# Patient Record
Sex: MALE | Race: BLACK OR AFRICAN AMERICAN | NOT HISPANIC OR LATINO | Employment: FULL TIME | URBAN - METROPOLITAN AREA
[De-identification: names, ages, dates, MRNs, and addresses within clinical notes are randomized per-mention and may not be internally consistent; named-entity substitution may affect disease eponyms.]

---

## 2017-01-06 ENCOUNTER — OFFICE VISIT (OUTPATIENT)
Dept: INTERNAL MEDICINE | Facility: CLINIC | Age: 57
End: 2017-01-06
Payer: COMMERCIAL

## 2017-01-06 VITALS
TEMPERATURE: 99 F | WEIGHT: 250.44 LBS | DIASTOLIC BLOOD PRESSURE: 83 MMHG | BODY MASS INDEX: 32.14 KG/M2 | HEART RATE: 75 BPM | SYSTOLIC BLOOD PRESSURE: 142 MMHG | HEIGHT: 74 IN | RESPIRATION RATE: 16 BRPM

## 2017-01-06 DIAGNOSIS — M21.612 BILATERAL BUNIONS: ICD-10-CM

## 2017-01-06 DIAGNOSIS — M21.611 BILATERAL BUNIONS: ICD-10-CM

## 2017-01-06 DIAGNOSIS — M1A.9XX0 CHRONIC GOUT WITHOUT TOPHUS, UNSPECIFIED CAUSE, UNSPECIFIED SITE: Primary | ICD-10-CM

## 2017-01-06 PROCEDURE — 3077F SYST BP >= 140 MM HG: CPT | Mod: S$GLB,,, | Performed by: FAMILY MEDICINE

## 2017-01-06 PROCEDURE — 99999 PR PBB SHADOW E&M-EST. PATIENT-LVL III: CPT | Mod: PBBFAC,,, | Performed by: FAMILY MEDICINE

## 2017-01-06 PROCEDURE — 3079F DIAST BP 80-89 MM HG: CPT | Mod: S$GLB,,, | Performed by: FAMILY MEDICINE

## 2017-01-06 PROCEDURE — 1159F MED LIST DOCD IN RCRD: CPT | Mod: S$GLB,,, | Performed by: FAMILY MEDICINE

## 2017-01-06 PROCEDURE — 99213 OFFICE O/P EST LOW 20 MIN: CPT | Mod: S$GLB,,, | Performed by: FAMILY MEDICINE

## 2017-01-06 RX ORDER — PREDNISONE 50 MG/1
50 TABLET ORAL DAILY
Qty: 5 TABLET | Refills: 0 | Status: SHIPPED | OUTPATIENT
Start: 2017-01-06 | End: 2017-01-11

## 2017-01-06 RX ORDER — ALLOPURINOL 300 MG/1
300 TABLET ORAL NIGHTLY
Qty: 30 TABLET | Refills: 11 | Status: SHIPPED | OUTPATIENT
Start: 2017-01-06 | End: 2017-03-17 | Stop reason: SDUPTHER

## 2017-01-06 NOTE — MR AVS SNAPSHOT
[place] - Internal Medicine   [facility]  [place] [address]  Phone: [phone]  Fax: [phone]                  Ghulam Ariza   2017 7:40 AM   Office Visit    Description:  Male : 1960   Provider:  Reggie Valle MD   Department:  [place] - [facility] Medicine           Reason for Visit     Gout           Diagnoses this Visit        Comments    Chronic gout without tophus, unspecified cause, unspecified site    -  Primary            To Do List           Goals (5 Years of Data)     None      Follow-Up and Disposition     Return if symptoms worsen or fail to improve.       These Medications        Disp Refills Start End    predniSONE (DELTASONE) 50 MG Tab 5 tablet 0 2017    Take 1 tablet (50 mg total) by mouth once daily. - Oral    Pharmacy: [facility] [id number] [place] [address] AT [facility] Ph #: [phone]       allopurinol (ZYLOPRIM) 300 MG tablet 30 tablet 11 2017     Take 1 tablet (300 mg total) by mouth every evening. - Oral    Pharmacy: [facility] [address] [id number] [facility] Ph #: [phone]         [facility] On Call     [facility] On Call Nurse Care Line -  Assistance  Registered nurses in the [facility] Center provide clinical advisement, health education, appointment booking, and other advisory services.  Call for this free service at [phone].             Medications           Message regarding Medications     Verify the changes and/or additions to your medication regime listed below are the same as discussed with your clinician today.  If any of these changes or additions are incorrect, please notify your healthcare provider.        START taking these NEW medications        Refills    predniSONE (DELTASONE) 50 MG Tab 0    Sig: Take 1 tablet (50 mg total) by mouth once daily.    Class: Normal    Route: Oral     "  CHANGE how you are taking these medications     Start Taking Instead of    allopurinol (ZYLOPRIM) 300 MG tablet allopurinol (ZYLOPRIM) 100 MG tablet    Dosage:  Take 1 tablet (300 mg total) by mouth every evening. Dosage:  Take 1 tablet (100 mg total) by mouth every evening.    Reason for Change:  Reorder            Verify that the below list of medications is an accurate representation of the medications you are currently taking.  If none reported, the list may be blank. If incorrect, please contact your healthcare provider. Carry this list with you in case of emergency.           Current Medications     allopurinol (ZYLOPRIM) 300 MG tablet Take 1 tablet (300 mg total) by mouth every evening.    benazepril (LOTENSIN) 40 MG tablet Take 1 tablet (40 mg total) by mouth once daily.    hydrochlorothiazide (MICROZIDE) 12.5 mg capsule Take 1 capsule (12.5 mg total) by mouth once daily.    VITAMIN D2 50,000 unit capsule     predniSONE (DELTASONE) 50 MG Tab Take 1 tablet (50 mg total) by mouth once daily.           Clinical Reference Information           Vital Signs - Last Recorded  Most recent update: 1/6/2017  7:48 AM by Lotus Philippe LPN    BP Pulse Temp Resp Ht Wt    (!) 142/83 (BP Location: Left arm, Patient Position: Sitting, BP Method: Manual) 75 98.5 °F (36.9 °C) (Oral) 16 6' 2" (1.88 m) 113.6 kg (250 lb 7.1 oz)    BMI                32.15 kg/m2          Blood Pressure          Most Recent Value    BP  (!)  142/83      Allergies as of 1/6/2017     Norvasc [Amlodipine]      Immunizations Administered on Date of Encounter - 1/6/2017     None      [facility] Sign-Up     Activating your [facility] account is as easy as 1-2-3!     1) Visit [email or web address], select Sign Up Now, enter this activation code and your date of birth, then select Next.  [email or web address]  Expires: 1/29/2017 10:21 AM      2) Create a username and password to use when you visit [facility] in the future and select a security question in case you " lose your password and select Next.    3) Enter your e-mail address and click Sign Up!    Additional Information  If you have questions, please e-mail [email or web address] or call [phone] to talk to our [facility] staff. Remember, [facility] is NOT to be used for urgent needs. For medical emergencies, dial 911.

## 2017-01-06 NOTE — PROGRESS NOTES
Subjective:       Patient ID: Ghulam rAiza is a 56 y.o. male.    Chief Complaint: Gout (right foot)    HPI 56-year-old -American male with chronic gout presents to clinic today secondary to a continued flare of gout which is now to his right great toe and has been worsening over the past week.  He was last seen approximately 3 weeks ago secondary to a gout flare to his bilateral ankles and left knee.  At that time he was not taking the allopurinol as prescribed.  Since that time he was restarted on allopurinol which he has been taking nightly and has also been drinking cherry juice but he began having a secondary flare to his right great toe secondary to missing a dose of allopurinol.  Secondarily, he also suffers from a bunion to the bilateral feet which he has seen podiatry in the past and has been using conservative treatment measures without relief.  At this time he is interested in bunionectomy.  Review of Systems   Constitutional: Negative for appetite change, chills, fatigue and fever.   HENT: Negative for congestion, ear pain, hearing loss, postnasal drip, rhinorrhea, sinus pressure, sore throat and tinnitus.    Eyes: Negative for redness, itching and visual disturbance.   Respiratory: Negative for cough, chest tightness and shortness of breath.    Cardiovascular: Negative for chest pain and palpitations.   Gastrointestinal: Negative for abdominal pain, constipation, diarrhea, nausea and vomiting.   Genitourinary: Negative for decreased urine volume, difficulty urinating, dysuria, frequency, hematuria and urgency.   Musculoskeletal: Positive for arthralgias (right great toe). Negative for back pain, myalgias, neck pain and neck stiffness.   Skin: Negative for rash.   Neurological: Negative for dizziness, light-headedness and headaches.   Psychiatric/Behavioral: Negative.        Objective:      Physical Exam   Constitutional: He is oriented to person, place, and time. He appears well-developed and  well-nourished. No distress.   HENT:   Head: Normocephalic and atraumatic.   Right Ear: External ear normal.   Left Ear: External ear normal.   Nose: Nose normal.   Mouth/Throat: Oropharynx is clear and moist. No oropharyngeal exudate.   Eyes: Conjunctivae and EOM are normal. Pupils are equal, round, and reactive to light. Right eye exhibits no discharge. Left eye exhibits no discharge. No scleral icterus.   Neck: Normal range of motion. Neck supple. No JVD present. No tracheal deviation present. No thyromegaly present.   Cardiovascular: Normal rate, regular rhythm, normal heart sounds and intact distal pulses.  Exam reveals no gallop and no friction rub.    No murmur heard.  Pulmonary/Chest: Effort normal and breath sounds normal. No stridor. No respiratory distress. He has no wheezes. He has no rales.   Abdominal: Soft. Bowel sounds are normal. He exhibits no distension and no mass. There is no tenderness. There is no rebound and no guarding.   Musculoskeletal: Normal range of motion. He exhibits no edema or tenderness.        Right foot: There is deformity ( bunion).        Left foot: There is deformity ( bunion).        Feet:    Lymphadenopathy:     He has no cervical adenopathy.   Neurological: He is alert and oriented to person, place, and time.   Skin: Skin is warm and dry. No rash noted. He is not diaphoretic. No erythema. No pallor.   Psychiatric: He has a normal mood and affect. His behavior is normal. Judgment and thought content normal.   Nursing note and vitals reviewed.      Assessment:       1. Chronic gout without tophus, unspecified cause, unspecified site    2. Bilateral bunions        Plan:       1.  Prednisone 50 mg daily ×5 days.  2.  Increase allopurinol to 300 mg daily.  3.  Refer to podiatry for further evaluation and treatment of chronic gout and bilateral bunions.  4.  Return to clinic as needed if symptoms persist or worsen.

## 2017-01-11 ENCOUNTER — TELEPHONE (OUTPATIENT)
Dept: INTERNAL MEDICINE | Facility: CLINIC | Age: 57
End: 2017-01-11

## 2017-01-24 ENCOUNTER — OFFICE VISIT (OUTPATIENT)
Dept: INTERNAL MEDICINE | Facility: CLINIC | Age: 57
End: 2017-01-24
Payer: COMMERCIAL

## 2017-01-24 VITALS
SYSTOLIC BLOOD PRESSURE: 130 MMHG | BODY MASS INDEX: 32.03 KG/M2 | HEIGHT: 74 IN | HEART RATE: 78 BPM | RESPIRATION RATE: 16 BRPM | WEIGHT: 249.56 LBS | DIASTOLIC BLOOD PRESSURE: 80 MMHG | TEMPERATURE: 98 F

## 2017-01-24 DIAGNOSIS — Z01.818 PRE-OP EXAM: Primary | ICD-10-CM

## 2017-01-24 DIAGNOSIS — M47.22 CERVICAL SPONDYLOSIS WITH RADICULOPATHY: ICD-10-CM

## 2017-01-24 DIAGNOSIS — E55.9 VITAMIN D DEFICIENCY DISEASE: ICD-10-CM

## 2017-01-24 DIAGNOSIS — M54.9 BACK PAIN, UNSPECIFIED BACK LOCATION, UNSPECIFIED BACK PAIN LATERALITY, UNSPECIFIED CHRONICITY: ICD-10-CM

## 2017-01-24 DIAGNOSIS — M1A.9XX0 CHRONIC GOUT WITHOUT TOPHUS, UNSPECIFIED CAUSE, UNSPECIFIED SITE: ICD-10-CM

## 2017-01-24 DIAGNOSIS — M21.611 BUNION OF GREAT TOE OF RIGHT FOOT: ICD-10-CM

## 2017-01-24 DIAGNOSIS — I10 ESSENTIAL HYPERTENSION: ICD-10-CM

## 2017-01-24 PROCEDURE — 1159F MED LIST DOCD IN RCRD: CPT | Mod: S$GLB,,, | Performed by: FAMILY MEDICINE

## 2017-01-24 PROCEDURE — 99999 PR PBB SHADOW E&M-EST. PATIENT-LVL III: CPT | Mod: PBBFAC,,, | Performed by: FAMILY MEDICINE

## 2017-01-24 PROCEDURE — 93010 ELECTROCARDIOGRAM REPORT: CPT | Mod: S$GLB,,, | Performed by: INTERNAL MEDICINE

## 2017-01-24 PROCEDURE — 3075F SYST BP GE 130 - 139MM HG: CPT | Mod: S$GLB,,, | Performed by: FAMILY MEDICINE

## 2017-01-24 PROCEDURE — 3079F DIAST BP 80-89 MM HG: CPT | Mod: S$GLB,,, | Performed by: FAMILY MEDICINE

## 2017-01-24 PROCEDURE — 93005 ELECTROCARDIOGRAM TRACING: CPT | Mod: S$GLB,,, | Performed by: FAMILY MEDICINE

## 2017-01-24 PROCEDURE — 99214 OFFICE O/P EST MOD 30 MIN: CPT | Mod: S$GLB,,, | Performed by: FAMILY MEDICINE

## 2017-01-24 RX ORDER — INDOMETHACIN 75 MG/1
CAPSULE, EXTENDED RELEASE ORAL
Refills: 0 | COMMUNITY
Start: 2017-01-13 | End: 2017-10-23 | Stop reason: ALTCHOICE

## 2017-01-24 RX ORDER — METOPROLOL SUCCINATE 25 MG/1
25 TABLET, EXTENDED RELEASE ORAL DAILY
Qty: 30 TABLET | Refills: 11 | Status: SHIPPED | OUTPATIENT
Start: 2017-01-24 | End: 2017-01-26 | Stop reason: SDUPTHER

## 2017-01-24 NOTE — PROGRESS NOTES
Subjective:       Patient ID: Ghulam Ariza is a 56 y.o. male.    Chief Complaint: Pre-op Exam (clearnce for foot surgery)    HPI 56-year-old -American male presents today for preoperative exam in order to have right foot bunion surgery performed by Dr. Huffman.  He has been seeing podiatry for continued treatment of the bunion but also for recurrent gout to the right great toe.  She reports 2 recent incision and drainage of the right toe secondary to gout.  Secondarily he is being treated for hypertension and has been on hydrochlorothiazide with adequate control of his blood pressure.  Secondary to the patient's recurrent gout I have recommended that we discontinue hydrochlorothiazide.  Other medications have been discussed and I recommend that the patient be started on metoprolol 25 mg daily in conjunction with the benazepril 40 mg daily for blood pressure control.  He continues to use allopurinol daily at this time.  Secondarily he continues to be treated for neck and back pain which at this time are stable.  Review of Systems   Constitutional: Negative for appetite change, chills, fatigue and fever.   HENT: Negative for congestion, ear pain, hearing loss, postnasal drip, rhinorrhea, sinus pressure, sore throat and tinnitus.    Eyes: Negative for redness, itching and visual disturbance.   Respiratory: Negative for cough, chest tightness and shortness of breath.    Cardiovascular: Negative for chest pain and palpitations.   Gastrointestinal: Negative for abdominal pain, constipation, diarrhea, nausea and vomiting.   Genitourinary: Negative for decreased urine volume, difficulty urinating, dysuria, frequency, hematuria and urgency.   Musculoskeletal: Positive for arthralgias (right foot pain). Negative for back pain, myalgias, neck pain and neck stiffness.   Skin: Negative for rash.   Neurological: Negative for dizziness, light-headedness and headaches.   Psychiatric/Behavioral: Negative.        Objective:       Physical Exam   Constitutional: He is oriented to person, place, and time. He appears well-developed and well-nourished. No distress.   HENT:   Head: Normocephalic and atraumatic.   Right Ear: External ear normal.   Left Ear: External ear normal.   Nose: Nose normal.   Mouth/Throat: Oropharynx is clear and moist. No oropharyngeal exudate.   Eyes: Conjunctivae and EOM are normal. Pupils are equal, round, and reactive to light. Right eye exhibits no discharge. Left eye exhibits no discharge. No scleral icterus.   Neck: Normal range of motion. Neck supple. No JVD present. No tracheal deviation present. No thyromegaly present.   Cardiovascular: Normal rate, regular rhythm, normal heart sounds and intact distal pulses.  Exam reveals no gallop and no friction rub.    No murmur heard.  Pulmonary/Chest: Effort normal and breath sounds normal. No stridor. No respiratory distress. He has no wheezes. He has no rales.   Abdominal: Soft. Bowel sounds are normal. He exhibits no distension and no mass. There is no tenderness. There is no rebound and no guarding.   Musculoskeletal: Normal range of motion. He exhibits no edema or tenderness.   Right foot in boot     Lymphadenopathy:     He has no cervical adenopathy.   Neurological: He is alert and oriented to person, place, and time.   Skin: Skin is warm and dry. No rash noted. He is not diaphoretic. No erythema. No pallor.   Psychiatric: He has a normal mood and affect. His behavior is normal. Judgment and thought content normal.   Nursing note and vitals reviewed.    EKG: Normal sinus rhythm with incomplete right bundle branch block.  Ventricular rate 62 bpm.  Component      Latest Ref Rng & Units 12/15/2016   WBC      3.90 - 12.70 K/uL 5.50   RBC      4.60 - 6.20 M/uL 4.72   Hemoglobin      14.0 - 18.0 g/dL 12.1 (L)   Hematocrit      40.0 - 54.0 % 37.4 (L)   MCV      82 - 98 fL 79 (L)   MCH      27.0 - 31.0 pg 25.6 (L)   MCHC      32.0 - 36.0 % 32.4   RDW      11.5 - 14.5 %  15.5 (H)   Platelets      150 - 350 K/uL 221   MPV      9.2 - 12.9 fL 10.5   Gran #      1.8 - 7.7 K/uL 2.9   Lymph #      1.0 - 4.8 K/uL 2.0   Mono #      0.3 - 1.0 K/uL 0.4   Eos #      0.0 - 0.5 K/uL 0.2   Baso #      0.00 - 0.20 K/uL 0.00   Gran%      38.0 - 73.0 % 52.9   Lymph%      18.0 - 48.0 % 36.2   Mono%      4.0 - 15.0 % 6.5   Eosinophil%      0.0 - 8.0 % 4.2   Basophil%      0.0 - 1.9 % 0.0   Differential Method       Automated   Sodium      136 - 145 mmol/L 138   Potassium      3.5 - 5.1 mmol/L 3.7   Chloride      95 - 110 mmol/L 102   CO2      23 - 29 mmol/L 28   Glucose      70 - 110 mg/dL 95   BUN, Bld      6 - 20 mg/dL 16   Creatinine      0.5 - 1.4 mg/dL 1.4   Calcium      8.7 - 10.5 mg/dL 9.1   Total Protein      6.0 - 8.4 g/dL 7.6   Albumin      3.5 - 5.2 g/dL 3.7   Total Bilirubin      0.1 - 1.0 mg/dL 0.3   Alkaline Phosphatase      55 - 135 U/L 84   AST      10 - 40 U/L 62 (H)   ALT      10 - 44 U/L 65 (H)   Anion Gap      8 - 16 mmol/L 8   eGFR if African American      >60 mL/min/1.73 m:2 >60.0   eGFR if non African American      >60 mL/min/1.73 m:2 55.8 (A)   Uric Acid      3.4 - 7.0 mg/dL 7.5 (H)     Assessment:       1. Pre-op exam    2. Bunion of great toe of right foot    3. Chronic gout without tophus, unspecified cause, unspecified site    4. Essential hypertension    5. Vitamin D deficiency disease    6. Cervical spondylosis with radiculopathy    7. Back pain, unspecified back location, unspecified back pain laterality, unspecified chronicity        Plan:       1.  EKG now.  2.  Discontinue hydrochlorothiazide and start metoprolol 25 mg daily.  Continue enalapril 40 mg daily.  3.  Continue vitamin D as prescribed.  4.  Continue Tylenol and ibuprofen as needed.  5.  The patient is okay to proceed with bunion surgery as scheduled.  6.  Return to clinic as needed or in 1 month for hypertension reevaluation.

## 2017-01-24 NOTE — MR AVS SNAPSHOT
[place] - Internal Medicine   [facility]  [place] [address]  Phone: [phone]  Fax: [phone]                  Ghulam Ariza   2017 9:40 AM   Office Visit    Description:  Male : 1960   Provider:  Reggie Valle MD   Department:  [place] - Internal Medicine           Reason for Visit     Pre-op Exam           Diagnoses this Visit        Comments    Pre-op exam    -  Primary     Bunion of great toe of right foot         Chronic gout without tophus, unspecified cause, unspecified site         Essential hypertension         Vitamin D deficiency disease         Cervical spondylosis with radiculopathy         Back pain, unspecified back location, unspecified back pain laterality, unspecified chronicity                To Do List           Goals (5 Years of Data)     None      Follow-Up and Disposition     Return in about 1 month (around 2017), or if symptoms worsen or fail to improve, for HTN reevaluation with Metoprolol .       These Medications        Disp Refills Start End    metoprolol succinate (TOPROL-XL) 25 MG 24 hr tablet 30 tablet 11 2017    Take 1 tablet (25 mg total) by mouth once daily. - Oral    Pharmacy: [id number] - [place] - [address]  #: [phone]         [facility] On Call     [facility] On Call Nurse Care Line -  Assistance  Registered nurses in the [facility] On Call Center provide clinical advisement, health education, appointment booking, and other advisory services.  Call for this free service at [phone].             Medications           Message regarding Medications     Verify the changes and/or additions to your medication regime listed below are the same as discussed with your clinician today.  If any of these changes or additions are incorrect, please notify your healthcare provider.        START taking these NEW medications        Refills    metoprolol succinate (TOPROL-XL) 25 MG 24 hr tablet  "11    Sig: Take 1 tablet (25 mg total) by mouth once daily.    Class: Normal    Route: Oral      STOP taking these medications     hydrochlorothiazide (MICROZIDE) 12.5 mg capsule Take 1 capsule (12.5 mg total) by mouth once daily.           Verify that the below list of medications is an accurate representation of the medications you are currently taking.  If none reported, the list may be blank. If incorrect, please contact your healthcare provider. Carry this list with you in case of emergency.           Current Medications     allopurinol (ZYLOPRIM) 300 MG tablet Take 1 tablet (300 mg total) by mouth every evening.    benazepril (LOTENSIN) 40 MG tablet Take 1 tablet (40 mg total) by mouth once daily.    indomethacin (INDOCIN SR) 75 mg CpSR CR capsule TAKE ONE PILL BY MOUTH TWICE DAILY- EVERY 12 HOURS    metoprolol succinate (TOPROL-XL) 25 MG 24 hr tablet Take 1 tablet (25 mg total) by mouth once daily.    VITAMIN D2 50,000 unit capsule            Clinical Reference Information           Vital Signs - Last Recorded  Most recent update: 1/24/2017 10:28 AM by Reggie Valle MD    BP Pulse Temp Resp Ht Wt    130/80 (BP Location: Left arm, Patient Position: Sitting, BP Method: Manual) 78 98.1 °F (36.7 °C) (Oral) 16 6' 2" (1.88 m) 113.2 kg (249 lb 9 oz)    BMI                32.04 kg/m2          Blood Pressure          Most Recent Value    BP  130/80      Allergies as of 1/24/2017     Norvasc [Amlodipine]      Immunizations Administered on Date of Encounter - 1/24/2017     None      Orders Placed During Today's Visit      Normal Orders This Visit    IN OFFICE EKG 12-LEAD (to [place])       [facility] Sign-Up     Activating your [facility] account is as easy as 1-2-3!     1) Visit [email or web address], select Sign Up Now, enter this activation code and your date of birth, then select Next.  [email or web address]  Expires: 1/29/2017 10:21 AM      2) Create a username and password to use when you visit [facility] in the future and " select a security question in case you lose your password and select Next.    3) Enter your e-mail address and click Sign Up!    Additional Information  If you have questions, please e-mail [email or web address] or call [phone] to talk to our [facility] staff. Remember, [facility] is NOT to be used for urgent needs. For medical emergencies, dial 911.

## 2017-01-26 RX ORDER — BENAZEPRIL HYDROCHLORIDE 40 MG/1
40 TABLET ORAL DAILY
Qty: 90 TABLET | Refills: 3 | Status: SHIPPED | OUTPATIENT
Start: 2017-01-26 | End: 2018-01-23 | Stop reason: SDUPTHER

## 2017-01-26 RX ORDER — BENAZEPRIL HYDROCHLORIDE 40 MG/1
40 TABLET ORAL DAILY
Qty: 90 TABLET | Refills: 3 | Status: SHIPPED | OUTPATIENT
Start: 2017-01-26 | End: 2017-01-26 | Stop reason: SDUPTHER

## 2017-01-26 RX ORDER — METOPROLOL SUCCINATE 25 MG/1
25 TABLET, EXTENDED RELEASE ORAL DAILY
Qty: 30 TABLET | Refills: 11 | Status: SHIPPED | OUTPATIENT
Start: 2017-01-26 | End: 2017-01-26 | Stop reason: SDUPTHER

## 2017-01-26 RX ORDER — METOPROLOL SUCCINATE 25 MG/1
25 TABLET, EXTENDED RELEASE ORAL DAILY
Qty: 90 TABLET | Refills: 3 | Status: SHIPPED | OUTPATIENT
Start: 2017-01-26 | End: 2017-10-26

## 2017-03-06 ENCOUNTER — TELEPHONE (OUTPATIENT)
Dept: RHEUMATOLOGY | Facility: CLINIC | Age: 57
End: 2017-03-06

## 2017-03-06 NOTE — TELEPHONE ENCOUNTER
Attempted to contact pt. To offer sooner appt, unavailable at this time. Left message for pt. To call back.

## 2017-03-07 ENCOUNTER — OFFICE VISIT (OUTPATIENT)
Dept: RHEUMATOLOGY | Facility: CLINIC | Age: 57
End: 2017-03-07
Payer: COMMERCIAL

## 2017-03-07 VITALS — DIASTOLIC BLOOD PRESSURE: 86 MMHG | HEART RATE: 61 BPM | HEIGHT: 74 IN | SYSTOLIC BLOOD PRESSURE: 131 MMHG

## 2017-03-07 DIAGNOSIS — M10.9 GOUTY ARTHRITIS: Primary | ICD-10-CM

## 2017-03-07 PROCEDURE — 99205 OFFICE O/P NEW HI 60 MIN: CPT | Mod: S$GLB,,, | Performed by: INTERNAL MEDICINE

## 2017-03-07 PROCEDURE — 3075F SYST BP GE 130 - 139MM HG: CPT | Mod: S$GLB,,, | Performed by: INTERNAL MEDICINE

## 2017-03-07 PROCEDURE — 3079F DIAST BP 80-89 MM HG: CPT | Mod: S$GLB,,, | Performed by: INTERNAL MEDICINE

## 2017-03-07 PROCEDURE — 1160F RVW MEDS BY RX/DR IN RCRD: CPT | Mod: S$GLB,,, | Performed by: INTERNAL MEDICINE

## 2017-03-07 PROCEDURE — 99999 PR PBB SHADOW E&M-EST. PATIENT-LVL III: CPT | Mod: PBBFAC,,, | Performed by: INTERNAL MEDICINE

## 2017-03-07 RX ORDER — COLCHICINE 0.6 MG/1
0.6 TABLET, FILM COATED ORAL DAILY
Qty: 30 TABLET | Refills: 3 | Status: SHIPPED | OUTPATIENT
Start: 2017-03-07 | End: 2017-03-15 | Stop reason: ALTCHOICE

## 2017-03-07 ASSESSMENT — ROUTINE ASSESSMENT OF PATIENT INDEX DATA (RAPID3)
TOTAL RAPID3 SCORE: 4.55
PAIN SCORE: 6
PSYCHOLOGICAL DISTRESS SCORE: 1.1
AM STIFFNESS SCORE: 0, NO
PATIENT GLOBAL ASSESSMENT SCORE: 5
FATIGUE SCORE: 5
MDHAQ FUNCTION SCORE: .8

## 2017-03-07 NOTE — MR AVS SNAPSHOT
[facility]  [address]  [facility] [address]  Phone: [phone]  Fax: [phone]                  Ghulam Ariza   3/7/2017 9:30 AM   Office Visit    Description:  Male : 1960   Provider:  Kristel Arboleda MD   Department:  [facility]           Reason for Visit     Joint Pain           Diagnoses this Visit        Comments    Gouty arthritis    -  Primary            To Do List           Future Appointments        Provider Department Dept Phone    2017 9:00 AM LAB, [facility] [phone]    2017 10:00 AM METH XR1 300 LB LIMIT [facility] [phone]      Goals (5 Years of Data)     None       These Medications        Disp Refills Start End    COLCRYS 0.6 mg tablet 30 tablet 3 3/7/2017 3/7/2018    Take 1 tablet (0.6 mg total) by mouth once daily. - Oral    Pharmacy: [id number] - [place] [address] AT [facility] #: [phone]         [facility]     [facility] -  Assistance  Registered nurses in the [facility] provide clinical advisement, health education, appointment booking, and other advisory services.  Call for this free service at [phone].             Medications           Message regarding Medications     Verify the changes and/or additions to your medication regime listed below are the same as discussed with your clinician today.  If any of these changes or additions are incorrect, please notify your healthcare provider.        START taking these NEW medications        Refills    COLCRYS 0.6 mg tablet 3    Sig: Take 1 tablet (0.6 mg total) by mouth once daily.    Class: Normal    Route: Oral           Verify that the below list of medications is an accurate representation of the medications you are currently taking.  If none reported, the list may be blank. If incorrect, please contact your  "healthcare provider. Carry this list with you in case of emergency.           Current Medications     allopurinol (ZYLOPRIM) 300 MG tablet Take 1 tablet (300 mg total) by mouth every evening.    benazepril (LOTENSIN) 40 MG tablet Take 1 tablet (40 mg total) by mouth once daily.    indomethacin (INDOCIN SR) 75 mg CpSR CR capsule TAKE ONE PILL BY MOUTH TWICE DAILY- EVERY 12 HOURS    metoprolol succinate (TOPROL-XL) 25 MG 24 hr tablet Take 1 tablet (25 mg total) by mouth once daily.    VITAMIN D2 50,000 unit capsule     COLCRYS 0.6 mg tablet Take 1 tablet (0.6 mg total) by mouth once daily.           Clinical Reference Information           Your Vitals Were     BP Pulse Height             131/86 (BP Location: Left arm, Patient Position: Sitting, BP Method: Automatic) 61 6' 2" (1.88 m)         Blood Pressure          Most Recent Value    BP  131/86      Allergies as of 3/7/2017     Norvasc [Amlodipine]      Immunizations Administered on Date of Encounter - 3/7/2017     None      Orders Placed During Today's Visit     Future Labs/Procedures Expected by Expires    C-reactive protein  3/7/2017 5/6/2018    Comprehensive metabolic panel  3/7/2017 5/6/2018    Cyclic citrul peptide antibody, IgG  3/7/2017 5/6/2018    Hepatitis B core antibody, IgM  3/7/2017 5/6/2018    Hepatitis B surface antibody  3/7/2017 5/6/2018    Hepatitis B surface antigen  3/7/2017 5/6/2018    Hepatitis C antibody  3/7/2017 5/6/2018    Rheumatoid factor  3/7/2017 5/6/2018    Sedimentation rate, manual  3/7/2017 5/6/2018    URIC ACID  3/7/2017 5/6/2018    X-Ray Hand 3 View Bilateral  3/7/2017 3/7/2018      [facility] Sign-Up     Activating your [facility] account is as easy as 1-2-3!     1) Visit [email or web address], select Sign Up Now, enter this activation code and your date of birth, then select Next.  [email or web address]  Expires: 4/21/2017 10:29 AM      2) Create a username and password to use when you visit [facility] in the future and select a security " question in case you lose your password and select Next.    3) Enter your e-mail address and click Sign Up!    Additional Information  If you have questions, please e-mail [email or web address] or call [phone] to talk to our [facility] staff. Remember, [facility] is NOT to be used for urgent needs. For medical emergencies, dial 911.         Language Assistance Services     ATTENTION: Language assistance services are available, free of charge. Please call [phone].      ATENCIÓN: Si habla español, tiene a rosas disposición servicios gratuitos de asistencia lingüística. Llame al [phone].     ELBA Ý: N?u b?n nói Ti?ng Vi?t, có các d?ch v? h? tr? ngôn ng? mi?n phí dành cho b?n. G?i s? [phone].         [facility] complies with applicable Federal civil rights laws and does not discriminate on the basis of race, color, national origin, age, disability, or sex.

## 2017-03-07 NOTE — PROGRESS NOTES
Subjective:       Patient ID: Ghulam Ariza is a 56 y.o. male.    Chief Complaint: Joint Pain    HPI    57 yo M with PMH of DJD of cervical spine, HTN, right foot bunionectomy here for evaluation of joint pain.  He reports he was diagnosed with gout around age 50. His first episode was in right foot podagra.   He reports having episodes of joint swelling in elbows, usually one at a time.  Reports that he started having gout attacks since being on HCTZ.  He reports that he may have injured the left second finger and now has trouble bending it.  He reports one episode of pain in left second finger.  He reports having 4- 5 attacks in last year.  He has been off HCTZ and has not had any more attacks.  He repots having tophi at site of right bunionectomy.  He drinks occasionally. He has been on allopurinol since  Beginning of January.   He reports skipping some doses.        Past Medical History:   Diagnosis Date    Degenerative disc disease     cervical radiculopathy    Genital herpes     Hx of colonic polyps     Hypertension     Pinched nerve in neck        Review of Systems   Constitutional: Negative for activity change, appetite change, chills, diaphoresis, fatigue and fever.   HENT: Negative for ear discharge, ear pain, hearing loss, mouth sores, nosebleeds and sinus pressure.    Eyes: Negative.  Negative for photophobia, pain, discharge, redness and itching.   Respiratory: Negative for cough, chest tightness and shortness of breath.    Cardiovascular: Negative for chest pain, palpitations and leg swelling.   Gastrointestinal: Negative for abdominal distention, blood in stool and nausea.   Endocrine: Negative for cold intolerance, heat intolerance, polydipsia and polyphagia.   Genitourinary: Negative for flank pain, genital sores and hematuria.   Musculoskeletal: Positive for arthralgias, back pain, joint swelling, myalgias, neck pain and neck stiffness. Negative for gait problem.   Skin: Negative for color  "change, pallor and rash.   Neurological: Negative for dizziness, weakness, light-headedness and headaches.   Hematological: Negative for adenopathy. Does not bruise/bleed easily.   Psychiatric/Behavioral: Negative for decreased concentration and hallucinations. The patient is not nervous/anxious.            Objective:   /86 (BP Location: Left arm, Patient Position: Sitting, BP Method: Automatic)  Pulse 61  Ht 6' 2" (1.88 m)     Physical Exam   Constitutional: He is oriented to person, place, and time and well-developed, well-nourished, and in no distress. No distress.   HENT:   Head: Normocephalic and atraumatic.   Right Ear: External ear normal.   Left Ear: External ear normal.   Nose: Nose normal.   Mouth/Throat: Oropharynx is clear and moist. No oropharyngeal exudate.   Eyes: Conjunctivae and EOM are normal. Pupils are equal, round, and reactive to light. Right eye exhibits no discharge. Left eye exhibits no discharge. No scleral icterus.   Neck: No JVD present. No tracheal deviation present. No thyromegaly present.   Cardiovascular: Normal rate and normal heart sounds.    Pulmonary/Chest: No stridor. No respiratory distress. He exhibits no tenderness.   Abdominal: Soft. Bowel sounds are normal. He exhibits no distension and no mass. There is no tenderness. There is no guarding.   Lymphadenopathy:     He has no cervical adenopathy.   Neurological: He is alert and oriented to person, place, and time.   Skin: He is not diaphoretic.     Musculoskeletal: He exhibits no edema.   Shoulders:FROM    Left hand mcp with tenderness and mild swelling  Elbows: bursitis of both elbows, worse on right  Knees, ankles: cool; no effusions  Feet: no synovitis           Foot xrays: (2016): I personally reviewed; gouty erosion      Assessment:       No diagnosis found.      55 yo M with PMH of DJD of cervical spine, HTN, right foot bunionectomy here for evaluation of joint pain.  His clinical picture is consistent with gouty " arthritis.  Goal will be to keep uric acid less than 5.    Plan:         Start colchicine 0.6 mg po qday  Continue allopurinol 300mg poq day  Labs in 5 weeks  xrays  Over 50 percent of visit was used to  re: diet for gout and treatment options for gout  *

## 2017-03-15 ENCOUNTER — TELEPHONE (OUTPATIENT)
Dept: RHEUMATOLOGY | Facility: CLINIC | Age: 57
End: 2017-03-15

## 2017-03-15 RX ORDER — COLCHICINE 0.6 MG/1
0.6 CAPSULE ORAL DAILY
Qty: 60 CAPSULE | Refills: 6 | Status: SHIPPED | OUTPATIENT
Start: 2017-03-15 | End: 2017-10-26

## 2017-03-17 DIAGNOSIS — M1A.9XX0 CHRONIC GOUT WITHOUT TOPHUS, UNSPECIFIED CAUSE, UNSPECIFIED SITE: ICD-10-CM

## 2017-03-17 RX ORDER — ALLOPURINOL 300 MG/1
300 TABLET ORAL NIGHTLY
Qty: 90 TABLET | Refills: 3 | Status: SHIPPED | OUTPATIENT
Start: 2017-03-17 | End: 2018-06-15

## 2017-03-28 ENCOUNTER — OFFICE VISIT (OUTPATIENT)
Dept: INTERNAL MEDICINE | Facility: CLINIC | Age: 57
End: 2017-03-28
Payer: COMMERCIAL

## 2017-03-28 ENCOUNTER — TELEPHONE (OUTPATIENT)
Dept: INTERNAL MEDICINE | Facility: CLINIC | Age: 57
End: 2017-03-28

## 2017-03-28 VITALS
SYSTOLIC BLOOD PRESSURE: 145 MMHG | WEIGHT: 254.88 LBS | HEIGHT: 74 IN | TEMPERATURE: 98 F | RESPIRATION RATE: 16 BRPM | HEART RATE: 73 BPM | BODY MASS INDEX: 32.71 KG/M2 | DIASTOLIC BLOOD PRESSURE: 90 MMHG

## 2017-03-28 DIAGNOSIS — M25.511 ACUTE PAIN OF RIGHT SHOULDER: Primary | ICD-10-CM

## 2017-03-28 PROCEDURE — 3080F DIAST BP >= 90 MM HG: CPT | Mod: S$GLB,,, | Performed by: FAMILY MEDICINE

## 2017-03-28 PROCEDURE — 20610 DRAIN/INJ JOINT/BURSA W/O US: CPT | Mod: S$GLB,,, | Performed by: FAMILY MEDICINE

## 2017-03-28 PROCEDURE — 3077F SYST BP >= 140 MM HG: CPT | Mod: S$GLB,,, | Performed by: FAMILY MEDICINE

## 2017-03-28 PROCEDURE — 99214 OFFICE O/P EST MOD 30 MIN: CPT | Mod: 25,S$GLB,, | Performed by: FAMILY MEDICINE

## 2017-03-28 PROCEDURE — 99999 PR PBB SHADOW E&M-EST. PATIENT-LVL III: CPT | Mod: PBBFAC,,, | Performed by: FAMILY MEDICINE

## 2017-03-28 PROCEDURE — 1160F RVW MEDS BY RX/DR IN RCRD: CPT | Mod: S$GLB,,, | Performed by: FAMILY MEDICINE

## 2017-03-28 RX ORDER — TRIAMCINOLONE ACETONIDE 40 MG/ML
40 INJECTION, SUSPENSION INTRA-ARTICULAR; INTRAMUSCULAR
Status: COMPLETED | OUTPATIENT
Start: 2017-03-28 | End: 2017-03-28

## 2017-03-28 RX ADMIN — TRIAMCINOLONE ACETONIDE 40 MG: 40 INJECTION, SUSPENSION INTRA-ARTICULAR; INTRAMUSCULAR at 11:03

## 2017-03-28 NOTE — TELEPHONE ENCOUNTER
Patient came for appt today.  He wanted a trigger injection in his shoulder.  Said pain was 9-10.    i explained dr guo doesn't do trigger injections but he may order one to go in the buttock.  He preferred to see dr winters later and maybe get a trigger injection.  Says he gave him one before to try and avoid a shot in the neck by pain management.     We moved appt to later today.  i told him i'd call if someone cancelled earlier and apologized for any inconvenience.

## 2017-03-28 NOTE — PROGRESS NOTES
Subjective:       Patient ID: Ghulam rAiza is a 56 y.o. male.    Chief Complaint: Shoulder Pain (right, wants injection)    HPI 56-year-old -American male with cervical radiculopathy and gouty arthritis presents to clinic today secondary to a complaint of worsening right shoulder pain since Sunday.  He has had shoulder pain in the past and has undergone injections in the past with improvement of symptoms.  His last shoulder injection was in 2014.  Recently he awoke on Sunday and has been noticing increased pain to his right shoulder with radiation into his right arm.  He has been noticing worsening pain with overhead activities and his pain has been better with his arm at his side.  He currently rates the pain at a 10 over 10.  He has been taking ibuprofen and Robaxin without relief.  At this time he is interested in a repeat shoulder injection.  Review of Systems   Constitutional: Negative for appetite change, chills, fatigue and fever.   HENT: Negative for congestion, ear pain, hearing loss, postnasal drip, rhinorrhea, sinus pressure, sore throat and tinnitus.    Eyes: Negative for redness, itching and visual disturbance.   Respiratory: Negative for cough, chest tightness and shortness of breath.    Cardiovascular: Negative for chest pain and palpitations.   Gastrointestinal: Negative for abdominal pain, constipation, diarrhea, nausea and vomiting.   Genitourinary: Negative for decreased urine volume, difficulty urinating, dysuria, frequency, hematuria and urgency.   Musculoskeletal: Positive for arthralgias (Right shoulder with radiation into right arm). Negative for back pain, myalgias, neck pain and neck stiffness.   Skin: Negative for rash.   Neurological: Negative for dizziness, light-headedness and headaches.   Psychiatric/Behavioral: Negative.        Objective:      Physical Exam   Constitutional: He is oriented to person, place, and time. He appears well-developed and well-nourished. No distress.    HENT:   Head: Normocephalic and atraumatic.   Right Ear: External ear normal.   Left Ear: External ear normal.   Nose: Nose normal.   Mouth/Throat: Oropharynx is clear and moist. No oropharyngeal exudate.   Eyes: Conjunctivae and EOM are normal. Pupils are equal, round, and reactive to light. Right eye exhibits no discharge. Left eye exhibits no discharge. No scleral icterus.   Neck: Normal range of motion. Neck supple. No JVD present. No tracheal deviation present. No thyromegaly present.   Cardiovascular: Normal rate, regular rhythm, normal heart sounds and intact distal pulses.  Exam reveals no gallop and no friction rub.    No murmur heard.  Pulmonary/Chest: Effort normal and breath sounds normal. No stridor. No respiratory distress. He has no wheezes. He has no rales.   Abdominal: Soft. Bowel sounds are normal. He exhibits no distension and no mass. There is no tenderness. There is no rebound and no guarding.   Musculoskeletal: He exhibits no edema.        Right shoulder: He exhibits decreased range of motion, tenderness, pain and spasm.   Lymphadenopathy:     He has no cervical adenopathy.   Neurological: He is alert and oriented to person, place, and time.   Skin: Skin is warm and dry. No rash noted. He is not diaphoretic. No erythema. No pallor.   Psychiatric: He has a normal mood and affect. His behavior is normal. Judgment and thought content normal.   Nursing note and vitals reviewed.      Assessment:       1. Acute pain of right shoulder        Plan:       1.  The risks and benefits of intra-articular steroid injection has been discussed and the patient wishes to proceed with injection.  The right shoulder was prepped in sterile fashion with Betadine.  1 cc of 40 mg/cc Kenalog and 4 cc of 0.5% Marcaine were injected into the right shoulder via the subacromial approach using a 25-gauge needle.  Patient tolerated the procedure well.  Band-Aid was placed for hemostasis.  2.  The patient has been informed  to use ice as needed for any pain or postinjection site soreness.  3.  Return to clinic as needed if symptoms persist or worsen.

## 2017-03-28 NOTE — MR AVS SNAPSHOT
[facility]   [facility]  [place] [address]  Phone: [phone]  Fax: [phone]                  Ghulam Ariza   3/28/2017 11:20 AM   Office Visit    Description:  Male : 1960   Provider:  Reggie Valle MD   Department:  [facility]           Reason for Visit     Shoulder Pain           Diagnoses this Visit        Comments    Acute pain of right shoulder    -  Primary            To Do List           Future Appointments        Provider Department Dept Phone    2017 9:00 AM LAB, [facility] - Laboratory [phone]    2017 10:00 AM METH XR1 300 LB LIMIT [facility] [phone]    2017 9:30 AM Kristel Arboleda MD [facility] - Rheumatology [phone]      Goals (5 Years of Data)     None      Follow-Up and Disposition     Return if symptoms worsen or fail to improve.      [facility]     [facility] Nurse Care Line -  Assistance  Registered nurses in the [facility] provide clinical advisement, health education, appointment booking, and other advisory services.  Call for this free service at [phone].             Medications           Message regarding Medications     Verify the changes and/or additions to your medication regime listed below are the same as discussed with your clinician today.  If any of these changes or additions are incorrect, please notify your healthcare provider.        These medications were administered today        Dose Freq    triamcinolone acetonide injection 40 mg 40 mg Clinic/HOD 1 time    Sig: Inject 1 mL (40 mg total) into the articular space one time.    Class: Normal    Route: Intra-articular           Verify that the below list of medications is an accurate representation of the medications you are currently taking.  If none reported, the list may be blank. If incorrect, please contact your healthcare provider. Carry this list with you in case  "of emergency.           Current Medications     allopurinol (ZYLOPRIM) 300 MG tablet Take 1 tablet (300 mg total) by mouth every evening.    benazepril (LOTENSIN) 40 MG tablet Take 1 tablet (40 mg total) by mouth once daily.    colchicine 0.6 mg Cap Take 1 capsule (0.6 mg total) by mouth once daily.    indomethacin (INDOCIN SR) 75 mg CpSR CR capsule TAKE ONE PILL BY MOUTH TWICE DAILY- EVERY 12 HOURS    metoprolol succinate (TOPROL-XL) 25 MG 24 hr tablet Take 1 tablet (25 mg total) by mouth once daily.    VITAMIN D2 50,000 unit capsule            Clinical Reference Information           Your Vitals Were     BP Pulse Temp Resp Height Weight    145/90 (BP Location: Left arm, Patient Position: Sitting, BP Method: Manual) 73 98.2 °F (36.8 °C) (Oral) 16 6' 2" (1.88 m) 115.6 kg (254 lb 13.6 oz)    BMI                32.72 kg/m2          Blood Pressure          Most Recent Value    BP  (!)  145/90      Allergies as of 3/28/2017     Norvasc [Amlodipine]      Immunizations Administered on Date of Encounter - 3/28/2017     None      [facility] Sign-Up     Activating your [facility] account is as easy as 1-2-3!     1) Visit [email or web address], select Sign Up Now, enter this activation code and your date of birth, then select Next.  [email or web address]  Expires: 4/21/2017 11:29 AM      2) Create a username and password to use when you visit [facility] in the future and select a security question in case you lose your password and select Next.    3) Enter your e-mail address and click Sign Up!    Additional Information  If you have questions, please e-mail [email or web address] or call [phone] to talk to our [facility] staff. Remember, [facility] is NOT to be used for urgent needs. For medical emergencies, dial 911.         Language Assistance Services     ATTENTION: Language assistance services are available, free of charge. Please call [phone].      ATENCIÓN: Si habla español, tiene a rosas disposición servicios gratuitos de " asistencia lingüística. Cecelia tompkins [phone].     [facility] Ý: N?u b?n nói Ti?ng Vi?t, có các d?ch v? h? tr? ngôn ng? mi?n phí dành cho b?n. G?i s? [phone].         [facility] complies with applicable Federal civil rights laws and does not discriminate on the basis of race, color, national origin, age, disability, or sex.

## 2017-03-29 ENCOUNTER — TELEPHONE (OUTPATIENT)
Dept: INTERNAL MEDICINE | Facility: CLINIC | Age: 57
End: 2017-03-29

## 2017-03-29 DIAGNOSIS — M54.12 CERVICAL RADICULOPATHY: ICD-10-CM

## 2017-03-29 DIAGNOSIS — M54.2 NECK PAIN: Primary | ICD-10-CM

## 2017-03-29 DIAGNOSIS — M54.9 BACK PAIN, UNSPECIFIED BACK LOCATION, UNSPECIFIED BACK PAIN LATERALITY, UNSPECIFIED CHRONICITY: ICD-10-CM

## 2017-03-29 DIAGNOSIS — M47.22 CERVICAL SPONDYLOSIS WITH RADICULOPATHY: ICD-10-CM

## 2017-03-29 NOTE — TELEPHONE ENCOUNTER
----- Message from Kat Mccauley sent at 3/29/2017  7:46 AM CDT -----  Contact: SELF/ [phone]  Patient would like to get a referral.  Does the patient already have the specialty clinic appointment scheduled:  NO  If yes, what date is the appointment scheduled:     Referral to what specialty:  PAIN MANAGEMENT  Reason (be specific):  NECK pain  Does the patient want the referral with a specific physician:  Yes Dr. Enrique Sanchez  [facility]  [facility]  Comments:  Patient would like an appointment asap, please call and advise. Patient states he saw Dr Valle on 3/28/17 and the pain is still there.    ##Advise the patient that once the physician approves this either a nurse or the  will return their call##

## 2017-04-03 ENCOUNTER — TELEPHONE (OUTPATIENT)
Dept: INTERNAL MEDICINE | Facility: CLINIC | Age: 57
End: 2017-04-03

## 2017-04-03 ENCOUNTER — TELEPHONE (OUTPATIENT)
Dept: PAIN MEDICINE | Facility: CLINIC | Age: 57
End: 2017-04-03

## 2017-04-03 NOTE — TELEPHONE ENCOUNTER
Informed the patient that he needs to keep appointment with pain clinic for a consult prior to discussing any injections.

## 2017-04-03 NOTE — TELEPHONE ENCOUNTER
"Contacted and spoke to patient, he stated " he is in a lot of pain, and just coming to speak to the provider is not going to help him, he needs an injection."    Patient was informed that his Doctor didn't order a procedure. He can contact them and asked them if they would order one, otherwise, he will come in and speak to Dr. Sanchez about what type of procedure he can be scheduled for.     Patient verbalized understanding.   "

## 2017-04-03 NOTE — TELEPHONE ENCOUNTER
----- Message from Alexa Garcia sent at 4/3/2017  9:29 AM CDT -----  Contact: Self/[phone]  Pt states that he is in serious pain dealing with his neck and shoulder.Pt states that he would like to see pain management to help. Please call and advise.

## 2017-04-03 NOTE — TELEPHONE ENCOUNTER
----- Message from Chidi Duran sent at 4/3/2017  9:06 AM CDT -----  Contact: FLAKO FRANCOIS [[id number]]  X_  1st Request  _  2nd Request  _  3rd Request        Who: FLAKO FRANCOIS [[id number]]    Why Patient has nrvl pain and is wondering whether or not he will receive an injection tomorrow. Patient was told that the visit with Dr. Sanchez was a clinic visit and not injection. Please call patient back to advise.    What Number to Call Back: [phone]    When to Expect a call back: (Before the end of the day)   -- if the call is after 12:00, the call back will be tomorrow.

## 2017-04-04 ENCOUNTER — OFFICE VISIT (OUTPATIENT)
Dept: SPINE | Facility: CLINIC | Age: 57
End: 2017-04-04
Attending: ANESTHESIOLOGY
Payer: COMMERCIAL

## 2017-04-04 VITALS
BODY MASS INDEX: 32.6 KG/M2 | HEART RATE: 77 BPM | HEIGHT: 74 IN | SYSTOLIC BLOOD PRESSURE: 138 MMHG | WEIGHT: 254 LBS | DIASTOLIC BLOOD PRESSURE: 86 MMHG

## 2017-04-04 DIAGNOSIS — M54.12 CERVICAL RADICULOPATHY: Primary | ICD-10-CM

## 2017-04-04 DIAGNOSIS — M47.22 CERVICAL SPONDYLOSIS WITH RADICULOPATHY: ICD-10-CM

## 2017-04-04 DIAGNOSIS — M54.2 NECK PAIN: ICD-10-CM

## 2017-04-04 PROCEDURE — 99214 OFFICE O/P EST MOD 30 MIN: CPT | Mod: S$GLB,,, | Performed by: ANESTHESIOLOGY

## 2017-04-04 PROCEDURE — 3079F DIAST BP 80-89 MM HG: CPT | Mod: S$GLB,,, | Performed by: ANESTHESIOLOGY

## 2017-04-04 PROCEDURE — 99999 PR PBB SHADOW E&M-EST. PATIENT-LVL III: CPT | Mod: PBBFAC,,, | Performed by: ANESTHESIOLOGY

## 2017-04-04 PROCEDURE — 1160F RVW MEDS BY RX/DR IN RCRD: CPT | Mod: S$GLB,,, | Performed by: ANESTHESIOLOGY

## 2017-04-04 PROCEDURE — 3075F SYST BP GE 130 - 139MM HG: CPT | Mod: S$GLB,,, | Performed by: ANESTHESIOLOGY

## 2017-04-04 RX ORDER — HYDROCODONE BITARTRATE AND ACETAMINOPHEN 7.5; 325 MG/1; MG/1
1 TABLET ORAL EVERY 6 HOURS PRN
COMMUNITY
End: 2017-10-27 | Stop reason: SDUPTHER

## 2017-04-04 NOTE — LETTER
April 4, 2017      Reggie Valle MD  [address] [address]           [facility]  [address], [address]  [place] [address]  Phone: [phone]  Fax: [phone]          Patient: Ghulam Ariza   MR Number: [id number]   YOB: 1960   Date of Visit: 4/4/2017       Dear Dr. Reggie Valle:    Thank you for referring Ghulam Ariza to me for evaluation. Attached you will find relevant portions of my assessment and plan of care.    If you have questions, please do not hesitate to call me. I look forward to following Ghulam Ariza along with you.    Sincerely,    Enrique Sanchez MD    Enclosure  CC:  No Recipients    If you would like to receive this communication electronically, please contact [email or web address] or [phone] to request more information on [employer] Link access.    For providers and/or their staff who would like to refer a patient to [facility], please contact us through our one-stop-shop provider referral line, [facility], at [phone].    If you feel you have received this communication in error or would no longer like to receive these types of communications, please e-mail [email or web address]

## 2017-04-04 NOTE — MR AVS SNAPSHOT
[facility]  [address], [address]  [facility] [address]  Phone: [phone]  Fax: [phone]                  Ghulam Ariza   2017 2:00 PM   Office Visit    Description:  Male : 1960   Provider:  Enrique Sanchez MD   Department:  [facility]           Reason for Visit     Neck Pain     Shoulder Pain     Hand Pain                To Do List           Future Appointments        Provider Department Dept Phone    2017 9:00 AM LAB, [place] - Laboratory [phone]    2017 10:00 AM METH XR1 300 LB LIMIT [facility] [phone]    2017 9:30 AM Kristel Arboleda MD [facility] - Rheumatology [phone]      Goals (5 Years of Data)     None      [facility]     [facility] Care [facility] -  Assistance  Unless otherwise directed by your provider, please contact [facility], our nurse care line that is available for  assistance.     Registered nurses in the [facility] provide: appointment scheduling, clinical advisement, health education, and other advisory services.  Call: [phone] (toll free)               Medications           Message regarding Medications     Verify the changes and/or additions to your medication regime listed below are the same as discussed with your clinician today.  If any of these changes or additions are incorrect, please notify your healthcare provider.             Verify that the below list of medications is an accurate representation of the medications you are currently taking.  If none reported, the list may be blank. If incorrect, please contact your healthcare provider. Carry this list with you in case of emergency.           Current Medications     allopurinol (ZYLOPRIM) 300 MG tablet Take 1 tablet (300 mg total) by mouth every evening.    benazepril (LOTENSIN) 40 MG tablet Take 1 tablet (40 mg total) by mouth once daily.    hydrocodone-acetaminophen 7.5-325mg  "(NORCO) 7.5-325 mg per tablet Take 1 tablet by mouth every 6 (six) hours as needed for Pain.    metoprolol succinate (TOPROL-XL) 25 MG 24 hr tablet Take 1 tablet (25 mg total) by mouth once daily.    colchicine 0.6 mg Cap Take 1 capsule (0.6 mg total) by mouth once daily.    indomethacin (INDOCIN SR) 75 mg CpSR CR capsule TAKE ONE PILL BY MOUTH TWICE DAILY- EVERY 12 HOURS    VITAMIN D2 50,000 unit capsule            Clinical Reference Information           Your Vitals Were     BP Pulse Height Weight BMI    138/86 77 6' 2" (1.88 m) 115.2 kg (254 lb) 32.61 kg/m2      Blood Pressure          Most Recent Value    BP  138/86      Allergies as of 4/4/2017     No Known Allergies      Immunizations Administered on Date of Encounter - 4/4/2017     None      [facility] Sign-Up     Activating your [facility] account is as easy as 1-2-3!     1) Visit [email or web address], select Sign Up Now, enter this activation code and your date of birth, then select Next.  [email or web address]  Expires: 4/21/2017 11:29 AM      2) Create a username and password to use when you visit [facility] in the future and select a security question in case you lose your password and select Next.    3) Enter your e-mail address and click Sign Up!    Additional Information  If you have questions, please e-mail [email or web address] or call [phone] to talk to our [facility] staff. Remember, [facility] is NOT to be used for urgent needs. For medical emergencies, dial 911.         Language Assistance Services     ATTENTION: Language assistance services are available, free of charge. Please call [phone].      ATENCIÓN: Si habla español, tiene a rosas disposición servicios gratuitos de asistencia lingüística. Llame al [phone].     [facility] Ý: N?u b?n nói Ti?ng Vi?t, có các d?ch v? h? tr? ngôn ng? mi?n phí dành cho b?n. G?i s? [phone].         [facility] complies with applicable Federal civil rights laws and does not discriminate on the basis " of race, color, national origin, age, disability, or sex.

## 2017-04-04 NOTE — PROGRESS NOTES
Subjective:      Patient ID: Ghulam Ariza is a 56 y.o. male.    Chief Complaint: Neck Pain; Shoulder Pain (right); and Hand Pain (right)    Referred by: Reggie Valle MD     Pain Scales  Best: 8/10  Worst: 10/10  Usually: 10/10  Today: 8/10    Neck Pain      Shoulder Pain    Associated symptoms include stiffness.   Hand Pain    Associated symptoms include stiffness.   HPI:  57 y/o male here for f/u chronic neck pain that radiates to the right upper extremity. He underwent C7-T1 ILESI on 11/13/14. He reports very good relief from this injection unitl about 1.5 weeks ago. He thinks he might have slept in an odd position. He now has neck pain located in the midline at that level of C6-7. He also reports right shoulder pain. He has right thumb tingling chronically, but this has worsened over the past 1.5 weeks. He denies any new numbness, weakness, or b/b dysfunction. He would like a repeat injection if possible. He received a right shoulder injection by his PCP that was not helpful at all.     Interventional Pain History  Right C6 TFESI 10/16/14  C7-T1 ILESI 11/13/14    Review of Systems   Musculoskeletal: Positive for neck pain and stiffness.   All other systems reviewed and are negative.    Past Medical History:   Diagnosis Date    Degenerative disc disease     cervical radiculopathy    Genital herpes     Hx of colonic polyps     Hypertension     Pinched nerve in neck        Past Surgical History:   Procedure Laterality Date    back sx      L4/L5 Herniated disk       Review of patient's allergies indicates:  No Known Allergies    Current Outpatient Prescriptions   Medication Sig Dispense Refill    allopurinol (ZYLOPRIM) 300 MG tablet Take 1 tablet (300 mg total) by mouth every evening. 90 tablet 3    benazepril (LOTENSIN) 40 MG tablet Take 1 tablet (40 mg total) by mouth once daily. 90 tablet 3    hydrocodone-acetaminophen 7.5-325mg (NORCO) 7.5-325 mg per tablet Take 1 tablet by mouth every 6 (six)  "hours as needed for Pain.      metoprolol succinate (TOPROL-XL) 25 MG 24 hr tablet Take 1 tablet (25 mg total) by mouth once daily. 90 tablet 3    colchicine 0.6 mg Cap Take 1 capsule (0.6 mg total) by mouth once daily. 60 capsule 6    indomethacin (INDOCIN SR) 75 mg CpSR CR capsule TAKE ONE PILL BY MOUTH TWICE DAILY- EVERY 12 HOURS  0    VITAMIN D2 50,000 unit capsule   2    [DISCONTINUED] amlodipine (NORVASC) 5 MG tablet        No current facility-administered medications for this visit.        Family History   Problem Relation Age of Onset    Hypertension Mother     Stroke Mother 68     TIA    Cancer Father 65     Prostate    Cancer Paternal Uncle     Cancer Brother      Prostate Cancer       Social History     Social History    Marital status: Single     Spouse name: N/A    Number of children: N/A    Years of education: N/A     Occupational History     [employer]     Social History Main Topics    Smoking status: Never Smoker    Smokeless tobacco: Never Used    Alcohol use 0.0 oz/week     0 Standard drinks or equivalent per week      Comment: every weekend few drinks    Drug use: No    Sexual activity: Yes     Partners: Female     Birth control/ protection: None     Other Topics Concern    Not on file     Social History Narrative             Objective:      /86  Pulse 77  Ht 6' 2" (1.88 m)  Wt 115.2 kg (254 lb)  BMI 32.61 kg/m2      General    Constitutional: He is oriented to person, place, and time. He appears well-developed and well-nourished. No distress.   HENT:   Head: Normocephalic and atraumatic.   Pulmonary/Chest: Effort normal. No respiratory distress.   Neurological: He is alert and oriented to person, place, and time.   Psychiatric: He has a normal mood and affect. His behavior is normal. Judgment and thought content normal.     General Musculoskeletal Exam   Gait: normal     Back (L-Spine & T-Spine) / Neck (C-Spine) Exam     Tenderness   The patient is " experiencing no tenderness in the right right trapezial, left trapezial, right scapular, left scapular, right SCM and left SCM. Posterior midline palpation reveals tenderness of the Lower C-Spine. Right paramedian tenderness of the Lower C-Spine. Left paramedian tenderness of the Lower C-Spine.     Neck (C-Spine) Range of Motion   Flexion:     Limited (painful)  Extension: Limited (painful)  Right Lateral Bend: abnormalNeck lateral bend right: painful.  Left Lateral Bend: abnormalNeck lateral bend left: painful.  Right Rotation: abnormalNeck rotation right: painful.  Left Rotation: abnormalNeck rotation left: painful.    Spinal Sensation   Right Side Sensation  C-Spine Level: C6 decreased   Left Side Sensation  C-Spine Level: normal    Neck (C-Spine) Tests   Spurling's Test   Left:  Negative  Right: negative  Right Shoulder Exam   Right shoulder exam is normal.    Muscle Strength   Right Upper Extremity   Biceps: 5/5/5   Deltoid:  5/5  Triceps:  5/5  Wrist Extension: 5/5/5   Wrist Flexion: 5/5/5   Finger Flexors:  5/5  Finger Extensors:  5/5  Left Upper Extremity  Biceps: 5/5/5   Deltoid:  5/5  Triceps:  5/5  Wrist Extension: 5/5/5   Wrist Flexion: 5/5/5   Finger Flexors:  5/5  Finger Extensors:  5/5    Reflexes     Left Side  Biceps:  2+  Triceps:  2+  Brachioradialis:  2+  Left Laboy's Sign:  Absent    Right Side   Biceps:  2+  Triceps:  2+  Brachioradialis:  2+  Right Laboy's Sign:  absent        Assessment:       Encounter Diagnoses   Name Primary?    Cervical radiculopathy Yes    Cervical spondylosis with radiculopathy     Neck pain          Plan:       Ghulam was seen today for neck pain, shoulder pain and hand pain.    Diagnoses and all orders for this visit:    Cervical radiculopathy    Cervical spondylosis with radiculopathy    Neck pain       We discussed with the patient the assessment and recommendations. The following is the plan we agreed on:  1. Schedule for repeat C7-T1 ILESI since it worked  before for identical symptomatology.  2. RTC 2 weeks after injection.    Víctor Montejo M.D. PGY-V  [facility] Pain Medicine Fellow      I have personally taken the history and examined this patient and agree with the fellow's note as stated above.

## 2017-04-06 ENCOUNTER — HOSPITAL ENCOUNTER (OUTPATIENT)
Facility: OTHER | Age: 57
Discharge: HOME | End: 2017-04-06
Attending: ANESTHESIOLOGY | Admitting: ANESTHESIOLOGY
Payer: COMMERCIAL

## 2017-04-06 ENCOUNTER — SURGERY (OUTPATIENT)
Age: 57
End: 2017-04-06

## 2017-04-06 VITALS
OXYGEN SATURATION: 100 % | RESPIRATION RATE: 18 BRPM | TEMPERATURE: 99 F | HEIGHT: 74 IN | DIASTOLIC BLOOD PRESSURE: 99 MMHG | HEART RATE: 77 BPM | BODY MASS INDEX: 31.44 KG/M2 | WEIGHT: 245 LBS | SYSTOLIC BLOOD PRESSURE: 157 MMHG

## 2017-04-06 DIAGNOSIS — R52 PAIN: Primary | ICD-10-CM

## 2017-04-06 PROCEDURE — 25000003 PHARM REV CODE 250: Performed by: ANESTHESIOLOGY

## 2017-04-06 PROCEDURE — 63600175 PHARM REV CODE 636 W HCPCS: Performed by: ANESTHESIOLOGY

## 2017-04-06 PROCEDURE — 25500020 PHARM REV CODE 255: Performed by: ANESTHESIOLOGY

## 2017-04-06 PROCEDURE — 77003 FLUOROGUIDE FOR SPINE INJECT: CPT | Performed by: ANESTHESIOLOGY

## 2017-04-06 PROCEDURE — 62321 NJX INTERLAMINAR CRV/THRC: CPT | Mod: ,,, | Performed by: ANESTHESIOLOGY

## 2017-04-06 PROCEDURE — 62321 NJX INTERLAMINAR CRV/THRC: CPT | Performed by: ANESTHESIOLOGY

## 2017-04-06 PROCEDURE — 62320 NJX INTERLAMINAR CRV/THRC: CPT | Performed by: ANESTHESIOLOGY

## 2017-04-06 RX ORDER — LIDOCAINE HYDROCHLORIDE 10 MG/ML
INJECTION, SOLUTION EPIDURAL; INFILTRATION; INTRACAUDAL; PERINEURAL
Status: DISCONTINUED | OUTPATIENT
Start: 2017-04-06 | End: 2017-04-06 | Stop reason: HOSPADM

## 2017-04-06 RX ORDER — LIDOCAINE HYDROCHLORIDE 10 MG/ML
INJECTION INFILTRATION; PERINEURAL
Status: DISCONTINUED | OUTPATIENT
Start: 2017-04-06 | End: 2017-04-06 | Stop reason: HOSPADM

## 2017-04-06 RX ORDER — DEXAMETHASONE SODIUM PHOSPHATE 100 MG/10ML
INJECTION INTRAMUSCULAR; INTRAVENOUS
Status: DISCONTINUED | OUTPATIENT
Start: 2017-04-06 | End: 2017-04-06 | Stop reason: HOSPADM

## 2017-04-06 RX ORDER — ALPRAZOLAM 0.5 MG/1
1 TABLET, ORALLY DISINTEGRATING ORAL
Status: DISCONTINUED | OUTPATIENT
Start: 2017-04-06 | End: 2017-04-06 | Stop reason: HOSPADM

## 2017-04-06 RX ADMIN — LIDOCAINE HYDROCHLORIDE 10 ML: 10 INJECTION, SOLUTION EPIDURAL; INFILTRATION; INTRACAUDAL; PERINEURAL at 02:04

## 2017-04-06 RX ADMIN — IOHEXOL 50 ML: 300 INJECTION, SOLUTION INTRAVENOUS at 02:04

## 2017-04-06 RX ADMIN — ALPRAZOLAM 1 MG: 0.5 TABLET, ORALLY DISINTEGRATING ORAL at 01:04

## 2017-04-06 RX ADMIN — LIDOCAINE HYDROCHLORIDE 10 ML: 10 INJECTION, SOLUTION INFILTRATION; PERINEURAL at 02:04

## 2017-04-06 RX ADMIN — DEXAMETHASONE SODIUM PHOSPHATE 10 MG: 10 INJECTION INTRAMUSCULAR; INTRAVENOUS at 02:04

## 2017-04-06 NOTE — DISCHARGE INSTRUCTIONS

## 2017-04-06 NOTE — OP NOTE
Cervical Interlaminar Epidural Steroid Injection under fluoroscopic guidance.  Time-out taken to identify patient and procedure prior to starting      the procedure.                                                                 Date of Service: 04/06/2017    PCP: Reggie Valle MD    Referring Physician:    Procedure: C7-T1 cervical interlaminar epidural steroid injection under fluoroscopy.  Reasons for procedure: Cervical radiculopathy  Physician: Enrique Sanchez MD  ASSISTANTS: None    Medications injected:  Preservative-free dexamethasone 10mg and Xylocaine 1% MPF 1ml.  This was followed by a slow injection of 4 mL sterile, preservative-free normal saline.    Local anesthetic used: Xylocaine 1% 9ml with Sodium Bicarbonate 1ml.     Sedation Medications: None    Complications:  none.    Estimated blood loss: none.    Technique:  With the patient laying in a prone position with the neck in a flexed forward position, the area was prepped and draped in the usual sterile fashion using ChloraPrep and a fenestrated drape.  The area was determined under fluoroscopic guidance.  Local anesthetic was given using a 27-gauge needle by raising a wheal and going down to the osseous elements of the cervical spine.  A 3.5 inch 20-gauge Touhy needle was introduced under fluoroscopic guidance to meet the lamina of T1.  The needle was then hinged under the lamina then advanced using loss of resistance technique.  Once the tip of the needle was in the desired position, the contrast dye Omnipaque was injected to determine placement and no vascular runoff.  Digital subtraction was employed to confirm that there is no vascular runoff.  The steroid was then injected slowly followed by a slow injection of the 4 mL of the sterile preservative-free normal saline.  The patient tolerated the procedure well.    Pain before the procedure: 9/10    Pain after the procedure: 4/10    The patient was monitored after the procedure and was given  post-procedure and discharge instructions to follow at home. The patient was discharged in a stable condition

## 2017-04-06 NOTE — IP AVS SNAPSHOT
[facility] Location ([facility])  [address] [place], [place] [id number]  Phone: [phone]           Patient Discharge Instructions   Our goal is to set you up for success. This packet includes information on your condition, medications, and your home care.  It will help you care for yourself to prevent having to return to the hospital.     Please ask your nurse if you have any questions.      There are many details to remember when preparing to leave the hospital. Here is what you will need to do:    1. Take your medicine. If you are prescribed medications, review your Medication List on the following pages. You may have new medications to  at the pharmacy and others that you'll need to stop taking. Review the instructions for how and when to take your medications. Talk with your doctor or nurses if you are unsure of what to do.     2. Go to your follow-up appointments. Specific follow-up information is listed in the following pages. Your may be contacted by a nurse or clinical provider about future appointments. Be sure we have all of the phone numbers to reach you. Please contact your provider's office if you are unable to make an appointment.     3. Watch for warning signs. Your doctor or nurse will give you detailed warning signs to watch for and when to call for assistance. These instructions may also include educational information about your condition. If you experience any of warning signs to your health, call your doctor.           [facility]  Unless otherwise directed by your provider, please   contact [facility], our nurse care line   that is available for 24/7 assistance.     [phone] (toll-free)     Registered nurses in the [facility]   provide: appointment scheduling, clinical advisement, health education, and other advisory services.                  ** Verify the list of medication(s) below is accurate and up to date. Carry this with you in case of  emergency. If your medications have changed, please notify your healthcare provider.             Medication List      ASK your doctor about these medications        Additional Info                      allopurinol 300 MG tablet   Commonly known as:  ZYLOPRIM   Quantity:  90 tablet   Refills:  3   Dose:  300 mg    Instructions:  Take 1 tablet (300 mg total) by mouth every evening.     Begin Date    AM    Noon    PM    Bedtime       benazepril 40 MG tablet   Commonly known as:  LOTENSIN   Quantity:  90 tablet   Refills:  3   Dose:  40 mg    Instructions:  Take 1 tablet (40 mg total) by mouth once daily.     Begin Date    AM    Noon    PM    Bedtime       colchicine 0.6 mg Cap   Quantity:  60 capsule   Refills:  6   Dose:  0.6 mg    Instructions:  Take 1 capsule (0.6 mg total) by mouth once daily.     Begin Date    AM    Noon    PM    Bedtime       hydrocodone-acetaminophen 7.5-325mg 7.5-325 mg per tablet   Commonly known as:  NORCO   Refills:  0   Dose:  1 tablet    Instructions:  Take 1 tablet by mouth every 6 (six) hours as needed for Pain.     Begin Date    AM    Noon    PM    Bedtime       indomethacin 75 mg Cpsr CR capsule   Commonly known as:  INDOCIN SR   Refills:  0    Instructions:  TAKE ONE PILL BY MOUTH TWICE DAILY- EVERY 12 HOURS     Begin Date    AM    Noon    PM    Bedtime       metoprolol succinate 25 MG 24 hr tablet   Commonly known as:  TOPROL-XL   Quantity:  90 tablet   Refills:  3   Dose:  25 mg    Instructions:  Take 1 tablet (25 mg total) by mouth once daily.     Begin Date    AM    Noon    PM    Bedtime       VITAMIN D2 50,000 unit Cap   Refills:  2   Generic drug:  ergocalciferol      Begin Date    AM    Noon    PM    Bedtime                  Please bring to all follow up appointments:    1. A copy of your discharge instructions.  2. All medicines you are currently taking in their original bottles.  3. Identification and insurance card.    Please arrive 15 minutes ahead of scheduled appointment  time.    Please call 24 hours in advance if you must reschedule your appointment and/or time.        Your Scheduled Appointments     Apr 12, 2017  9:00 AM CDT   Non-Fasting Lab with NATALIA, [facility]    [address]  [place] [id number] [phone]            Apr 12, 2017 10:00 AM CDT   Diagnostic Xray with METH XR1 300 LB LIMIT   [facility]    [address]  [place] [id number] [phone]            Apr 21, 2017  9:30 AM CDT   Established Patient Visit with MD Ad[facility] Molina [facility] - Rheumatology ([facility] )    [address]  [place] [address] [phone]            Apr 26, 2017  9:15 AM CDT   Established Patient Visit with Enrique Sanchez MD   [facility] ([facility])    [address]  [facility] [id number] [phone]                  Discharge Instructions       Home Care Instructions Pain Management:    1. DIET:   You may resume your normal diet today.   2. BATHING:   You may shower with luke warm water.  3. DRESSING:   You may remove your bandage today.   4. ACTIVITY LEVEL:   You may resume your normal activities 24 hrs after your procedure.  5. MEDICATIONS:   You may resume your normal medications today.   6. SPECIAL INSTRUCTIONS:   No heat to the injection site for 24 hrs including, bath or shower, heating pad, moist heat, or hot tubs.    Use ice pack to injection site for any pain or discomfort.  Apply ice packs for 20 minute intervals as needed.   If you have received any sedatives by mouth today you may not drive for 12 hours.    If you have received any sedation through your IV, you may not drive for 24 hrs.     PLEASE CALL YOUR DOCTOR IF:  1. Redness or swelling around the injection site.  2. Fever of 101 degrees  3. Drainage (pus) from the injection site.  4. For any continuous bleeding (some dried blood over the incision is normal.)    FOR  "EMERGENCIES:   If any unusual problems or difficulties occur during clinic hours, call [phone] or 293.         Admission Information     Date & Time Provider Department CSN    4/6/2017  1:22 PM Enrique Sanchez MD [facility] [id number]      Care Providers     Provider Role Specialty Primary office phone    Enrique Sanchez MD Attending Provider Pain Medicine [phone]    Enrique Sanchez MD Surgeon  Pain Medicine [phone]      Your Vitals Were     BP Pulse Temp Resp Height Weight    157/99 77 98.6 °F (37 °C) (Oral) 18 6' 2" (1.88 m) 111.1 kg (245 lb)    SpO2 BMI             100% 31.46 kg/m2         Recent Lab Values     No lab values to display.      Allergies as of 4/6/2017     No Known Allergies      Advance Directives     An advance directive is a document which, in the event you are no longer able to make decisions for yourself, tells your healthcare team what kind of treatment you do or do not want to receive, or who you would like to make those decisions for you.  If you do not currently have an advance directive, [facility] encourages you to create one.  For more information call:  [phone]), [phone] ([phone]),  or log on to [email or web address].        Language Assistance Services     ATTENTION: Language assistance services are available, free of charge. Please call [phone].      ATENCIÓN: Si habla español, tiene a rosas disposición servicios gratuitos de asistencia lingüística. Llame al [phone].     [facility] Ý: N?u b?n nói Ti?ng Vi?t, có các d?ch v? h? tr? ngôn ng? mi?n phí dành cho b?n. G?i s? [phone].        [facility] Sign-Up     Activating your [facility] account is as easy as 1-2-3!     1) Visit [email or web address], select Sign Up Now, enter this activation code and your date of birth, then select Next.  [email or web address]  Expires: 4/21/2017 11:29 AM      2) Create a username and password to use when you visit [facility] in the future and select a " security question in case you lose your password and select Next.    3) Enter your e-mail address and click Sign Up!    Additional Information  If you have questions, please e-mail [email or web address] or call [phone] to talk to our [facility] staff. Remember, [facility] is NOT to be used for urgent needs. For medical emergencies, dial 911.          [facility] complies with applicable Federal civil rights laws and does not discriminate on the basis of race, color, national origin, age, disability, or sex.

## 2017-04-12 ENCOUNTER — HOSPITAL ENCOUNTER (OUTPATIENT)
Dept: RADIOLOGY | Facility: HOSPITAL | Age: 57
Discharge: HOME | End: 2017-04-12
Attending: INTERNAL MEDICINE
Payer: COMMERCIAL

## 2017-04-12 DIAGNOSIS — M10.9 GOUTY ARTHRITIS: ICD-10-CM

## 2017-04-12 PROCEDURE — 73130 X-RAY EXAM OF HAND: CPT | Mod: 26,50,, | Performed by: RADIOLOGY

## 2017-04-12 PROCEDURE — 73130 X-RAY EXAM OF HAND: CPT | Mod: 50,TC,PO

## 2017-04-18 RX ORDER — VALACYCLOVIR HYDROCHLORIDE 500 MG/1
500 TABLET, FILM COATED ORAL DAILY
Qty: 30 TABLET | Refills: 11 | Status: SHIPPED | OUTPATIENT
Start: 2017-04-18 | End: 2018-06-15

## 2017-04-21 ENCOUNTER — OFFICE VISIT (OUTPATIENT)
Dept: RHEUMATOLOGY | Facility: CLINIC | Age: 57
End: 2017-04-21
Payer: COMMERCIAL

## 2017-04-21 VITALS
DIASTOLIC BLOOD PRESSURE: 101 MMHG | WEIGHT: 250.88 LBS | BODY MASS INDEX: 32.2 KG/M2 | HEART RATE: 77 BPM | HEIGHT: 74 IN | SYSTOLIC BLOOD PRESSURE: 132 MMHG

## 2017-04-21 DIAGNOSIS — M10.9 GOUTY ARTHRITIS: ICD-10-CM

## 2017-04-21 PROCEDURE — 99215 OFFICE O/P EST HI 40 MIN: CPT | Mod: S$GLB,,, | Performed by: INTERNAL MEDICINE

## 2017-04-21 PROCEDURE — 3075F SYST BP GE 130 - 139MM HG: CPT | Mod: S$GLB,,, | Performed by: INTERNAL MEDICINE

## 2017-04-21 PROCEDURE — 3080F DIAST BP >= 90 MM HG: CPT | Mod: S$GLB,,, | Performed by: INTERNAL MEDICINE

## 2017-04-21 PROCEDURE — 1160F RVW MEDS BY RX/DR IN RCRD: CPT | Mod: S$GLB,,, | Performed by: INTERNAL MEDICINE

## 2017-04-21 PROCEDURE — 99999 PR PBB SHADOW E&M-EST. PATIENT-LVL III: CPT | Mod: PBBFAC,,, | Performed by: INTERNAL MEDICINE

## 2017-04-21 ASSESSMENT — ROUTINE ASSESSMENT OF PATIENT INDEX DATA (RAPID3)
MDHAQ FUNCTION SCORE: .7
FATIGUE SCORE: 2.5
PATIENT GLOBAL ASSESSMENT SCORE: 3
TOTAL RAPID3 SCORE: 2.94
PSYCHOLOGICAL DISTRESS SCORE: 2.2
PAIN SCORE: 3.5
AM STIFFNESS SCORE: 1, YES

## 2017-04-21 NOTE — MR AVS SNAPSHOT
[facility] - Rheumatology  [address]  [place] [address]  Phone: [phone]  Fax: [phone]                  Ghulam Ariza   2017 9:30 AM   Office Visit    Description:  Male : 1960   Provider:  Kristel Arboleda MD   Department:  [facility] - Rheumatology           Reason for Visit     Follow-up           Diagnoses this Visit        Comments    Chronic gout without tophus, unspecified cause, unspecified site                To Do List           Future Appointments        Provider Department Dept Phone    2017 9:15 AM Enrique Sanchez MD [facility] - Pain Management [phone]    2017 9:00 AM LAB, [facility] - Laboratory [phone]    2017 9:30 AM Kristel Arboleda MD [facility] Rheumatology [phone]      Goals (5 Years of Data)     None      [facility]     [facility] Nurse Care Line -  Assistance  Unless otherwise directed by your provider, please contact [facility], our nurse care line that is available for  assistance.     Registered nurses in the [facility] Center provide: appointment scheduling, clinical advisement, health education, and other advisory services.  Call: [phone] (toll free)               Medications           Message regarding Medications     Verify the changes and/or additions to your medication regime listed below are the same as discussed with your clinician today.  If any of these changes or additions are incorrect, please notify your healthcare provider.             Verify that the below list of medications is an accurate representation of the medications you are currently taking.  If none reported, the list may be blank. If incorrect, please contact your healthcare provider. Carry this list with you in case of emergency.           Current Medications     allopurinol (ZYLOPRIM) 300 MG tablet Take 1 tablet (300 mg total) by mouth every evening.    benazepril (LOTENSIN) 40 MG tablet Take 1 tablet (40 mg  "total) by mouth once daily.    colchicine 0.6 mg Cap Take 1 capsule (0.6 mg total) by mouth once daily.    hydrocodone-acetaminophen 7.5-325mg (NORCO) 7.5-325 mg per tablet Take 1 tablet by mouth every 6 (six) hours as needed for Pain.    indomethacin (INDOCIN SR) 75 mg CpSR CR capsule TAKE ONE PILL BY MOUTH TWICE DAILY- EVERY 12 HOURS    metoprolol succinate (TOPROL-XL) 25 MG 24 hr tablet Take 1 tablet (25 mg total) by mouth once daily.    valacyclovir (VALTREX) 500 MG tablet Take 1 tablet (500 mg total) by mouth once daily.    VITAMIN D2 50,000 unit capsule            Clinical Reference Information           Your Vitals Were     BP Pulse Height Weight BMI    132/101 (BP Location: Left arm, Patient Position: Sitting, BP Method: Automatic) 77 6' 2" (1.88 m) 113.8 kg (250 lb 14.4 oz) 32.21 kg/m2      Blood Pressure          Most Recent Value    BP  (!)  132/101      Allergies as of 4/21/2017     No Known Allergies      Immunizations Administered on Date of Encounter - 4/21/2017     None      Orders Placed During Today's Visit     Future Labs/Procedures Expected by Expires    Comprehensive metabolic panel  4/21/2017 6/20/2018    URIC ACID  4/21/2017 6/20/2018      [facility] Sign-Up     Activating your [facility] account is as easy as 1-2-3!     1) Visit [email or web address], select Sign Up Now, enter this activation code and your date of birth, then select Next.  [email or web address]  Expires: 4/21/2017 11:29 AM      2) Create a username and password to use when you visit [facility] in the future and select a security question in case you lose your password and select Next.    3) Enter your e-mail address and click Sign Up!    Additional Information  If you have questions, please e-mail [email or web address] or call [phone] to talk to our [facility] staff. Remember, [facility] is NOT to be used for urgent needs. For medical emergencies, dial 911.         Language Assistance Services     ATTENTION: Language assistance services " are available, free of charge. Please call [phone].      ATENCIÓN: Si habla steffenañol, tiene a rosas disposición servicios gratuitos de asistencia lingüística. Llame al [phone].     CHÚ Ý: N?u b?n nói Ti?ng Vi?t, có các d?ch v? h? tr? ngôn ng? mi?n phí dành cho b?n. G?i s? [phone].         [facility] complies with applicable Federal civil rights laws and does not discriminate on the basis of race, color, national origin, age, disability, or sex.

## 2017-04-21 NOTE — PROGRESS NOTES
Subjective:       Patient ID: Ghulam Ariza is a 56 y.o. male.    Chief Complaint: Joint Pain    HPI    57 yo M with PMH of DJD of cervical spine, HTN, right foot bunionectomy here for evaluation of joint pain.  He reports he was diagnosed with gout around age 50. His first episode was in right foot podagra.   He reports having episodes of joint swelling in elbows, usually one at a time.  Reports that he started having gout attacks since being on HCTZ.  He reports that he may have injured the left second finger and now has trouble bending it.  He reports one episode of pain in left second finger.  He reports having 4- 5 attacks in last year.  He has been off HCTZ and has not had any more attacks.  He repots having tophi at site of right bunionectomy.  He drinks occasionally. He has been on allopurinol since  Beginning of January.   He reports skipping some doses.    Interval history: He is taking allopurinol 300mg a day.  He has not had a flare.  He is trying to change his diet.      Past Medical History:   Diagnosis Date    Degenerative disc disease     cervical radiculopathy    Genital herpes     Hx of colonic polyps     Hypertension     Pinched nerve in neck        Review of Systems   Constitutional: Negative for activity change, appetite change, chills, diaphoresis, fatigue and fever.   HENT: Negative for ear discharge, ear pain, hearing loss, mouth sores, nosebleeds and sinus pressure.    Eyes: Negative.  Negative for photophobia, pain, discharge, redness and itching.   Respiratory: Negative for cough, chest tightness and shortness of breath.    Cardiovascular: Negative for chest pain, palpitations and leg swelling.   Gastrointestinal: Negative for abdominal distention, blood in stool and nausea.   Endocrine: Negative for cold intolerance, heat intolerance, polydipsia and polyphagia.   Genitourinary: Negative for flank pain, genital sores and hematuria.   Musculoskeletal: Positive for arthralgias, back pain,  "joint swelling, myalgias, neck pain and neck stiffness. Negative for gait problem.   Skin: Negative for color change, pallor and rash.   Neurological: Negative for dizziness, weakness, light-headedness and headaches.   Hematological: Negative for adenopathy. Does not bruise/bleed easily.   Psychiatric/Behavioral: Negative for decreased concentration and hallucinations. The patient is not nervous/anxious.            Objective:   /86 (BP Location: Left arm, Patient Position: Sitting, BP Method: Automatic)  Pulse 61  Ht 6' 2" (1.88 m)     Physical Exam   Constitutional: He is oriented to person, place, and time and well-developed, well-nourished, and in no distress. No distress.   HENT:   Head: Normocephalic and atraumatic.   Right Ear: External ear normal.   Left Ear: External ear normal.   Nose: Nose normal.   Mouth/Throat: Oropharynx is clear and moist. No oropharyngeal exudate.   Eyes: Conjunctivae and EOM are normal. Pupils are equal, round, and reactive to light. Right eye exhibits no discharge. Left eye exhibits no discharge. No scleral icterus.   Neck: No JVD present. No tracheal deviation present. No thyromegaly present.   Cardiovascular: Normal rate and normal heart sounds.    Pulmonary/Chest: No stridor. No respiratory distress. He exhibits no tenderness.   Abdominal: Soft. Bowel sounds are normal. He exhibits no distension and no mass. There is no tenderness. There is no guarding.   Lymphadenopathy:     He has no cervical adenopathy.   Neurological: He is alert and oriented to person, place, and time.   Skin: He is not diaphoretic.     Musculoskeletal: He exhibits no edema.   Shoulders:FROM    Left hand mcp with tenderness and mild swelling  Elbows: bursitis of both elbows, worse on right  Knees, ankles: cool; no effusions  Feet: no synovitis        Foot xrays: (2016): I personally reviewed; gouty erosion    Rf,ccp-negative        Arthritis survey: (3/7/2017): I personally reviewed   Mild DJD at " many joints. Inflammatory arthritis at the left second PIP joint     Assessment:     57 yo M with PMH of DJD of cervical spine, HTN, right foot bunionectomy here for evaluation of joint pain.  His clinical picture is consistent with gouty arthritis.  Goal will be to keep uric acid less than 5.   I told patient to increase allopurinol to 400mg but he prefers to wait.  Note is made of transaminitis.    Plan:       encouraged colchicine 0.6 mg po qday (patient states he prefers not to take)  Continue allopurinol 300mg poq day  Labs in 10  Weeks with blood work prior    Over 50 percent of visit was used to  re: diet for gout and treatment options for gout  *

## 2017-04-24 ENCOUNTER — TELEPHONE (OUTPATIENT)
Dept: INTERNAL MEDICINE | Facility: CLINIC | Age: 57
End: 2017-04-24

## 2017-04-24 DIAGNOSIS — E55.9 VITAMIN D DEFICIENCY DISEASE: ICD-10-CM

## 2017-04-24 DIAGNOSIS — Z12.5 PROSTATE CANCER SCREENING: ICD-10-CM

## 2017-04-24 DIAGNOSIS — I10 ESSENTIAL HYPERTENSION: ICD-10-CM

## 2017-04-24 DIAGNOSIS — M10.9 GOUT, UNSPECIFIED CAUSE, UNSPECIFIED CHRONICITY, UNSPECIFIED SITE: ICD-10-CM

## 2017-04-24 DIAGNOSIS — Z00.00 WELL ADULT EXAM: Primary | ICD-10-CM

## 2017-04-24 NOTE — TELEPHONE ENCOUNTER
----- Message from Sondra Aguilera sent at 4/24/2017  4:06 PM CDT -----  Contact: self   Doctor appointment and lab have been scheduled.  Please link lab orders to the lab appointment.  Date of doctor appointment:  05/24  Physical or EP:  Physical   Date of lab appointment:  05/19  Comments:

## 2017-04-26 ENCOUNTER — OFFICE VISIT (OUTPATIENT)
Dept: PAIN MEDICINE | Facility: CLINIC | Age: 57
End: 2017-04-26
Attending: ANESTHESIOLOGY
Payer: COMMERCIAL

## 2017-04-26 VITALS
DIASTOLIC BLOOD PRESSURE: 83 MMHG | HEART RATE: 77 BPM | HEIGHT: 74 IN | TEMPERATURE: 99 F | BODY MASS INDEX: 31.97 KG/M2 | RESPIRATION RATE: 18 BRPM | WEIGHT: 249.13 LBS | SYSTOLIC BLOOD PRESSURE: 116 MMHG

## 2017-04-26 DIAGNOSIS — M47.22 CERVICAL SPONDYLOSIS WITH RADICULOPATHY: Primary | ICD-10-CM

## 2017-04-26 DIAGNOSIS — M54.12 CERVICAL RADICULOPATHY: ICD-10-CM

## 2017-04-26 PROCEDURE — 99213 OFFICE O/P EST LOW 20 MIN: CPT | Mod: S$GLB,,, | Performed by: ANESTHESIOLOGY

## 2017-04-26 PROCEDURE — 1160F RVW MEDS BY RX/DR IN RCRD: CPT | Mod: S$GLB,,, | Performed by: ANESTHESIOLOGY

## 2017-04-26 PROCEDURE — 3074F SYST BP LT 130 MM HG: CPT | Mod: S$GLB,,, | Performed by: ANESTHESIOLOGY

## 2017-04-26 PROCEDURE — 3079F DIAST BP 80-89 MM HG: CPT | Mod: S$GLB,,, | Performed by: ANESTHESIOLOGY

## 2017-04-26 PROCEDURE — 99999 PR PBB SHADOW E&M-EST. PATIENT-LVL III: CPT | Mod: PBBFAC,,, | Performed by: ANESTHESIOLOGY

## 2017-04-26 NOTE — PROGRESS NOTES
Subjective:      Patient ID: Ghulam Ariza is a 56 y.o. male.    Chief Complaint: Follow-up (2wk after injection)    Referred by: No ref. provider found     HPI    Interventional Pain History  ***    ROS        Objective:      ***    Ortho/SPM Exam      Assessment:       No diagnosis found.      Plan:       There are no diagnoses linked to this encounter.     ***

## 2017-05-17 ENCOUNTER — TELEPHONE (OUTPATIENT)
Dept: RHEUMATOLOGY | Facility: CLINIC | Age: 57
End: 2017-05-17

## 2017-05-17 NOTE — TELEPHONE ENCOUNTER
Verbalized to pt. That Dr. Arboleda will be out of the office on 6/30 and that we needed to reschedule his appointment. Pt. Verbalized understanding and reported that he will call back to reschedule.

## 2017-10-23 ENCOUNTER — OFFICE VISIT (OUTPATIENT)
Dept: URGENT CARE | Facility: CLINIC | Age: 57
End: 2017-10-23
Payer: OTHER MISCELLANEOUS

## 2017-10-23 VITALS
HEART RATE: 68 BPM | DIASTOLIC BLOOD PRESSURE: 108 MMHG | HEIGHT: 74 IN | SYSTOLIC BLOOD PRESSURE: 150 MMHG | OXYGEN SATURATION: 96 % | TEMPERATURE: 98 F | WEIGHT: 250 LBS | BODY MASS INDEX: 32.08 KG/M2

## 2017-10-23 DIAGNOSIS — M47.22 CERVICAL SPONDYLOSIS WITH RADICULOPATHY: ICD-10-CM

## 2017-10-23 DIAGNOSIS — M54.12 CERVICAL RADICULOPATHY AT C6: Primary | ICD-10-CM

## 2017-10-23 PROCEDURE — 99204 OFFICE O/P NEW MOD 45 MIN: CPT | Mod: S$GLB,,,

## 2017-10-23 RX ORDER — IBUPROFEN 200 MG
200 TABLET ORAL EVERY 6 HOURS PRN
Qty: 100 TABLET | Refills: 2 | Status: SHIPPED | OUTPATIENT
Start: 2017-10-23 | End: 2017-10-24 | Stop reason: ALTCHOICE

## 2017-10-23 NOTE — LETTER
[facility]  [address], [address]  [place] [address]  Phone: [phone]  Fax: [phone]    Pt Name: Ghualm Ariza  Injury Date: 10/21/2017   Employee ID: [id number] Date of First Treatment: 10/23/2017   Company: [employer]#[id number]            Appointment Time: 11:05 AM Arrived: 11:20 AM CDT   Physician: Chema Kaplan MD Time Out: 15:00 PM           Office Treatment: Ghulam was seen today for neck pain and arm pain.    Diagnoses and all orders for this visit:    Cervical radiculopathy at C6  -     X-Ray Cervical Spine Complete 5 view; Future  -     ibuprofen (ADVIL) 200 MG tablet; Take 1 tablet (200 mg total) by mouth every 6 (six) hours as needed for Pain.    Cervical spondylosis with radiculopathy            Restrictions: Regular Duty       Return Appointment: Follow Up with [facility] on 10/25/2017

## 2017-10-23 NOTE — PROGRESS NOTES
Subjective:       Patient ID: Ghulam Ariza is a 57 y.o. male.    Chief Complaint: Neck Pain and Arm Pain    New work injury: Pt works as  at [employer]. 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 Pt states he was wearing a hard hat. He c/o neck pain radiating to RUE, and right thumb tingling. Went to put a tag on the bottom of a rail car, ducked under quickly and struck side rail of car with head/hard hat, suddenly extending his neck. Felt some discomfort in post neck and R trapezius. Happened at 10 PM and worked the remainder of the night. Rested following day. Felt increased pain and symptoms and tingling in R thumb next day. Took OTC Ibuprofen. Today feeling pain 8/10 with R thumb tingling. Past history of cervical spondylosis with multiple JUDE's most recently in April, RTW in May. Saw neurosurgeon in 2014 who offered surgery. Total of 5-6 JUDE's in the past. Had several JUDE's in 2014 before it calmed down      Neck Pain    This is a new problem. The current episode started in the past 7 days. The problem occurs constantly. The problem has been unchanged. The pain is at a severity of 9/10. The pain is moderate. The pain is same all the time. Pertinent negatives include no chest pain, fever, headaches, numbness, syncope or weakness.   Arm Pain    The incident occurred 3 to 5 days ago. The incident occurred at work. The injury mechanism was a direct blow. The pain is present in the right shoulder. The pain radiates to the right arm. The pain is at a severity of 9/10. The pain is moderate. The pain has been constant since the incident. Pertinent negatives include no chest pain or numbness.     Review of Systems   Constitution: Negative for chills, fever, weakness and malaise/fatigue.   HENT: Negative for congestion, ear pain and nosebleeds.    Eyes: Negative for blurred vision, pain and visual disturbance.   Cardiovascular: Negative for chest pain, palpitations and  syncope.   Respiratory: Negative for cough, shortness of breath and wheezing.    Skin: Negative for dry skin, itching and rash.   Musculoskeletal: Positive for neck pain. Negative for joint pain, muscle weakness and stiffness.   Gastrointestinal: Negative for abdominal pain, constipation, diarrhea, nausea and vomiting.   Genitourinary: Negative for dysuria and hematuria.   Neurological: Negative for dizziness, headaches, numbness, seizures and tremors.   All other systems reviewed and are negative.      Objective:      Physical Exam   Constitutional: He is oriented to person, place, and time. He appears well-developed and well-nourished. He is cooperative.  Non-toxic appearance. He does not appear ill. No distress.   HENT:   Head: Normocephalic and atraumatic.   Right Ear: Hearing, tympanic membrane, external ear and ear canal normal.   Left Ear: Hearing, tympanic membrane, external ear and ear canal normal.   Nose: Nose normal. No mucosal edema, rhinorrhea or nasal deformity. No epistaxis. Right sinus exhibits no maxillary sinus tenderness and no frontal sinus tenderness. Left sinus exhibits no maxillary sinus tenderness and no frontal sinus tenderness.   Mouth/Throat: Uvula is midline, oropharynx is clear and moist and mucous membranes are normal. No trismus in the jaw. Normal dentition. No uvula swelling. No posterior oropharyngeal erythema.   Eyes: Conjunctivae and lids are normal. Right eye exhibits no discharge. Left eye exhibits no discharge. No scleral icterus.   Sclera clear bilat   Neck: Trachea normal, normal range of motion, full passive range of motion without pain and phonation normal. Neck supple.   Cardiovascular: Normal rate, regular rhythm, normal heart sounds, intact distal pulses and normal pulses.    Pulmonary/Chest: Effort normal and breath sounds normal. No respiratory distress.   Abdominal: Soft. Normal appearance and bowel sounds are normal. He exhibits no distension, no pulsatile midline  mass and no mass. There is no tenderness.   Musculoskeletal: He exhibits no edema or deformity.        Right shoulder: He exhibits decreased range of motion (decreased R lat bend and rotation) and tenderness.   Cervical spine:    ROM decreased in R rotation and bending otherwise normal:    Flex N  Ext 15 with pain  Rotation R decreased and L normal  Side bending R decreased and L normal  Pos Spurling's sign    Sensory intact to pin prick L   decreased R along thumb, C6  Motor 5/5 bilaterally  DTR's 1+ biceps, triceps L 0 R biceps and triceps    X-ray images reviewed with patient   Neurological: He is alert and oriented to person, place, and time. A sensory deficit is present. He exhibits normal muscle tone. Coordination normal.   Reflex Scores:       Tricep reflexes are 0 on the right side and 1+ on the left side.       Bicep reflexes are 0 on the right side and 1+ on the left side.  Skin: Skin is warm, dry and intact. He is not diaphoretic. No pallor.   Psychiatric: He has a normal mood and affect. His speech is normal and behavior is normal. Judgment and thought content normal. Cognition and memory are normal.   Nursing note and vitals reviewed.      Assessment:       1. Cervical radiculopathy at C6    2. Cervical spondylosis with radiculopathy        Plan:     Follow up [facility] Wednesday October 25  Continue OTC Ibuprofen, ice, heat

## 2017-10-24 ENCOUNTER — NURSE TRIAGE (OUTPATIENT)
Dept: ADMINISTRATIVE | Facility: CLINIC | Age: 57
End: 2017-10-24

## 2017-10-24 ENCOUNTER — OFFICE VISIT (OUTPATIENT)
Dept: URGENT CARE | Facility: CLINIC | Age: 57
End: 2017-10-24
Payer: OTHER MISCELLANEOUS

## 2017-10-24 VITALS
HEART RATE: 69 BPM | SYSTOLIC BLOOD PRESSURE: 156 MMHG | OXYGEN SATURATION: 96 % | TEMPERATURE: 99 F | DIASTOLIC BLOOD PRESSURE: 112 MMHG

## 2017-10-24 DIAGNOSIS — M47.22 CERVICAL SPONDYLOSIS WITH RADICULOPATHY: Primary | ICD-10-CM

## 2017-10-24 DIAGNOSIS — M54.12 CERVICAL RADICULOPATHY AT C6: ICD-10-CM

## 2017-10-24 PROCEDURE — 96372 THER/PROPH/DIAG INJ SC/IM: CPT | Mod: S$GLB,,,

## 2017-10-24 PROCEDURE — 99214 OFFICE O/P EST MOD 30 MIN: CPT | Mod: 25,S$GLB,,

## 2017-10-24 RX ORDER — METHYLPREDNISOLONE 4 MG/1
TABLET ORAL
Qty: 1 PACKAGE | Refills: 0 | Status: SHIPPED | OUTPATIENT
Start: 2017-10-24 | End: 2018-01-08 | Stop reason: CLARIF

## 2017-10-24 RX ORDER — KETOROLAC TROMETHAMINE 30 MG/ML
60 INJECTION, SOLUTION INTRAMUSCULAR; INTRAVENOUS
Status: COMPLETED | OUTPATIENT
Start: 2017-10-24 | End: 2017-10-24

## 2017-10-24 RX ORDER — CYCLOBENZAPRINE HCL 10 MG
10 TABLET ORAL NIGHTLY PRN
Qty: 15 TABLET | Refills: 1 | Status: SHIPPED | OUTPATIENT
Start: 2017-10-24 | End: 2017-11-03

## 2017-10-24 RX ADMIN — KETOROLAC TROMETHAMINE 60 MG: 30 INJECTION, SOLUTION INTRAMUSCULAR; INTRAVENOUS at 03:10

## 2017-10-24 NOTE — PROGRESS NOTES
Subjective:       Patient ID: Ghulam Ariza is a 57 y.o. male.    Chief Complaint: Neck Pain    F/u for neck pain and R arm pain and R hand/thumb numbness. Unable to work. Pain 8/10      Neck Pain    Episode onset: 10/21/2017. The problem occurs constantly. The problem has been unchanged. The pain is at a severity of 9/10. The pain is moderate. Pertinent negatives include no chest pain, fever, headaches, numbness, syncope or weakness.     Review of Systems   Constitution: Negative for chills, fever, weakness and malaise/fatigue.   HENT: Negative for congestion, ear pain and nosebleeds.    Eyes: Negative for blurred vision, pain and visual disturbance.   Cardiovascular: Negative for chest pain, palpitations and syncope.   Respiratory: Negative for cough, shortness of breath and wheezing.    Skin: Negative for dry skin, itching and rash.   Musculoskeletal: Positive for neck pain. Negative for joint pain, muscle weakness and stiffness.   Gastrointestinal: Negative for abdominal pain, constipation, diarrhea, nausea and vomiting.   Genitourinary: Negative for dysuria and hematuria.   Neurological: Negative for dizziness, headaches, numbness, seizures and tremors.   All other systems reviewed and are negative.      Objective:      Physical Exam   Constitutional: He is oriented to person, place, and time. He appears well-developed and well-nourished. He is cooperative.  Non-toxic appearance. He does not appear ill. No distress.   HENT:   Head: Normocephalic and atraumatic.   Right Ear: Hearing, tympanic membrane, external ear and ear canal normal.   Left Ear: Hearing, tympanic membrane, external ear and ear canal normal.   Nose: Nose normal. No mucosal edema, rhinorrhea or nasal deformity. No epistaxis. Right sinus exhibits no maxillary sinus tenderness and no frontal sinus tenderness. Left sinus exhibits no maxillary sinus tenderness and no frontal sinus tenderness.   Mouth/Throat: Uvula is midline, oropharynx is clear and  moist and mucous membranes are normal. No trismus in the jaw. Normal dentition. No uvula swelling. No posterior oropharyngeal erythema.   Eyes: Conjunctivae and lids are normal. Right eye exhibits no discharge. Left eye exhibits no discharge. No scleral icterus.   Sclera clear bilat   Neck: Trachea normal, normal range of motion, full passive range of motion without pain and phonation normal. Neck supple.   Cardiovascular: Normal rate, regular rhythm, normal heart sounds, intact distal pulses and normal pulses.    Pulmonary/Chest: Effort normal and breath sounds normal. No respiratory distress.   Abdominal: Soft. Normal appearance and bowel sounds are normal. He exhibits no distension, no pulsatile midline mass and no mass. There is no tenderness.   Musculoskeletal: He exhibits no edema or deformity.        Cervical back: He exhibits decreased range of motion (Decreased ext with pain. Decreased R rot and lat bending with pain), tenderness, pain and spasm.   Cervical exam:    ROM :  Flex 30  Ext 15 with pain  Rotation R 30 and L 60  Side bending R and L  Pos Spurling's sign    Sensory decreased to pin prick R thumb  Motor 5/5 bilaterally except decreased R  strength  DTR's 2+ biceps, triceps   Neurological: He is alert and oriented to person, place, and time. He exhibits normal muscle tone. Coordination normal.   Skin: Skin is warm, dry and intact. He is not diaphoretic. No pallor.   Psychiatric: He has a normal mood and affect. His speech is normal and behavior is normal. Judgment and thought content normal. Cognition and memory are normal.   Nursing note and vitals reviewed.      Assessment:       1. Cervical spondylosis with radiculopathy    2. Cervical radiculopathy at C6        Plan:     See PCP re elevated blood pressure  PT at [facility]      Medications Ordered This Encounter      cyclobenzaprine (FLEXERIL) 10 MG tablet          Sig: Take 1 tablet (10 mg total) by mouth nightly as needed for Muscle  spasms.          Dispense:  15 tablet          Refill:  1      ketorolac injection 60 mg      methylPREDNISolone (MEDROL DOSEPACK) 4 mg tablet          Sig: use as directed          Dispense:  1 Package          Refill:  0      Restrictions: Sedentary work only  Return in about 3 days (around 10/27/2017).

## 2017-10-24 NOTE — LETTER
[facility] [place]  [address], [address]  [place] [address]  Phone: [phone]  Fax: [phone]    Pt Name: Ghulam Garcia Date: 10/21/2017   Employee ID: [id number] Date : 10/24/2017   Company: [employer]/#[id number]            Appointment Time:  Arrived:  1:50 PM CDT   Physician: Chema Kaplan MD Check out: 3:45PM           Office Treatment: Ghulam was seen today for neck pain.    Diagnoses and all orders for this visit:    Cervical spondylosis with radiculopathy  -     cyclobenzaprine (FLEXERIL) 10 MG tablet; Take 1 tablet (10 mg total) by mouth nightly as needed for Muscle spasms.    Cervical radiculopathy at C6  -     ketorolac injection 60 mg; Inject 2 mLs (60 mg total) into the muscle one time.    Other orders  -     methylPREDNISolone (MEDROL DOSEPACK) 4 mg tablet; use as directed       Patient Instructions: Begin Physical Therapy once authorized      WORK STATUS: LIGHT DUTY  Sedentary work only       Return Appointment: 10/27/2017@10:15 AM

## 2017-10-25 ENCOUNTER — TELEPHONE (OUTPATIENT)
Dept: INTERNAL MEDICINE | Facility: CLINIC | Age: 57
End: 2017-10-25

## 2017-10-25 ENCOUNTER — OFFICE VISIT (OUTPATIENT)
Dept: INTERNAL MEDICINE | Facility: CLINIC | Age: 57
End: 2017-10-25
Payer: COMMERCIAL

## 2017-10-25 VITALS
HEIGHT: 74 IN | BODY MASS INDEX: 32.96 KG/M2 | SYSTOLIC BLOOD PRESSURE: 138 MMHG | WEIGHT: 256.81 LBS | DIASTOLIC BLOOD PRESSURE: 86 MMHG | HEART RATE: 62 BPM | RESPIRATION RATE: 16 BRPM | TEMPERATURE: 99 F

## 2017-10-25 DIAGNOSIS — E55.9 VITAMIN D DEFICIENCY DISEASE: ICD-10-CM

## 2017-10-25 DIAGNOSIS — Z12.5 PROSTATE CANCER SCREENING: ICD-10-CM

## 2017-10-25 DIAGNOSIS — R00.2 PALPITATIONS: ICD-10-CM

## 2017-10-25 DIAGNOSIS — I10 ESSENTIAL HYPERTENSION: Primary | ICD-10-CM

## 2017-10-25 DIAGNOSIS — Z00.00 WELL ADULT EXAM: Primary | ICD-10-CM

## 2017-10-25 DIAGNOSIS — I45.9 RIGHT VENTRICULAR CONDUCTION DELAY: ICD-10-CM

## 2017-10-25 DIAGNOSIS — I10 ESSENTIAL HYPERTENSION: ICD-10-CM

## 2017-10-25 PROCEDURE — 99999 PR PBB SHADOW E&M-EST. PATIENT-LVL III: CPT | Mod: PBBFAC,,, | Performed by: FAMILY MEDICINE

## 2017-10-25 PROCEDURE — 93005 ELECTROCARDIOGRAM TRACING: CPT | Mod: S$GLB,,, | Performed by: FAMILY MEDICINE

## 2017-10-25 PROCEDURE — 93010 ELECTROCARDIOGRAM REPORT: CPT | Mod: S$GLB,,, | Performed by: INTERNAL MEDICINE

## 2017-10-25 PROCEDURE — 99214 OFFICE O/P EST MOD 30 MIN: CPT | Mod: S$GLB,,, | Performed by: FAMILY MEDICINE

## 2017-10-25 NOTE — TELEPHONE ENCOUNTER
----- Message from Kat Mccauley sent at 10/25/2017  8:54 AM CDT -----  Contact: self  Doctor appointment and lab have been scheduled.  Please link lab orders to the lab appointment.  Date of doctor appointment:  12/1/17  Physical or EP:  phys  Date of lab appointment:  11/27/17  Comments:

## 2017-10-25 NOTE — PROGRESS NOTES
Subjective:       Patient ID: Ghulam Ariza is a 57 y.o. male.    Chief Complaint: Hypertension and other (heart flutter)    HPI 57-year-old -American male with chronic cervical radiculopathy and hypertension presents to clinic today secondary to concerns of elevated blood pressure while at the doctor's office yesterday.  The patient reports a work related injury on Sunday.  For the past 2 days he has been seeing his work doctor for treatment of the pain but there has been concerned secondary to blood pressure as high as 150/100.  The patient also reports palpitations over the past few days.  He denies any chest pain or shortness of breath.  He received an injection yesterday she reports has improved his pain.  Review of Systems   Constitutional: Negative for appetite change, chills, fatigue and fever.   HENT: Negative for congestion, ear pain, hearing loss, postnasal drip, rhinorrhea, sinus pressure, sore throat and tinnitus.    Eyes: Negative for redness, itching and visual disturbance.   Respiratory: Negative for cough, chest tightness and shortness of breath.    Cardiovascular: Positive for palpitations. Negative for chest pain.   Gastrointestinal: Negative for abdominal pain, constipation, diarrhea, nausea and vomiting.   Genitourinary: Negative for decreased urine volume, difficulty urinating, dysuria, frequency, hematuria and urgency.   Musculoskeletal: Negative for back pain, myalgias, neck pain and neck stiffness.   Skin: Negative for rash.   Neurological: Negative for dizziness, light-headedness and headaches.   Psychiatric/Behavioral: Negative.        Objective:      Physical Exam   Constitutional: He is oriented to person, place, and time. He appears well-developed and well-nourished. No distress.   HENT:   Head: Normocephalic and atraumatic.   Right Ear: External ear normal.   Left Ear: External ear normal.   Nose: Nose normal.   Mouth/Throat: Oropharynx is clear and moist. No oropharyngeal  exudate.   Eyes: Conjunctivae and EOM are normal. Pupils are equal, round, and reactive to light. Right eye exhibits no discharge. Left eye exhibits no discharge. No scleral icterus.   Neck: Normal range of motion. Neck supple. No JVD present. No tracheal deviation present. No thyromegaly present.   Cardiovascular: Normal rate, regular rhythm, normal heart sounds and intact distal pulses.  Exam reveals no gallop and no friction rub.    No murmur heard.  Pulmonary/Chest: Effort normal and breath sounds normal. No stridor. No respiratory distress. He has no wheezes. He has no rales.   Abdominal: Soft. Bowel sounds are normal. He exhibits no distension and no mass. There is no tenderness. There is no rebound and no guarding.   Musculoskeletal: Normal range of motion. He exhibits no edema or tenderness.   Lymphadenopathy:     He has no cervical adenopathy.   Neurological: He is alert and oriented to person, place, and time.   Skin: Skin is warm and dry. No rash noted. He is not diaphoretic. No erythema. No pallor.   Psychiatric: He has a normal mood and affect. His behavior is normal. Judgment and thought content normal.   Nursing note and vitals reviewed.    EKG: Sinus bradycardia with right ventricular conduction delay.  Ventricular rate 57 bpm.  Assessment:       1. Essential hypertension    2. Palpitations    3. Right ventricular conduction delay        Plan:       1.  Elevated blood pressure and palpitations are likely a pain response.  At this time blood pressure is stable.  Continue benazepril 40 mg daily and metoprolol 25 mg daily.  2.  EKG now.  3.  Refer to cardiology for further evaluation of right ventricular conduction delay.  4.  Return to clinic as needed or as scheduled for physical exam.

## 2017-10-26 ENCOUNTER — OFFICE VISIT (OUTPATIENT)
Dept: CARDIOLOGY | Facility: CLINIC | Age: 57
End: 2017-10-26
Payer: COMMERCIAL

## 2017-10-26 VITALS
HEART RATE: 76 BPM | WEIGHT: 254.44 LBS | BODY MASS INDEX: 32.65 KG/M2 | SYSTOLIC BLOOD PRESSURE: 148 MMHG | HEIGHT: 74 IN | DIASTOLIC BLOOD PRESSURE: 96 MMHG

## 2017-10-26 DIAGNOSIS — I10 ESSENTIAL HYPERTENSION: Primary | ICD-10-CM

## 2017-10-26 DIAGNOSIS — R00.2 PALPITATIONS: ICD-10-CM

## 2017-10-26 DIAGNOSIS — N52.9 ERECTILE DYSFUNCTION, UNSPECIFIED ERECTILE DYSFUNCTION TYPE: ICD-10-CM

## 2017-10-26 PROCEDURE — 99243 OFF/OP CNSLTJ NEW/EST LOW 30: CPT | Mod: S$GLB,,, | Performed by: INTERNAL MEDICINE

## 2017-10-26 PROCEDURE — 99999 PR PBB SHADOW E&M-EST. PATIENT-LVL III: CPT | Mod: PBBFAC,,, | Performed by: INTERNAL MEDICINE

## 2017-10-26 RX ORDER — DILTIAZEM HYDROCHLORIDE 60 MG/1
60 CAPSULE, EXTENDED RELEASE ORAL 2 TIMES DAILY
Qty: 60 CAPSULE | Refills: 11 | Status: SHIPPED | OUTPATIENT
Start: 2017-10-26 | End: 2018-12-10 | Stop reason: ALTCHOICE

## 2017-10-26 NOTE — Clinical Note
Dear Clement, Thank you for referring Ghulam Ariza  to the [facility] [place]. I switched metoprolol to diltiazem. If it works it will control his BP, suppress his palpitations and avoid beta-blocker induced erectile dysfunction. Hilario

## 2017-10-26 NOTE — LETTER
October 30, 2017      Reggie Valle MD  [address]  [place] [address]           [place] [facility]  [address]  [place] [address]  Phone: [phone]          Patient: Ghulam Ariza   MR Number: [id number]   YOB: 1960   Date of Visit: 10/26/2017       Dear Dr. Reggie Valle:    Thank you for referring Ghulam Ariza to me for evaluation. Attached you will find relevant portions of my assessment and plan of care.    If you have questions, please do not hesitate to call me. I look forward to following Ghulam Ariza along with you.    Sincerely,    Hilario Medina MD    Enclosure  CC:  No Recipients    If you would like to receive this communication electronically, please contact [email or web address] or [phone] to request more information on [facility] Link access.    For providers and/or their staff who would like to refer a patient to [facility], please contact us through our one-stop-shop provider referral line, [facility], at [phone].    If you feel you have received this communication in error or would no longer like to receive these types of communications, please e-mail [email or web address]

## 2017-10-26 NOTE — ASSESSMENT & PLAN NOTE
Change metoprolol to diltiazem 60 mg bid. Will titrate the dose and then determine the dose of the long acting preparation.  Monitor BP and heart rate at home. Review next week.  Continue benazepril.  He may be able to tolerate a diuretic PRN.

## 2017-10-26 NOTE — PATIENT INSTRUCTIONS
Omron series 7, get a large adult cuff  Stop metoprolol on Saturday and start diltiazem twice a day on Saturday. Call us w BP and pulse rate readings on Monday    Avoid salt.

## 2017-10-26 NOTE — PROGRESS NOTES
Subjective:     Patient ID:  Ghulam Ariza is a 57 y.o. male who presents for evaluation of right ventricular conduction delay      Problem List:  HTN    HPI:     Ghulam Ariza presented to Dr. Valle' clinic yesterday. He reported palpitations described as a fluttering. It was noted on Sunday and has occurred in the past.  He recvd a steroid injection and prescription for oral steroids during the week. He does not report lightheadedness or dizziness.    Hypertension currently treated w benazepril and metoprolol. He was treated with olmesartan but was switched to benazepril in 2013. Amlodipine was added briefly in 1/13 and stopped 3/13. He does not recall having side effects w amlodipine.   He was also treated w HCTZ for years but that was switched to low dose metoprolol in 1/17 because the diuretic was causing gout. He experiences some erectile dysfunction.      Review of Systems   Constitution: Negative for weakness, malaise/fatigue, weight gain and weight loss.   Cardiovascular: Positive for irregular heartbeat. Negative for chest pain, claudication, dyspnea on exertion, leg swelling, orthopnea, palpitations, paroxysmal nocturnal dyspnea and syncope.   Respiratory: Negative for cough, sputum production and wheezing.    Musculoskeletal: Positive for arthritis, back pain, muscle cramps and muscle weakness. Negative for falls, joint pain and myalgias.   Gastrointestinal: Negative for abdominal pain, heartburn and melena.   Genitourinary: Negative for frequency, hematuria and nocturia.   Neurological: Positive for paresthesias. Negative for dizziness, light-headedness and loss of balance.   Psychiatric/Behavioral: Negative for depression. The patient is nervous/anxious.          Current Outpatient Prescriptions   Medication Sig    allopurinol (ZYLOPRIM) 300 MG tablet Take 1 tablet (300 mg total) by mouth every evening.    benazepril (LOTENSIN) 40 MG tablet Take 1 tablet (40 mg total) by mouth once daily.     "cyclobenzaprine (FLEXERIL) 10 MG tablet Take 1 tablet (10 mg total) by mouth nightly as needed for Muscle spasms.    hydrocodone-acetaminophen 7.5-325mg (NORCO) 7.5-325 mg per tablet Take 1 tablet by mouth every 6 (six) hours as needed for Pain.    methylPREDNISolone (MEDROL DOSEPACK) 4 mg tablet use as directed    metoprolol succinate (TOPROL-XL) 25 MG 24 hr tablet Take 1 tablet (25 mg total) by mouth once daily.    valacyclovir (VALTREX) 500 MG tablet Take 1 tablet (500 mg total) by mouth once daily. (Patient taking differently: Take 500 mg by mouth as needed. )           Past Medical History:   Diagnosis Date    Degenerative disc disease     cervical radiculopathy    Genital herpes     Hx of colonic polyps     Hypertension early 40s    Pinched nerve in neck          Social History   Substance Use Topics    Smoking status: Never Smoker    Smokeless tobacco: Never Used    Alcohol use 0.0 oz/week      Comment: every weekend few drinks         Family History   Problem Relation Age of Onset    Hypertension Mother     Stroke Mother 68     TIA    Cancer Father 65     Prostate    Cancer Paternal Uncle     Cancer Brother      Prostate Cancer         Objective:     Physical Exam   Constitutional: He is oriented to person, place, and time. He appears well-developed and well-nourished.   BP (!) 148/96   Pulse 76   Ht 6' 2" (1.88 m)   Wt 115.4 kg (254 lb 6.6 oz)   BMI 32.66 kg/m²      HENT:   Head: Normocephalic and atraumatic.   Neck: No JVD present. Carotid bruit is not present.   Cardiovascular: Normal rate, regular rhythm, S1 normal and S2 normal.  Exam reveals no gallop.    No murmur heard.  Pulses:       Radial pulses are 2+ on the right side, and 2+ on the left side.        Posterior tibial pulses are 2+ on the right side, and 2+ on the left side.   Pulmonary/Chest: Effort normal. He has no wheezes. He has no rales. Chest wall is not dull to percussion.   Abdominal: Soft. There is no splenomegaly " or hepatomegaly. There is no tenderness.   Musculoskeletal:        Right lower leg: He exhibits no edema.        Left lower leg: He exhibits no edema.   Neurological: He is alert and oriented to person, place, and time. Gait normal.   Skin: Skin is warm and dry. No bruising noted. No cyanosis. Nails show no clubbing.   Psychiatric: He has a normal mood and affect. His speech is normal and behavior is normal. Judgment and thought content normal. Cognition and memory are normal.         Lab Results   Component Value Date    CHOL 213 (H) 01/07/2016    CHOL 193 05/15/2014    CHOL 202 (H) 03/01/2013     Lab Results   Component Value Date    HDL 46 01/07/2016    HDL 48 05/15/2014    HDL 49 03/01/2013     Lab Results   Component Value Date    LDLCALC 147.4 01/07/2016    LDLCALC 125.6 05/15/2014    LDLCALC 132.0 03/01/2013     Lab Results   Component Value Date    TRIG 98 01/07/2016    TRIG 97 05/15/2014    TRIG 103 03/01/2013     Lab Results   Component Value Date    CHOLHDL 21.6 01/07/2016    CHOLHDL 24.9 05/15/2014    CHOLHDL 24.3 03/01/2013     Lab Results   Component Value Date    ALT 74 (H) 04/12/2017     Lab Results   Component Value Date    ALT 74 (H) 04/12/2017    AST 25 04/12/2017    ALKPHOS 75 04/12/2017    BILITOT 0.4 04/12/2017      Lab Results   Component Value Date    CREATININE 1.3 04/12/2017    BUN 12 04/12/2017     04/12/2017    K 4.6 04/12/2017     04/12/2017    CO2 27 04/12/2017         Assessment and Plan:     1. Essential hypertension    2. Palpitations    3. Erectile dysfunction          Essential hypertension  Change metoprolol to diltiazem 60 mg bid. Will titrate the dose and then determine the dose of the long acting preparation.  Monitor BP and heart rate at home. Review next week.  Continue benazepril.  He may be able to tolerate a diuretic PRN.     Palpitations  Very brief. Further work up if they are not suppressed with diltiazem.     FUP with Dr. Valle and RTC PRN.

## 2017-10-27 ENCOUNTER — OFFICE VISIT (OUTPATIENT)
Dept: URGENT CARE | Facility: CLINIC | Age: 57
End: 2017-10-27
Payer: OTHER MISCELLANEOUS

## 2017-10-27 VITALS
OXYGEN SATURATION: 97 % | SYSTOLIC BLOOD PRESSURE: 156 MMHG | HEART RATE: 84 BPM | DIASTOLIC BLOOD PRESSURE: 110 MMHG | TEMPERATURE: 99 F

## 2017-10-27 DIAGNOSIS — M54.12 CERVICAL RADICULOPATHY AT C6: Primary | ICD-10-CM

## 2017-10-27 DIAGNOSIS — M47.22 CERVICAL SPONDYLOSIS WITH RADICULOPATHY: ICD-10-CM

## 2017-10-27 PROCEDURE — 99213 OFFICE O/P EST LOW 20 MIN: CPT | Mod: S$GLB,,,

## 2017-10-27 RX ORDER — HYDROCODONE BITARTRATE AND ACETAMINOPHEN 7.5; 325 MG/1; MG/1
1 TABLET ORAL EVERY 6 HOURS PRN
Qty: 28 TABLET | Refills: 0 | Status: SHIPPED | OUTPATIENT
Start: 2017-10-27 | End: 2018-02-21

## 2017-10-27 NOTE — LETTER
[facility]  [address], [address]  [place] [address]  Phone: [phone]  Fax: [phone]    Pt Name: Ghulam Garcia Date: 10/21/17    Employee ID: [id number] Date: 10/27/2017   Company: [employer]/#[id number]            Appointment Time: 10:15 AM Arrived: 10:15 AM CDT   Physician: Chema Kaplan MD Check out: 11:50 AM           Office Treatment: Ghulam was seen today for neck pain.    Diagnoses and all orders for this visit:    Cervical radiculopathy at C6  -     hydrocodone-acetaminophen 7.5-325mg (NORCO) 7.5-325 mg per tablet; Take 1 tablet by mouth every 6 (six) hours as needed for Pain.  -     Ambulatory Referral to Physical/Occupational Therapy    Cervical spondylosis with radiculopathy       Patient Instructions: Begin Physical Therapy      Restrictions: Home today       Return Appointment: 10/30/2017@10:45 AM

## 2017-10-27 NOTE — PROGRESS NOTES
Subjective:       Patient ID: Ghulam Ariza is a 57 y.o. male.    Chief Complaint: Neck Pain    F/u for neck pain radiating to R arm and R hand/thumb numbness. Pain 10/10 Pt reports the toradol injection given at last ov helped him get some sleep. Otherwise, pt states that he is unable to sleep due to his severe neck pain. Continues with R neck, R trapezius, radiating to RUE. R thumb numbness      Neck Pain    Episode onset: 10/21/17. The problem occurs constantly. The problem has been unchanged. The quality of the pain is described as aching and stabbing. The pain is at a severity of 10/10. The pain is severe. Nothing aggravates the symptoms. Pertinent negatives include no chest pain, fever, headaches, numbness, syncope or weakness. He has tried muscle relaxants and acetaminophen for the symptoms. The treatment provided no relief.     Review of Systems   Constitution: Negative for chills, fever, weakness and malaise/fatigue.   HENT: Negative for congestion, ear pain and nosebleeds.    Eyes: Negative for blurred vision, pain and visual disturbance.   Cardiovascular: Negative for chest pain, palpitations and syncope.   Respiratory: Negative for cough, shortness of breath and wheezing.    Skin: Negative for dry skin, itching and rash.   Musculoskeletal: Positive for neck pain. Negative for joint pain, muscle weakness and stiffness.   Gastrointestinal: Negative for abdominal pain, constipation, diarrhea, nausea and vomiting.   Genitourinary: Negative for dysuria and hematuria.   Neurological: Negative for dizziness, headaches, numbness, seizures and tremors.   All other systems reviewed and are negative.      Objective:      Physical Exam   Constitutional: He is oriented to person, place, and time. He appears well-developed and well-nourished. He is cooperative.  Non-toxic appearance. He does not appear ill. No distress.   HENT:   Head: Normocephalic and atraumatic.   Right Ear: Hearing, tympanic membrane, external ear  and ear canal normal.   Left Ear: Hearing, tympanic membrane, external ear and ear canal normal.   Nose: Nose normal. No mucosal edema, rhinorrhea or nasal deformity. No epistaxis. Right sinus exhibits no maxillary sinus tenderness and no frontal sinus tenderness. Left sinus exhibits no maxillary sinus tenderness and no frontal sinus tenderness.   Mouth/Throat: Uvula is midline and mucous membranes are normal. No trismus in the jaw. Normal dentition. No uvula swelling. No posterior oropharyngeal erythema.   Eyes: Conjunctivae and lids are normal. Right eye exhibits no discharge. Left eye exhibits no discharge. No scleral icterus.   Sclera clear bilat   Neck: Trachea normal, normal range of motion, full passive range of motion without pain and phonation normal. Neck supple.   Cardiovascular: Normal rate, regular rhythm, normal heart sounds, intact distal pulses and normal pulses.    Pulmonary/Chest: Effort normal and breath sounds normal. No respiratory distress.   Abdominal: Soft. Normal appearance. He exhibits no distension, no pulsatile midline mass and no mass. There is no tenderness.   Musculoskeletal: He exhibits no edema or deformity.        Cervical back: He exhibits decreased range of motion, tenderness, pain and spasm.   Cervical spine:    ROM functional in flex, R and L rotation with pain, severe pain with any ext:  Flex 45  Ext 5  Rotation R and L 45  Side bending R and L 25  Pos Spurling's sign R side  Sensory intact to pin prick except R thumb, numb  Motor 5/5 bilaterally  DTR's 1+ biceps, triceps   Neurological: He is alert and oriented to person, place, and time. He exhibits normal muscle tone. Coordination normal.   Skin: Skin is warm, dry and intact. He is not diaphoretic. No pallor.   Psychiatric: He has a normal mood and affect. His speech is normal and behavior is normal. Judgment and thought content normal. Cognition and memory are normal.   Nursing note and vitals reviewed.      Assessment:        1. Cervical radiculopathy at C6    2. Cervical spondylosis with radiculopathy        Plan:           Medications Ordered This Encounter      hydrocodone-acetaminophen 7.5-325mg (NORCO) 7.5-325 mg per tablet          Sig: Take 1 tablet by mouth every 6 (six) hours as needed for Pain.          Dispense:  28 tablet          Refill:  0         No Follow-up on file.

## 2017-10-30 ENCOUNTER — OFFICE VISIT (OUTPATIENT)
Dept: URGENT CARE | Facility: CLINIC | Age: 57
End: 2017-10-30
Payer: OTHER MISCELLANEOUS

## 2017-10-30 VITALS
HEART RATE: 79 BPM | OXYGEN SATURATION: 97 % | SYSTOLIC BLOOD PRESSURE: 140 MMHG | DIASTOLIC BLOOD PRESSURE: 100 MMHG | TEMPERATURE: 98 F

## 2017-10-30 DIAGNOSIS — M54.12 CERVICAL RADICULOPATHY AT C6: Primary | ICD-10-CM

## 2017-10-30 DIAGNOSIS — M47.22 CERVICAL SPONDYLOSIS WITH RADICULOPATHY: ICD-10-CM

## 2017-10-30 PROBLEM — R00.2 PALPITATIONS: Status: ACTIVE | Noted: 2017-10-30

## 2017-10-30 PROCEDURE — 99213 OFFICE O/P EST LOW 20 MIN: CPT | Mod: S$GLB,,,

## 2017-10-30 RX ORDER — NAPROXEN 500 MG/1
500 TABLET ORAL 2 TIMES DAILY WITH MEALS
Qty: 30 TABLET | Refills: 2 | Status: SHIPPED | OUTPATIENT
Start: 2017-10-30 | End: 2018-06-15

## 2017-10-30 NOTE — LETTER
[facility]  [address], [address]  [place] [address]  Phone: [phone]  Fax: [phone]    Pt Name: Ghulam Ariza  Injury Date: 10/21/17   Employee ID: [id number] Date: 10/30/2017   Company: [employer]/#[id number]            Appointment Time: 10:30 AM Arrived: 10:45 AM CDT   Physician: Chema Kaplan MD Check out: 11:59 AM           Office Treatment: Ghulam was seen today for neck pain.    Diagnoses and all orders for this visit:    Cervical radiculopathy at C6  -     naproxen (NAPROSYN) 500 MG tablet; Take 1 tablet (500 mg total) by mouth 2 (two) times daily with meals.    Cervical spondylosis with radiculopathy       Patient Instructions: Begin Physical Therapy      WORK STATUS: Sedentary work only       Return Appointment: 11/7/2017@10:45AM

## 2017-10-30 NOTE — PROGRESS NOTES
Subjective:       Patient ID: Ghulam Ariza is a 57 y.o. male.    Chief Complaint: Neck Pain    F/u for neck pain radiating to R arm and R hand/thumb numbness. Pt reports moderate relief with pain meds, 7/10 on pain scale. Overall about the same. Tried not taking the Hydrocodone and pain increased. Finished Medrol dose marlene yesterday. Scheduled for PT Nov the 7th, pending Work Comp approval.       Neck Pain    Episode onset: 10/21/2017. The problem occurs constantly. The problem has been unchanged. The pain is at a severity of 7/10. The pain is moderate. Pertinent negatives include no chest pain, fever, headaches, numbness, syncope or weakness. He has tried acetaminophen and muscle relaxants for the symptoms. The treatment provided moderate relief.     Review of Systems   Constitution: Negative for chills, fever, weakness and malaise/fatigue.   HENT: Negative for congestion, ear pain and nosebleeds.    Eyes: Negative for blurred vision, pain and visual disturbance.   Cardiovascular: Negative for chest pain, palpitations and syncope.   Respiratory: Negative for cough, shortness of breath and wheezing.    Skin: Negative for dry skin, itching and rash.   Musculoskeletal: Positive for neck pain. Negative for joint pain, muscle weakness and stiffness.   Gastrointestinal: Negative for abdominal pain, constipation, diarrhea, nausea and vomiting.   Genitourinary: Negative for dysuria and hematuria.   Neurological: Negative for dizziness, headaches, numbness, seizures and tremors.   All other systems reviewed and are negative.      Objective:      Physical Exam   Constitutional: He is oriented to person, place, and time. He appears well-developed and well-nourished. He is cooperative.  Non-toxic appearance. He does not appear ill. No distress.   HENT:   Head: Normocephalic and atraumatic.   Right Ear: Hearing, tympanic membrane, external ear and ear canal normal.   Left Ear: Hearing, tympanic membrane, external ear and ear  canal normal.   Nose: Nose normal. No mucosal edema, rhinorrhea or nasal deformity. No epistaxis. Right sinus exhibits no maxillary sinus tenderness and no frontal sinus tenderness. Left sinus exhibits no maxillary sinus tenderness and no frontal sinus tenderness.   Mouth/Throat: Uvula is midline and mucous membranes are normal. No trismus in the jaw. Normal dentition. No uvula swelling. No posterior oropharyngeal erythema.   Eyes: Conjunctivae and lids are normal. Right eye exhibits no discharge. Left eye exhibits no discharge. No scleral icterus.   Sclera clear bilat   Neck: Trachea normal, normal range of motion, full passive range of motion without pain and phonation normal. Neck supple.   Cardiovascular: Normal rate, regular rhythm, normal heart sounds, intact distal pulses and normal pulses.    Pulmonary/Chest: Effort normal and breath sounds normal. No respiratory distress.   Abdominal: Soft. Normal appearance. He exhibits no distension, no pulsatile midline mass and no mass. There is no tenderness.   Musculoskeletal: He exhibits no edema or deformity.        Cervical back: He exhibits decreased range of motion, tenderness, pain and spasm.   Cervical Spine:    ROM l:  Flex 30  Ext 10  Rotation R 45 and L 60  Side bending R and L 20  Pos Spurling's sign    Sensory intact to pin prick except R thumb  Motor 5/5 bilaterally except decreased  strength R hand  DTR's 1+ biceps, triceps bilaterally   Neurological: He is alert and oriented to person, place, and time. He exhibits normal muscle tone. Coordination normal.   Skin: Skin is warm, dry and intact. He is not diaphoretic. No pallor.   Psychiatric: He has a normal mood and affect. His speech is normal and behavior is normal. Judgment and thought content normal. Cognition and memory are normal.   Nursing note and vitals reviewed.      Assessment:       1. Cervical radiculopathy at C6    2. Cervical spondylosis with radiculopathy        Plan:     Start  sedentary duty Wednesday Nov 1      Medications Ordered This Encounter      naproxen (NAPROSYN) 500 MG tablet          Sig: Take 1 tablet (500 mg total) by mouth 2 (two) times daily with meals.          Dispense:  30 tablet          Refill:  2  Patient Instructions: Begin Physical Therapy      Return in about 8 days (around 11/7/2017).

## 2017-11-07 ENCOUNTER — OFFICE VISIT (OUTPATIENT)
Dept: URGENT CARE | Facility: CLINIC | Age: 57
End: 2017-11-07
Payer: OTHER MISCELLANEOUS

## 2017-11-07 ENCOUNTER — CLINICAL SUPPORT (OUTPATIENT)
Dept: REHABILITATION | Facility: HOSPITAL | Age: 57
End: 2017-11-07
Payer: OTHER MISCELLANEOUS

## 2017-11-07 VITALS — HEART RATE: 68 BPM | DIASTOLIC BLOOD PRESSURE: 88 MMHG | TEMPERATURE: 99 F | SYSTOLIC BLOOD PRESSURE: 122 MMHG

## 2017-11-07 DIAGNOSIS — M54.12 CERVICAL RADICULOPATHY AT C6: Primary | ICD-10-CM

## 2017-11-07 DIAGNOSIS — M54.12 CERVICAL RADICULOPATHY: ICD-10-CM

## 2017-11-07 DIAGNOSIS — M47.22 CERVICAL SPONDYLOSIS WITH RADICULOPATHY: ICD-10-CM

## 2017-11-07 DIAGNOSIS — M54.2 NECK PAIN: Primary | ICD-10-CM

## 2017-11-07 PROCEDURE — G8981 BODY POS CURRENT STATUS: HCPCS | Mod: CL

## 2017-11-07 PROCEDURE — G8982 BODY POS GOAL STATUS: HCPCS | Mod: CK

## 2017-11-07 PROCEDURE — 99214 OFFICE O/P EST MOD 30 MIN: CPT | Mod: S$GLB,,,

## 2017-11-07 PROCEDURE — 97162 PT EVAL MOD COMPLEX 30 MIN: CPT

## 2017-11-07 RX ORDER — METOPROLOL SUCCINATE 25 MG/1
TABLET, EXTENDED RELEASE ORAL
COMMUNITY
Start: 2017-10-31 | End: 2018-01-23

## 2017-11-07 NOTE — PROGRESS NOTES
Subjective:       Patient ID: Ghulam Ariza is a 57 y.o. male.    Chief Complaint: Neck Pain    F/u for neck pain radiating to R arm and R hand/thumb numbness. Pt reports mild relief with pain meds, 8/10 on pain scale. Overall about the same. Continues with neck pain, R upper ext weakness and numbness. Scheduled for his first PT visit today. 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 Taking Hydrocodone 1-2 times per day.      Neck Pain    The current episode started 1 to 4 weeks ago (10/21/2017). The problem occurs constantly. The problem has been unchanged. The pain is at a severity of 8/10. The pain is moderate. Nothing aggravates the symptoms. Pertinent negatives include no chest pain, fever, headaches, numbness, syncope or weakness. He has tried NSAIDs and acetaminophen for the symptoms. The treatment provided mild relief.     Review of Systems   Constitution: Negative for chills, fever, weakness and malaise/fatigue.   HENT: Negative for congestion, ear pain and nosebleeds.    Eyes: Negative for blurred vision, pain and visual disturbance.   Cardiovascular: Negative for chest pain, palpitations and syncope.   Respiratory: Negative for cough, shortness of breath and wheezing.    Skin: Negative for dry skin, itching and rash.   Musculoskeletal: Positive for neck pain. Negative for joint pain, muscle weakness and stiffness.   Gastrointestinal: Negative for abdominal pain, constipation, diarrhea, nausea and vomiting.   Genitourinary: Negative for dysuria and hematuria.   Neurological: Negative for dizziness, headaches, numbness, seizures and tremors.   All other systems reviewed and are negative.      Objective:      Physical Exam   Constitutional: He is oriented to person, place, and time. He appears well-developed and well-nourished. He is cooperative.  Non-toxic appearance. He does not appear ill. No distress.   HENT:    Head: Normocephalic and atraumatic.   Right Ear: Hearing, tympanic membrane, external ear and ear canal normal.   Left Ear: Hearing, tympanic membrane, external ear and ear canal normal.   Nose: Nose normal. No mucosal edema, rhinorrhea or nasal deformity. No epistaxis. Right sinus exhibits no maxillary sinus tenderness and no frontal sinus tenderness. Left sinus exhibits no maxillary sinus tenderness and no frontal sinus tenderness.   Mouth/Throat: Uvula is midline, oropharynx is clear and moist and mucous membranes are normal. No trismus in the jaw. Normal dentition. No uvula swelling. No posterior oropharyngeal erythema.   Eyes: Conjunctivae and lids are normal. Right eye exhibits no discharge. Left eye exhibits no discharge. No scleral icterus.   Sclera clear bilat   Neck: Trachea normal, normal range of motion, full passive range of motion without pain and phonation normal. Neck supple.   Cardiovascular: Normal rate, regular rhythm, normal heart sounds, intact distal pulses and normal pulses.    Pulmonary/Chest: Effort normal and breath sounds normal. No respiratory distress.   Abdominal: Soft. Normal appearance and bowel sounds are normal. He exhibits no distension, no pulsatile midline mass and no mass. There is no tenderness.   Musculoskeletal: He exhibits no edema or deformity.        Cervical back: He exhibits decreased range of motion, pain and spasm.   Cervical Spine:    ROM :  Flex 30  Ext 15 with pain  Rotation R 30 with pain and L 45  Side bending R and L 15, pain on Right  Pos Spurling's sign    Sensory intact to pin prick except decreased R thumb  Motor 5/5 bilaterally, except decreased R  strength  DTR's 2+ biceps, triceps   Neurological: He is alert and oriented to person, place, and time. He exhibits normal muscle tone. Coordination normal.   Skin: Skin is warm, dry and intact. He is not diaphoretic. No pallor.   Psychiatric: He has a normal mood and affect. His speech is normal and  behavior is normal. Judgment and thought content normal. Cognition and memory are normal.   Nursing note and vitals reviewed.      Assessment:       1. Cervical radiculopathy at C6    2. Cervical spondylosis with radiculopathy        Plan:     MRI Cervical spine  Referral to Dr Sanchez post MRI  Disabled Oct 31 to present and until next visit on November 14                No Follow-up on file.

## 2017-11-07 NOTE — PLAN OF CARE
Name:Ghulam Ariza  Physician:Chema Kaplan MD  Date of eval:11/7/2017  Orders:  Physical Therapy evaluate and treat  Clinical#:[id number]  Diagnosis:  1. Neck pain     2. Cervical radiculopathy         Visit 1      SUBJECTIVE:     Onset of pain:  10/21/17  Mechanism of onset : stuck his head against a metal bar    Chief complaint: Patient is a 58 yo AAM referred to OP PT secondary neck pain and radicular symptoms into R UE. 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    Radicular symptoms: tingling, numbness, and pain into R hand  Dizziness: none    Aggravating factors: lying down, extending neck, turning head to the R, and prolonged sitting.  Easing factors: ice, heat, hold R UE lowered at his side.    Sleep is disturbed. Sleeping position: normally on his stomach, but sleeps semi reclined     Previous functional status includes: I with amb and ADL  Current functional status: I with amb and ADL, unless in extreme pain    Patients structured exercise routine: none  Exercise routine prior to onset: walking 2-3 times a week     Allergies:  Review of patient's allergies indicates:  No Known Allergies    Medical history:   Past Medical History:   Diagnosis Date    Degenerative disc disease     cervical radiculopathy    Genital herpes     Hx of colonic polyps     Hypertension early 40s    Pinched nerve in neck        Past Surgical History:   Procedure Laterality Date    back sx      L4/L5 Herniated disk       Medication:   Current Outpatient Prescriptions on File Prior to Visit   Medication Sig Dispense Refill    allopurinol (ZYLOPRIM) 300 MG tablet Take 1 tablet (300 mg total) by mouth every evening. 90 tablet 3    benazepril (LOTENSIN) 40 MG tablet Take 1 tablet (40 mg total) by mouth once daily. 90 tablet 3    diltiaZEM (CARDIZEM SR) 60 MG Cp12 Take 1 capsule (60 mg total) by mouth 2 (two) times daily. 60 capsule 11    hydrocodone-acetaminophen 7.5-325mg (NORCO)  7.5-325 mg per tablet Take 1 tablet by mouth every 6 (six) hours as needed for Pain. 28 tablet 0    methylPREDNISolone (MEDROL DOSEPACK) 4 mg tablet use as directed 1 Package 0    naproxen (NAPROSYN) 500 MG tablet Take 1 tablet (500 mg total) by mouth 2 (two) times daily with meals. 30 tablet 2    valacyclovir (VALTREX) 500 MG tablet Take 1 tablet (500 mg total) by mouth once daily. (Patient taking differently: Take 500 mg by mouth as needed. ) 30 tablet 11    [DISCONTINUED] amlodipine (NORVASC) 5 MG tablet        No current facility-administered medications on file prior to visit.        Special tests:           Xray:  10/23/17       Lateral imaging demonstrates grossly adequate alignment of the cervical spine noting C7 is obscured by soft tissues.  There is disc space height loss primarily involving C4-C5, C5-C6, and C6-C7, similar in appearance to the previous exam.  There is anterior marginal osteophytosis from the levels.  The facet joints are well aligned.  There is minimal anterior vertebral body height loss involving C5, stable, likely on the basis of ongoing degenerative change.  The odontoid is intact.  Lateral masses of C1 are in anatomic relationship with C2.  AP spinal alignment is appropriate.  There is facet arthropathy.  The lung apices are grossly clear.  The paraspinous and hypopharyngeal soft tissues are grossly unremarkable.  Airway is patent.    There is calcification of the supraspinous ligament.  There is multilevel neuroforaminal narrowing, noting bilateral neuroforaminal narrowing at C4-C5 and C5-C6, and likely also involving C3-C4.            Past treatment includes:  OP PT, epidural injections    Work:  [employer]             Pts goals: to decrease his pain  Pain level with 0 being the lowest and 10 being the highest presently: 9  Pain level with 0 being the lowest and 10 being the highest at worst: 10    OBJECTIVE: 56 yo AAM.  Postural examination in sitting:   - decreased lumbar  lordosis  - forward head  - forward shoulders      Cervical AROM    flexion 25 deg   extension 20 deg   R rotation 60 deg   L rotation 55 deg   R side bending 30 deg   L side bending 25 deg       UE MMT R L   C3 Cervical side bend 5/5 * 5/5   C4 Shoulder Shrug 5/5  5/5   Shoulder flexion 4+/5 5/5   Shoulder abduction 4/5 5/5   Shoulder IR 5/5 5/5   Shoulder ER 5/5 5/5   C5 Elbow flexion 5/5 5/5   C7 Elbow extension 5/5 5/5   C6 Wrist extension 5/5 5/5   Wrist flexion 5/5 5/5   Scapular protraction 4/5 5/5   Mid Traps 3+/5 5/5   Lower traps 3+/5 5/5     * - with pain    Endurance is good.    Cervical Special Tests    Compression positive   Distraction positive   Alar Ligament Stress Test: negative   Sharp-Jim Test negative   Spurling's:    Positive on R       Palpation: tenderness in R 1st rib. Tenderness in C7, T1    Joint mobility: hypomobile C7/T1      Other:   Functional Limitations  Reports - G Codes    Category:  Changing and Maintaining Body Position   Tool:  FOTO Outcomes Measurement System.   Score:  Patient scored out 30 of 100 on the FOTO Outcomes Measurement System.   Current:   CL at least 60% < 80% impaired, limited or restricted   Goal:   CK at least 40% < 60% impaired, limited or restricted       Patient History Examination Clinical Presentation Clinical Decision Making   Comorbidities:  Cervical spondylosis    Personal Factors:  none       Activity and Participation Restriction:  Affects dressing with increased pain  Limits work schedule    Body Systems:  Musculoskeletal: strength    Body Regions: neck, UEs Evolving clinical presentation with changing clinical characteristics     Mod    FOTO: 60% < 80% impaired, limited or restricted           TREATMENT: evaluation, mod complexity  Pt was educated, performed, and was provided with a written copy of  HEP to perform as tolerated,  including:    Exercises were reviewed and pt was able to demonstrate them prior to the end of the session.      No cultural, environmental, or spiritual barriers identified to treatment or learning.    Treatment today also included:  R 1st rib mobilization    ASSESSMENT:  PT diagnosis: neck pain, radicular pain into R UE. Weak scapular stabilizers, decreased R UE strength, poor posture   Patient can benefit from outpatient physical therapy and a home program.  Treatment will be directed at improving the following impairments.  Prognosis good. Will proceed with scapular stabilization and postural reeducation as well as mechanical traction to decrease neck and R UE radicular pain.    Medical necessity is demonstrated by the following  IMPAIRMENTS:  - poor posture  - pain  - decreased flexibility  - decreased muscle strength  - impaired function  - decreased work ability    GOALS: 4-6 weeks (12/19/17) . Pt agrees with goals set.  1. Independent with HEP.  2. Report decreased cervical and R UE pain < or =  5/10 with adls such as lying down, extending neck, turning head to the R, and prolonged sitting.  3. Increased MMT for B UE by 1 muscle grade to promote proper scapular stabilization to decrease cervical and R UE pain < or =  5/10 with adls such as lying down, extending neck, turning head to the R, and prolonged sitting.  4. Patient to achieve CK (at least 40% < 60% impaired, limited or restricted) level on the FOTO Outcomes Measurement System.    PLAN: Outpatient physical therapy 2 times weekly to include: pt ed, hep, therapeutic exercises, neuromuscular re-education/ balance exercises, joint mobilizations, manual therapy, modalities prn. Pt may be seen by PTA to carry out plan of care.      Cont PT for 4-6 weeks.     I certify the need for these services furnished under this plan of treatment and while under my care.    ____________________________________ Physician/Referring Practitioner                                               Date of Signature

## 2017-11-07 NOTE — LETTER
[facility]  [address], [address]  [place] [address]  Phone: [phone]  Fax: [phone]    Pt Name: Ghulam Ariza  Injury Date: 10/21/17   Employee ID: [id number] Date: 11/07/2017   Company: [email or web address]/#[id number]            Appointment Time: 10:30 AM Arrived: 10:45 AM CST   Physician: Chema Kaplan MD Check out: 12:15 PM           Office Treatment: Ghulam was seen today for neck pain.    Diagnoses and all orders for this visit:    Cervical radiculopathy at C6  -     Ambulatory referral to Pain Clinic    Cervical spondylosis with radiculopathy            Restrictions: Home today       Return Appointment: 11/14/2017@10:30 AM

## 2017-11-14 ENCOUNTER — OFFICE VISIT (OUTPATIENT)
Dept: URGENT CARE | Facility: CLINIC | Age: 57
End: 2017-11-14
Payer: OTHER MISCELLANEOUS

## 2017-11-14 VITALS
OXYGEN SATURATION: 98 % | SYSTOLIC BLOOD PRESSURE: 152 MMHG | TEMPERATURE: 99 F | HEART RATE: 81 BPM | DIASTOLIC BLOOD PRESSURE: 92 MMHG

## 2017-11-14 DIAGNOSIS — M47.22 CERVICAL SPONDYLOSIS WITH RADICULOPATHY: ICD-10-CM

## 2017-11-14 DIAGNOSIS — M54.12 CERVICAL RADICULOPATHY AT C6: Primary | ICD-10-CM

## 2017-11-14 PROCEDURE — 99213 OFFICE O/P EST LOW 20 MIN: CPT | Mod: S$GLB,,,

## 2017-11-14 NOTE — PROGRESS NOTES
Subjective:       Patient ID: Ghulam Ariza is a 57 y.o. male.    Chief Complaint: Neck Pain       F/u for neck pain radiating to R arm and R hand/thumb numbness. (doi 10/21/2017). No change in symptoms. Vivian tried to refer patient to [facility] without consulting us and delayed start of his PT Due to start tomorrow.       Review of Systems   Constitution: Negative for chills, fever, weakness and malaise/fatigue.   HENT: Negative for congestion, ear pain and nosebleeds.    Eyes: Negative for blurred vision, pain and visual disturbance.   Cardiovascular: Negative for chest pain, palpitations and syncope.   Respiratory: Negative for cough, shortness of breath and wheezing.    Skin: Negative for dry skin, itching and rash.   Musculoskeletal: Positive for neck pain. Negative for joint pain, muscle weakness and stiffness.   Gastrointestinal: Negative for abdominal pain, constipation, diarrhea, nausea and vomiting.   Genitourinary: Negative for dysuria and hematuria.   Neurological: Negative for dizziness, headaches, numbness, seizures and tremors.   All other systems reviewed and are negative.      Objective:      Physical Exam   Constitutional: He is oriented to person, place, and time. He appears well-developed and well-nourished. He is cooperative.  Non-toxic appearance. He does not appear ill. No distress.   HENT:   Head: Normocephalic and atraumatic.   Right Ear: Hearing, tympanic membrane, external ear and ear canal normal.   Left Ear: Hearing, tympanic membrane, external ear and ear canal normal.   Nose: Nose normal. No mucosal edema, rhinorrhea or nasal deformity. No epistaxis. Right sinus exhibits no maxillary sinus tenderness and no frontal sinus tenderness. Left sinus exhibits no maxillary sinus tenderness and no frontal sinus tenderness.   Mouth/Throat: Uvula is midline, oropharynx is clear and moist and mucous membranes are normal. No trismus in the jaw. Normal dentition. No uvula swelling. No posterior  oropharyngeal erythema.   Eyes: Conjunctivae and lids are normal. Right eye exhibits no discharge. Left eye exhibits no discharge. No scleral icterus.   Sclera clear bilat   Neck: Trachea normal, normal range of motion, full passive range of motion without pain and phonation normal. Neck supple.   Cardiovascular: Normal rate, regular rhythm, normal heart sounds, intact distal pulses and normal pulses.    Pulmonary/Chest: Effort normal and breath sounds normal. No respiratory distress.   Abdominal: Soft. Normal appearance and bowel sounds are normal. He exhibits no distension, no pulsatile midline mass and no mass. There is no tenderness.   Musculoskeletal: He exhibits no edema or deformity.        Cervical back: He exhibits decreased range of motion, tenderness, pain and spasm.   Cervical Spine:    ROM abnormal:  Flex 30  Ext 5  Rotation R 30 and L 45  Side bending R and L  Pos Spurling's sign R    Sensory intact to pin prick  Motor 5/5 bilaterally  DTR's 1+ biceps, 1+  Triceps     Neurological: He is alert and oriented to person, place, and time. He exhibits normal muscle tone. Coordination normal.   Skin: Skin is warm, dry and intact. He is not diaphoretic. No pallor.   Psychiatric: He has a normal mood and affect. His speech is normal and behavior is normal. Judgment and thought content normal. Cognition and memory are normal.   Nursing note and vitals reviewed.      Assessment:       1. Cervical radiculopathy at C6    2. Cervical spondylosis with radiculopathy        Plan:     Disabled  OTC Tylenol combined with Naproxen  Re assess for work ability in one week  MRI C spine and referral to Dr Sanchez as previously ordered.       Patient Instructions: Continue Physical Therapy   Restrictions: Home today  Return in about 1 week (around 11/21/2017).

## 2017-11-14 NOTE — LETTER
[facility]  [address], [address]  [place] [address]  Phone: [phone]  Fax: [phone]    Pt Name: Ghulam Ariza  Injury Date: 10/21/2017   Employee ID: [id number] Date : 11/14/2017   Company: [employer]/#[id number]            Appointment Time: 1030 AM Arrived: 10:30 AM CST   Physician: Chema Kaplan MD Check out: 12:19 PM           Office Treatment: Ghulam was seen today for neck pain.    Diagnoses and all orders for this visit:    Cervical radiculopathy at C6    Cervical spondylosis with radiculopathy       Patient Instructions: Continue Physical Therapy, MRI to be scheduled once authorized      Restrictions: Disabled until next office visit       Return Appointment: 11/21/2017@11:30

## 2017-11-15 ENCOUNTER — CLINICAL SUPPORT (OUTPATIENT)
Dept: REHABILITATION | Facility: HOSPITAL | Age: 57
End: 2017-11-15
Payer: OTHER MISCELLANEOUS

## 2017-11-15 DIAGNOSIS — M54.12 CERVICAL RADICULOPATHY: ICD-10-CM

## 2017-11-15 DIAGNOSIS — M54.2 NECK PAIN: Primary | ICD-10-CM

## 2017-11-15 PROCEDURE — 97012 MECHANICAL TRACTION THERAPY: CPT

## 2017-11-15 NOTE — PROGRESS NOTES
"Name: Ghulam Ariza  Clinic Number: [id number]  Date of Treatment: 11/15/2017  Diagnosis:     ICD-10-CM ICD-9-CM    1. Neck pain M54.2 723.1    2. Cervical radiculopathy M54.12 723.4          Subjective: Ghulam Ariza reports increased pain. "I am in severe pain." states the tingling is still into his R UE. Patient reports his neck pain between a 9 and a  10/10 on a 0-10 scale with 0 being no pain and 10 being the worst pain imaginable. States he tried performing the median nerve glide with his R UE abducted to 90 deg, but he was unable to keep his R UE up due to the pain. States he was able to perform the median nerve glide with his arm at his side. Has also been performing his 1st rib mobilization. Following mechanical traction, he stated his neck felt "loose." rated pain at a 9 out of 10.    Objective:  Patient was educated and performed therapy to increase strength, flexibility, posture and core stabilization with activities as follows:     Ghulam Ariza was educated and performed therapeutic exercises to develop strength, flexibility, posture and core stabilization including:     DATE 11/15/17   VISIT 2   Evaluation Date: 11/7/17   G CODE  Last FOTO 2/10  11/7/17   Mechanical traction See below                                               INITIALS CDW, PT     The patient received the following supervised modalities after being cleared for contradictions: Mechanical Traction:  Ghulam received intermittent mechanical traction to the cervical spine at a force of 18/12 pounds for a total of 20 minutes. Hold time of 30 seconds and rest time for 10 seconds. Patient tolerated treatment well without any adverse effects.      Assessment:   Minimal reduction in pain and radicular symptoms with use of mechanical traction. Will increased pounds next visit to improve cervical distraction and to decrease neck pain. Will also initiate scapular strengthening exercises    Pt will continue to benefit from skilled PT intervention. " Medical Necessity is demonstrated by:  Continued inability to participate in vocational pursuits, Pain limits function of effected part for some activities, Unable to participate fully in daily activities, Requires skilled supervision to complete and progress HEP and Weakness.    Patient is making poor progress towards established goals.    New/Revised goals: continue with current goals  GOALS: 4-6 weeks (12/19/17) . Pt agrees with goals set.  1. Independent with HEP.  2. Report decreased cervical and R UE pain < or =  5/10 with adls such as lying down, extending neck, turning head to the R, and prolonged sitting.  3. Increased MMT for B UE by 1 muscle grade to promote proper scapular stabilization to decrease cervical and R UE pain < or =  5/10 with adls such as lying down, extending neck, turning head to the R, and prolonged sitting.  4. Patient to achieve CK (at least 40% < 60% impaired, limited or restricted) level on the FOTO Outcomes Measurement System.    Plan:  Continue with established Plan of Care towards PT goals.

## 2017-11-17 ENCOUNTER — CLINICAL SUPPORT (OUTPATIENT)
Dept: REHABILITATION | Facility: HOSPITAL | Age: 57
End: 2017-11-17
Payer: OTHER MISCELLANEOUS

## 2017-11-17 DIAGNOSIS — M54.12 CERVICAL RADICULOPATHY: ICD-10-CM

## 2017-11-17 DIAGNOSIS — M54.2 NECK PAIN: Primary | ICD-10-CM

## 2017-11-17 PROCEDURE — 97012 MECHANICAL TRACTION THERAPY: CPT

## 2017-11-17 NOTE — PROGRESS NOTES
"Name: Ghulam Ariza  Clinic Number: [id number]  Date of Treatment: 11/17/2017  Diagnosis:     ICD-10-CM ICD-9-CM    1. Neck pain M54.2 723.1    2. Cervical radiculopathy M54.12 723.4        Subjective: Ghulam Ariza continues to report his neck pain at a 9/10 on a 0-10 scale with 0 being no pain and 10 being the worst pain imaginable. Following traction, pt reported his neck pain at an 8 out of 10.    Objective:  Patient was educated and performed therapy to increase strength, flexibility, posture and core stabilization with activities as follows:     Ghulam Ariza was educated and performed therapeutic exercises to develop strength, flexibility, posture and core stabilization for 43 total minutes including:     DATE 11/17/17 11/15/17   VISIT 3 2   Evaluation Date: 11/7/17 11/7/17   G CODE  Last FOTO 3/10  11/7/17 2/10  11/7/17   Mechanical traction See below See below   Lat pull downs with band 20 x 3" with BTB    Shoulder retractions with band 20 x 3" with BTB    Shoulder extension with band 20 x 3" with BTB                                            INITIALS CDW, PT CDW, PT     The patient received the following supervised modalities after being cleared for contradictions: Mechanical Traction:  Ghulam received intermittent mechanical traction to the cervical spine at a force of 20/12 pounds for a total of 20 minutes. Hold time of 30 seconds and rest time for 10 seconds. Patient tolerated treatment well without any adverse effects.      Assessment:   Minimal decrease in neck pain and radicular symptoms into R UE following traction. Initiated scapular stabilization exercises.    Pt will continue to benefit from skilled PT intervention. Medical Necessity is demonstrated by:  Continued inability to participate in vocational pursuits, Pain limits function of effected part for some activities, Unable to participate fully in daily activities, Requires skilled supervision to complete and progress HEP and Weakness.    Patient is " making poor progress towards established goals.    New/Revised goals: continue with current goals  GOALS: 4-6 weeks (12/19/17) . Pt agrees with goals set.  1. Independent with HEP.  2. Report decreased cervical and R UE pain < or =  5/10 with adls such as lying down, extending neck, turning head to the R, and prolonged sitting.  3. Increased MMT for B UE by 1 muscle grade to promote proper scapular stabilization to decrease cervical and R UE pain < or =  5/10 with adls such as lying down, extending neck, turning head to the R, and prolonged sitting.  4. Patient to achieve CK (at least 40% < 60% impaired, limited or restricted) level on the FOTO Outcomes Measurement System.    Plan:  Continue with established Plan of Care towards PT goals.

## 2017-11-21 ENCOUNTER — OFFICE VISIT (OUTPATIENT)
Dept: URGENT CARE | Facility: CLINIC | Age: 57
End: 2017-11-21
Payer: OTHER MISCELLANEOUS

## 2017-11-21 VITALS — DIASTOLIC BLOOD PRESSURE: 106 MMHG | HEART RATE: 104 BPM | TEMPERATURE: 99 F | SYSTOLIC BLOOD PRESSURE: 148 MMHG

## 2017-11-21 DIAGNOSIS — M47.22 CERVICAL SPONDYLOSIS WITH RADICULOPATHY: ICD-10-CM

## 2017-11-21 DIAGNOSIS — M54.12 CERVICAL RADICULOPATHY AT C6: Primary | ICD-10-CM

## 2017-11-21 PROCEDURE — 99213 OFFICE O/P EST LOW 20 MIN: CPT | Mod: S$GLB,,,

## 2017-11-21 NOTE — LETTER
[facility]  [address], [address]  [place] [address]  Phone: [phone]  Fax: [phone]    Pt Name: Ghulam Ariza  Injury Date: 10/21/2017   Employee ID: [id number] Date : 11/21/2017   Company: [employer]/#[id number]            Appointment Time: 11:30 AM Arrived: 11:30 AM CST   Physician: Chema Kaplan MD Check out: 12:59 pm           Office Treatment: Ghulam was seen today for neck pain.    Diagnoses and all orders for this visit:    Cervical radiculopathy at C6    Cervical spondylosis with radiculopathy       Patient Instructions: MRI to be scheduled once authorized      WORK STATUS: DISABLED until next office visit       Return Appointment: 11/28/2017@11:00 AM

## 2017-11-21 NOTE — PROGRESS NOTES
Subjective:       Patient ID: Ghulam Ariza is a 57 y.o. male.    Chief Complaint: Neck Pain    F/u for neck pain radiating to R arm and R hand/thumb numbness. (doi 10/21/2017). No change since last Ov. Has had 2 PT visits so far with trial of traction.       Neck Pain    The current episode started 1 to 4 weeks ago. The problem occurs constantly. The problem has been unchanged. The quality of the pain is described as aching. The pain is at a severity of 8/10. The pain is severe. Nothing aggravates the symptoms. The pain is same all the time. Pertinent negatives include no chest pain, fever, headaches, numbness, syncope or weakness. He has tried NSAIDs, home exercises and heat for the symptoms. The treatment provided no relief.     Review of Systems   Constitution: Negative for chills, fever, weakness and malaise/fatigue.   HENT: Negative for congestion, ear pain and nosebleeds.    Eyes: Negative for blurred vision, pain and visual disturbance.   Cardiovascular: Negative for chest pain, palpitations and syncope.   Respiratory: Negative for cough, shortness of breath and wheezing.    Skin: Negative for dry skin, itching and rash.   Musculoskeletal: Positive for neck pain. Negative for joint pain, muscle weakness and stiffness.   Gastrointestinal: Negative for abdominal pain, constipation, diarrhea, nausea and vomiting.   Genitourinary: Negative for dysuria and hematuria.   Neurological: Negative for dizziness, headaches, numbness, seizures and tremors.   All other systems reviewed and are negative.      Objective:      Physical Exam   Constitutional: He is oriented to person, place, and time. He appears well-developed and well-nourished. He is cooperative.  Non-toxic appearance. He does not appear ill. No distress.   HENT:   Head: Normocephalic and atraumatic.   Right Ear: Hearing, tympanic membrane, external ear and ear canal normal.   Left Ear: Hearing, tympanic membrane, external ear and ear canal normal.   Nose:  Nose normal. No mucosal edema, rhinorrhea or nasal deformity. No epistaxis. Right sinus exhibits no maxillary sinus tenderness and no frontal sinus tenderness. Left sinus exhibits no maxillary sinus tenderness and no frontal sinus tenderness.   Mouth/Throat: Uvula is midline, oropharynx is clear and moist and mucous membranes are normal. No trismus in the jaw. Normal dentition. No uvula swelling. No posterior oropharyngeal erythema.   Eyes: Conjunctivae and lids are normal. Right eye exhibits no discharge. Left eye exhibits no discharge. No scleral icterus.   Sclera clear bilat   Neck: Trachea normal, normal range of motion, full passive range of motion without pain and phonation normal. Neck supple.   Cardiovascular: Normal rate, regular rhythm, normal heart sounds, intact distal pulses and normal pulses.    Pulmonary/Chest: Effort normal and breath sounds normal. No respiratory distress.   Abdominal: Soft. Normal appearance and bowel sounds are normal. He exhibits no distension, no pulsatile midline mass and no mass. There is no tenderness.   Musculoskeletal: He exhibits no edema or deformity.        Cervical back: He exhibits decreased range of motion, tenderness, pain and spasm.   Cervical spine:    ROM :  Flex 45  Ext 15 with pain  Rotation R 45 With paresthesias R hand   and L rot   Side bending R and L decreased  Neg Spurling's sign    Sensory intact to pin prick except R thumb, radial forearm    Motor 5/5 bilaterally, with some R  and elbow flex weakness  DTR's 2+ biceps, triceps    PT notes reviewed. Some decrease with traction.   Needs MRI and consult with Dr Sanchez for probable repeat JUDE   Neurological: He is alert and oriented to person, place, and time. He exhibits normal muscle tone. Coordination normal.   Skin: Skin is warm, dry and intact. He is not diaphoretic. No pallor.   Psychiatric: He has a normal mood and affect. His speech is normal and behavior is normal. Judgment and thought content  normal. Cognition and memory are normal.   Nursing note and vitals reviewed.      Assessment:       1. Cervical radiculopathy at C6    2. Cervical spondylosis with radiculopathy        Plan:            Patient Instructions: MRI to be scheduled once authorized   Restrictions: Home today  Return in about 1 week (around 11/28/2017).

## 2017-11-22 ENCOUNTER — CLINICAL SUPPORT (OUTPATIENT)
Dept: REHABILITATION | Facility: HOSPITAL | Age: 57
End: 2017-11-22
Payer: OTHER MISCELLANEOUS

## 2017-11-22 DIAGNOSIS — M54.12 CERVICAL RADICULOPATHY: ICD-10-CM

## 2017-11-22 DIAGNOSIS — M54.2 NECK PAIN: Primary | ICD-10-CM

## 2017-11-22 PROCEDURE — 97012 MECHANICAL TRACTION THERAPY: CPT

## 2017-11-22 PROCEDURE — 97110 THERAPEUTIC EXERCISES: CPT

## 2017-11-22 NOTE — PROGRESS NOTES
"Name: Ghulam Ariza  Clinic Number: [id number]  Date of Treatment: 11/22/2017  Diagnosis:     ICD-10-CM ICD-9-CM    1. Neck pain M54.2 723.1    2. Cervical radiculopathy M54.12 723.4        Subjective: Ghulam Ariza states "everything is the same." continues to report his neck pain at an 8 or 9/10 on a 0-10 scale with 0 being no pain and 10 being the worst pain imaginable.     Objective:  Patient was educated and performed therapy to increase strength, flexibility, posture and core stabilization with activities as follows:     Ghulam Ariza was educated and performed therapeutic exercises to develop strength, flexibility, posture and core stabilization for 55 total minutes including:     One on one ther ex x 15 minutes    DATE 11/22/17 11/17/17 11/15/17   VISIT 4 3 2   Evaluation Date: 11/7/17 11/7/17 11/7/17   G CODE  Last FOTO 4/10  11/7/17 3/10  11/7/17 2/10  11/7/17   Mechanical traction  See below See below   Lat pull downs with band 20 x 3" with BTB 20 x 3" with BTB    Shoulder retractions with band 20 x 3" with BTB 20 x 3" with BTB    Shoulder extension with band 20 x 3" with BTB 20 x 3" with BTB    Isometric neck flexion 10 x 3"     Isometric neck side flexion 10 x 3"     Chin tucks 10 x 3"                                   INITIALS CDW, PT CDW, PT CDW, PT     The patient received the following supervised modalities after being cleared for contradictions: Mechanical Traction:  Ghulam received intermittent mechanical traction to the cervical spine at a force of 22/12 pounds for a total of 20 minutes. Hold time of 30 seconds and rest time for 10 seconds. Patient tolerated treatment well without any adverse effects.      Written Home Exercises Provided: Please refer to Notes section for HEP given today  Isometric neck flexion  Isometric lateral flexion  Chin tucks    Pt demo good understanding of the education provided. Ghulam Ariza demonstrated good return demonstration of activities.     Assessment:   Added isometric " neck stabilization exercises to HEP. Patient experiences some decrease in neck pain, but overall continues to have elevated neck pain with radicular symptoms into R UE    Pt will continue to benefit from skilled PT intervention. Medical Necessity is demonstrated by:  Continued inability to participate in vocational pursuits, Pain limits function of effected part for some activities, Unable to participate fully in daily activities, Requires skilled supervision to complete and progress HEP and Weakness.    Patient is making poor progress towards established goals.    New/Revised goals: continue with current goals  GOALS: 4-6 weeks (12/19/17) . Pt agrees with goals set.  1. Independent with HEP.  2. Report decreased cervical and R UE pain < or =  5/10 with adls such as lying down, extending neck, turning head to the R, and prolonged sitting.  3. Increased MMT for B UE by 1 muscle grade to promote proper scapular stabilization to decrease cervical and R UE pain < or =  5/10 with adls such as lying down, extending neck, turning head to the R, and prolonged sitting.  4. Patient to achieve CK (at least 40% < 60% impaired, limited or restricted) level on the FOTO Outcomes Measurement System.    Plan:  Continue with established Plan of Care towards PT goals.

## 2017-11-22 NOTE — PATIENT INSTRUCTIONS
Isometric Flexion        Put the tips of both index fingers lightly on forehead. Gently press into fingers as if looking toward ground. Resist for _3_ seconds. Press and release slowly.  Repeat _20__ times. Do _2_ sessions per day.     https://e(ye)BRAIN.[employer].us/18     Copyright © [employer]. All rights reserved.   Isometric Lateral Flexion        Put right index finger on right temple. Gently try to move right ear toward shoulder, pushing against finger. Hold __3__ seconds. Repeat on other side. Push and release slowly.  Repeat __20__ times. Do __2__ sessions per day.     https://e(ye)BRAIN.[employer].us/22     Copyright © [employer]. All rights reserved.       NECK: Capital Extension        Sitting: Tuck chin, move head and neck backward. Lying Down: Tuck chin, push back of neck into pillow.  _20_ reps per set, hold  3  Seconds,  _2_ sets per day.    Copyright © [employer]. All rights reserved.

## 2017-11-27 ENCOUNTER — CLINICAL SUPPORT (OUTPATIENT)
Dept: REHABILITATION | Facility: HOSPITAL | Age: 57
End: 2017-11-27
Payer: OTHER MISCELLANEOUS

## 2017-11-27 ENCOUNTER — LAB VISIT (OUTPATIENT)
Dept: LAB | Facility: HOSPITAL | Age: 57
End: 2017-11-27
Attending: FAMILY MEDICINE
Payer: COMMERCIAL

## 2017-11-27 DIAGNOSIS — E55.9 VITAMIN D DEFICIENCY DISEASE: ICD-10-CM

## 2017-11-27 DIAGNOSIS — M54.2 NECK PAIN: Primary | ICD-10-CM

## 2017-11-27 DIAGNOSIS — Z00.00 WELL ADULT EXAM: ICD-10-CM

## 2017-11-27 DIAGNOSIS — M54.12 CERVICAL RADICULOPATHY: ICD-10-CM

## 2017-11-27 DIAGNOSIS — Z12.5 PROSTATE CANCER SCREENING: ICD-10-CM

## 2017-11-27 DIAGNOSIS — M10.9 GOUT, UNSPECIFIED CAUSE, UNSPECIFIED CHRONICITY, UNSPECIFIED SITE: ICD-10-CM

## 2017-11-27 LAB
25(OH)D3+25(OH)D2 SERPL-MCNC: 30 NG/ML
ALBUMIN SERPL BCP-MCNC: 3.6 G/DL
ALP SERPL-CCNC: 55 U/L
ALT SERPL W/O P-5'-P-CCNC: 29 U/L
ANION GAP SERPL CALC-SCNC: 7 MMOL/L
AST SERPL-CCNC: 36 U/L
BASOPHILS # BLD AUTO: 0.01 K/UL
BASOPHILS NFR BLD: 0.4 %
BILIRUB SERPL-MCNC: 0.4 MG/DL
BUN SERPL-MCNC: 16 MG/DL
CALCIUM SERPL-MCNC: 9.2 MG/DL
CHLORIDE SERPL-SCNC: 106 MMOL/L
CHOLEST SERPL-MCNC: 168 MG/DL
CHOLEST/HDLC SERPL: 4.4 {RATIO}
CO2 SERPL-SCNC: 29 MMOL/L
COMPLEXED PSA SERPL-MCNC: 0.47 NG/ML
CREAT SERPL-MCNC: 1.5 MG/DL
DIFFERENTIAL METHOD: ABNORMAL
EOSINOPHIL # BLD AUTO: 0.1 K/UL
EOSINOPHIL NFR BLD: 4.5 %
ERYTHROCYTE [DISTWIDTH] IN BLOOD BY AUTOMATED COUNT: 15 %
EST. GFR  (AFRICAN AMERICAN): 58.9 ML/MIN/1.73 M^2
EST. GFR  (NON AFRICAN AMERICAN): 50.9 ML/MIN/1.73 M^2
ESTIMATED AVG GLUCOSE: 123 MG/DL
GLUCOSE SERPL-MCNC: 98 MG/DL
HBA1C MFR BLD HPLC: 5.9 %
HCT VFR BLD AUTO: 37.9 %
HDLC SERPL-MCNC: 38 MG/DL
HDLC SERPL: 22.6 %
HGB BLD-MCNC: 12.5 G/DL
IMM GRANULOCYTES # BLD AUTO: 0 K/UL
IMM GRANULOCYTES NFR BLD AUTO: 0 %
LDLC SERPL CALC-MCNC: 112 MG/DL
LYMPHOCYTES # BLD AUTO: 1.1 K/UL
LYMPHOCYTES NFR BLD: 45.3 %
MCH RBC QN AUTO: 25.7 PG
MCHC RBC AUTO-ENTMCNC: 33 G/DL
MCV RBC AUTO: 78 FL
MONOCYTES # BLD AUTO: 0.3 K/UL
MONOCYTES NFR BLD: 10.9 %
NEUTROPHILS # BLD AUTO: 1 K/UL
NEUTROPHILS NFR BLD: 38.9 %
NONHDLC SERPL-MCNC: 130 MG/DL
NRBC BLD-RTO: 0 /100 WBC
PLATELET # BLD AUTO: 201 K/UL
PLATELET BLD QL SMEAR: ABNORMAL
PMV BLD AUTO: 10.6 FL
POTASSIUM SERPL-SCNC: 4.2 MMOL/L
PROT SERPL-MCNC: 7.4 G/DL
RBC # BLD AUTO: 4.86 M/UL
SODIUM SERPL-SCNC: 142 MMOL/L
T4 FREE SERPL-MCNC: 1.04 NG/DL
TRIGL SERPL-MCNC: 90 MG/DL
TSH SERPL DL<=0.005 MIU/L-ACNC: 0.83 UIU/ML
URATE SERPL-MCNC: 8.4 MG/DL
WBC # BLD AUTO: 2.47 K/UL

## 2017-11-27 PROCEDURE — G8981 BODY POS CURRENT STATUS: HCPCS | Mod: CL

## 2017-11-27 PROCEDURE — G8982 BODY POS GOAL STATUS: HCPCS | Mod: CK

## 2017-11-27 PROCEDURE — 84153 ASSAY OF PSA TOTAL: CPT

## 2017-11-27 PROCEDURE — 84550 ASSAY OF BLOOD/URIC ACID: CPT

## 2017-11-27 PROCEDURE — 36415 COLL VENOUS BLD VENIPUNCTURE: CPT | Mod: PO

## 2017-11-27 PROCEDURE — 80061 LIPID PANEL: CPT

## 2017-11-27 PROCEDURE — 84439 ASSAY OF FREE THYROXINE: CPT

## 2017-11-27 PROCEDURE — 80053 COMPREHEN METABOLIC PANEL: CPT

## 2017-11-27 PROCEDURE — 83036 HEMOGLOBIN GLYCOSYLATED A1C: CPT

## 2017-11-27 PROCEDURE — 82306 VITAMIN D 25 HYDROXY: CPT

## 2017-11-27 PROCEDURE — 84443 ASSAY THYROID STIM HORMONE: CPT

## 2017-11-27 PROCEDURE — 97012 MECHANICAL TRACTION THERAPY: CPT

## 2017-11-27 PROCEDURE — 85025 COMPLETE CBC W/AUTO DIFF WBC: CPT

## 2017-11-27 NOTE — PROGRESS NOTES
"Name: Ghulam Ariza  Clinic Number: [id number]  Date of Treatment: 11/27/2017  Diagnosis:     ICD-10-CM ICD-9-CM    1. Neck pain M54.2 723.1    2. Cervical radiculopathy M54.12 723.4        Subjective: Ghulam Ariza reports no difference in his neck pain or radicular symptoms with OP PT. States in the past, he has always had a steroid shot prior to starting OP PT for his neck.  continues to report his neck pain at an 8 or 9/10 on a 0-10 scale with 0 being no pain and 10 being the worst pain imaginable.     Objective:  Patient was educated and performed therapy to increase strength, flexibility, posture and core stabilization with activities as follows:     Ghulam Ariza was educated and performed therapeutic exercises to develop strength, flexibility, posture and core stabilization for 53 total minutes including:         DATE 11/27/17 11/22/17 11/17/17 11/15/17   VISIT 5 4 3 2   Evaluation Date: 11/7/17 11/7/17 11/7/17 11/7/17   G CODE  Last FOTO 1/10  11/27/17 4/10  11/7/17 3/10  11/7/17 2/10  11/7/17   Mechanical traction   See below See below   Lat pull downs with band 20 x 3" with BTB 20 x 3" with BTB 20 x 3" with BTB    Shoulder retractions with band 20 x 3" with BTB 20 x 3" with BTB 20 x 3" with BTB    Shoulder extension with band 20 x 3" with BTB 20 x 3" with BTB 20 x 3" with BTB    Isometric neck flexion  10 x 3"     Isometric neck side flexion  10 x 3"     Chin tucks  10 x 3"                                        INITIALS CDW, PT CDW, PT CDW, PT CDW, PT     The patient received the following supervised modalities after being cleared for contradictions: Mechanical Traction:  Ghulam received intermittent mechanical traction to the cervical spine at a force of 22/12 pounds for a total of 20 minutes. Hold time of 30 seconds and rest time for 10 seconds. Patient tolerated treatment well without any adverse effects.    Functional Limitations  Reports - G Codes    Category:  Changing and Maintaining Body Position   Tool:  " FOTO Outcomes Measurement System.   Score:  Patient scored out 32 of 100 on the FOTO Outcomes Measurement System.   Current:   CL at least 60% < 80% impaired, limited or restricted   Goal:  [id number] at least 40% < 60% impaired, limited or restricted         Written Home Exercises Provided: Please refer to Notes section for HEP given today  No new HEP given today    Assessment:   Minimal reduction in neck pain with traction.    Pt will continue to benefit from skilled PT intervention. Medical Necessity is demonstrated by:  Continued inability to participate in vocational pursuits, Pain limits function of effected part for some activities, Unable to participate fully in daily activities, Requires skilled supervision to complete and progress HEP and Weakness.    Patient is making poor progress towards established goals.    New/Revised goals: continue with current goals  GOALS: 4-6 weeks (12/19/17) . Pt agrees with goals set.  1. Independent with HEP.  2. Report decreased cervical and R UE pain < or =  5/10 with adls such as lying down, extending neck, turning head to the R, and prolonged sitting.  3. Increased MMT for B UE by 1 muscle grade to promote proper scapular stabilization to decrease cervical and R UE pain < or =  5/10 with adls such as lying down, extending neck, turning head to the R, and prolonged sitting.  4. Patient to achieve CK (at least 40% < 60% impaired, limited or restricted) level on the FOTO Outcomes Measurement System.    Plan:  Continue with established Plan of Care towards PT goals.

## 2017-11-28 ENCOUNTER — OFFICE VISIT (OUTPATIENT)
Dept: URGENT CARE | Facility: CLINIC | Age: 57
End: 2017-11-28
Payer: OTHER MISCELLANEOUS

## 2017-11-28 VITALS
SYSTOLIC BLOOD PRESSURE: 122 MMHG | TEMPERATURE: 99 F | DIASTOLIC BLOOD PRESSURE: 96 MMHG | HEART RATE: 73 BPM | OXYGEN SATURATION: 97 %

## 2017-11-28 DIAGNOSIS — M54.12 CERVICAL RADICULOPATHY AT C6: Primary | ICD-10-CM

## 2017-11-28 DIAGNOSIS — M47.22 CERVICAL SPONDYLOSIS WITH RADICULOPATHY: ICD-10-CM

## 2017-11-28 PROCEDURE — 99213 OFFICE O/P EST LOW 20 MIN: CPT | Mod: S$GLB,,,

## 2017-11-28 NOTE — PROGRESS NOTES
Subjective:       Patient ID: Ghulam Ariza is a 57 y.o. male.    Chief Complaint: Neck Pain    F/u for neck pain radiating to R arm and R hand/thumb numbness. (doi 10/21/2017). No change since last Ov. Pt states that gets some relief immediately after aphysical therapy visit, but his symptoms return. Continues with neck pain 8-9/10. No contact with Nikia FLORES and Dr Ousmane gallardo.Currently with URI/virus, seeing PCP this Friday in follow up.      Neck Pain    The current episode started more than 1 month ago. The problem occurs constantly. The problem has been unchanged. The pain is at a severity of 8/10. The pain is severe. Nothing aggravates the symptoms. He has tried NSAIDs, heat, acetaminophen and home exercises for the symptoms. The treatment provided mild relief.     Review of Systems   Musculoskeletal: Positive for neck pain.   All other systems reviewed and are negative.      Objective:      Physical Exam   Constitutional: He is oriented to person, place, and time. He appears well-developed and well-nourished. He is cooperative.  Non-toxic appearance. He does not appear ill. No distress.   HENT:   Head: Normocephalic and atraumatic.   Right Ear: Hearing, tympanic membrane, external ear and ear canal normal.   Left Ear: Hearing, tympanic membrane, external ear and ear canal normal.   Nose: Nose normal. No mucosal edema, rhinorrhea or nasal deformity. No epistaxis. Right sinus exhibits no maxillary sinus tenderness and no frontal sinus tenderness. Left sinus exhibits no maxillary sinus tenderness and no frontal sinus tenderness.   Mouth/Throat: Uvula is midline and mucous membranes are normal. No trismus in the jaw. Normal dentition. No uvula swelling. No posterior oropharyngeal erythema.   Eyes: Conjunctivae and lids are normal. Right eye exhibits no discharge. Left eye exhibits no discharge. No scleral icterus.   Sclera clear bilat   Neck: Trachea normal, normal range of motion, full passive range of motion  without pain and phonation normal. Neck supple.   Cardiovascular: Normal rate, regular rhythm, normal heart sounds, intact distal pulses and normal pulses.    Pulmonary/Chest: Effort normal and breath sounds normal. No respiratory distress.   Abdominal: Soft. Normal appearance. He exhibits no distension, no pulsatile midline mass and no mass. There is no tenderness.   Musculoskeletal: He exhibits no edema or deformity.        Cervical back: He exhibits decreased range of motion, tenderness, pain and spasm.   Cervical spine    ROM :  Flex 30  Ext 10  Rotation R 60 and L 30  Side bending R and L decreased  Pos Spurling's sign on R    Sensory decreased to pin prick R thumb and radial forearem  Motor:Weak R biceps, brachioradialis with atrophy in brachioradialis, and     DTR's 0-1+ biceps, 1+ triceps on R, 1+ on L in biceps/triceps    PT notes reviewed.    Neurological: He is alert and oriented to person, place, and time. He exhibits normal muscle tone. Coordination normal.   Skin: Skin is warm, dry and intact. He is not diaphoretic. No pallor.   Psychiatric: He has a normal mood and affect. His speech is normal and behavior is normal. Judgment and thought content normal. Cognition and memory are normal.   Nursing note and vitals reviewed.      Assessment:       1. Cervical radiculopathy at C6    2. Cervical spondylosis with radiculopathy        Plan:            Patient Instructions: Continue Physical Therapy   Restrictions: Home today  Return in about 6 days (around 12/4/2017).

## 2017-11-28 NOTE — LETTER
[facility]  [address], [address]  [place] [address]  Phone: [phone]  Fax: [phone]    Pt Name: Ghulam Ariza  Injury Date: 10/21/2017   Employee ID: [id number] Date : 11/28/2017   Company: [email or web address]/#[id number]            Appointment Time: 10:00 AM Arrived: 11:00 AM CST   Physician: Chema Kaplan MD Check out: 12:41 PM           Office Treatment: Ghulam was seen today for neck pain.    Diagnoses and all orders for this visit:    Cervical radiculopathy at C6    Cervical spondylosis with radiculopathy       Patient Instructions: Continue Physical Therapy      Restrictions: Home today  Disabled until next office visit       Return Appointment: 12/4/2017@11:00 AM

## 2017-11-29 ENCOUNTER — CLINICAL SUPPORT (OUTPATIENT)
Dept: REHABILITATION | Facility: HOSPITAL | Age: 57
End: 2017-11-29
Payer: OTHER MISCELLANEOUS

## 2017-11-29 DIAGNOSIS — M54.2 NECK PAIN: Primary | ICD-10-CM

## 2017-11-29 DIAGNOSIS — M54.12 CERVICAL RADICULOPATHY: ICD-10-CM

## 2017-11-29 PROCEDURE — 97012 MECHANICAL TRACTION THERAPY: CPT

## 2017-11-29 NOTE — PROGRESS NOTES
"Name: Ghulam Ariza  Clinic Number: [id number]  Date of Treatment: 11/29/2017  Diagnosis:     ICD-10-CM ICD-9-CM    1. Neck pain M54.2 723.1    2. Cervical radiculopathy M54.12 723.4        Subjective: Ghulam Ariza continues to report no significant long term reduction in neck pain. reported his neck pain at an 8 or 9/10 on a 0-10 scale with 0 being no pain and 10 being the worst pain imaginable.     Objective:  Patient was educated and performed therapy to increase strength, flexibility, posture and core stabilization with activities as follows:     Ghulam Ariza was educated and performed therapeutic exercises to develop strength, flexibility, posture and core stabilization for 40 total minutes including:       DATE 11/27/17 11/22/17 11/17/17 11/15/17   VISIT 5 4 3 2   Evaluation Date: 11/7/17 11/7/17 11/7/17 11/7/17   G CODE  Last FOTO 1/10  11/27/17 4/10  11/7/17 3/10  11/7/17 2/10  11/7/17   Mechanical traction   See below See below   Lat pull downs with band 20 x 3" with BTB 20 x 3" with BTB 20 x 3" with BTB    Shoulder retractions with band 20 x 3" with BTB 20 x 3" with BTB 20 x 3" with BTB    Shoulder extension with band 20 x 3" with BTB 20 x 3" with BTB 20 x 3" with BTB    Isometric neck flexion  10 x 3"     Isometric neck side flexion  10 x 3"     Chin tucks  10 x 3"                   Mechanical traction See below                    INITIALS CDW, PT CDW, PT CDW, PT CDW, PT     The patient received the following supervised modalities after being cleared for contradictions: Mechanical Traction:  Ghulam received intermittent mechanical traction to the cervical spine at a force of 22/12 pounds for a total of 20 minutes. Hold time of 30 seconds and rest time for 10 seconds. Patient tolerated treatment well without any adverse effects.      Written Home Exercises Provided: Please refer to Notes section for HEP given today  No new HEP given today    Assessment:   Patient continues to receive short term immediate pain " reduction following traction, but does not experience long term reduction.    Pt will continue to benefit from skilled PT intervention. Medical Necessity is demonstrated by:  Continued inability to participate in vocational pursuits, Pain limits function of effected part for some activities, Unable to participate fully in daily activities, Requires skilled supervision to complete and progress HEP and Weakness.    Patient is making poor progress towards established goals.    New/Revised goals: continue with current goals  GOALS: 4-6 weeks (12/19/17) . Pt agrees with goals set.  1. Independent with HEP.  2. Report decreased cervical and R UE pain < or =  5/10 with adls such as lying down, extending neck, turning head to the R, and prolonged sitting.  3. Increased MMT for B UE by 1 muscle grade to promote proper scapular stabilization to decrease cervical and R UE pain < or =  5/10 with adls such as lying down, extending neck, turning head to the R, and prolonged sitting.  4. Patient to achieve CK (at least 40% < 60% impaired, limited or restricted) level on the FOTO Outcomes Measurement System.    Plan:  Continue with established Plan of Care towards PT goals.

## 2017-11-30 ENCOUNTER — TELEPHONE (OUTPATIENT)
Dept: PAIN MEDICINE | Facility: CLINIC | Age: 57
End: 2017-11-30

## 2017-11-30 NOTE — TELEPHONE ENCOUNTER
Contacted patient regarding his request. No answer, voicemail is full, unable to leave a messae.     Dr. Sanchez is not accepting any W/c at this time.

## 2017-11-30 NOTE — TELEPHONE ENCOUNTER
----- Message from Jayla Morfin sent at 11/30/2017  9:48 AM CST -----  Contact: pt  x_  1st Request  _  2nd Request  _  3rd Request    Who: FLAKO FRANCOIS [[id number]]    Why: Patient would like to speak with staff in regards to scheduling an appt for a workman's compt claim.     What Number to Call Back:[phone]    When to Expect a call back: (Within 24 hours)    Please return the call at earliest convenience. Thanks!

## 2017-12-04 ENCOUNTER — OFFICE VISIT (OUTPATIENT)
Dept: URGENT CARE | Facility: CLINIC | Age: 57
End: 2017-12-04
Payer: OTHER MISCELLANEOUS

## 2017-12-04 ENCOUNTER — TELEPHONE (OUTPATIENT)
Dept: PAIN MEDICINE | Facility: CLINIC | Age: 57
End: 2017-12-04

## 2017-12-04 VITALS
TEMPERATURE: 99 F | OXYGEN SATURATION: 98 % | SYSTOLIC BLOOD PRESSURE: 114 MMHG | DIASTOLIC BLOOD PRESSURE: 82 MMHG | HEART RATE: 92 BPM

## 2017-12-04 DIAGNOSIS — M47.22 CERVICAL SPONDYLOSIS WITH RADICULOPATHY: ICD-10-CM

## 2017-12-04 DIAGNOSIS — M54.12 CERVICAL RADICULOPATHY AT C6: Primary | ICD-10-CM

## 2017-12-04 PROCEDURE — 99213 OFFICE O/P EST LOW 20 MIN: CPT | Mod: S$GLB,,,

## 2017-12-04 NOTE — LETTER
[facility]  [address], [address]  [place] [address]  Phone: [phone]  Fax: [phone]    Pt Name: Ghulam Ariza  Injury Date: 10/21/2017   Employee ID:  Date of First Treatment: 12/04/2017   Company: [employer]#[id number]            Appointment Time: 11:00 AM Arrived: 11:07 AM CST   Physician: Chema Kaplan MD Check out: 1:37 PM           Office Treatment: Ghulam was seen today for neck pain.    Diagnoses and all orders for this visit:    Cervical radiculopathy at C6    Cervical spondylosis with radiculopathy            Restrictions: Disabled until next office visit       Return Appointment: 12/11/2017@1:15PM

## 2017-12-04 NOTE — TELEPHONE ENCOUNTER
----- Message from Shannan Efra sent at 12/4/2017  1:25 PM CST -----  _ x 1st Request  _  2nd Request  _  3rd Request        Who: [facility]    Why: CHARLIE stating dr. Kaplan would like to speak with dr. tidwell regarding pt.    What Number to Call Back:[phone]    When to Expect a call back: (Before the end of the day)   -- if the call is after 12:00, the call back will be tomorrow.

## 2017-12-04 NOTE — PROGRESS NOTES
Subjective:       Patient ID: Ghulam Ariza is a 57 y.o. male.    Chief Complaint: Neck Pain    F/u for neck pain radiating to R arm and R hand/thumb numbness. (doi 10/21/2017). Pt has MRI scheduled 12/5@ 7:30pm. Referral for pain management has been approved by [facility]. Dr Sanchez is currently not accepting workers comp patients.       Neck Pain    The current episode started more than 1 month ago. The problem occurs constantly. The problem has been unchanged. The pain is at a severity of 8/10. The pain is moderate. Pertinent negatives include no chest pain, fever, headaches, numbness, syncope or weakness. He has tried NSAIDs, acetaminophen and home exercises for the symptoms. The treatment provided no relief.     Review of Systems   Constitution: Negative for chills, fever, weakness and malaise/fatigue.   HENT: Negative for congestion, ear pain and nosebleeds.    Eyes: Negative for blurred vision, pain and visual disturbance.   Cardiovascular: Negative for chest pain, palpitations and syncope.   Respiratory: Negative for cough, shortness of breath and wheezing.    Skin: Negative for dry skin, itching and rash.   Musculoskeletal: Positive for neck pain. Negative for joint pain, muscle weakness and stiffness.   Gastrointestinal: Negative for abdominal pain, constipation, diarrhea, nausea and vomiting.   Genitourinary: Negative for dysuria and hematuria.   Neurological: Negative for dizziness, headaches, numbness, seizures and tremors.   All other systems reviewed and are negative.      Objective:      Physical Exam   Constitutional: He is oriented to person, place, and time. He appears well-developed and well-nourished. He is cooperative.  Non-toxic appearance. He does not appear ill. No distress.   HENT:   Head: Normocephalic and atraumatic.   Right Ear: Hearing, tympanic membrane, external ear and ear canal normal.   Left Ear: Hearing, tympanic membrane, external ear and ear canal normal.   Nose: Nose normal. No mucosal  edema, rhinorrhea or nasal deformity. No epistaxis. Right sinus exhibits no maxillary sinus tenderness and no frontal sinus tenderness. Left sinus exhibits no maxillary sinus tenderness and no frontal sinus tenderness.   Mouth/Throat: Uvula is midline, oropharynx is clear and moist and mucous membranes are normal. No trismus in the jaw. Normal dentition. No uvula swelling. No posterior oropharyngeal erythema.   Eyes: Conjunctivae and lids are normal. Right eye exhibits no discharge. Left eye exhibits no discharge. No scleral icterus.   Sclera clear bilat   Neck: Trachea normal, normal range of motion, full passive range of motion without pain and phonation normal. Neck supple.   Cardiovascular: Normal rate, regular rhythm, normal heart sounds, intact distal pulses and normal pulses.    Pulmonary/Chest: Effort normal and breath sounds normal. No respiratory distress.   Abdominal: Soft. Normal appearance and bowel sounds are normal. He exhibits no distension, no pulsatile midline mass and no mass. There is no tenderness.   Musculoskeletal: He exhibits no edema or deformity.        Cervical back: He exhibits decreased range of motion, pain and spasm.   Cervical Spine:    ROM :  Flex 45  Ext 10 with pain  Rotation R 45 and L 60  Side bending R and L  Pos Spurling's sign    Sensory exam: decreased to pin prick R thumb and radial forearm  Motor Decreased R hand , brachioradialis, atrophy of brachioradialis  DTR's 1+ biceps, triceps       Neurological: He is alert and oriented to person, place, and time. He exhibits normal muscle tone. Coordination normal.   Skin: Skin is warm, dry and intact. He is not diaphoretic. No pallor.   Psychiatric: He has a normal mood and affect. His speech is normal and behavior is normal. Judgment and thought content normal. Cognition and memory are normal.   Nursing note and vitals reviewed.      Assessment:       1. Cervical radiculopathy at C6    2. Cervical spondylosis with  radiculopathy        Plan:     MRI as planned and referral to Dr Sanchez               No Follow-up on file.

## 2017-12-05 ENCOUNTER — HOSPITAL ENCOUNTER (OUTPATIENT)
Dept: RADIOLOGY | Facility: HOSPITAL | Age: 57
Discharge: HOME | End: 2017-12-05
Payer: COMMERCIAL

## 2017-12-05 ENCOUNTER — CLINICAL SUPPORT (OUTPATIENT)
Dept: REHABILITATION | Facility: HOSPITAL | Age: 57
End: 2017-12-05
Payer: OTHER MISCELLANEOUS

## 2017-12-05 DIAGNOSIS — M54.2 NECK PAIN: Primary | ICD-10-CM

## 2017-12-05 DIAGNOSIS — M54.12 CERVICAL RADICULOPATHY AT C6: ICD-10-CM

## 2017-12-05 DIAGNOSIS — M54.12 CERVICAL RADICULOPATHY: ICD-10-CM

## 2017-12-05 PROCEDURE — 72141 MRI NECK SPINE W/O DYE: CPT | Mod: 26,,, | Performed by: RADIOLOGY

## 2017-12-05 PROCEDURE — 72141 MRI NECK SPINE W/O DYE: CPT | Mod: TC

## 2017-12-05 PROCEDURE — 97012 MECHANICAL TRACTION THERAPY: CPT

## 2017-12-05 NOTE — PROGRESS NOTES
"Name: Ghulam Ariza  Clinic Number: [id number]  Date of Treatment: 12/5/2017  Diagnosis:     ICD-10-CM ICD-9-CM    1. Neck pain M54.2 723.1    2. Cervical radiculopathy M54.12 723.4        Subjective: Ghulam Ariza reported his neck pain at an 8 or 9/10 on a 0-10 scale with 0 being no pain and 10 being the worst pain imaginable. States he is to have his neck MRI tonight and then follow up Monday with Dr. Kaplan. States he is currently not able to see Dr. Sanchez due to insurance coverage ([employer]). States he has been performing his HEP without difficulty.    Objective:  Patient was educated and performed therapy to increase strength, flexibility, posture and core stabilization with activities as follows:     Ghulam Ariza was educated and performed therapeutic exercises to develop strength, flexibility, posture and core stabilization for 35 total minutes including:       DATE 12/5/17 11/27/17 11/22/17 11/17/17 11/15/17   VISIT 6 5 4 3 2   Evaluation Date: 11/7/17 11/7/17 11/7/17 11/7/17 11/7/17   G CODE  Last FOTO 2/10  11/27/17 1/10  11/27/17 4/10  11/7/17 3/10  11/7/17 2/10  11/7/17   Mechanical traction    See below See below   Lat pull downs with band  20 x 3" with BTB 20 x 3" with BTB 20 x 3" with BTB    Shoulder retractions with band  20 x 3" with BTB 20 x 3" with BTB 20 x 3" with BTB    Shoulder extension with band  20 x 3" with BTB 20 x 3" with BTB 20 x 3" with BTB    Isometric neck flexion   10 x 3"     Isometric neck side flexion   10 x 3"     Chin tucks   10 x 3"                     Mechanical traction See below See below                      INITIALS CDW, PT CDW, PT CDW, PT CDW, PT CDW, PT     The patient received the following supervised modalities after being cleared for contradictions: Mechanical Traction:  Ghulam received intermittent mechanical traction to the cervical spine at a force of 22/12 pounds for a total of 20 minutes. Hold time of 30 seconds and rest time for 10 seconds. Patient tolerated " treatment well without any adverse effects.      Written Home Exercises Provided: Please refer to Notes section for HEP given today  No new HEP given today    Assessment:   Still presenting with neck and R UE radicular pain.    Pt will continue to benefit from skilled PT intervention. Medical Necessity is demonstrated by:  Continued inability to participate in vocational pursuits, Pain limits function of effected part for some activities, Unable to participate fully in daily activities, Requires skilled supervision to complete and progress HEP and Weakness.    Patient is making poor progress towards established goals.    New/Revised goals: continue with current goals  GOALS: 4-6 weeks (12/19/17) . Pt agrees with goals set.  1. Independent with HEP.  2. Report decreased cervical and R UE pain < or =  5/10 with adls such as lying down, extending neck, turning head to the R, and prolonged sitting.  3. Increased MMT for B UE by 1 muscle grade to promote proper scapular stabilization to decrease cervical and R UE pain < or =  5/10 with adls such as lying down, extending neck, turning head to the R, and prolonged sitting.  4. Patient to achieve CK (at least 40% < 60% impaired, limited or restricted) level on the FOTO Outcomes Measurement System.    Plan:  Continue with established Plan of Care towards PT goals.

## 2017-12-08 ENCOUNTER — CLINICAL SUPPORT (OUTPATIENT)
Dept: REHABILITATION | Facility: HOSPITAL | Age: 57
End: 2017-12-08
Payer: OTHER MISCELLANEOUS

## 2017-12-08 DIAGNOSIS — M54.12 CERVICAL RADICULOPATHY: ICD-10-CM

## 2017-12-08 DIAGNOSIS — M54.2 NECK PAIN: Primary | ICD-10-CM

## 2017-12-08 PROCEDURE — 97110 THERAPEUTIC EXERCISES: CPT

## 2017-12-08 PROCEDURE — 97012 MECHANICAL TRACTION THERAPY: CPT

## 2017-12-08 NOTE — PROGRESS NOTES
"Name: Ghulam Ariza  Clinic Number: [id number]  Date of Treatment: 12/8/2017  Diagnosis:     ICD-10-CM ICD-9-CM    1. Neck pain M54.2 723.1    2. Cervical radiculopathy M54.12 723.4        Subjective: Ghulam Ariza reported his neck pain at an 8 or 9/10 on a 0-10 scale with 0 being no pain and 10 being the worst pain imaginable. States that mechanical traction provides some relief to symptoms. Stated that he had his MRI earlier this week and will see the physician to discuss the results.      Objective:  Patient was educated and performed therapy to increase strength, flexibility, posture and core stabilization with activities as follows:     Ghulam Ariza was educated and performed therapeutic exercises to develop strength, flexibility, posture and core stabilization for 15 total minutes including:       DATE 12/8/17 12/5/17 11/27/17 11/22/17 11/17/17 11/15/17   VISIT 7 6 5 4 3 2   Evaluation Date: 11/7/17 11/7/17 11/7/17 11/7/17 11/7/17 11/7/17   G CODE  Last FOTO 3/10  11/27/17 2/10  11/27/17 1/10  11/27/17 4/10  11/7/17 3/10  11/7/17 2/10  11/7/17   Mechanical traction     See below See below   Lat pull downs with band 20 x 3" with BTB  20 x 3" with BTB 20 x 3" with BTB 20 x 3" with BTB    Shoulder retractions with band 20 x 3" with BTB  20 x 3" with BTB 20 x 3" with BTB 20 x 3" with BTB    Shoulder extension with band   20 x 3" with BTB 20 x 3" with BTB 20 x 3" with BTB    Isometric neck flexion    10 x 3"     Isometric neck side flexion    10 x 3"     Chin tucks 20 x 3"   10 x 3"                       Mechanical traction See below See below See below                        INITIALS JG, PTA CDW, PT CDW, PT CDW, PT CDW, PT CDW, PT     The patient received the following supervised modalities after being cleared for contradictions: Mechanical Traction:  Ghulam received intermittent mechanical traction to the cervical spine at a force of 22/12 pounds for a total of 20 minutes. Hold time of 30 seconds and rest time for 10 " seconds. Patient tolerated treatment well without any adverse effects.      Written Home Exercises Provided: Please refer to Notes section for HEP given today  No new HEP given today    Assessment:   Still presenting with neck and R UE radicular pain. Pt was able to tolerate some scapular strengthening exercises. Pt will continue to benefit from skilled PT intervention. Medical Necessity is demonstrated by: Continued inability to participate in vocational pursuits, Pain limits function of effected part for some activities, Unable to participate fully in daily activities, Requires skilled supervision to complete and progress HEP and Weakness.    Patient is making poor progress towards established goals.    New/Revised goals: continue with current goals  GOALS: 4-6 weeks (12/19/17) . Pt agrees with goals set.  1. Independent with HEP.  2. Report decreased cervical and R UE pain < or =  5/10 with adls such as lying down, extending neck, turning head to the R, and prolonged sitting.  3. Increased MMT for B UE by 1 muscle grade to promote proper scapular stabilization to decrease cervical and R UE pain < or =  5/10 with adls such as lying down, extending neck, turning head to the R, and prolonged sitting.  4. Patient to achieve CK (at least 40% < 60% impaired, limited or restricted) level on the FOTO Outcomes Measurement System.    Plan:  Continue with established Plan of Care towards PT goals.

## 2017-12-11 ENCOUNTER — OFFICE VISIT (OUTPATIENT)
Dept: URGENT CARE | Facility: CLINIC | Age: 57
End: 2017-12-11
Payer: OTHER MISCELLANEOUS

## 2017-12-11 DIAGNOSIS — M47.22 CERVICAL SPONDYLOSIS WITH RADICULOPATHY: ICD-10-CM

## 2017-12-11 DIAGNOSIS — M54.12 CERVICAL RADICULOPATHY AT C6: Primary | ICD-10-CM

## 2017-12-11 PROCEDURE — 99213 OFFICE O/P EST LOW 20 MIN: CPT | Mod: S$GLB,,,

## 2017-12-11 NOTE — LETTER
[facility] [place] [address], [address]  [facility] [address]  Phone: [phone]  Fax: [phone]    Pt Name: Ghulam Ariza  Injury Date: 10/21/17   Employee ID:[id number] Date: 12/11/2017   Company: [employer]/#[id number]            Appointment Time: 1:15 Arrived:  1:15 PM CST   Physician: Chema Kaplan MD Check out: 2:36 PM           Office Treatment: Ghulam was seen today for neck pain.    Diagnoses and all orders for this visit:    Cervical radiculopathy at C6    Cervical spondylosis with radiculopathy       Patient Instructions: Continue Physical Therapy    Restrictions:Disabled until next office visit  To see Dr Sanchez 12/26       Return Appointment: 12/29/2017@11:00 AM

## 2017-12-11 NOTE — PROGRESS NOTES
Subjective:       Patient ID: Ghulam Ariza is a 57 y.o. male.    Chief Complaint: Neck Pain    F/u for neck pain radiating to R arm and R hand/thumb numbness. (doi 10/21/2017). Pt reports no changes in pain. He has had an MRI (see imaging) He has been scheduled to see Dr Sanchez on 12/26/17. No change in symptoms, continues with 8/10 pain, decreased to 5/10 in PT      Neck Pain    This is a chronic problem. The current episode started more than 1 month ago. The problem occurs constantly. The problem has been unchanged. The quality of the pain is described as stabbing. The pain is at a severity of 8/10. The pain is moderate. Nothing aggravates the symptoms. Pertinent negatives include no chest pain, fever, headaches, numbness, syncope or weakness. He has tried NSAIDs and acetaminophen for the symptoms. The treatment provided mild relief.     Review of Systems   Constitution: Negative for chills, fever, weakness and malaise/fatigue.   HENT: Negative for congestion, ear pain and nosebleeds.    Eyes: Negative for blurred vision, pain and visual disturbance.   Cardiovascular: Negative for chest pain, palpitations and syncope.   Respiratory: Negative for cough, shortness of breath and wheezing.    Skin: Negative for dry skin, itching and rash.   Musculoskeletal: Positive for neck pain. Negative for joint pain, muscle weakness and stiffness.   Gastrointestinal: Negative for abdominal pain, constipation, diarrhea, nausea and vomiting.   Genitourinary: Negative for dysuria and hematuria.   Neurological: Negative for dizziness, headaches, numbness, seizures and tremors.   All other systems reviewed and are negative.      Objective:      Physical Exam   Constitutional: He is oriented to person, place, and time. He appears well-developed and well-nourished. He is cooperative.  Non-toxic appearance. He does not appear ill. No distress.   HENT:   Head: Normocephalic and atraumatic.   Right Ear: Hearing, tympanic membrane, external  ear and ear canal normal.   Left Ear: Hearing, tympanic membrane, external ear and ear canal normal.   Nose: Nose normal. No mucosal edema, rhinorrhea or nasal deformity. No epistaxis. Right sinus exhibits no maxillary sinus tenderness and no frontal sinus tenderness. Left sinus exhibits no maxillary sinus tenderness and no frontal sinus tenderness.   Mouth/Throat: Uvula is midline, oropharynx is clear and moist and mucous membranes are normal. No trismus in the jaw. Normal dentition. No uvula swelling. No posterior oropharyngeal erythema.   Eyes: Conjunctivae and lids are normal. Right eye exhibits no discharge. Left eye exhibits no discharge. No scleral icterus.   Sclera clear bilat   Neck: Trachea normal, normal range of motion, full passive range of motion without pain and phonation normal. Neck supple.   Cardiovascular: Normal rate, regular rhythm, normal heart sounds, intact distal pulses and normal pulses.    Pulmonary/Chest: Effort normal and breath sounds normal. No respiratory distress.   Abdominal: Soft. Normal appearance and bowel sounds are normal. He exhibits no distension, no pulsatile midline mass and no mass. There is no tenderness.   Musculoskeletal: He exhibits no edema or deformity.        Cervical back: He exhibits decreased range of motion, tenderness and pain.   Cervical spine:    ROM :    Flex 30  Ext 25  Rotation R 52 and L 62  Side bending R and L  Pos Spurling's sign on R    Sensory intact to pin prick except R thumb radial forearm  Motor 5/5 bilaterally  DTR's 2+ biceps, triceps   Neurological: He is alert and oriented to person, place, and time. He exhibits normal muscle tone. Coordination normal.   Skin: Skin is warm, dry and intact. He is not diaphoretic. No pallor.   Psychiatric: He has a normal mood and affect. His speech is normal and behavior is normal. Judgment and thought content normal. Cognition and memory are normal.   Nursing note and vitals reviewed.      Assessment:        No diagnosis found.    Plan:     To see Dr Sanchez 12/26                No Follow-up on file.

## 2017-12-12 ENCOUNTER — OFFICE VISIT (OUTPATIENT)
Dept: SPINE | Facility: CLINIC | Age: 57
End: 2017-12-12
Attending: ANESTHESIOLOGY
Payer: OTHER MISCELLANEOUS

## 2017-12-12 ENCOUNTER — CLINICAL SUPPORT (OUTPATIENT)
Dept: REHABILITATION | Facility: HOSPITAL | Age: 57
End: 2017-12-12
Payer: OTHER MISCELLANEOUS

## 2017-12-12 VITALS
WEIGHT: 254 LBS | DIASTOLIC BLOOD PRESSURE: 80 MMHG | BODY MASS INDEX: 32.6 KG/M2 | HEIGHT: 74 IN | SYSTOLIC BLOOD PRESSURE: 139 MMHG | HEART RATE: 79 BPM

## 2017-12-12 DIAGNOSIS — M47.22 OSTEOARTHRITIS OF SPINE WITH RADICULOPATHY, CERVICAL REGION: Primary | ICD-10-CM

## 2017-12-12 DIAGNOSIS — M54.2 NECK PAIN: Primary | ICD-10-CM

## 2017-12-12 DIAGNOSIS — M54.12 CERVICAL RADICULOPATHY: ICD-10-CM

## 2017-12-12 PROCEDURE — 97012 MECHANICAL TRACTION THERAPY: CPT

## 2017-12-12 PROCEDURE — 99214 OFFICE O/P EST MOD 30 MIN: CPT | Mod: S$GLB,,, | Performed by: ANESTHESIOLOGY

## 2017-12-12 PROCEDURE — 99999 PR PBB SHADOW E&M-EST. PATIENT-LVL III: CPT | Mod: PBBFAC,,, | Performed by: ANESTHESIOLOGY

## 2017-12-12 PROCEDURE — 97110 THERAPEUTIC EXERCISES: CPT

## 2017-12-12 RX ORDER — CYCLOBENZAPRINE HCL 10 MG
TABLET ORAL
COMMUNITY
Start: 2017-11-28 | End: 2018-06-15

## 2017-12-12 NOTE — PROGRESS NOTES
"Name: Ghulam Ariza  Clinic Number: [id number]  Date of Treatment: 12/12/2017  Diagnosis:   No diagnosis found.    Subjective: Ghulam Ariza reported his neck pain at an 8/10 on a 0-10 scale with 0 being no pain and 10 being the worst pain imaginable. States that mechanical traction provides some relief to symptoms. Stated that his visit with the pain management physician was moved up for today.    Objective:  Patient was educated and performed therapy to increase strength, flexibility, posture and core stabilization with activities as follows:     Ghulam Ariza was educated and performed therapeutic exercises to develop strength, flexibility, posture and core stabilization for 15 total minutes including:       DATE 12/12/17 12/8/17 12/5/17 11/27/17 11/22/17 11/17/17 11/15/17   VISIT 8 7 6 5 4 3 2   Evaluation Date: 11/7/17 11/7/17 11/7/17 11/7/17 11/7/17 11/7/17 11/7/17   G CODE  Last FOTO 4/10  11/27/17 3/10  11/27/17 2/10  11/27/17 1/10  11/27/17 4/10  11/7/17 3/10  11/7/17 2/10  11/7/17   Mechanical traction      See below See below   Lat pull downs with band 20 x 5" with BTB 20 x 3" with BTB  20 x 3" with BTB 20 x 3" with BTB 20 x 3" with BTB    Shoulder retractions with band 20 x 5" with BTB 20 x 3" with BTB  20 x 3" with BTB 20 x 3" with BTB 20 x 3" with BTB    Shoulder extension with band 20 x 5" with BTB   20 x 3" with BTB 20 x 3" with BTB 20 x 3" with BTB    Isometric neck flexion     10 x 3"     Isometric neck side flexion     10 x 3"     Chin tucks 20 x 5" in supine 20 x 3"   10 x 3"     Standing chin tucks on wall 15 x 3"                   Mechanical traction See below See below See below See below                          INITIALS JG, PTA JG, PTA CDW, PT CDW, PT CDW, PT CDW, PT CDW, PT     The patient received the following supervised modalities after being cleared for contradictions: Mechanical Traction:  Ghulam received intermittent mechanical traction to the cervical spine at a force of 22/12 pounds for a " total of 20 minutes. Hold time of 30 seconds and rest time for 10 seconds. Patient tolerated treatment well without any adverse effects.      Written Home Exercises Provided: Please refer to Notes section for HEP given today  No new HEP given today    Assessment:   Pt continues to report some relief with mechanical tx but states that relief is only temporary. Continuing to perform core and scapular stabilization exercises. Chin tucks on wall were added during today's tx visit to promote cervical flexibility and stabilization. Pt did not note any increase in symptoms post chin tucks on wall. Pt will continue to benefit from skilled PT intervention. Medical Necessity is demonstrated by: Continued inability to participate in vocational pursuits, Pain limits function of effected part for some activities, Unable to participate fully in daily activities, Requires skilled supervision to complete and progress HEP and Weakness.    Patient is making poor progress towards established goals.    New/Revised goals: continue with current goals  GOALS: 4-6 weeks (12/19/17) . Pt agrees with goals set.  1. Independent with HEP.  2. Report decreased cervical and R UE pain < or =  5/10 with adls such as lying down, extending neck, turning head to the R, and prolonged sitting.  3. Increased MMT for B UE by 1 muscle grade to promote proper scapular stabilization to decrease cervical and R UE pain < or =  5/10 with adls such as lying down, extending neck, turning head to the R, and prolonged sitting.  4. Patient to achieve CK (at least 40% < 60% impaired, limited or restricted) level on the FOTO Outcomes Measurement System.    Plan:  Continue with established Plan of Care towards PT goals.

## 2017-12-12 NOTE — LETTER
December 12, 2017      Chema Kaplan MD  [address]  [place] [address]           [facility]  [address], [address]  [facility] [address]  Phone: [phone]  Fax: [phone]          Patient: Ghulam Ariza   MR Number: [id number]   YOB: 1960   Date of Visit: 12/12/2017       Dear Dr. Chema Kaplan:    Thank you for referring Ghulam Ariza to me for evaluation. Attached you will find relevant portions of my assessment and plan of care.    If you have questions, please do not hesitate to call me. I look forward to following Ghulam Ariza along with you.    Sincerely,    Enrique Sanchez MD    Enclosure  CC:  No Recipients    If you would like to receive this communication electronically, please contact [email or web address] or [phone] to request more information on [employer] Link access.    For providers and/or their staff who would like to refer a patient to [facility], please contact us through our one-stop-shop provider referral line, [facility], at [phone].    If you feel you have received this communication in error or would no longer like to receive these types of communications, please e-mail [email or web address]

## 2017-12-12 NOTE — PROGRESS NOTES
Subjective:      Patient ID: Ghulam Ariza is a 57 y.o. male.    Chief Complaint: Neck Pain    Referred by: Chema Kaplan MD     Pain Scales  Best: 7/10  Worst: 10/10  Usually: 8/10  Today: 9/10    Neck Pain    Associated symptoms include numbness. Pertinent negatives include no chest pain or fever.     Mr. Ghulam Arzia is a 57 year old male that presents for follow up for neck pain. He previously had C7-T1 ILSEI on 4/6/17 that gave him 80-85% pain relief until 10/21/17 when he hit his head while at work and the symptoms returned. He was wearing his hard hat and hit the front of his hard hat and later that night felt a return of the neck pain and pain down his right arm. He does not have any other changes in his symptoms other than they have returned to baseline.  Neck pain is achy and radiates into his right arm with mild numbness at times in his right thumb. He has been taking Naproxen and Flexeril that helps along with using a heating pad.  Pain worse with reaching upward or bending his neck forward or backwards. Has been doing PT for the last 3 weeks, and after he is done with PT the pain level increases. No bowel or bladder incontinence, weakness, or saddle anesthesia. He would like another injection.     Interventional Pain History  C7-T1 ILSEI on 4/6/17 with 80-85% pain relief    Physical therapy: Yes  Medications: Naproxen 500mg daily prn, Flexeril 10mg daily prn     Past Medical History:   Diagnosis Date    Degenerative disc disease     cervical radiculopathy    Genital herpes     Hx of colonic polyps     Hypertension early 40s    Pinched nerve in neck        Past Surgical History:   Procedure Laterality Date    back sx      L4/L5 Herniated disk       Review of patient's allergies indicates:  No Known Allergies    Current Outpatient Prescriptions   Medication Sig Dispense Refill    allopurinol (ZYLOPRIM) 300 MG tablet Take 1 tablet (300 mg total) by mouth every evening. 90 tablet 3    benazepril  (LOTENSIN) 40 MG tablet Take 1 tablet (40 mg total) by mouth once daily. 90 tablet 3    cyclobenzaprine (FLEXERIL) 10 MG tablet       diltiaZEM (CARDIZEM SR) 60 MG Cp12 Take 1 capsule (60 mg total) by mouth 2 (two) times daily. 60 capsule 11    hydrocodone-acetaminophen 7.5-325mg (NORCO) 7.5-325 mg per tablet Take 1 tablet by mouth every 6 (six) hours as needed for Pain. 28 tablet 0    naproxen (NAPROSYN) 500 MG tablet Take 1 tablet (500 mg total) by mouth 2 (two) times daily with meals. 30 tablet 2    TOPROL XL 25 mg 24 hr tablet       valacyclovir (VALTREX) 500 MG tablet Take 1 tablet (500 mg total) by mouth once daily. (Patient taking differently: Take 500 mg by mouth as needed. ) 30 tablet 11    methylPREDNISolone (MEDROL DOSEPACK) 4 mg tablet use as directed 1 Package 0     No current facility-administered medications for this visit.        Family History   Problem Relation Age of Onset    Hypertension Mother     Stroke Mother 68     TIA    Cancer Father 65     Prostate    Cancer Paternal Uncle     Cancer Brother      Prostate Cancer       Social History     Social History    Marital status: Single     Spouse name: N/A    Number of children: N/A    Years of education: N/A     Occupational History     [employer]     Social History Main Topics    Smoking status: Never Smoker    Smokeless tobacco: Never Used    Alcohol use 0.0 oz/week      Comment: every weekend few drinks    Drug use: No    Sexual activity: Yes     Partners: Female     Birth control/ protection: None     Other Topics Concern    Not on file     Social History Narrative    No narrative on file       Review of Systems   Constitution: Negative for chills and fever.   HENT: Negative for congestion and sore throat.    Eyes: Negative for blurred vision and pain.   Cardiovascular: Negative for chest pain and palpitations.   Respiratory: Negative for cough and shortness of breath.    Skin: Negative for rash and  "suspicious lesions.   Musculoskeletal: Positive for muscle weakness and neck pain. Negative for back pain.   Gastrointestinal: Negative for diarrhea, nausea and vomiting.   Genitourinary: Negative for dysuria and hematuria.   Neurological: Positive for focal weakness and numbness.   Psychiatric/Behavioral: Negative for depression. The patient does not have insomnia.            Objective:      /80   Pulse 79   Ht 6' 2" (1.88 m)   Wt 115.2 kg (254 lb)   BMI 32.61 kg/m²   Normocephalic.  Atraumatic.  Affect appropriate.  Breathing unlabored.  Extra ocular muscles intact.    CERVICAL SPINE AND BILATERAL UPPER EXTREMITY EXAM:   INSPECTION: No gross deformities or abnormalities noted.   RANGE OF MOTION: Mildly limited in side bending but otherwise full ROM.  STABILITY: No instability noted/appreciated.   MYOFASCIAL EXAM: No TTP cervical midline, paraspinal, or upper trapezius  SPURLING: Negative bilaterally for radiating arm pain.   FACET LOADING: Unremarkable bilaterally.    MUSCLE STRENGTH: 4/5 strength in right shoulder abduction, elbow flexors, and finger flexors otherwise 5/5 in RUE; 5/5 in LUE; 5/5 in bilateral lower extremities and symmetric  SENSORY EXAM: diminished sensation right thumb but otherwise Intact to light touch, bilateral upper extremities.   DTRs: 1+ and symmetrical in bilateral upper and lower extremities.    PATHOLOGICAL REFLEXES:   BALLARD'S: Negative bilaterally.   CLONUS: None bilaterally.   BABINSKI: Downgoing plantar response bilaterally.    Cervical MRI 11/28/17:  Comparison: 10/30/2014    Findings:    There is straightening of the normal cervical lordosis.  The vertebral body heights are well-maintained.  Multilevel disc space narrowing, worse at the level of C5-C6.  Bone marrow signal is preserved. Visualized posterior fossa structures and cervical cord are unremarkable.    Limited evaluation of the neck soft tissues is unremarkable.  Fluid seen in the partially visualized sphenoid " sinuses.    C2-3: No spinal canal stenosis or neuroforaminal narrowing.    C3-4: Small posterior disc osteophyte complex, bilateral facet arthropathy and mild uncovertebral spurring without spinal canal stenosis.  There is moderate bilateral neural foraminal narrowing.    C4-5: Small posterior disc osteophyte complex, asymmetric to the right abutting the anterior CSF sleeve together with bilateral uncovertebral spurring resulting in mild spinal canal stenosis and moderate bilateral neural foraminal narrowing.     C5-6: Small posterior disc osteophyte complex and bilateral uncovertebral spurring, asymmetric to the right resulting in mild spinal canal stenosis, moderate right and mild left neural foraminal narrowing.    C6-7: Small posterior disc osteophyte complex and bilateral facet arthropathy without spinal canal stenosis or neural foraminal narrowing.    C7-T1: Small posterior disc osteophyte complex without spinal canal stenosis or neural foraminal narrowing.   Impression         Straightening of the normal cervical lordosis with multilevel cervical spondylosis worse at the level of C4-C5 and C5-C6 resulting in mild spinal canal stenosis.    Mild-to-moderate bilateral neural foraminal narrowing as detailed above.     Reviewed Cervical MRI and found imaging to show severe neural foraminal narrowing at C4-5 and C5-6 as well as multilevel cervical spondylosis.      Ortho/SPM Exam      Assessment:       Encounter Diagnoses   Name Primary?    Osteoarthritis of spine with radiculopathy, cervical region Yes    Cervical radiculopathy          Plan:       Ghulam was seen today for neck pain.    Diagnoses and all orders for this visit:    Osteoarthritis of spine with radiculopathy, cervical region    Cervical radiculopathy       We discussed with the patient the assessment and recommendations. The following is the plan we agreed on:    1. Will schedule for cervical C7-T1 ILESI for his cervical radiculopathy. He had  great benefit previously.  2. Continue physical therapy and medications  3. RTC 2 weeks after procedure.     Patient was initially seen and examined by [facility] PGY-III resident Dr. Víctor Santiago. Thank you for allowing me to participate in the care of this patient.   I have personally taken the history and examined this patient and agree with the resident's note as stated above.  I have explained the risks, benefits, and alternatives of the procedure in detail. The patient voices understanding and all questions have been answered. The patient agrees to proceed as planned.

## 2017-12-14 ENCOUNTER — CLINICAL SUPPORT (OUTPATIENT)
Dept: REHABILITATION | Facility: HOSPITAL | Age: 57
End: 2017-12-14
Payer: OTHER MISCELLANEOUS

## 2017-12-14 ENCOUNTER — TELEPHONE (OUTPATIENT)
Dept: PAIN MEDICINE | Facility: CLINIC | Age: 57
End: 2017-12-14

## 2017-12-14 DIAGNOSIS — M54.12 CERVICAL RADICULOPATHY: ICD-10-CM

## 2017-12-14 DIAGNOSIS — M54.2 NECK PAIN: Primary | ICD-10-CM

## 2017-12-14 PROCEDURE — G8982 BODY POS GOAL STATUS: HCPCS | Mod: CK

## 2017-12-14 PROCEDURE — G8981 BODY POS CURRENT STATUS: HCPCS | Mod: CL

## 2017-12-14 PROCEDURE — 97012 MECHANICAL TRACTION THERAPY: CPT

## 2017-12-14 NOTE — TELEPHONE ENCOUNTER
----- Message from Delmi Grove MA sent at 12/13/2017  4:31 PM CST -----  Jayla Nunez Staff  Caller: pt (Today,  3:37 PM)         x_  1st Request   _  2nd Request   _  3rd Request     Who: FLAKO FRANCOIS [[id number]]     Why: Patient is returning a call.     What Number to Call Back:[phone]     When to Expect a call back: (Within 24 hours)     Please return the call at earliest convenience. Thanks!

## 2017-12-14 NOTE — PROGRESS NOTES
"Name: Ghulam Ariza  Clinic Number: [id number]  Date of Treatment: 12/14/2017  Diagnosis:     ICD-10-CM ICD-9-CM    1. Neck pain M54.2 723.1    2. Cervical radiculopathy M54.12 723.4        Subjective: Ghulam Ariza reported his neck pain at an 8/10 on a 0-10 scale with 0 being no pain and 10 being the worst pain imaginable. States that mechanical traction provides some relief to symptoms. States he had a tooth pulled yesterday and asked to defer exercises today. States she saw Dr. Sanchez and he is going to be scheduled for an epidural injection for his neck.    Objective:  Patient was educated and performed therapy to increase strength, flexibility, posture and core stabilization with activities as follows:     Ghulam Ariza was educated and performed therapeutic exercises to develop strength, flexibility, posture and core stabilization for 40 total minutes including:       DATE 12/14/17 12/12/17 12/8/17 12/5/17 11/27/17   VISIT 10 8 7 6 5   Evaluation Date: 11/7/17 11/7/17 11/7/17 11/7/17 11/7/17   G CODE  Last FOTO 1/10  12/14/17 4/10  11/27/17 3/10  11/27/17 2/10  11/27/17 1/10  11/27/17   Mechanical traction        Lat pull downs with band  20 x 5" with BTB 20 x 3" with BTB  20 x 3" with BTB   Shoulder retractions with band  20 x 5" with BTB 20 x 3" with BTB  20 x 3" with BTB   Shoulder extension with band  20 x 5" with BTB   20 x 3" with BTB   Isometric neck flexion        Isometric neck side flexion        Chin tucks  20 x 5" in supine 20 x 3"     Standing chin tucks on wall  15 x 3"              Mechanical traction  See below See below See below See below                   INITIALS  JG, PTA JG, PTA CDW, PT CDW, PT     The patient received the following supervised modalities after being cleared for contradictions: Mechanical Traction:  Ghulam received intermittent mechanical traction to the cervical spine at a force of 22/12 pounds for a total of 20 minutes. Hold time of 45 seconds and rest time for 15 seconds. Patient " tolerated treatment well without any adverse effects.    Functional Limitations  Reports - G Codes    Category:  Changing and Maintaining Body Position   Tool:  FOTO Outcomes Measurement System.   Score:  Patient scored out 37 of 100 on the FOTO Outcomes Measurement System.   Current:   CL at least 60% < 80% impaired, limited or restricted   Goal:   CK at least 40% < 60% impaired, limited or restricted         Written Home Exercises Provided: Please refer to Notes section for HEP given today  No new HEP given today    Assessment:   Continues to present with increased neck pain and radicular symptoms into R UE. Pt awaiting to be scheduled for epidural injections. Will awaiting injections prior to scheduling additional visits.     Pt will continue to benefit from skilled PT intervention. Medical Necessity is demonstrated by: Continued inability to participate in vocational pursuits, Pain limits function of effected part for some activities, Unable to participate fully in daily activities, Requires skilled supervision to complete and progress HEP and Weakness.    Patient is making poor progress towards established goals.    New/Revised goals: continue with current goals  GOALS: 4-6 weeks (12/19/17) . Pt agrees with goals set.  1. Independent with HEP.  2. Report decreased cervical and R UE pain < or =  5/10 with adls such as lying down, extending neck, turning head to the R, and prolonged sitting.  3. Increased MMT for B UE by 1 muscle grade to promote proper scapular stabilization to decrease cervical and R UE pain < or =  5/10 with adls such as lying down, extending neck, turning head to the R, and prolonged sitting.  4. Patient to achieve CK (at least 40% < 60% impaired, limited or restricted) level on the FOTO Outcomes Measurement System.    Plan: Pt awaiting to be scheduled for epidural injections. Will awaiting injections prior to scheduling additional visits.

## 2017-12-29 ENCOUNTER — OFFICE VISIT (OUTPATIENT)
Dept: URGENT CARE | Facility: CLINIC | Age: 57
End: 2017-12-29
Payer: OTHER MISCELLANEOUS

## 2017-12-29 VITALS
TEMPERATURE: 99 F | SYSTOLIC BLOOD PRESSURE: 140 MMHG | DIASTOLIC BLOOD PRESSURE: 92 MMHG | OXYGEN SATURATION: 98 % | HEART RATE: 78 BPM

## 2017-12-29 DIAGNOSIS — M47.22 CERVICAL SPONDYLOSIS WITH RADICULOPATHY: ICD-10-CM

## 2017-12-29 DIAGNOSIS — M54.12 CERVICAL RADICULOPATHY AT C6: Primary | ICD-10-CM

## 2017-12-29 PROCEDURE — 99213 OFFICE O/P EST LOW 20 MIN: CPT | Mod: S$GLB,,,

## 2017-12-29 RX ORDER — PENICILLIN V POTASSIUM 500 MG/1
TABLET, FILM COATED ORAL
Refills: 0 | COMMUNITY
Start: 2017-12-13 | End: 2018-01-23 | Stop reason: ALTCHOICE

## 2017-12-29 NOTE — LETTER
[facility] [place]  [address], [address]  [place] [address]  Phone: [phone]  Fax: [phone]    Pt Name: Ghulam Ariza  Injury Date: 10/21/2017   Employee ID:[id number] Date : 12/29/2017   Company: "[email or web address][id number]            Appointment Time: 11:00 AM Arrived: 11:00 AM CST   Physician: Chema Kaplan MD Check out: 11:55 AM           Office Treatment: Ghulam was seen today for neck pain.    Diagnoses and all orders for this visit:    Cervical radiculopathy at C6    Cervical spondylosis with radiculopathy            Restrictions: Disabled until next office visit  Follow up at [facility] Wednesday January 10, 2018

## 2017-12-29 NOTE — PROGRESS NOTES
Subjective:       Patient ID: Ghulam Ariza is a 57 y.o. male.    Chief Complaint: Neck Pain     F/u for neck pain radiating to R arm and R hand/thumb numbness. (doi 10/21/2017). Pt reports no changes. He was been seen by Dr Sanchez 12/12 and is scheduled 1/4/18 for cervical steroid injection. Current pain 8/10       Neck Pain    The current episode started more than 1 month ago. The problem occurs constantly. The problem has been unchanged. The pain is at a severity of 8/10. The pain is severe. Pertinent negatives include no chest pain, fever, headaches, numbness, syncope or weakness.     Review of Systems   Constitution: Negative for chills, fever, weakness and malaise/fatigue.   HENT: Negative for congestion, ear pain and nosebleeds.    Eyes: Negative for blurred vision, pain and visual disturbance.   Cardiovascular: Negative for chest pain, palpitations and syncope.   Respiratory: Negative for cough, shortness of breath and wheezing.    Skin: Negative for dry skin, itching and rash.   Musculoskeletal: Positive for neck pain. Negative for joint pain, muscle weakness and stiffness.   Gastrointestinal: Negative for abdominal pain, constipation, diarrhea, nausea and vomiting.   Genitourinary: Negative for dysuria and hematuria.   Neurological: Negative for dizziness, headaches, numbness, seizures and tremors.   All other systems reviewed and are negative.      Objective:      Physical Exam   Constitutional: He is oriented to person, place, and time. He appears well-developed and well-nourished. He is cooperative.  Non-toxic appearance. He does not appear ill. No distress.   HENT:   Head: Normocephalic and atraumatic.   Right Ear: Hearing, tympanic membrane, external ear and ear canal normal.   Left Ear: Hearing, tympanic membrane, external ear and ear canal normal.   Nose: Nose normal. No mucosal edema, rhinorrhea or nasal deformity. No epistaxis. Right sinus exhibits no maxillary sinus tenderness and no frontal sinus  tenderness. Left sinus exhibits no maxillary sinus tenderness and no frontal sinus tenderness.   Mouth/Throat: Uvula is midline and mucous membranes are normal. No trismus in the jaw. Normal dentition. No uvula swelling. No posterior oropharyngeal erythema.   Eyes: Conjunctivae and lids are normal. Right eye exhibits no discharge. Left eye exhibits no discharge. No scleral icterus.   Sclera clear bilat   Neck: Trachea normal, normal range of motion, full passive range of motion without pain and phonation normal. Neck supple.   Cardiovascular: Normal rate, regular rhythm, normal heart sounds, intact distal pulses and normal pulses.    Pulmonary/Chest: Effort normal and breath sounds normal. No respiratory distress.   Abdominal: Soft. Normal appearance. He exhibits no distension, no pulsatile midline mass and no mass. There is no tenderness.   Musculoskeletal: He exhibits no edema or deformity.        Cervical back: He exhibits decreased range of motion, tenderness, pain and spasm.   Cervical Spine:    ROM :  Flex 30  Ext 20  Rotation R and L 45  Side bending R and L 30  Pos Spurling's sign on R    Sensory decreased to pin prick R thumb  Motor 4/5 R biceps  DTR's 1+ biceps, triceps   Neurological: He is alert and oriented to person, place, and time. He exhibits normal muscle tone. Coordination normal.   Skin: Skin is warm, dry and intact. He is not diaphoretic. No pallor.   Psychiatric: He has a normal mood and affect. His speech is normal and behavior is normal. Judgment and thought content normal. Cognition and memory are normal.   Nursing note and vitals reviewed.      Assessment:       No diagnosis found.    Plan:       Follow up at [facility]

## 2018-01-08 ENCOUNTER — HOSPITAL ENCOUNTER (EMERGENCY)
Facility: HOSPITAL | Age: 58
Discharge: HOME | End: 2018-01-08
Attending: EMERGENCY MEDICINE
Payer: COMMERCIAL

## 2018-01-08 VITALS
OXYGEN SATURATION: 100 % | TEMPERATURE: 98 F | SYSTOLIC BLOOD PRESSURE: 148 MMHG | DIASTOLIC BLOOD PRESSURE: 91 MMHG | RESPIRATION RATE: 18 BRPM | BODY MASS INDEX: 30.16 KG/M2 | HEART RATE: 68 BPM | WEIGHT: 235 LBS | HEIGHT: 74 IN

## 2018-01-08 DIAGNOSIS — M54.12 CERVICAL RADICULOPATHY: Primary | ICD-10-CM

## 2018-01-08 DIAGNOSIS — M54.2 NECK PAIN: ICD-10-CM

## 2018-01-08 LAB
BUN SERPL-MCNC: 17 MG/DL (ref 6–30)
CHLORIDE SERPL-SCNC: 103 MMOL/L (ref 95–110)
CREAT SERPL-MCNC: 1.3 MG/DL (ref 0.5–1.4)
GLUCOSE SERPL-MCNC: 104 MG/DL (ref 70–110)
HCT VFR BLD CALC: 40 %PCV (ref 36–54)
POC IONIZED CALCIUM: 1.23 MMOL/L (ref 1.06–1.42)
POC TCO2 (MEASURED): 28 MMOL/L (ref 23–29)
POTASSIUM BLD-SCNC: 4.3 MMOL/L (ref 3.5–5.1)
SAMPLE: NORMAL
SODIUM BLD-SCNC: 140 MMOL/L (ref 136–145)

## 2018-01-08 PROCEDURE — 93005 ELECTROCARDIOGRAM TRACING: CPT

## 2018-01-08 PROCEDURE — 99284 EMERGENCY DEPT VISIT MOD MDM: CPT | Mod: 25

## 2018-01-08 PROCEDURE — 93010 ELECTROCARDIOGRAM REPORT: CPT | Mod: ,,, | Performed by: INTERNAL MEDICINE

## 2018-01-08 PROCEDURE — 99284 EMERGENCY DEPT VISIT MOD MDM: CPT | Mod: ,,, | Performed by: PHYSICIAN ASSISTANT

## 2018-01-08 RX ORDER — METHYLPREDNISOLONE 4 MG/1
TABLET ORAL
Qty: 1 PACKAGE | Refills: 0 | Status: SHIPPED | OUTPATIENT
Start: 2018-01-08 | End: 2018-01-23 | Stop reason: ALTCHOICE

## 2018-01-08 RX ORDER — MELOXICAM 7.5 MG/1
7.5 TABLET ORAL DAILY
Qty: 10 TABLET | Refills: 0 | Status: SHIPPED | OUTPATIENT
Start: 2018-01-08 | End: 2018-01-24 | Stop reason: SDUPTHER

## 2018-01-08 RX ORDER — METHOCARBAMOL 750 MG/1
750 TABLET, FILM COATED ORAL 3 TIMES DAILY
Qty: 25 TABLET | Refills: 0 | Status: SHIPPED | OUTPATIENT
Start: 2018-01-08 | End: 2018-01-13

## 2018-01-08 NOTE — ED NOTES
Pt identifiers checked and accurate with wristband.   LOC: The patient is awake, alert and aware of environment with an appropriate affect, the patient is oriented x 3 and speaking appropriately.  APPEARANCE: Patient resting comfortably and in no acute distress, patient is clean and well groomed  SKIN: The skin is warm and dry, color consistent with ethnicity, patient has normal skin turgor and moist mucus membranes, skin intact, no breakdown or bruising noted.  MUSCULOSKELETAL: Patient moving all extremities well, no obvious swelling or deformities noted. Pt reports neck pain radiating to right shoulder and arm. Pt reports hx of pinched nerve in neck since 2013, head injury in October aggravated pinched nerve.   RESPIRATORY: Airway is open and patent, breath sounds clear throughout all lung fields; respirations are spontaneous, patient has a normal effort and rate, no accessory muscle use noted. Pt denies SOB  CARDIAC: Patient has no peripheral edema noted, capillary refill < 3 seconds. No complaints of chest pain   NEUROLOGIC: PERRL, 3 mm bilaterally, eyes open spontaneously, behavior appropriate to situation, follows commands, facial expression symmetrical, bilateral hand grasp equal and even, purposeful motor response noted. Pt reports right thumb numbness and tingling.

## 2018-01-08 NOTE — ED PROVIDER NOTES
Encounter Date: 1/8/2018    SCRIBE #1 NOTE: I, Nasra Guerrero, am scribing for, and in the presence of,  Dr. Davies. I have scribed the following portions of the note - the APC attestation.       History     Chief Complaint   Patient presents with    Neck Pain     neck pain, pending neck injection for workers comp, cont with alot of pain     57 year old male with past medical history of HTN, Cervical radiculopathy, vitamin D deficiency presenting to the ED with the chief complaint of neck pain. Patient reports to have chronic neck pain since 2014. He describes the pain as starting in the middle of his posterior neck and radiating down his right arm. He has associated numbness in his right thumb. He reports the pain today is similar to his chronic pain and he is presenting today for pain control. He reports his pain has been controlled with steroid injections and he last received one in 5/2017. Patient reports having an accident at work in 10/2017 that aggravated his chronic pain. He had an MRI on 11/28 that showed mild spinal stenosis and he is being followed by pain management and a spine surgeon. He reports he was scheduled to have a steroid injection last week, but there was an issue with his insurance and had to have his appointment rescheduled. He states he currently uses physical therapy and hot/cold compresses to treat his pain. He denies taking any medications currently for pain. He denies recent fevers, chest pain, shortness of breath, abdominal pain, nausea, vomiting, loss of bowel/bladder control.           Review of patient's allergies indicates:  No Known Allergies  Past Medical History:   Diagnosis Date    Degenerative disc disease     cervical radiculopathy    Genital herpes     Hx of colonic polyps     Hypertension early 40s    Pinched nerve in neck      Past Surgical History:   Procedure Laterality Date    back sx      L4/L5 Herniated disk     Family History   Problem Relation Age of Onset     Hypertension Mother     Stroke Mother 68     TIA    Cancer Father 65     Prostate    Cancer Paternal Uncle     Cancer Brother      Prostate Cancer     Social History   Substance Use Topics    Smoking status: Never Smoker    Smokeless tobacco: Never Used    Alcohol use 0.0 oz/week      Comment: every weekend few drinks     Review of Systems   Constitutional: Negative for chills and fever.   HENT: Negative for congestion, sore throat and trouble swallowing.    Eyes: Negative for pain, redness and visual disturbance.   Respiratory: Negative for cough and shortness of breath.    Cardiovascular: Negative for chest pain.   Gastrointestinal: Negative for abdominal pain, diarrhea, nausea and vomiting.   Genitourinary: Negative for dysuria and hematuria.   Musculoskeletal: Positive for arthralgias, myalgias and neck pain. Negative for neck stiffness.   Skin: Negative for rash and wound.   Neurological: Positive for numbness. Negative for seizures, weakness, light-headedness and headaches.       Physical Exam     Initial Vitals [01/08/18 0902]   BP Pulse Resp Temp SpO2   (!) 160/92 79 18 98.3 °F (36.8 °C) 99 %      MAP       114.67         Physical Exam    Constitutional: He appears well-developed and well-nourished. No distress.   HENT:   Head: Normocephalic and atraumatic.   Mouth/Throat: Oropharynx is clear and moist. No oropharyngeal exudate.   Eyes: EOM are normal. Pupils are equal, round, and reactive to light.   Neck: Normal range of motion. Neck supple.   No midline cervical tenderness   Cardiovascular: Normal rate and regular rhythm.   Pulmonary/Chest: Breath sounds normal. No respiratory distress. He has no wheezes.   Abdominal: Soft. Bowel sounds are normal. There is no tenderness.   Musculoskeletal: Normal range of motion. He exhibits no edema or tenderness.   Neurological: He is alert and oriented to person, place, and time. He has normal reflexes. No cranial nerve deficit or sensory deficit.   BRAULOI  strength 3/5; LUE strength 5/5   Skin: Skin is warm and dry. No erythema.         ED Course   Procedures  Labs Reviewed   ISTAT PROCEDURE   ISTAT CHEM8             Medical Decision Making:   History:   Old Medical Records: I decided to obtain old medical records.  Clinical Tests:   Lab Tests: Ordered and Reviewed  Medical Tests: Ordered and Reviewed       APC / Resident Notes:   57 year old male with past medical history of HTN, Cervical radiculopathy, vitamin D deficiency presenting to the ED seeking pain control for his chronic neck pain. Initial vitals stable. Physical exam reveals 3/5 strength of the RUE and 5/5 strength of the LUE, which is consistent with previous physical examination documentation from prior visits. DDx includes but not limited to cervical radiculopathy, neuropathy, compression fracture, central cord compression, cerebrovascular disease. Given recent imaging and no recent trauma, I do not feel imaging is necessary at this time. Will check iStat creatine as his last creatine was elevated.     This presentation is consistent with cervical radiculopathy. Creatine level decreased to 1.3. Will discharge patient with Mobic, Robaxin, and Medrol dose pack for ongoing management of his pain. Advised patient to follow-up with his back surgeon and pain management. I have advised the patient to follow-up with their PCP. Return to ED precautions given. All of the patient's questions were answered. I have discussed the care of this patient with my supervising physician.          Scribe Attestation:   Scribe #1: I performed the above scribed service and the documentation accurately describes the services I performed. I attest to the accuracy of the note.    Attending Attestation:     Physician Attestation Statement for NP/PA:   I have conducted a face to face encounter with this patient in addition to the NP/PA, due to Medical Complexity    Other NP/PA Attestation Additions:    History of Present Illness:  Patient with a history of chronic neck pain and abnormal MRI, followed by Pain Management and PCP. On no medications.   Physical Exam: Neuro exam: 4+ out of 5 weakness of the right upper extremity, which patient states is at baseline.   Medical Decision Making: Patient has CKD. Will re-check creatinine, give a course of steroids as well as muscle relaxants and patient f/u with his physicians. Patient denies any cauda equina symptoms.                  ED Course      Clinical Impression:   The primary encounter diagnosis was Cervical radiculopathy. A diagnosis of Neck pain was also pertinent to this visit.    Disposition:   Disposition: Discharged  Condition: Stable                        Ruben House PA-C  01/08/18 1400

## 2018-01-08 NOTE — ED NOTES
ISTAT results    Na-140  K-4.3  Cl-103  iCa-1.23  TC02-28  Glu-104  BUN-17  Crea-1.3  Hct-40    Verified with JOHNNY Arguello

## 2018-01-08 NOTE — ED NOTES
Pt reports neck pain beginning last night. Pt reports hx of pinch nerve in neck since 2013, October 21 head injury aggravated nerve. Pt reports pain radiates to right shoulder down to right arm. Pt reports right thumb numbness and pain. Pt denies recent trauma and falls.

## 2018-01-10 ENCOUNTER — TELEPHONE (OUTPATIENT)
Dept: PAIN MEDICINE | Facility: CLINIC | Age: 58
End: 2018-01-10

## 2018-01-11 NOTE — TELEPHONE ENCOUNTER
----- Message from Devin Casillas sent at 1/11/2018  8:15 AM CST -----  Per the pt he was wondering would the  Appeal the injection?

## 2018-01-23 ENCOUNTER — TELEPHONE (OUTPATIENT)
Dept: PAIN MEDICINE | Facility: CLINIC | Age: 58
End: 2018-01-23

## 2018-01-23 ENCOUNTER — OFFICE VISIT (OUTPATIENT)
Dept: INTERNAL MEDICINE | Facility: CLINIC | Age: 58
End: 2018-01-23
Payer: COMMERCIAL

## 2018-01-23 VITALS
HEART RATE: 74 BPM | TEMPERATURE: 98 F | SYSTOLIC BLOOD PRESSURE: 116 MMHG | BODY MASS INDEX: 29.93 KG/M2 | DIASTOLIC BLOOD PRESSURE: 76 MMHG | WEIGHT: 233.25 LBS | RESPIRATION RATE: 16 BRPM | HEIGHT: 74 IN

## 2018-01-23 DIAGNOSIS — M54.12 CERVICAL RADICULOPATHY: ICD-10-CM

## 2018-01-23 DIAGNOSIS — Z12.11 COLON CANCER SCREENING: ICD-10-CM

## 2018-01-23 DIAGNOSIS — M47.22 CERVICAL SPONDYLOSIS WITH RADICULOPATHY: ICD-10-CM

## 2018-01-23 DIAGNOSIS — I10 ESSENTIAL HYPERTENSION: ICD-10-CM

## 2018-01-23 DIAGNOSIS — M54.2 NECK PAIN: Chronic | ICD-10-CM

## 2018-01-23 DIAGNOSIS — Z00.00 WELL ADULT EXAM: Primary | ICD-10-CM

## 2018-01-23 DIAGNOSIS — M10.9 GOUT, UNSPECIFIED CAUSE, UNSPECIFIED CHRONICITY, UNSPECIFIED SITE: ICD-10-CM

## 2018-01-23 DIAGNOSIS — E55.9 VITAMIN D DEFICIENCY DISEASE: ICD-10-CM

## 2018-01-23 PROCEDURE — 99396 PREV VISIT EST AGE 40-64: CPT | Mod: S$GLB,,, | Performed by: FAMILY MEDICINE

## 2018-01-23 PROCEDURE — 99999 PR PBB SHADOW E&M-EST. PATIENT-LVL III: CPT | Mod: PBBFAC,,, | Performed by: FAMILY MEDICINE

## 2018-01-23 RX ORDER — BENAZEPRIL HYDROCHLORIDE 40 MG/1
40 TABLET ORAL DAILY
Qty: 90 TABLET | Refills: 3 | Status: SHIPPED | OUTPATIENT
Start: 2018-01-23 | End: 2018-01-23 | Stop reason: SDUPTHER

## 2018-01-23 RX ORDER — BENAZEPRIL HYDROCHLORIDE 40 MG/1
40 TABLET ORAL DAILY
Qty: 90 TABLET | Refills: 3 | Status: SHIPPED | OUTPATIENT
Start: 2018-01-23 | End: 2018-12-10 | Stop reason: SDUPTHER

## 2018-01-23 NOTE — TELEPHONE ENCOUNTER
We receive a message on 01/11/2018 informing the office that patient procedure was denied and asking if the physician will complete the Appeal. The question was presented to the physician, once he agreed, a message was sent back to  lynnette Storm asking what the office needed to do to appeal.     No response received.     We will start the appeal process as of today as we just received the information.

## 2018-01-23 NOTE — TELEPHONE ENCOUNTER
----- Message from Devin Casillas sent at 1/23/2018 11:09 AM CST -----  Contact: The patient   Pt called in concerns to the appeal process of injection that was previously denied . The information was provided on the 11th as to how to appeal. I have attached website to this in-basket    [email or web address]

## 2018-01-23 NOTE — PROGRESS NOTES
Subjective:       Patient ID: Ghulam Ariza is a 57 y.o. male.    Chief Complaint: Annual Exam    HPI 57-year-old -American male presents to clinic today for annual physical exam.  He continues to be treated for hypertension and has recently seen cardiology for further assistance with blood pressure control.  In the past the patient had taken hydrochlorothiazide which was discontinued secondary to gout.  Patient was been attempted on amlodipine without improvement.  He was last on benazepril and Toprol without improvement of his blood pressure but also noted sexual side effects from the medication.  Toprol has been discontinued and the patient was started on diltiazem 60 mg daily.  At this time his blood pressure is well controlled without any sexual side effects, chest pain, palpitations, or headaches.  He does continue to be seen by pain management secondary to cervical radiculopathy.  He continues to take allopurinol daily secondary to continued hyperuricemia.  He has had vitamin D deficiency in the past but at this time his vitamin D levels are stable on over-the-counter vitamin D.  He has a past surgical history of L4/5 discectomy secondary to herniation.  He has a strong family history of cancer with his father having prostate cancer at the age of 65 and his brother in all also having prostate cancer.  He also has a family history of his mother having hypertension and having a stroke at the age of 68.  Flu vaccine has been discussed but has been declined.  The patient's last colonoscopy was 5 years ago and has been recommended that he repeat the colonoscopy this year.    Review of Systems   Constitutional: Negative for appetite change, chills, fatigue and fever.   HENT: Negative for congestion, ear pain, hearing loss, postnasal drip, rhinorrhea, sinus pressure, sore throat and tinnitus.    Eyes: Negative for redness, itching and visual disturbance.   Respiratory: Negative for cough, chest tightness and  shortness of breath.    Cardiovascular: Negative for chest pain and palpitations.   Gastrointestinal: Negative for abdominal pain, constipation, diarrhea, nausea and vomiting.   Genitourinary: Negative for decreased urine volume, difficulty urinating, dysuria, frequency, hematuria and urgency.   Musculoskeletal: Positive for neck pain. Negative for back pain, myalgias and neck stiffness.   Skin: Negative for rash.   Neurological: Negative for dizziness, light-headedness and headaches.   Psychiatric/Behavioral: Negative.        Objective:      Physical Exam   Constitutional: He is oriented to person, place, and time. He appears well-developed and well-nourished. No distress.   HENT:   Head: Normocephalic and atraumatic.   Right Ear: External ear normal.   Left Ear: External ear normal.   Nose: Nose normal.   Mouth/Throat: Oropharynx is clear and moist. No oropharyngeal exudate.   Eyes: Conjunctivae and EOM are normal. Pupils are equal, round, and reactive to light. Right eye exhibits no discharge. Left eye exhibits no discharge. No scleral icterus.   Neck: Normal range of motion. Neck supple. No JVD present. No tracheal deviation present. No thyromegaly present.   Cardiovascular: Normal rate, regular rhythm, normal heart sounds and intact distal pulses.  Exam reveals no gallop and no friction rub.    No murmur heard.  Pulmonary/Chest: Effort normal and breath sounds normal. No stridor. No respiratory distress. He has no wheezes. He has no rales.   Abdominal: Soft. Bowel sounds are normal. He exhibits no distension and no mass. There is no tenderness. There is no rebound and no guarding.   Musculoskeletal: Normal range of motion. He exhibits no edema or tenderness.   Lymphadenopathy:     He has no cervical adenopathy.   Neurological: He is alert and oriented to person, place, and time.   Skin: Skin is warm and dry. No rash noted. He is not diaphoretic. No erythema. No pallor.   Psychiatric: He has a normal mood and  affect. His behavior is normal. Judgment and thought content normal.   Nursing note and vitals reviewed.      Assessment:       1. Well adult exam    2. Essential hypertension    3. Vitamin D deficiency disease    4. Neck pain    5. Cervical radiculopathy    6. Cervical spondylosis with radiculopathy    7. Gout, unspecified cause, unspecified chronicity, unspecified site    8. Colon cancer screening        Plan:       1.  Labs have been reviewed and are overall within normal limits  2.  Continue benazepril 40 mg daily and diltiazem 60 mg daily.  Hypertension is well controlled.  3.  Continue over-the-counter vitamin D 2000 units daily.  4.  Continue follow-up with pain management for continued treatment of cervical radiculopathy.  5.  Continue allopurinol 300 mg daily.  Gout is stable.  6.  Screening colonoscopy.  7.  Return to clinic as needed or in one year for annual exam.

## 2018-01-23 NOTE — TELEPHONE ENCOUNTER
----- Message from Renee Bull sent at 1/23/2018 11:01 AM CST -----  Contact: Devin Storm with [employer]   x_  1st Request  _  2nd Request  _  3rd Request        Who: Devin Storm with [employer]     Why: He states he sent over an In-Basket on 1/11/18 with the instructions on how to appeal the WC denial but has not heard anything back yet. He states the patient has 14 days to appeal from the denial date and would like a call back as soon as possible.     What Number to Call Back: [phone]    When to Expect a call back: (Before the end of the day)   -- if call after 3:00 call back will be tomorrow.

## 2018-01-23 NOTE — TELEPHONE ENCOUNTER
----- Message from Devin Casillas sent at 1/23/2018 11:09 AM CST -----  Contact: The patient   Pt called in concerns to the appeal process of injection that was previously denied . The information was provided on the 11th as to how to appeal. I have attached website to this in-basket    [email or web address]

## 2018-01-24 ENCOUNTER — OFFICE VISIT (OUTPATIENT)
Dept: PAIN MEDICINE | Facility: CLINIC | Age: 58
End: 2018-01-24
Attending: ANESTHESIOLOGY
Payer: OTHER MISCELLANEOUS

## 2018-01-24 VITALS
TEMPERATURE: 97 F | BODY MASS INDEX: 30.33 KG/M2 | DIASTOLIC BLOOD PRESSURE: 89 MMHG | SYSTOLIC BLOOD PRESSURE: 132 MMHG | HEIGHT: 74 IN | HEART RATE: 78 BPM | WEIGHT: 236.31 LBS

## 2018-01-24 DIAGNOSIS — M54.12 CERVICAL RADICULOPATHY: ICD-10-CM

## 2018-01-24 DIAGNOSIS — M47.812 DJD (DEGENERATIVE JOINT DISEASE), CERVICAL: Primary | ICD-10-CM

## 2018-01-24 PROCEDURE — 99213 OFFICE O/P EST LOW 20 MIN: CPT | Mod: S$GLB,,, | Performed by: ANESTHESIOLOGY

## 2018-01-24 PROCEDURE — 99999 PR PBB SHADOW E&M-EST. PATIENT-LVL III: CPT | Mod: PBBFAC,,, | Performed by: ANESTHESIOLOGY

## 2018-01-24 RX ORDER — MELOXICAM 7.5 MG/1
7.5 TABLET ORAL DAILY
Qty: 30 TABLET | Refills: 0 | Status: SHIPPED | OUTPATIENT
Start: 2018-01-24 | End: 2018-02-21 | Stop reason: SDUPTHER

## 2018-01-24 RX ORDER — TRAMADOL HYDROCHLORIDE 50 MG/1
50 TABLET ORAL EVERY 12 HOURS PRN
Qty: 60 TABLET | Refills: 0 | Status: SHIPPED | OUTPATIENT
Start: 2018-01-24 | End: 2018-02-21 | Stop reason: SDUPTHER

## 2018-01-24 RX ORDER — CYCLOBENZAPRINE HCL 10 MG
10 TABLET ORAL 3 TIMES DAILY PRN
Qty: 90 TABLET | Refills: 0 | Status: SHIPPED | OUTPATIENT
Start: 2018-01-24 | End: 2018-02-21 | Stop reason: SDUPTHER

## 2018-01-24 NOTE — PROGRESS NOTES
Subjective:      Patient ID: Ghulam Ariza is a 57 y.o. male.    Chief Complaint: Follow-up    Referred by: No ref. provider found     Pain Scales  Best: 8/10  Worst: 10/10  Usually: 10/10  Today: 9/10        He is here for follow-up.  His procedure was not approved by his insurance.  He has Worker's Comp.  Cervical radiculopathy and right side.  He tried physical therapy and medications.  The pain is severe.  It interferes with activities of daily living.  He had to the emergency room a few days ago because of severe pain.  He is requesting medications.      Past Medical History:   Diagnosis Date    Degenerative disc disease     cervical radiculopathy    Genital herpes     Hx of colonic polyps     Hypertension early 40s    Pinched nerve in neck        Past Surgical History:   Procedure Laterality Date    back sx      L4/L5 Herniated disk       Review of patient's allergies indicates:  No Known Allergies    Current Outpatient Prescriptions   Medication Sig Dispense Refill    allopurinol (ZYLOPRIM) 300 MG tablet Take 1 tablet (300 mg total) by mouth every evening. 90 tablet 3    benazepril (LOTENSIN) 40 MG tablet Take 1 tablet (40 mg total) by mouth once daily. 90 tablet 3    diltiaZEM (CARDIZEM SR) 60 MG Cp12 Take 1 capsule (60 mg total) by mouth 2 (two) times daily. 60 capsule 11    naproxen (NAPROSYN) 500 MG tablet Take 1 tablet (500 mg total) by mouth 2 (two) times daily with meals. 30 tablet 2    cyclobenzaprine (FLEXERIL) 10 MG tablet       cyclobenzaprine (FLEXERIL) 10 MG tablet Take 1 tablet (10 mg total) by mouth 3 (three) times daily as needed for Muscle spasms. 90 tablet 0    hydrocodone-acetaminophen 7.5-325mg (NORCO) 7.5-325 mg per tablet Take 1 tablet by mouth every 6 (six) hours as needed for Pain. 28 tablet 0    meloxicam (MOBIC) 7.5 MG tablet Take 1 tablet (7.5 mg total) by mouth once daily. 30 tablet 0    traMADol (ULTRAM) 50 mg tablet Take 1 tablet (50 mg total) by mouth every 12  (twelve) hours as needed for Pain. 60 tablet 0    valacyclovir (VALTREX) 500 MG tablet Take 1 tablet (500 mg total) by mouth once daily. (Patient taking differently: Take 500 mg by mouth as needed. ) 30 tablet 11     No current facility-administered medications for this visit.        Family History   Problem Relation Age of Onset    Hypertension Mother     Stroke Mother 68     TIA    Cancer Father 65     Prostate    Cancer Paternal Uncle     Cancer Brother      Prostate Cancer       Social History     Social History    Marital status: Single     Spouse name: N/A    Number of children: N/A    Years of education: N/A     Occupational History     [employer]     Social History Main Topics    Smoking status: Never Smoker    Smokeless tobacco: Never Used    Alcohol use 0.0 oz/week      Comment: every weekend few drinks    Drug use: No    Sexual activity: Yes     Partners: Female     Birth control/ protection: None     Other Topics Concern    Not on file     Social History Narrative    No narrative on file       Review of Systems   Constitution: Negative for chills, fever, malaise/fatigue, weight gain and weight loss.   HENT: Negative for ear pain and hoarse voice.    Eyes: Negative for blurred vision, pain and visual disturbance.   Cardiovascular: Negative for chest pain, dyspnea on exertion and irregular heartbeat.   Respiratory: Negative for cough, shortness of breath and wheezing.    Endocrine: Negative for cold intolerance and heat intolerance.   Hematologic/Lymphatic: Negative for adenopathy and bleeding problem. Does not bruise/bleed easily.   Skin: Negative for color change, itching and rash.   Musculoskeletal: Negative for back pain.   Gastrointestinal: Negative for change in bowel habit, diarrhea, hematemesis, hematochezia, melena and vomiting.   Genitourinary: Negative for flank pain, frequency, hematuria and urgency.   Neurological: Negative for difficulty with concentration,  "dizziness, headaches, loss of balance and seizures.   Psychiatric/Behavioral: Negative for altered mental status, depression and suicidal ideas. The patient is not nervous/anxious.    Allergic/Immunologic: Negative for HIV exposure.           Objective:      /89   Pulse 78   Temp 97 °F (36.1 °C)   Ht 6' 2" (1.88 m)   Wt 107.2 kg (236 lb 5.3 oz)   BMI 30.34 kg/m²   Normocephalic.  Atraumatic.  Affect appropriate.  Breathing unlabored.  Extra ocular muscles intact.  Positive Spurling sign to the right side.  Muscle strength reduced probably secondary to pain.    Ortho/SPM Exam      Assessment:       Encounter Diagnoses   Name Primary?    DJD (degenerative joint disease), cervical Yes    Cervical radiculopathy          Plan:       Ghulam was seen today for follow-up.    Diagnoses and all orders for this visit:    DJD (degenerative joint disease), cervical    Cervical radiculopathy    Other orders  -     meloxicam (MOBIC) 7.5 MG tablet; Take 1 tablet (7.5 mg total) by mouth once daily.  -     cyclobenzaprine (FLEXERIL) 10 MG tablet; Take 1 tablet (10 mg total) by mouth 3 (three) times daily as needed for Muscle spasms.  -     traMADol (ULTRAM) 50 mg tablet; Take 1 tablet (50 mg total) by mouth every 12 (twelve) hours as needed for Pain.         We discussed with the patient the assessment and recommendations. The following is the plan we agreed on:  1.  Schedule patient for interlaminar cervical epidural at C7-T1.  2.  Return as needed.  We will refill the patient's meloxicam, Flexeril, and we will start him on tramadol.  These medications given for one month's and refills.  He was counseled regarding his medications in details.      "

## 2018-01-25 ENCOUNTER — TELEPHONE (OUTPATIENT)
Dept: PAIN MEDICINE | Facility: CLINIC | Age: 58
End: 2018-01-25

## 2018-01-25 NOTE — TELEPHONE ENCOUNTER
Provider is completing appeal for patient denied w/c for a pain interventional procedure. The 1010 form need to accompany the documentation.     Staff went to retrieve documentation from patients  and 1010 form present is for a different patient.     Please fax denied 1010 form to providers office at [phone] ASAP as we only have a short window to complete appeal process.

## 2018-01-25 NOTE — TELEPHONE ENCOUNTER
I will fax over 1010 ASAP. Please be sure to send off all supporting documents including xrays with the 1010. Please send off all clinicals supporting the injection reason. Thanks

## 2018-01-29 RX ORDER — METOPROLOL SUCCINATE 25 MG/1
TABLET, EXTENDED RELEASE ORAL
Qty: 90 TABLET | Refills: 3 | Status: SHIPPED | OUTPATIENT
Start: 2018-01-29 | End: 2018-12-10 | Stop reason: SDUPTHER

## 2018-01-31 ENCOUNTER — TELEPHONE (OUTPATIENT)
Dept: PAIN MEDICINE | Facility: CLINIC | Age: 58
End: 2018-01-31

## 2018-01-31 NOTE — TELEPHONE ENCOUNTER
[employer].  [facility] states that the patient is approved for a one time injection. Patient was denied but is now approved through [employer].       Please review and advise.      Plan:       Ghulam was seen today for follow-up.     Diagnoses and all orders for this visit:     DJD (degenerative joint disease), cervical     Cervical radiculopathy     Other orders  -     meloxicam (MOBIC) 7.5 MG tablet; Take 1 tablet (7.5 mg total) by mouth once daily.  -     cyclobenzaprine (FLEXERIL) 10 MG tablet; Take 1 tablet (10 mg total) by mouth 3 (three) times daily as needed for Muscle spasms.  -     traMADol (ULTRAM) 50 mg tablet; Take 1 tablet (50 mg total) by mouth every 12 (twelve) hours as needed for Pain.           We discussed with the patient the assessment and recommendations. The following is the plan we agreed on:  1.  Schedule patient for interlaminar cervical epidural at C7-T1.  2.  Return as needed.  We will refill the patient's meloxicam, Flexeril, and we will start him on tramadol.  These medications given for one month's and refills.  He was counseled regarding his medications in details.

## 2018-01-31 NOTE — TELEPHONE ENCOUNTER
----- Message from [place] Vinny sent at 1/31/2018  3:25 PM CST -----  Hello    I received a call from the  stating that he will approve one time injection for the pt. Please setup appointment for pt to be treated. Pt was prior denied, but is now approved through workers comp.    Devin Casillas  Workers comp [phone]

## 2018-02-01 ENCOUNTER — TELEPHONE (OUTPATIENT)
Dept: PAIN MEDICINE | Facility: CLINIC | Age: 58
End: 2018-02-01

## 2018-02-01 NOTE — TELEPHONE ENCOUNTER
Contacted and spoke with pt regarding injection. Pt injection was rescheduled for 02/05/2018. Pt was informed of instructions and arrival time.    Pt verbalized understanding

## 2018-02-02 ENCOUNTER — TELEPHONE (OUTPATIENT)
Dept: PAIN MEDICINE | Facility: CLINIC | Age: 58
End: 2018-02-02

## 2018-02-02 NOTE — TELEPHONE ENCOUNTER
----- Message from [place] Vinny sent at 1/31/2018  3:25 PM CST -----  Hello    I received a call from the  stating that he will approve one time injection for the pt. Please setup appointment for pt to be treated. Pt was prior denied, but is now approved through workers comp.    Devin Casillas  Workers comp [phone]

## 2018-02-05 ENCOUNTER — SURGERY (OUTPATIENT)
Age: 58
End: 2018-02-05

## 2018-02-05 ENCOUNTER — HOSPITAL ENCOUNTER (OUTPATIENT)
Facility: OTHER | Age: 58
Discharge: HOME | End: 2018-02-05
Attending: ANESTHESIOLOGY | Admitting: ANESTHESIOLOGY
Payer: OTHER MISCELLANEOUS

## 2018-02-05 VITALS
BODY MASS INDEX: 29.52 KG/M2 | DIASTOLIC BLOOD PRESSURE: 79 MMHG | SYSTOLIC BLOOD PRESSURE: 139 MMHG | TEMPERATURE: 99 F | WEIGHT: 230 LBS | HEART RATE: 68 BPM | RESPIRATION RATE: 18 BRPM | HEIGHT: 74 IN | OXYGEN SATURATION: 97 %

## 2018-02-05 DIAGNOSIS — G89.29 CHRONIC PAIN: ICD-10-CM

## 2018-02-05 DIAGNOSIS — M47.22 OSTEOARTHRITIS OF SPINE WITH RADICULOPATHY, CERVICAL REGION: Primary | ICD-10-CM

## 2018-02-05 PROCEDURE — 63600175 PHARM REV CODE 636 W HCPCS: Performed by: ANESTHESIOLOGY

## 2018-02-05 PROCEDURE — 62321 NJX INTERLAMINAR CRV/THRC: CPT | Mod: ,,, | Performed by: ANESTHESIOLOGY

## 2018-02-05 PROCEDURE — 62321 NJX INTERLAMINAR CRV/THRC: CPT | Performed by: ANESTHESIOLOGY

## 2018-02-05 PROCEDURE — 62320 NJX INTERLAMINAR CRV/THRC: CPT | Performed by: ANESTHESIOLOGY

## 2018-02-05 PROCEDURE — A4216 STERILE WATER/SALINE, 10 ML: HCPCS | Performed by: ANESTHESIOLOGY

## 2018-02-05 PROCEDURE — 25000003 PHARM REV CODE 250: Performed by: ANESTHESIOLOGY

## 2018-02-05 PROCEDURE — 25500020 PHARM REV CODE 255: Performed by: ANESTHESIOLOGY

## 2018-02-05 RX ORDER — LIDOCAINE HYDROCHLORIDE 10 MG/ML
INJECTION, SOLUTION EPIDURAL; INFILTRATION; INTRACAUDAL; PERINEURAL
Status: DISCONTINUED | OUTPATIENT
Start: 2018-02-05 | End: 2018-02-05 | Stop reason: HOSPADM

## 2018-02-05 RX ORDER — LIDOCAINE HYDROCHLORIDE 10 MG/ML
INJECTION INFILTRATION; PERINEURAL
Status: DISCONTINUED | OUTPATIENT
Start: 2018-02-05 | End: 2018-02-05 | Stop reason: HOSPADM

## 2018-02-05 RX ORDER — DEXAMETHASONE SODIUM PHOSPHATE 100 MG/10ML
INJECTION INTRAMUSCULAR; INTRAVENOUS
Status: DISCONTINUED | OUTPATIENT
Start: 2018-02-05 | End: 2018-02-05 | Stop reason: HOSPADM

## 2018-02-05 RX ORDER — SODIUM CHLORIDE 9 MG/ML
500 INJECTION, SOLUTION INTRAVENOUS CONTINUOUS
Status: DISCONTINUED | OUTPATIENT
Start: 2018-02-05 | End: 2018-02-05 | Stop reason: HOSPADM

## 2018-02-05 RX ORDER — SODIUM CHLORIDE 9 MG/ML
INJECTION, SOLUTION INTRAMUSCULAR; INTRAVENOUS; SUBCUTANEOUS
Status: DISCONTINUED | OUTPATIENT
Start: 2018-02-05 | End: 2018-02-05 | Stop reason: HOSPADM

## 2018-02-05 RX ORDER — ALPRAZOLAM 0.5 MG/1
1 TABLET, ORALLY DISINTEGRATING ORAL ONCE
Status: COMPLETED | OUTPATIENT
Start: 2018-02-05 | End: 2018-02-05

## 2018-02-05 RX ADMIN — ALPRAZOLAM 1 MG: 0.5 TABLET, ORALLY DISINTEGRATING ORAL at 01:02

## 2018-02-05 RX ADMIN — DEXAMETHASONE SODIUM PHOSPHATE 10 MG: 10 INJECTION INTRAMUSCULAR; INTRAVENOUS at 01:02

## 2018-02-05 RX ADMIN — SODIUM CHLORIDE 5 ML: 9 INJECTION, SOLUTION INTRAMUSCULAR; INTRAVENOUS; SUBCUTANEOUS at 01:02

## 2018-02-05 RX ADMIN — IOHEXOL 5 ML: 300 INJECTION, SOLUTION INTRAVENOUS at 01:02

## 2018-02-05 RX ADMIN — LIDOCAINE HYDROCHLORIDE 5 ML: 10 INJECTION, SOLUTION EPIDURAL; INFILTRATION; INTRACAUDAL; PERINEURAL at 01:02

## 2018-02-05 RX ADMIN — SODIUM BICARBONATE 5 ML: 0.2 INJECTION, SOLUTION INTRAVENOUS at 01:02

## 2018-02-05 RX ADMIN — LIDOCAINE HYDROCHLORIDE 10 ML: 10 INJECTION, SOLUTION INFILTRATION; PERINEURAL at 01:02

## 2018-02-05 NOTE — OP NOTE
Cervical Interlaminar Epidural Steroid Injection under fluoroscopic guidance.  Time-out taken to identify patient and procedure side prior to starting the procedure.   I attest that I have reviewed the patient's home medications prior to the procedure and no contraindication have been identified. I  re-evaluated the patient after the patient was positioned for the procedure in the procedure room immediately before the procedural time-out. The vital signs are current and represent the current state of the patient which has not significantly changed since the preprocedure assessment.                                                              Date of Service: 02/05/2018    PCP: Reggie Valle MD    Procedure: C7-T1 cervical interlaminar epidural steroid injection under fluoroscopy.  Reasons for procedure: Cervical radiculopathy [M54.12]  1. Osteoarthritis of spine with radiculopathy, cervical region    2. Chronic pain      Physician: Enrique Sanchez MD  ASSISTANTS: Padilla Gomez MD    Medications injected:  Preservative-free dexamethasone 10mg and Xylocaine 1% MPF 1ml.  This was followed by a slow injection of 4 mL sterile, preservative-free normal saline.    Local anesthetic used: Xylocaine 1% 9ml with Sodium Bicarbonate 1ml.     Sedation Medications: None    Complications:  none.    Estimated blood loss: none.    Technique:  With the patient laying in a prone position with the neck in a flexed forward position, the area was prepped and draped in the usual sterile fashion using ChloraPrep and a fenestrated drape.  The area was determined under fluoroscopic guidance.  Local anesthetic was given using a 27-gauge needle by raising a wheal and going down to the osseous elements of the cervical spine.  A 3.5 inch 20-gauge Touhy needle was introduced under fluoroscopic guidance to meet the lamina of T1.  The needle was then hinged under the lamina then advanced using loss of resistance technique.  Once the tip of the  needle was in the desired position, the contrast dye Omnipaque was injected to determine placement and no vascular runoff.  Digital subtraction was employed to confirm that there is no vascular runoff.  The steroid was then injected slowly followed by a slow injection of the 4 mL of the sterile preservative-free normal saline.  The patient tolerated the procedure well.    Pain before the procedure: 9/10    Pain after the procedure: 0/10    The patient was monitored after the procedure and was given post-procedure and discharge instructions to follow at home. The patient was discharged in a stable condition

## 2018-02-21 ENCOUNTER — OFFICE VISIT (OUTPATIENT)
Dept: PAIN MEDICINE | Facility: CLINIC | Age: 58
End: 2018-02-21
Payer: OTHER MISCELLANEOUS

## 2018-02-21 VITALS
WEIGHT: 229.25 LBS | RESPIRATION RATE: 16 BRPM | TEMPERATURE: 99 F | HEIGHT: 74 IN | DIASTOLIC BLOOD PRESSURE: 93 MMHG | BODY MASS INDEX: 29.42 KG/M2 | SYSTOLIC BLOOD PRESSURE: 133 MMHG | HEART RATE: 66 BPM

## 2018-02-21 DIAGNOSIS — M54.12 CERVICAL RADICULOPATHY: Primary | ICD-10-CM

## 2018-02-21 DIAGNOSIS — G89.4 CHRONIC PAIN SYNDROME: ICD-10-CM

## 2018-02-21 DIAGNOSIS — M47.22 OSTEOARTHRITIS OF SPINE WITH RADICULOPATHY, CERVICAL REGION: ICD-10-CM

## 2018-02-21 DIAGNOSIS — M47.22 CERVICAL SPONDYLOSIS WITH RADICULOPATHY: ICD-10-CM

## 2018-02-21 PROCEDURE — 3008F BODY MASS INDEX DOCD: CPT | Mod: S$GLB,,, | Performed by: NURSE PRACTITIONER

## 2018-02-21 PROCEDURE — 99214 OFFICE O/P EST MOD 30 MIN: CPT | Mod: S$GLB,,, | Performed by: NURSE PRACTITIONER

## 2018-02-21 PROCEDURE — 99999 PR PBB SHADOW E&M-EST. PATIENT-LVL III: CPT | Mod: PBBFAC,,, | Performed by: NURSE PRACTITIONER

## 2018-02-21 RX ORDER — MELOXICAM 7.5 MG/1
7.5 TABLET ORAL DAILY
Qty: 30 TABLET | Refills: 2 | Status: SHIPPED | OUTPATIENT
Start: 2018-02-21 | End: 2018-02-21 | Stop reason: SDUPTHER

## 2018-02-21 RX ORDER — CYCLOBENZAPRINE HCL 10 MG
10 TABLET ORAL 3 TIMES DAILY PRN
Qty: 90 TABLET | Refills: 2 | Status: SHIPPED | OUTPATIENT
Start: 2018-02-21 | End: 2018-06-30 | Stop reason: SDUPTHER

## 2018-02-21 RX ORDER — TRAMADOL HYDROCHLORIDE 50 MG/1
50 TABLET ORAL EVERY 12 HOURS PRN
Qty: 60 TABLET | Refills: 0 | Status: SHIPPED | OUTPATIENT
Start: 2018-02-21 | End: 2018-03-23

## 2018-02-21 RX ORDER — MELOXICAM 7.5 MG/1
7.5 TABLET ORAL DAILY
Qty: 30 TABLET | Refills: 2 | Status: SHIPPED | OUTPATIENT
Start: 2018-02-21 | End: 2018-06-22 | Stop reason: SDUPTHER

## 2018-02-21 NOTE — PROGRESS NOTES
Chronic patient Established Note (Follow up visit)      SUBJECTIVE:    Ghulam Ariza presents to the clinic for a follow-up appointment for neck pain.  He is s/p C7-T1 IL JUDE with 50% pain relief.  He is still complaining of neck pain which radiates down the front of his right arm to his first digit with numbness.  His recent MRI does show disc osteophytes at C4-5 and C5-6 with NF narrowing.  He also notices weakness and states that he sometimes drops objects.  He denies bowel or bladder changes.  He reports benefit with current medication regimen.  Since the last visit, Ghulam Ariza states the pain has been improving.  Current pain intensity is 7/10.    Pain Disability Index Review:  Last 3 PDI Scores 12/12/2017 4/26/2017 4/4/2017   Pain Disability Index (PDI) 48 0 47       Pain Medications:  Tramadol 50 mg BID PRN  Flexeril 10 mg PRN  Mobic 7.5 mg    Opioid Contract: no     report:  Reviewed and consistent with medication use as prescribed.    Pain Procedures:   2/5/18 C7-T1 IL JUDE- 50% relief    Physical Therapy/Home Exercise: yes    Imaging:     Cervical MRI 12/5/17    Narrative     Cervical spine MRI    Technique: MRI of cervical spine was performed without contrast and the following sequences were obtained: Localizer; sagittal T1, T2, and stir; axial T2 and gradient.    Comparison: 10/30/2014    Findings:    There is straightening of the normal cervical lordosis.  The vertebral body heights are well-maintained.  Multilevel disc space narrowing, worse at the level of C5-C6.  Bone marrow signal is preserved. Visualized posterior fossa structures and cervical cord are unremarkable.    Limited evaluation of the neck soft tissues is unremarkable.  Fluid seen in the partially visualized sphenoid sinuses.    C2-3: No spinal canal stenosis or neuroforaminal narrowing.    C3-4: Small posterior disc osteophyte complex, bilateral facet arthropathy and mild uncovertebral spurring without spinal canal stenosis.  There is  moderate bilateral neural foraminal narrowing.    C4-5: Small posterior disc osteophyte complex, asymmetric to the right abutting the anterior CSF sleeve together with bilateral uncovertebral spurring resulting in mild spinal canal stenosis and moderate bilateral neural foraminal narrowing.     C5-6: Small posterior disc osteophyte complex and bilateral uncovertebral spurring, asymmetric to the right resulting in mild spinal canal stenosis, moderate right and mild left neural foraminal narrowing.    C6-7: Small posterior disc osteophyte complex and bilateral facet arthropathy without spinal canal stenosis or neural foraminal narrowing.    C7-T1: Small posterior disc osteophyte complex without spinal canal stenosis or neural foraminal narrowing.   Impression         Straightening of the normal cervical lordosis with multilevel cervical spondylosis worse at the level of C4-C5 and C5-C6 resulting in mild spinal canal stenosis.    Mild-to-moderate bilateral neural foraminal narrowing as detailed above     Cervical XRAYs 10/23/17    Narrative     Cervical spine complete 5 view    Clinical history: Radiculopathy    Comparison: 10/8/2014, MRI 10/30/2014, CT 9/24/2014    Findings:  5 views.    Lateral imaging demonstrates grossly adequate alignment of the cervical spine noting C7 is obscured by soft tissues.  There is disc space height loss primarily involving C4-C5, C5-C6, and C6-C7, similar in appearance to the previous exam.  There is anterior marginal osteophytosis from the levels.  The facet joints are well aligned.  There is minimal anterior vertebral body height loss involving C5, stable, likely on the basis of ongoing degenerative change.  The odontoid is intact.  Lateral masses of C1 are in anatomic relationship with C2.  AP spinal alignment is appropriate.  There is facet arthropathy.  The lung apices are grossly clear.  The paraspinous and hypopharyngeal soft tissues are grossly unremarkable.  Airway is  patent.    There is calcification of the supraspinous ligament.  There is multilevel neuroforaminal narrowing, noting bilateral neuroforaminal narrowing at C4-C5 and C5-C6, and likely also involving C3-C4.   Impression       1.  No acute displaced fracture or dislocation of the cervical spine noting facet arthropathy, and neuroforaminal narrowing as described above.         Allergies: Review of patient's allergies indicates:  No Known Allergies    Current Medications:   Current Outpatient Prescriptions   Medication Sig Dispense Refill    allopurinol (ZYLOPRIM) 300 MG tablet Take 1 tablet (300 mg total) by mouth every evening. 90 tablet 3    benazepril (LOTENSIN) 40 MG tablet Take 1 tablet (40 mg total) by mouth once daily. 90 tablet 3    cyclobenzaprine (FLEXERIL) 10 MG tablet       cyclobenzaprine (FLEXERIL) 10 MG tablet Take 1 tablet (10 mg total) by mouth 3 (three) times daily as needed for Muscle spasms. 90 tablet 0    diltiaZEM (CARDIZEM SR) 60 MG Cp12 Take 1 capsule (60 mg total) by mouth 2 (two) times daily. 60 capsule 11    hydrocodone-acetaminophen 7.5-325mg (NORCO) 7.5-325 mg per tablet Take 1 tablet by mouth every 6 (six) hours as needed for Pain. 28 tablet 0    meloxicam (MOBIC) 7.5 MG tablet Take 1 tablet (7.5 mg total) by mouth once daily. 30 tablet 0    metoprolol succinate (TOPROL-XL) 25 MG 24 hr tablet TAKE 1 TABLET DAILY 90 tablet 3    naproxen (NAPROSYN) 500 MG tablet Take 1 tablet (500 mg total) by mouth 2 (two) times daily with meals. 30 tablet 2    traMADol (ULTRAM) 50 mg tablet Take 1 tablet (50 mg total) by mouth every 12 (twelve) hours as needed for Pain. 60 tablet 0    valacyclovir (VALTREX) 500 MG tablet Take 1 tablet (500 mg total) by mouth once daily. (Patient taking differently: Take 500 mg by mouth as needed. ) 30 tablet 11     No current facility-administered medications for this visit.        REVIEW OF SYSTEMS:    GENERAL:  No weight loss, malaise or fevers.  HEENT:   Negative for frequent or significant headaches.  NECK:  Negative for lumps, goiter, pain and significant neck swelling.  RESPIRATORY:  Negative for cough, wheezing or shortness of breath.  CARDIOVASCULAR:  Negative for chest pain, leg swelling or palpitations. Hypertension.  GI:  Negative for abdominal discomfort, blood in stools or black stools or change in bowel habits.  MUSCULOSKELETAL:  See HPI.  SKIN:  Negative for lesions, rash, and itching.  PSYCH:  Negative for sleep disturbance, mood disorder and recent psychosocial stressors.  HEMATOLOGY/LYMPHOLOGY:  Negative for prolonged bleeding, bruising easily or swollen nodes.  NEURO:   No history of headaches, syncope, paralysis, seizures or tremors.  All other reviewed and negative other than HPI.    Past Medical History:  Past Medical History:   Diagnosis Date    Degenerative disc disease     cervical radiculopathy    Genital herpes     Hx of colonic polyps     Hypertension early 40s    Pinched nerve in neck        Past Surgical History:  Past Surgical History:   Procedure Laterality Date    back sx      L4/L5 Herniated disk       Family History:  Family History   Problem Relation Age of Onset    Hypertension Mother     Stroke Mother 68     TIA    Cancer Father 65     Prostate    Cancer Paternal Uncle     Cancer Brother      Prostate Cancer       Social History:  Social History     Social History    Marital status: Single     Spouse name: N/A    Number of children: N/A    Years of education: N/A     Occupational History     [employer]     Social History Main Topics    Smoking status: Never Smoker    Smokeless tobacco: Never Used    Alcohol use 0.0 oz/week      Comment: every weekend few drinks    Drug use: No    Sexual activity: Yes     Partners: Female     Birth control/ protection: None     Other Topics Concern    None     Social History Narrative    None       OBJECTIVE:    BP (!) 133/93   Pulse 66   Temp 99 °F (37.2 °C)  "(Oral)   Resp 16   Ht 6' 2" (1.88 m)   Wt 104 kg (229 lb 4.5 oz)   BMI 29.44 kg/m²     PHYSICAL EXAMINATION:    General appearance: Well appearing, in no acute distress, alert and oriented x3.  Psych:  Mood and affect appropriate.  Skin: Skin color, texture, turgor normal, no rashes or lesions, in both upper and lower body.  Head/face:  Atraumatic, normocephalic. No palpable lymph nodes  Neck: No pain to palpation over the cervical paraspinous muscles. Spurling is positive to right side.  There is pain on flexion and lateral rotation.  Positive facet loading bilaterally.  Cor: RRR  Pulm: CTA  GI: Abdomen soft and non-tender.  Extremities: Peripheral joint ROM is full and pain free without obvious instability or laxity in all four extremities. No deformities, edema, or skin discoloration. Good capillary refill.  Musculoskeletal: Right hand  strength is 4/5, 5/5 on left.  Right wrist extension is 4/5, 5/5 on left.  No atrophy or tone abnormalities are noted.  Neuro: Bilateral upper and lower extremity coordination and muscle stretch reflexes are physiologic and symmetric.  Plantar response are downgoing.  Decreased sensation in right C6 distribution.  Gait: Normal.    ASSESSMENT: 57 y.o. year old male with neck pain, consistent with the following diagnoses:     1. Cervical radiculopathy     2. Cervical spondylosis with radiculopathy     3. Osteoarthritis of spine with radiculopathy, cervical region     4. Chronic pain syndrome           PLAN:     - Previous imaging was reviewed and discussed with the patient today.    - He is s/p C7-T1 IL JUDE with 50% benefit.  I will schedule him for right C5 and C6 TF JUDE for better relief.  His pain is still limiting his daily function.  The procedure, risks, benefits and options were discussed with patient. There are no contraindications to the procedure. The patient expressed understanding and agreed to proceed.  Consent obtained today.    - Continue Tramadol 50 mg BID " PRN pain, #60, 0 refills.  Continue Mobic 7.5 mg QD PRN, and Flexeril 10 mg Q8h PRN.    - RTC 3 weeks after above procedure.    - Counseled patient regarding the importance of constant sleeping habits and physical therapy.  - The patient will continue a home exercise routine to help with pain and strengthening.      - Dr. Sanchez was consulted on the patient and agrees with this plan.      The above plan and management options were discussed at length with patient. Patient is in agreement with the above and verbalized understanding.    Leena Hicks  02/21/2018

## 2018-03-01 ENCOUNTER — TELEPHONE (OUTPATIENT)
Dept: PAIN MEDICINE | Facility: CLINIC | Age: 58
End: 2018-03-01

## 2018-03-01 NOTE — TELEPHONE ENCOUNTER
From W/c :     Re'chikis Denial from  Jamison [phone].... Phone number [phone].... Pt was denied due to the request not meeting established treament standards of medical necessity.. Sent in-basket to  Office and uploaded denial to media..

## 2018-03-02 NOTE — TELEPHONE ENCOUNTER
----- Message from IRLANDA Lindsay sent at 3/1/2018  3:53 PM CST -----   I called the patient but his mailbox is full.  Can you see what we need to do to appeal this through WC.  I am happy to do whatever is needed.    ----- Message -----  From: Nemo Hollis LPN  Sent: 3/1/2018   3:44 PM  To: IRLANDA Lindsay    pts workmans comp has not been approved for Monday 3/5/18. Sounds like it has been denied and needs appeal. pls contact pt in this regard and may need to be cancelled for now.

## 2018-03-02 NOTE — TELEPHONE ENCOUNTER
Re'cd Denial from  Jamison [phone].... Phone number [phone].... Pt was denied due to the request not meeting established treament standards of medical necessity.. Sent in-basket to  Office and uploaded denial to media..

## 2018-03-07 ENCOUNTER — TELEPHONE (OUTPATIENT)
Dept: PAIN MEDICINE | Facility: CLINIC | Age: 58
End: 2018-03-07

## 2018-03-22 ENCOUNTER — HOSPITAL ENCOUNTER (OUTPATIENT)
Facility: OTHER | Age: 58
Discharge: HOME | End: 2018-03-22
Attending: ANESTHESIOLOGY | Admitting: ANESTHESIOLOGY
Payer: OTHER MISCELLANEOUS

## 2018-03-22 ENCOUNTER — SURGERY (OUTPATIENT)
Age: 58
End: 2018-03-22

## 2018-03-22 VITALS
BODY MASS INDEX: 29.13 KG/M2 | RESPIRATION RATE: 18 BRPM | TEMPERATURE: 99 F | SYSTOLIC BLOOD PRESSURE: 148 MMHG | HEIGHT: 74 IN | WEIGHT: 227 LBS | HEART RATE: 66 BPM | DIASTOLIC BLOOD PRESSURE: 83 MMHG | OXYGEN SATURATION: 96 %

## 2018-03-22 DIAGNOSIS — M47.22 CERVICAL SPONDYLOSIS WITH RADICULOPATHY: Primary | ICD-10-CM

## 2018-03-22 DIAGNOSIS — G89.29 CHRONIC PAIN: ICD-10-CM

## 2018-03-22 PROCEDURE — 64480 NJX AA&/STRD TFRM EPI C/T EA: CPT | Performed by: ANESTHESIOLOGY

## 2018-03-22 PROCEDURE — 64479 NJX AA&/STRD TFRM EPI C/T 1: CPT | Performed by: ANESTHESIOLOGY

## 2018-03-22 PROCEDURE — 64479 NJX AA&/STRD TFRM EPI C/T 1: CPT | Mod: RT,,, | Performed by: ANESTHESIOLOGY

## 2018-03-22 PROCEDURE — 25500020 PHARM REV CODE 255: Performed by: ANESTHESIOLOGY

## 2018-03-22 PROCEDURE — 25000003 PHARM REV CODE 250: Performed by: ANESTHESIOLOGY

## 2018-03-22 PROCEDURE — 63600175 PHARM REV CODE 636 W HCPCS: Performed by: ANESTHESIOLOGY

## 2018-03-22 PROCEDURE — 64480 NJX AA&/STRD TFRM EPI C/T EA: CPT | Mod: RT,,, | Performed by: ANESTHESIOLOGY

## 2018-03-22 RX ORDER — LIDOCAINE HYDROCHLORIDE 10 MG/ML
INJECTION INFILTRATION; PERINEURAL
Status: DISCONTINUED | OUTPATIENT
Start: 2018-03-22 | End: 2018-03-22 | Stop reason: HOSPADM

## 2018-03-22 RX ORDER — BUPIVACAINE HYDROCHLORIDE 2.5 MG/ML
INJECTION, SOLUTION EPIDURAL; INFILTRATION; INTRACAUDAL
Status: DISCONTINUED | OUTPATIENT
Start: 2018-03-22 | End: 2018-03-22 | Stop reason: HOSPADM

## 2018-03-22 RX ORDER — LIDOCAINE HYDROCHLORIDE 10 MG/ML
INJECTION, SOLUTION EPIDURAL; INFILTRATION; INTRACAUDAL; PERINEURAL
Status: DISCONTINUED | OUTPATIENT
Start: 2018-03-22 | End: 2018-03-22 | Stop reason: HOSPADM

## 2018-03-22 RX ORDER — DEXAMETHASONE SODIUM PHOSPHATE 100 MG/10ML
INJECTION INTRAMUSCULAR; INTRAVENOUS
Status: DISCONTINUED | OUTPATIENT
Start: 2018-03-22 | End: 2018-03-22 | Stop reason: HOSPADM

## 2018-03-22 RX ORDER — ALPRAZOLAM 0.5 MG/1
1 TABLET, ORALLY DISINTEGRATING ORAL ONCE
Status: COMPLETED | OUTPATIENT
Start: 2018-03-22 | End: 2018-03-22

## 2018-03-22 RX ORDER — SODIUM CHLORIDE 9 MG/ML
500 INJECTION, SOLUTION INTRAVENOUS CONTINUOUS
Status: DISCONTINUED | OUTPATIENT
Start: 2018-03-22 | End: 2018-03-22 | Stop reason: HOSPADM

## 2018-03-22 RX ADMIN — LIDOCAINE HYDROCHLORIDE 10 ML: 10 INJECTION, SOLUTION EPIDURAL; INFILTRATION; INTRACAUDAL; PERINEURAL at 01:03

## 2018-03-22 RX ADMIN — IOHEXOL 5 ML: 300 INJECTION, SOLUTION INTRAVENOUS at 01:03

## 2018-03-22 RX ADMIN — DEXAMETHASONE SODIUM PHOSPHATE 10 MG: 10 INJECTION INTRAMUSCULAR; INTRAVENOUS at 01:03

## 2018-03-22 RX ADMIN — ALPRAZOLAM 1 MG: 0.5 TABLET, ORALLY DISINTEGRATING ORAL at 12:03

## 2018-03-22 RX ADMIN — LIDOCAINE HYDROCHLORIDE 10 ML: 10 INJECTION, SOLUTION INFILTRATION; PERINEURAL at 01:03

## 2018-03-22 NOTE — DISCHARGE INSTRUCTIONS

## 2018-03-22 NOTE — PLAN OF CARE
Pt tolerated procedure well. Pt reports 8/10 pain after procedure. Assisted pt up for first time. Steady on feet. All discharge instructions given.

## 2018-03-22 NOTE — OP NOTE
1. Cervical Transforaminal Epidural Steroid Injection  Time-out taken to identify patient and procedure side prior to starting the procedure.   I attest that I have reviewed the patient's home medications prior to the procedure and no contraindication have been identified. I  re-evaluated the patient after the patient was positioned for the procedure in the procedure room immediately before the procedural time-out. The vital signs are current and represent the current state of the patient which has not significantly changed since the preprocedure assessment.                                                                Date of Service: 03/22/2018    PCP: Reggie Valle MD      PROCEDURE:  Right C5 and C6 cervical transforaminal epidural steroid injection under fluoroscopy    REASON FOR PROCEDURE: Cervical DJD with radiculopathy  1. Cervical spondylosis with radiculopathy    2. Chronic pain        PHYSICIAN: Enrique Sanchez MD  ASSISTANTS: Rory Zuniga MD    MEDICATIONS INJECTED: Preservative-free dexamethasone 5mg and 0.5ml of Xylocaine-MPF 1%  SEDATION MEDICATIONS: none    ESTIMATED BLOOD LOSS:  None.  COMPLICATION:  None.    TECHNIQUE:   Laying in theleftoblique position, the patient was prepped and draped in the usual sterile fashion using ChloraPrep and fenestrated drape.  The area to be injected was determined under fluoroscopic guidance.  A 26-gauge 3.5 inch spinal needed was introduced towards the desired pedicle.  When the tip of the needle reached the periosteum it was worked anteriorly along the facet joint line.  Negative pressure applied to make sure no blood is seen in the hub.  Omnipaque was injected and digital subtraction was applied to make sure that there was no vascular runoff whatsoever.  It was also injected to confirm placement in the appropriate area.  The medication was then injected slowly.  The patient tolerated the procedure well.    PAIN BEFORE THE PROCEDURE:  9/10.    PAIN AFTER THE  PROCEDURE:  8/10.    The patient was monitored after the procedure.  Patient was given post procedure and discharge instructions to follow at home.  We will see the patient back in two weeks or the patient may call to inform of status. The patient was discharged in a stable condition    Rory Zuniga MD, PGY-2  03/22/2018

## 2018-03-28 ENCOUNTER — OFFICE VISIT (OUTPATIENT)
Dept: PAIN MEDICINE | Facility: CLINIC | Age: 58
End: 2018-03-28
Attending: ANESTHESIOLOGY
Payer: COMMERCIAL

## 2018-03-28 VITALS
HEIGHT: 74 IN | WEIGHT: 233.44 LBS | SYSTOLIC BLOOD PRESSURE: 144 MMHG | BODY MASS INDEX: 29.96 KG/M2 | HEART RATE: 77 BPM | TEMPERATURE: 99 F | DIASTOLIC BLOOD PRESSURE: 98 MMHG

## 2018-03-28 DIAGNOSIS — M79.18 MYOFASCIAL PAIN: ICD-10-CM

## 2018-03-28 DIAGNOSIS — M54.12 CERVICAL RADICULOPATHY: ICD-10-CM

## 2018-03-28 DIAGNOSIS — M47.812 DJD (DEGENERATIVE JOINT DISEASE), CERVICAL: Primary | ICD-10-CM

## 2018-03-28 PROCEDURE — 99213 OFFICE O/P EST LOW 20 MIN: CPT | Mod: S$GLB,,, | Performed by: ANESTHESIOLOGY

## 2018-03-28 PROCEDURE — 3080F DIAST BP >= 90 MM HG: CPT | Mod: CPTII,S$GLB,, | Performed by: ANESTHESIOLOGY

## 2018-03-28 PROCEDURE — 3077F SYST BP >= 140 MM HG: CPT | Mod: CPTII,S$GLB,, | Performed by: ANESTHESIOLOGY

## 2018-03-28 PROCEDURE — 99999 PR PBB SHADOW E&M-EST. PATIENT-LVL III: CPT | Mod: PBBFAC,,, | Performed by: ANESTHESIOLOGY

## 2018-03-28 NOTE — PROGRESS NOTES
Subjective:      Patient ID: Ghulam Ariza is a 57 y.o. male.    Chief Complaint: Shoulder Pain and Neck Pain    Referred by: No ref. provider found     Pain Scales  Best: 7/10  Worst: 9/10  Usually: 8/10  Today: 8/10    Shoulder Pain      Neck Pain          He is here for follow-up.  He is status post transforaminal epidural steroid injection cervical spine.  He said he had relief for 2 days.  The pain then returned with the same distribution on the right side of the neck and down the arm to the thumb.  He also has myofascial pain on the medial right scapula.  No new symptomatology otherwise.  Denied having any new bowel or bladder symptomatology.  He has a total of 3 epidural steroid injection within the last 12 months.  His symptomatology has not responded favorably to these injections.  Last time he saw spine surgery was in 2014.    Past Medical History:   Diagnosis Date    Degenerative disc disease     cervical radiculopathy    Genital herpes     Hx of colonic polyps     Hypertension early 40s    Pinched nerve in neck        Past Surgical History:   Procedure Laterality Date    back sx      L4/L5 Herniated disk       Review of patient's allergies indicates:  No Known Allergies    Current Outpatient Prescriptions   Medication Sig Dispense Refill    allopurinol (ZYLOPRIM) 300 MG tablet Take 1 tablet (300 mg total) by mouth every evening. 90 tablet 3    benazepril (LOTENSIN) 40 MG tablet Take 1 tablet (40 mg total) by mouth once daily. 90 tablet 3    cyclobenzaprine (FLEXERIL) 10 MG tablet       cyclobenzaprine (FLEXERIL) 10 MG tablet Take 1 tablet (10 mg total) by mouth 3 (three) times daily as needed for Muscle spasms. 90 tablet 2    diltiaZEM (CARDIZEM SR) 60 MG Cp12 Take 1 capsule (60 mg total) by mouth 2 (two) times daily. 60 capsule 11    meloxicam (MOBIC) 7.5 MG tablet Take 1 tablet (7.5 mg total) by mouth once daily. 30 tablet 2    metoprolol succinate (TOPROL-XL) 25 MG 24 hr tablet TAKE 1  "TABLET DAILY 90 tablet 3    naproxen (NAPROSYN) 500 MG tablet Take 1 tablet (500 mg total) by mouth 2 (two) times daily with meals. 30 tablet 2    valacyclovir (VALTREX) 500 MG tablet Take 1 tablet (500 mg total) by mouth once daily. (Patient taking differently: Take 500 mg by mouth as needed. ) 30 tablet 11     No current facility-administered medications for this visit.        Family History   Problem Relation Age of Onset    Hypertension Mother     Stroke Mother 68     TIA    Cancer Father 65     Prostate    Cancer Paternal Uncle     Cancer Brother      Prostate Cancer       Social History     Social History    Marital status: Single     Spouse name: N/A    Number of children: N/A    Years of education: N/A     Occupational History     [employer]     Social History Main Topics    Smoking status: Never Smoker    Smokeless tobacco: Never Used    Alcohol use 0.0 oz/week      Comment: every weekend few drinks    Drug use: No    Sexual activity: Yes     Partners: Female     Birth control/ protection: None     Other Topics Concern    Not on file     Social History Narrative    No narrative on file         Review of Systems   Musculoskeletal: Positive for neck pain.        Shoulder pain           Objective:      BP (!) 144/98   Pulse 77   Temp 98.5 °F (36.9 °C)   Ht 6' 2" (1.88 m)   Wt 105.9 kg (233 lb 7.5 oz)   BMI 29.98 kg/m²   Normocephalic.  Atraumatic.  Affect appropriate.  Breathing unlabored.  Extra ocular muscles intact.      Ortho/SPM Exam    positive myofascial pain over right side of the neck right upper trapezius, right levator scapula and medial scapular muscles.  Positive Spurling sign to the right side  Assessment:       Encounter Diagnoses   Name Primary?    DJD (degenerative joint disease), cervical Yes    Cervical radiculopathy     Myofascial pain          Plan:       Ghulam was seen today for shoulder pain and neck pain.    Diagnoses and all orders for this " visit:    DJD (degenerative joint disease), cervical    Cervical radiculopathy    Myofascial pain         We discussed with the patient the assessment and recommendations. The following is the plan we agreed on:  1.  Spine surgery consult.  2.  Return as needed.

## 2018-03-29 ENCOUNTER — TELEPHONE (OUTPATIENT)
Dept: SPINE | Facility: CLINIC | Age: 58
End: 2018-03-29

## 2018-03-29 NOTE — TELEPHONE ENCOUNTER
----- Message from Iris Grossman LPN sent at 3/28/2018  2:23 PM CDT -----  Hi,  This patient was seen today in Pain Management by Dr Sanchez, he put in a surgery consult for the patient to see Neurosurgery.Can you contact Neurosurgery when the patient can be scheduled for the appointment. Thank you in advance for your assistance with this matter.

## 2018-04-25 ENCOUNTER — OFFICE VISIT (OUTPATIENT)
Dept: NEUROSURGERY | Facility: CLINIC | Age: 58
End: 2018-04-25
Payer: OTHER MISCELLANEOUS

## 2018-04-25 VITALS
WEIGHT: 239.13 LBS | DIASTOLIC BLOOD PRESSURE: 78 MMHG | HEART RATE: 69 BPM | SYSTOLIC BLOOD PRESSURE: 130 MMHG | TEMPERATURE: 99 F | BODY MASS INDEX: 30.69 KG/M2 | HEIGHT: 74 IN

## 2018-04-25 DIAGNOSIS — M54.12 CERVICAL RADICULOPATHY: Primary | ICD-10-CM

## 2018-04-25 DIAGNOSIS — M47.22 CERVICAL SPONDYLOSIS WITH RADICULOPATHY: ICD-10-CM

## 2018-04-25 PROCEDURE — 99244 OFF/OP CNSLTJ NEW/EST MOD 40: CPT | Mod: S$GLB,,, | Performed by: NEUROLOGICAL SURGERY

## 2018-04-25 PROCEDURE — 99999 PR PBB SHADOW E&M-EST. PATIENT-LVL III: CPT | Mod: PBBFAC,,, | Performed by: NEUROLOGICAL SURGERY

## 2018-04-25 NOTE — LETTER
May 1, 2018      Chema Kaplan MD  [address] [facility]  [address]  [place] [address]           [facility] - Neurosurgery 7th Fl  [address]  [place] [address]  Phone: [phone]          Patient: Ghulam Ariza   MR Number: [id number]   YOB: 1960   Date of Visit: 4/25/2018       Dear Dr. Chema Kaplan:    Thank you for referring Ghulam Ariza to me for evaluation. Attached you will find relevant portions of my assessment and plan of care.    If you have questions, please do not hesitate to call me. I look forward to following Ghulam Ariza along with you.    Sincerely,    Zhanna Rosario  CC:  No Recipients    If you would like to receive this communication electronically, please contact [email or web address] or [phone] to request more information on [employer] access.    For providers and/or their staff who would like to refer a patient to [facility], please contact us through our one-stop-shop provider referral line, [facility], at [phone].    If you feel you have received this communication in error or would no longer like to receive these types of communications, please e-mail [email or web address]

## 2018-04-25 NOTE — PROGRESS NOTES
Subjective:       Patient ID: Ghulam Ariza is a 57 y.o. male.    Chief Complaint: No chief complaint on file.    HPI   Pt is a 57 y.o. male who presents per referral by Dr. Chema Kaplan for evaluation of cervical disc disease. He has a history of neck pain, right arm and leg pain. Has seen Dr. Barillas in 2014 for similar complaints. At that time she recommended 2 level ACDF. Pt states that he endures pains with associated RUE numbness similar to last evaluation, neck pain most severe. Pt has received multiple injections, last being March, in the past with significant relief. Pt notes that he did have an accident at work where he hit his head, exacerbating his pain.     Review of Systems   Constitutional: Negative for chills, diaphoresis, fatigue and fever.   HENT: Negative for congestion, rhinorrhea, sinus pressure, sneezing, sore throat and trouble swallowing.    Eyes: Negative.  Negative for visual disturbance.   Respiratory: Negative for cough, choking, chest tightness and shortness of breath.    Cardiovascular: Negative for chest pain.   Gastrointestinal: Negative for abdominal pain, diarrhea, nausea and vomiting.   Endocrine: Negative.    Genitourinary: Negative for dysuria.   Musculoskeletal: Positive for neck pain.   Skin: Negative for color change, pallor, rash and wound.   Neurological: Positive for numbness (RUE). Negative for syncope.   Hematological: Does not bruise/bleed easily.   Psychiatric/Behavioral: Negative for confusion.       Objective:      Physical Exam:  Nursing note and vitals reviewed.    Constitutional: He appears well-developed.     Eyes: Pupils are equal, round, and reactive to light. Conjunctivae and EOM are normal.     Cardiovascular: Normal rate, regular rhythm, normal pulses and intact distal pulses.     Abdominal: Soft.     Psych/Behavior: He is alert. He is oriented to person, place, and time. He has a normal mood and affect.     Musculoskeletal: Gait is normal.        Neck: Range  of motion is full. There is no tenderness. Muscle strength is 5/5. Tone is normal.        Back: Range of motion is full. There is no tenderness. Muscle strength is 5/5. Tone is normal.        Right Upper Extremities: Range of motion is full. There is no tenderness. Muscle strength is 5/5. Tone is normal.        Left Upper Extremities: Range of motion is full. There is no tenderness. Muscle strength is 5/5. Tone is normal.       Right Lower Extremities: Range of motion is full. There is no tenderness. Muscle strength is 5/5. Tone is normal.        Left Lower Extremities: Range of motion is full. There is no tenderness. Muscle strength is 5/5. Tone is normal.     Neurological:        Coordination: He has a normal Romberg Test, normal finger to nose coordination, normal heel to shin coordination and normal tandem walking coordination.        DTRs: DTRs are normal. Tricep reflexes are 2+ on the right side and 2+ on the left side. Bicep reflexes are 2+ on the right side and 2+ on the left side. Brachioradialis reflexes are 2+ on the right side and 2+ on the left side. Patellar reflexes are 2+ on the right side and 2+ on the left side. Achilles reflexes are 2+ on the right side and 2+ on the left side.        Cranial nerves: Cranial nerve(s) II, III, IV, V, VI, VII, VIII, IX, X, XI and XII are intact.       Pt still has neck pain and right sided radiculopathy shooting down into his thumb and index finger.     He has breakaway weakness at right deltoid.   Weakness at right triceps.   He has some restricted ROM.   No Laboy's no clonus.   The rest of the exam is relatively normal.     Imaging:   MRI C spine, dated 12/5/2017, shows loss of cervical lordosis. He has disc disease at C5-6 with some right foraminal narrowing at C6-7; left foraminal narrowing.     DARYN QUIÑONEZ MD, personally reviewed the imaging and interpreted independent of the radiology report.    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 I think he is symptomatic from the C5-6, C6-7 disc. He has failed multiple conservative management approaches including PT, multiple rounds of injections, selective nerve root block and pain management. Clearly he has intractable radiculopathy with associated weakness of biceps and triceps. Pt would likely benefit from 2 level ACDF, but he is understandably hesitant. He would like to think about it and get back to us. We will plan to get flexion/extension X rays. I am not optimistic about disc arthoplasty.     I, DARYN Easley MD, personally performed the services described in this documentation. All medical record entries made by the scribe, Sushma Aguirre, were at my direction and in my presence.  I have reviewed the chart and agree that the record reflects my personal performance and is accurate and complete.

## 2018-04-26 ENCOUNTER — TELEPHONE (OUTPATIENT)
Dept: SPINE | Facility: CLINIC | Age: 58
End: 2018-04-26

## 2018-04-26 DIAGNOSIS — M54.2 NECK PAIN: Primary | ICD-10-CM

## 2018-04-27 ENCOUNTER — TELEPHONE (OUTPATIENT)
Dept: SPINE | Facility: CLINIC | Age: 58
End: 2018-04-27

## 2018-04-27 NOTE — TELEPHONE ENCOUNTER
----- Message from Bhavana Dotson PA-C sent at 4/27/2018  9:00 AM CDT -----  He has appt with me on Monday. He just saw Easley yesterday for WC- please check to be sure he still needs to see me (not sure if he saw Easley for neck or back) and make sure he knows I do not do WC.

## 2018-06-15 ENCOUNTER — HOSPITAL ENCOUNTER (EMERGENCY)
Facility: HOSPITAL | Age: 58
Discharge: HOME | End: 2018-06-15
Attending: EMERGENCY MEDICINE
Payer: COMMERCIAL

## 2018-06-15 VITALS
TEMPERATURE: 98 F | BODY MASS INDEX: 31.44 KG/M2 | HEART RATE: 79 BPM | OXYGEN SATURATION: 99 % | SYSTOLIC BLOOD PRESSURE: 154 MMHG | HEIGHT: 74 IN | RESPIRATION RATE: 16 BRPM | DIASTOLIC BLOOD PRESSURE: 84 MMHG | WEIGHT: 245 LBS

## 2018-06-15 DIAGNOSIS — M54.2 CHRONIC NECK PAIN: Primary | ICD-10-CM

## 2018-06-15 DIAGNOSIS — G89.29 CHRONIC NECK PAIN: Primary | ICD-10-CM

## 2018-06-15 PROCEDURE — 99283 EMERGENCY DEPT VISIT LOW MDM: CPT

## 2018-06-15 RX ORDER — MELOXICAM 7.5 MG/1
7.5 TABLET ORAL DAILY
Qty: 14 TABLET | Refills: 0 | Status: SHIPPED | OUTPATIENT
Start: 2018-06-15 | End: 2018-12-10 | Stop reason: SDUPTHER

## 2018-06-15 RX ORDER — MELOXICAM 7.5 MG/1
7.5 TABLET ORAL DAILY
COMMUNITY
End: 2018-06-15

## 2018-06-15 RX ORDER — CYCLOBENZAPRINE HCL 10 MG
10 TABLET ORAL 3 TIMES DAILY PRN
Qty: 12 TABLET | Refills: 0 | Status: SHIPPED | OUTPATIENT
Start: 2018-06-15 | End: 2018-12-10 | Stop reason: SDUPTHER

## 2018-06-15 NOTE — ED NOTES
APPEARANCE: Alert, oriented and in no acute distress.  CARDIAC: Normal rate and rhythm, no murmur heard.   PERIPHERAL VASCULAR: peripheral pulses present. Normal cap refill. No edema. Warm to touch.    RESPIRATORY:Normal rate and effort, breath sounds clear bilaterally throughout chest. Respirations are equal and unlabored no obvious signs of distress.  GASTRO: soft, bowel sounds normal, no tenderness, no abdominal distention.  MUSC: Limited ROM to neck. No bony tenderness or soft tissue tenderness. No obvious deformity.  SKIN: Skin is warm and dry, normal skin turgor, mucous membranes moist.  NEURO: 5/5 strength major flexors/extensors bilaterally. Sensory intact to light touch bilaterally. [place] coma scale: eyes open spontaneously-4, oriented & converses-5, obeys commands-6. No neurological abnormalities.   MENTAL STATUS: awake, alert and aware of environment.  EYE: PERRL, both eyes: pupils brisk and reactive to light. Normal size.  ENT: EARS: no obvious drainage. NOSE: no active bleeding.   Pt complains of neck pain. Has appt with surgeon next week but ran out of medication.

## 2018-06-15 NOTE — ED PROVIDER NOTES
Encounter Date: 6/15/2018       History     Chief Complaint   Patient presents with    Neck Pain     right sided neck pain radiating down arm.  Has appointment with surgeon on Tuesday.  Ran out of muscle relaxers.     Pt presents to ER for chronic cervical pain that radiates down his R arm. This is a chronic problem that pt is awaiting second neurosurgery appointment for second opinion. Pt has currently ran out of his muscle relaxers. No new trauma, weakness, numbness, tingling or CP.       The history is provided by the patient.   Neck Pain    This is a chronic problem. The current episode started several weeks ago. The problem occurs daily. The problem has been unchanged. The pain is present in the generalized neck. The pain radiates to the right arm. The pain is the same all the time. Pertinent negatives include no photophobia, no visual change, no chest pain, no syncope, no numbness, no headaches, no bowel incontinence, no bladder incontinence, no leg pain, no paresis, no tingling and no weakness. He has tried muscle relaxants for the symptoms. The treatment provided moderate relief.     Review of patient's allergies indicates:  No Known Allergies  Past Medical History:   Diagnosis Date    Degenerative disc disease     cervical radiculopathy    Genital herpes     Hx of colonic polyps     Hypertension early 40s    Pinched nerve in neck      Past Surgical History:   Procedure Laterality Date    back sx      L4/L5 Herniated disk     Family History   Problem Relation Age of Onset    Hypertension Mother     Stroke Mother 68        TIA    Cancer Father 65        Prostate    Cancer Paternal Uncle     Cancer Brother         Prostate Cancer     Social History   Substance Use Topics    Smoking status: Never Smoker    Smokeless tobacco: Never Used    Alcohol use 0.0 oz/week      Comment: every weekend few drinks     Review of Systems   Constitutional: Negative for fever.   HENT: Negative for sore throat.     Eyes: Negative for photophobia.   Respiratory: Negative for shortness of breath.    Cardiovascular: Negative for chest pain and syncope.   Gastrointestinal: Negative for bowel incontinence and nausea.   Genitourinary: Negative for bladder incontinence and dysuria.   Musculoskeletal: Positive for neck pain. Negative for back pain.   Skin: Negative for rash and wound.   Neurological: Negative for tingling, weakness, numbness and headaches.   Hematological: Does not bruise/bleed easily.   Psychiatric/Behavioral: Positive for confusion.       Physical Exam     Initial Vitals [06/15/18 1600]   BP Pulse Resp Temp SpO2   (!) 154/84 79 16 98.1 °F (36.7 °C) 99 %      MAP       --         Physical Exam    Nursing note and vitals reviewed.  Constitutional: Vital signs are normal. He appears well-developed and well-nourished.  Non-toxic appearance. He does not have a sickly appearance.   HENT:   Head: Normocephalic and atraumatic.   Eyes: Conjunctivae, EOM and lids are normal.   Neck: Trachea normal and normal range of motion.   Cardiovascular: Normal rate and regular rhythm.   Pulmonary/Chest: Effort normal.   Abdominal: Soft. Normal appearance and bowel sounds are normal.   Musculoskeletal:        Right shoulder: Normal.        Right elbow: Normal.       Right wrist: Normal.        Cervical back: He exhibits tenderness, pain and spasm. He exhibits normal range of motion, no bony tenderness, no swelling, no edema, no deformity and no laceration.        Thoracic back: Normal.        Right upper arm: Normal.        Right forearm: Normal.        Right hand: He exhibits normal range of motion, no tenderness, normal capillary refill, no deformity, no laceration and no swelling. Normal sensation noted. Decreased strength noted.   Neurological: He is alert and oriented to person, place, and time. He has normal strength.   Skin: Skin is warm, dry and intact. Capillary refill takes less than 2 seconds.   Psychiatric: He has a normal  mood and affect. His speech is normal and behavior is normal.         ED Course   Procedures  Labs Reviewed - No data to display       No orders to display        Medical Decision Making:   History:   Old Medical Records: I decided to obtain old medical records.  Old Records Summarized: records from clinic visits.       <> Summary of Records: Pt has chronic cervical radiculopathy, seen by pain management and neurosurgery  Initial Assessment:   Pt here for cervical pain that is chronic in nature. Pt has seen pain management, neurosurgery and is awaiting second neurosurgery opinion. Pt has ran out of his muscle relaxers. Pt denies new trauma, numbness, tingling or decreased sensation. No CP, SOB.  Differential Diagnosis:   Chronic cervical pain, strain, sprain, spasm, cervical radiculopathy  ED Management:  Pt has scheduled neurosurgery appt this Tues for second opinion. No need labs or imaging pt reports this is same pain he has had over one year. Refilled muscle relaxer and mobic.                      Clinical Impression:   The encounter diagnosis was Chronic neck pain.                             Ck Ramirez NP  06/15/18 [id number]

## 2018-06-22 RX ORDER — MELOXICAM 7.5 MG/1
7.5 TABLET ORAL DAILY
Qty: 30 TABLET | Refills: 2 | Status: SHIPPED | OUTPATIENT
Start: 2018-06-22 | End: 2018-10-09 | Stop reason: SDUPTHER

## 2018-07-02 RX ORDER — CYCLOBENZAPRINE HCL 10 MG
10 TABLET ORAL 3 TIMES DAILY PRN
Qty: 90 TABLET | Refills: 2 | Status: SHIPPED | OUTPATIENT
Start: 2018-07-02 | End: 2018-10-09 | Stop reason: SDUPTHER

## 2018-08-03 ENCOUNTER — TELEPHONE (OUTPATIENT)
Dept: ENDOSCOPY | Facility: HOSPITAL | Age: 58
End: 2018-08-03

## 2018-08-16 DIAGNOSIS — Z12.11 SPECIAL SCREENING FOR MALIGNANT NEOPLASMS, COLON: Primary | ICD-10-CM

## 2018-08-16 RX ORDER — POLYETHYLENE GLYCOL 3350, SODIUM SULFATE ANHYDROUS, SODIUM BICARBONATE, SODIUM CHLORIDE, POTASSIUM CHLORIDE 236; 22.74; 6.74; 5.86; 2.97 G/4L; G/4L; G/4L; G/4L; G/4L
4 POWDER, FOR SOLUTION ORAL ONCE
Qty: 4000 ML | Refills: 0 | Status: SHIPPED | OUTPATIENT
Start: 2018-08-16 | End: 2018-08-17

## 2018-09-06 ENCOUNTER — ANESTHESIA EVENT (OUTPATIENT)
Dept: ENDOSCOPY | Facility: HOSPITAL | Age: 58
End: 2018-09-06
Payer: COMMERCIAL

## 2018-09-06 ENCOUNTER — HOSPITAL ENCOUNTER (OUTPATIENT)
Facility: HOSPITAL | Age: 58
Discharge: HOME | End: 2018-09-06
Attending: COLON & RECTAL SURGERY | Admitting: COLON & RECTAL SURGERY
Payer: COMMERCIAL

## 2018-09-06 ENCOUNTER — ANESTHESIA (OUTPATIENT)
Dept: ENDOSCOPY | Facility: HOSPITAL | Age: 58
End: 2018-09-06
Payer: COMMERCIAL

## 2018-09-06 VITALS
TEMPERATURE: 98 F | SYSTOLIC BLOOD PRESSURE: 138 MMHG | DIASTOLIC BLOOD PRESSURE: 89 MMHG | RESPIRATION RATE: 18 BRPM | HEIGHT: 74 IN | HEART RATE: 64 BPM | OXYGEN SATURATION: 99 % | BODY MASS INDEX: 30.16 KG/M2 | WEIGHT: 235 LBS

## 2018-09-06 DIAGNOSIS — Z12.11 SCREENING FOR COLON CANCER: ICD-10-CM

## 2018-09-06 PROCEDURE — 25000003 PHARM REV CODE 250: Performed by: NURSE PRACTITIONER

## 2018-09-06 PROCEDURE — 45378 DIAGNOSTIC COLONOSCOPY: CPT | Mod: ,,, | Performed by: COLON & RECTAL SURGERY

## 2018-09-06 PROCEDURE — 63600175 PHARM REV CODE 636 W HCPCS: Performed by: NURSE ANESTHETIST, CERTIFIED REGISTERED

## 2018-09-06 PROCEDURE — G0105 COLORECTAL SCRN; HI RISK IND: HCPCS

## 2018-09-06 PROCEDURE — E9220 PRA ENDO ANESTHESIA: HCPCS | Mod: ,,, | Performed by: NURSE ANESTHETIST, CERTIFIED REGISTERED

## 2018-09-06 PROCEDURE — 37000009 HC ANESTHESIA EA ADD 15 MINS: Performed by: COLON & RECTAL SURGERY

## 2018-09-06 PROCEDURE — 37000008 HC ANESTHESIA 1ST 15 MINUTES: Performed by: COLON & RECTAL SURGERY

## 2018-09-06 RX ORDER — PROPOFOL 10 MG/ML
VIAL (ML) INTRAVENOUS CONTINUOUS PRN
Status: DISCONTINUED | OUTPATIENT
Start: 2018-09-06 | End: 2018-09-06

## 2018-09-06 RX ORDER — SODIUM CHLORIDE 0.9 % (FLUSH) 0.9 %
3 SYRINGE (ML) INJECTION
Status: DISCONTINUED | OUTPATIENT
Start: 2018-09-06 | End: 2018-09-06 | Stop reason: HOSPADM

## 2018-09-06 RX ORDER — PROPOFOL 10 MG/ML
VIAL (ML) INTRAVENOUS
Status: DISCONTINUED | OUTPATIENT
Start: 2018-09-06 | End: 2018-09-06

## 2018-09-06 RX ORDER — SODIUM CHLORIDE 9 MG/ML
INJECTION, SOLUTION INTRAVENOUS CONTINUOUS
Status: DISCONTINUED | OUTPATIENT
Start: 2018-09-06 | End: 2018-09-06 | Stop reason: HOSPADM

## 2018-09-06 RX ORDER — LIDOCAINE HCL/PF 100 MG/5ML
SYRINGE (ML) INTRAVENOUS
Status: DISCONTINUED | OUTPATIENT
Start: 2018-09-06 | End: 2018-09-06

## 2018-09-06 RX ADMIN — PROPOFOL 50 MG: 10 INJECTION, EMULSION INTRAVENOUS at 12:09

## 2018-09-06 RX ADMIN — LIDOCAINE HYDROCHLORIDE 100 MG: 20 INJECTION, SOLUTION INTRAVENOUS at 12:09

## 2018-09-06 RX ADMIN — PROPOFOL 150 MCG/KG/MIN: 10 INJECTION, EMULSION INTRAVENOUS at 12:09

## 2018-09-06 RX ADMIN — SODIUM CHLORIDE: 0.9 INJECTION, SOLUTION INTRAVENOUS at 11:09

## 2018-09-06 NOTE — ANESTHESIA PREPROCEDURE EVALUATION
09/06/2018  Ghulam Ariza is a 58 y.o., male.    Anesthesia Evaluation    I have reviewed the Patient Summary Reports.     I have reviewed the Medications.     Review of Systems  Cardiovascular:   Hypertension, well controlled    Musculoskeletal:   Arthritis: osteoarthritis.     Neurological:   Neuromuscular Disease,        Physical Exam  General:  Well nourished    Airway/Jaw/Neck:  Airway Findings: Mallampati: I                Anesthesia Plan  Type of Anesthesia, risks & benefits discussed:  Anesthesia Type:  general  Patient's Preference:   Intra-op Monitoring Plan: standard ASA monitors  Intra-op Monitoring Plan Comments:   Post Op Pain Control Plan:   Post Op Pain Control Plan Comments:   Induction:   IV  Beta Blocker:  Patient is on a Beta-Blocker and has received one dose within the past 24 hours (No further documentation required).       Informed Consent: Patient understands risks and agrees with Anesthesia plan.  Questions answered. Anesthesia consent signed with patient.  ASA Score: 2     Day of Surgery Review of History & Physical:  There are no significant changes.          Ready For Surgery From Anesthesia Perspective.

## 2018-09-06 NOTE — ANESTHESIA POSTPROCEDURE EVALUATION
"Anesthesia Post Evaluation    Patient: Ghulam Ariza    Procedure(s) Performed: Procedure(s) (LRB):  COLONOSCOPY (N/A)    Final Anesthesia Type: general  Patient location during evaluation: GI PACU  Patient participation: Yes- Able to Participate  Level of consciousness: awake and alert and oriented  Post-procedure vital signs: reviewed and stable  Pain management: adequate  Airway patency: patent  PONV status at discharge: No PONV  Anesthetic complications: no      Cardiovascular status: blood pressure returned to baseline and hemodynamically stable  Respiratory status: unassisted, spontaneous ventilation and room air  Hydration status: euvolemic  Follow-up not needed.        Visit Vitals  BP (!) 106/58 (BP Location: Left arm, Patient Position: Sitting)   Pulse 66   Temp 36.8 °C (98.2 °F) (Temporal)   Resp 18   Ht 6' 2" (1.88 m)   Wt 106.6 kg (235 lb)   SpO2 95%   BMI 30.17 kg/m²       Pain/Stuart Score: Pain Assessment Performed: Yes (9/6/2018 12:34 PM)  Presence of Pain: non-verbal indicators absent (9/6/2018 12:34 PM)  Stuart Score: 9 (9/6/2018 12:34 PM)        "

## 2018-09-06 NOTE — TRANSFER OF CARE
"Anesthesia Transfer of Care Note    Patient: Ghulam Ariza    Procedure(s) Performed: Procedure(s) (LRB):  COLONOSCOPY (N/A)    Patient location: PACU    Anesthesia Type: general    Transport from OR: Transported from OR on room air with adequate spontaneous ventilation    Post pain: adequate analgesia    Post assessment: no apparent anesthetic complications and tolerated procedure well    Post vital signs: stable    Level of consciousness: sedated    Nausea/Vomiting: no nausea/vomiting    Complications: none    Transfer of care protocol was followed      Last vitals:   Visit Vitals  BP (!) 152/94 (BP Location: Left arm, Patient Position: Lying)   Pulse 70   Temp 36.7 °C (98.1 °F) (Skin)   Resp 16   Ht 6' 2" (1.88 m)   Wt 106.6 kg (235 lb)   SpO2 99%   BMI 30.17 kg/m²     "

## 2018-09-06 NOTE — H&P
Short Stay Endoscopy History and Physical    PCP - Reggie Valle MD  Referring Physician - Reggie Valle MD  [address] [place] [address]    Procedure - colonoscopy  ASA - per anesthesia  Mallampati - per anesthesia  History of Anesthesia problems - see anesthesia note  Family history Anesthesia problems -  see anesthesia note  Plan of anesthesia - as per anesthesia    HPI  58 y.o. male    Reason for procedure: previous polyps    Abdominal/epigastric pain: RLQ intermittent discomfort  Reflux: No   Dysphagia: No    ROS:  Constitutional: No fevers, chills  CV: No chest pain  Pulm: No shortness of breath  GI: see HPI    Medical History:  has a past medical history of Degenerative disc disease, Genital herpes, colonic polyps, Hypertension (early 40s), and Pinched nerve in neck.    Surgical History:  has a past surgical history that includes back sx; INJECTION-STEROID-EPIDURAL-TRANSFORAMINAL (Right, 3/22/2018); INJECTION-STEROID-EPIDURAL-CERVICAL (N/A, 2/5/2018); INJECTION-STEROID-EPIDURAL-CERVICAL (N/A, 4/6/2017); INJECTION-STEROID-EPIDURAL-CERVICAL (N/A, 11/13/2014); INJECTION-STEROID-EPIDURAL-TRANSFORAMINAL (Right, 10/16/2014); and COLONOSCOPY (N/A, 1/9/2013).    Family History: family history includes Cancer in his brother and paternal uncle; Cancer (age of onset: 65) in his father; Hypertension in his mother; Stroke (age of onset: 68) in his mother.    Social History:  reports that  has never smoked. he has never used smokeless tobacco. He reports that he drinks alcohol. He reports that he does not use drugs.    Review of patient's allergies indicates:  No Known Allergies    Medications:   Medications Prior to Admission   Medication Sig Dispense Refill Last Dose    benazepril (LOTENSIN) 40 MG tablet Take 1 tablet (40 mg total) by mouth once daily. 90 tablet 3 9/6/2018 at Unknown time    cyclobenzaprine (FLEXERIL) 10 MG tablet Take 1 tablet (10 mg total) by mouth 3 (three) times daily as  needed for Muscle spasms. 12 tablet 0 Past Week at Unknown time    cyclobenzaprine (FLEXERIL) 10 MG tablet TAKE 1 TABLET (10 MG TOTAL) BY MOUTH 3 (THREE) TIMES DAILY AS NEEDED FOR MUSCLE SPASMS. 90 tablet 2 Past Week at Unknown time    meloxicam (MOBIC) 7.5 MG tablet Take 1 tablet (7.5 mg total) by mouth once daily. 14 tablet 0 9/6/2018 at Unknown time    meloxicam (MOBIC) 7.5 MG tablet TAKE 1 TABLET (7.5 MG TOTAL) BY MOUTH ONCE DAILY. 30 tablet 2 Past Month at Unknown time    diltiaZEM (CARDIZEM SR) 60 MG Cp12 Take 1 capsule (60 mg total) by mouth 2 (two) times daily. 60 capsule 11 Taking    metoprolol succinate (TOPROL-XL) 25 MG 24 hr tablet TAKE 1 TABLET DAILY 90 tablet 3 More than a month at Unknown time       Physical Exam:    Vital Signs:   Vitals:    09/06/18 1131   BP: (!) 152/94   Pulse: 70   Resp: 16   Temp: 98.1 °F (36.7 °C)       General Appearance: Well appearing in no acute distress  Lungs: No respiratory distress.   Heart:  Regular rate, S1, S2 normal.  Abdomen: Soft, non tender, non distended with normal bowel sounds, no masses    Labs:  Lab Results   Component Value Date    WBC 2.47 (L) 11/27/2017    HGB 12.5 (L) 11/27/2017    HCT 40 01/08/2018    MCV 78 (L) 11/27/2017     11/27/2017     Lab Results   Component Value Date    INR 1.0 05/13/2011     No results found for: IRON, FERRITIN, TIBC, FESATURATED  Lab Results   Component Value Date     11/27/2017    K 4.2 11/27/2017     11/27/2017    CO2 29 11/27/2017    BUN 16 11/27/2017    CREATININE 1.5 (H) 11/27/2017       I have explained the risks and benefits of this endoscopic procedure to the patient/family including but not limited to missed polyp, missed CRC, bleeding, inflammation, infection, perforation, hypoxia/respiratory failure, and death.    Jerrod Almonte MD

## 2018-09-06 NOTE — [employer] PATIENT INSTRUCTIONS
Discharge Summary/Instructions after an Endoscopic Procedure  Patient Name: Ghulam Ariza  Patient MRN: [id number]  Patient YOB: 1960  Thursday, September 06, 2018  Mg Lagunas MD  RESTRICTIONS:  During your procedure today, you received medications for sedation.  These   medications may affect your judgment, balance and coordination.  Therefore,   for 24 hours, you have the following restrictions:   - DO NOT drive a car, operate machinery, make legal/financial decisions,   sign important papers or drink alcohol.    ACTIVITY:  Today: no heavy lifting, straining or running due to procedural   sedation/anesthesia.  The following day: return to full activity including work.  DIET:  Eat and drink normally unless instructed otherwise.     TREATMENT FOR COMMON SIDE EFFECTS:  - Mild abdominal pain, nausea, belching, bloating or excessive gas:  rest,   eat lightly and use a heating pad.  - Sore Throat: treat with throat lozenges and/or gargle with warm salt   water.  - Because air was used during the procedure, expelling large amounts of air   from your rectum or belching is normal.  - If a bowel prep was taken, you may not have a bowel movement for 1-3 days.    This is normal.  SYMPTOMS TO WATCH FOR AND REPORT TO YOUR PHYSICIAN:  1. Abdominal pain or bloating, other than gas cramps.  2. Chest pain.  3. Back pain.  4. Signs of infection such as: chills or fever occurring within 24 hours   after the procedure.  5. Rectal bleeding, which would show as bright red, maroon, or black stools.   (A tablespoon of blood from the rectum is not serious, especially if   hemorrhoids are present.)  6. Vomiting.  7. Weakness or dizziness.  GO DIRECTLY TO THE NEAREST EMERGENCY ROOM IF YOU HAVE ANY OF THE FOLLOWING:      Difficulty breathing              Chills and/or fever over 101 F   Persistent vomiting and/or vomiting blood   Severe abdominal pain   Severe chest pain   Black, tarry stools   Bleeding- more than one  tablespoon   Any other symptom or condition that you feel may need urgent attention  Your doctor recommends these additional instructions:  If any biopsies were taken, your doctors clinic will contact you in 1 to 2   weeks with any results.  - Repeat colonoscopy in 7 years for screening purposes.Talk with  his primay   M.COREEN about his occ. right lower quad pain and consider CT SCAN   - Resume previous diet indefinitely.   - Continue present medications.   - Discharge patient to home (ambulatory).  For questions, problems or results please call your physician - Mg Lagunas MD at Work:  [phone].  [facility], EMERGENCY ROOM PHONE NUMBER: [phone]  IF A COMPLICATION OR EMERGENCY SITUATION ARISES AND YOU ARE UNABLE TO REACH   YOUR PHYSICIAN - GO DIRECTLY TO THE EMERGENCY ROOM.  Mg Lagunas MD  9/6/2018 12:36:27 PM  This report has been verified and signed electronically.  [employer]

## 2018-09-06 NOTE — DISCHARGE INSTRUCTIONS
Colonoscopy     A camera attached to a flexible tube with a viewing lens is used to take video pictures.     Colonoscopy is a test to view the inside of your lower digestive tract (colon and rectum). Sometimes it can show the last part of the small intestine (ileum). During the test, small pieces of tissue may be removed for testing. This is called a biopsy. Small growths, such as polyps, may also be removed.   Why is colonoscopy done?  The test is done to help look for colon cancer. And it can help find the source of abdominal pain, bleeding, and changes in bowel habits. It may be needed once a year, depending on factors such as your:  · Age  · Health history  · Family health history  · Symptoms  · Results from any prior colonoscopy  Risks and possible complications  These include:  · Bleeding               · A puncture or tear in the colon   · Risks of anesthesia  · A cancer lesion not being seen  Getting ready   To prepare for the test:  · Talk with your healthcare provider about the risks of the test (see below). Also ask your healthcare provider about alternatives to the test.  · Tell your healthcare provider about any medicines you take. Also tell him or her about any health conditions you may have.  · Make sure your rectum and colon are empty for the test. Follow the diet and bowel prep instructions exactly. If you dont, the test may need to be rescheduled.  · Plan for a friend or family member to drive you home after the test.     Colonoscopy provides an inside view of the entire colon.     You may discuss the results with your doctor right away or at a future visit.  During the test   The test is usually done in the hospital on an outpatient basis. This means you go home the same day. The procedure takes about 30 minutes. During that time:  · You are given relaxing (sedating) medicine through an IV line. You may be drowsy, or fully asleep.  · The healthcare provider will first give you a physical exam to  check for anal and rectal problems.  · Then the anus is lubricated and the scope inserted.  · If you are awake, you may have a feeling similar to needing to have a bowel movement. You may also feel pressure as air is pumped into the colon. Its OK to pass gas during the procedure.  · Biopsy, polyp removal, or other treatments may be done during the test.  After the test   You may have gas right after the test. It can help to try to pass it to help prevent later bloating. Your healthcare provider may discuss the results with you right away. Or you may need to schedule a follow-up visit to talk about the results. After the test, you can go back to your normal eating and other activities. You may be tired from the sedation and need to rest for a few hours.  Date Last Reviewed: 11/1/2016 © 2000-2017 [employer] [address] [place] [address], [place] [address]. All rights reserved. This information is not intended as a substitute for professional medical care. Always follow your healthcare professional's instructions.

## 2018-09-13 ENCOUNTER — TELEPHONE (OUTPATIENT)
Dept: ENDOSCOPY | Facility: HOSPITAL | Age: 58
End: 2018-09-13

## 2018-10-09 RX ORDER — MELOXICAM 7.5 MG/1
7.5 TABLET ORAL DAILY
Qty: 30 TABLET | Refills: 2 | Status: SHIPPED | OUTPATIENT
Start: 2018-10-09 | End: 2019-01-23 | Stop reason: SDUPTHER

## 2018-10-09 RX ORDER — CYCLOBENZAPRINE HCL 10 MG
10 TABLET ORAL 3 TIMES DAILY PRN
Qty: 90 TABLET | Refills: 2 | Status: SHIPPED | OUTPATIENT
Start: 2018-10-09 | End: 2019-01-23 | Stop reason: SDUPTHER

## 2018-10-21 ENCOUNTER — HOSPITAL ENCOUNTER (EMERGENCY)
Facility: HOSPITAL | Age: 58
Discharge: HOME | End: 2018-10-21
Attending: EMERGENCY MEDICINE
Payer: COMMERCIAL

## 2018-10-21 VITALS
WEIGHT: 240 LBS | SYSTOLIC BLOOD PRESSURE: 157 MMHG | HEIGHT: 74 IN | RESPIRATION RATE: 18 BRPM | HEART RATE: 71 BPM | TEMPERATURE: 98 F | DIASTOLIC BLOOD PRESSURE: 75 MMHG | OXYGEN SATURATION: 98 % | BODY MASS INDEX: 30.8 KG/M2

## 2018-10-21 DIAGNOSIS — M54.2 CHRONIC NECK PAIN: Primary | ICD-10-CM

## 2018-10-21 DIAGNOSIS — G89.29 CHRONIC NECK PAIN: Primary | ICD-10-CM

## 2018-10-21 DIAGNOSIS — J02.9 SORE THROAT: ICD-10-CM

## 2018-10-21 PROCEDURE — 25000003 PHARM REV CODE 250: Performed by: EMERGENCY MEDICINE

## 2018-10-21 PROCEDURE — 96372 THER/PROPH/DIAG INJ SC/IM: CPT

## 2018-10-21 PROCEDURE — 99284 EMERGENCY DEPT VISIT MOD MDM: CPT | Mod: 25

## 2018-10-21 PROCEDURE — 63600175 PHARM REV CODE 636 W HCPCS: Performed by: EMERGENCY MEDICINE

## 2018-10-21 RX ORDER — HALOPERIDOL 5 MG/ML
5 INJECTION INTRAMUSCULAR
Status: COMPLETED | OUTPATIENT
Start: 2018-10-21 | End: 2018-10-21

## 2018-10-21 RX ORDER — ORPHENADRINE CITRATE 30 MG/ML
60 INJECTION INTRAMUSCULAR; INTRAVENOUS
Status: COMPLETED | OUTPATIENT
Start: 2018-10-21 | End: 2018-10-21

## 2018-10-21 RX ADMIN — HALOPERIDOL LACTATE 5 MG: 5 INJECTION, SOLUTION INTRAMUSCULAR at 07:10

## 2018-10-21 RX ADMIN — LIDOCAINE HYDROCHLORIDE: 20 SOLUTION ORAL; TOPICAL at 08:10

## 2018-10-21 RX ADMIN — ORPHENADRINE CITRATE 60 MG: 30 INJECTION INTRAMUSCULAR; INTRAVENOUS at 07:10

## 2018-10-22 NOTE — ED NOTES
Patient identifiers for Ghulam Ariza checked and correct.  LOC: The patient is awake, alert and aware of environment with an appropriate affect, the patient is oriented x 3 and speaking appropriately.  APPEARANCE: Patient resting comfortably and in no acute distress, patient is clean and well groomed, patient's clothing are properly fastened.  SKIN: The skin is warm and dry, patient has normal skin turgor and moist mucus membranes, skin intact, no breakdown or brusing noted.  MUSKULOSKELETAL: Patient moving all extremities well, no obvious swelling or deformities noted.  RESPIRATORY: Airway is open and patent, respirations are spontaneous, patient has a normal effort and rate.  NEUROLOGIC: Moving all extremities.

## 2018-10-22 NOTE — ED PROVIDER NOTES
Encounter Date: 10/21/2018       History     Chief Complaint   Patient presents with    Neck Pain     pt to triage ambulatory and reports a gradaul worsening of pain and has a neck problem and waiting for follow up with neurosurgery and takes 2 meds for his neck; pain is post neck and right ant neck and to upper abck and right shoulder also    Back Pain     58-year-old male presents emergency department complaining of neck pain. Patient has a history of chronic neck pain since an injury at work and is attempting to schedule surgery for it.  He reports it is flared up over the past several days, and describes an aching pain to his right neck that radiates to his right shoulder.  It is worse with certain movements and positions and a little bit better with his prescribed medications.  Denies any recent re-injury.  Reports chronic tingling to his right 1st and 2nd fingers that are unchanged, denies any focal numbness or weakness. Also reports that he has been having a sore throat and some postnasal drip.  Reports right-sided anterior neck pain that is sore, dissimilar from his usual neck pain.  He has tried over-the-counter remedies such as gargling with salt water which have somewhat ameliorated his symptoms, but there persistent, so he came in tonight to get some relief from a chronic pain but also to check out what is going on with my throat. Denies any cough, fever, nausea, vomiting, chest pain, shortness of breath.  Denies any difficulty swallowing, just some pain.          Review of patient's allergies indicates:  No Known Allergies  Past Medical History:   Diagnosis Date    Degenerative disc disease     cervical radiculopathy    Genital herpes     Hx of colonic polyps     Hypertension early 40s    Pinched nerve in neck      Past Surgical History:   Procedure Laterality Date    back sx      L4/L5 Herniated disk    COLONOSCOPY N/A 9/6/2018    Procedure: COLONOSCOPY;  Surgeon: Mg Lagunas MD;   Location: [facility] (4TH FLR);  Service: Endoscopy;  Laterality: N/A;    COLONOSCOPY N/A 9/6/2018    Performed by Mg [facility]as MD at [facility] (4TH FLR)    COLONOSCOPY N/A 1/9/2013    Performed by Mg [facility]as MD at [facility] (4TH FLR)    INJECTION-STEROID-EPIDURAL-CERVICAL N/A 2/5/2018    [facility]    INJECTION-STEROID-EPIDURAL-CERVICAL N/A 4/6/2017    [facility]    INJECTION-STEROID-EPIDURAL-CERVICAL N/A 11/13/2014    [facility]    INJECTION-STEROID-EPIDURAL-TRANSFORAMINAL Right 3/22/2018    [facility]    INJECTION-STEROID-EPIDURAL-TRANSFORAMINAL Right 10/16/2014    [facility]     Family History   Problem Relation Age of Onset    Hypertension Mother     Stroke Mother 68        TIA    Cancer Father 65        Prostate    Cancer Paternal Uncle     Cancer Brother         Prostate Cancer     Social History     Tobacco Use    Smoking status: Never Smoker    Smokeless tobacco: Never Used   Substance Use Topics    Alcohol use: Yes     Alcohol/week: 0.0 oz     Comment: every weekend few drinks    Drug use: No     Review of Systems   Constitutional: Negative for chills, fatigue and fever.   HENT: Positive for postnasal drip and sore throat. Negative for congestion and voice change.    Eyes: Negative for photophobia, pain and redness.   Respiratory: Negative for cough, choking and shortness of breath.    Cardiovascular: Negative for chest pain, palpitations and leg swelling.   Gastrointestinal: Negative for abdominal pain, diarrhea, nausea and vomiting.   Genitourinary: Negative for dysuria, frequency and urgency.   Musculoskeletal: Positive for neck pain. Negative for back pain and neck stiffness.   Neurological: Negative for seizures, speech difficulty, light-headedness, numbness and headaches.   All other systems reviewed  and are negative.      Physical Exam     Initial Vitals [10/21/18 1938]   BP Pulse Resp Temp SpO2   (!) 161/107 75 20 98.1 °F (36.7 °C) 98 %      MAP       --         Physical Exam    Nursing note and vitals reviewed.  Constitutional: He appears well-developed and well-nourished. No distress.   HENT:   Head: Normocephalic and atraumatic.   Oropharynx minimally erythematous without swelling or exudate; Dry MM; Uvula elevates symmetrically   Eyes: Conjunctivae and EOM are normal. Pupils are equal, round, and reactive to light.   Neck: Normal range of motion. Neck supple. No tracheal deviation present.   Cardiovascular: Normal rate, regular rhythm, normal heart sounds and intact distal pulses.   Pulmonary/Chest: Breath sounds normal. No respiratory distress. He has no wheezes. He has no rhonchi. He has no rales.   Abdominal: Soft. Bowel sounds are normal. He exhibits no distension. There is no tenderness.   Musculoskeletal: Normal range of motion. He exhibits no edema or tenderness.   Lymphadenopathy:     He has no cervical adenopathy.   Neurological: He is alert and oriented to person, place, and time. He has normal strength. No cranial nerve deficit or sensory deficit.   Skin: Skin is warm and dry. Capillary refill takes less than 2 seconds.         ED Course   Procedures  Labs Reviewed - No data to display         X-Rays:   Independently Interpreted Readings:   Other Readings:  Imaging interpreted by radiologist and visualized by me    Medical Decision Making:   Initial Assessment:   50-year-old male presents emergency department complaining of chronic neck pain and pain with swallowing  Differential Diagnosis:   URI, influenza, mass, retropharyngeal worse peritonsillar abscess,  Independently Interpreted Test(s):   I have ordered and independently interpreted X-rays - see prior notes.  ED Management:  Patient given Norflex, Haldol, a GI cocktail.  He is tolerating p.o. with improvement in symptoms. Informed him of  results as well as plan to discharge with instructions to follow up promptly with his primary care physician and neurosurgeon, reasons to return to the emergency room.  Patient expressed good understanding and is comfortable with discharge at this time.                      Clinical Impression:   The primary encounter diagnosis was Chronic neck pain. A diagnosis of Sore throat was also pertinent to this visit.      Disposition:   Disposition: Discharged  Condition: Stable                        Jos Hebert MD  10/21/18 2032

## 2018-12-10 ENCOUNTER — HOSPITAL ENCOUNTER (OUTPATIENT)
Dept: RADIOLOGY | Facility: HOSPITAL | Age: 58
Discharge: HOME | End: 2018-12-10
Attending: FAMILY MEDICINE
Payer: COMMERCIAL

## 2018-12-10 ENCOUNTER — OFFICE VISIT (OUTPATIENT)
Dept: INTERNAL MEDICINE | Facility: CLINIC | Age: 58
End: 2018-12-10
Payer: COMMERCIAL

## 2018-12-10 VITALS
SYSTOLIC BLOOD PRESSURE: 126 MMHG | DIASTOLIC BLOOD PRESSURE: 84 MMHG | TEMPERATURE: 98 F | HEIGHT: 74 IN | WEIGHT: 250.88 LBS | OXYGEN SATURATION: 98 % | BODY MASS INDEX: 32.2 KG/M2 | HEART RATE: 74 BPM | RESPIRATION RATE: 18 BRPM

## 2018-12-10 DIAGNOSIS — Z01.818 PRE-OP EXAM: Primary | ICD-10-CM

## 2018-12-10 DIAGNOSIS — I10 ESSENTIAL HYPERTENSION: ICD-10-CM

## 2018-12-10 DIAGNOSIS — M47.22 CERVICAL SPONDYLOSIS WITH RADICULOPATHY: ICD-10-CM

## 2018-12-10 DIAGNOSIS — N52.9 ERECTILE DYSFUNCTION, UNSPECIFIED ERECTILE DYSFUNCTION TYPE: ICD-10-CM

## 2018-12-10 DIAGNOSIS — M54.12 CERVICAL RADICULOPATHY: ICD-10-CM

## 2018-12-10 DIAGNOSIS — Z01.818 PRE-OP EXAM: ICD-10-CM

## 2018-12-10 DIAGNOSIS — M10.9 GOUT, UNSPECIFIED CAUSE, UNSPECIFIED CHRONICITY, UNSPECIFIED SITE: ICD-10-CM

## 2018-12-10 PROCEDURE — 71046 X-RAY EXAM CHEST 2 VIEWS: CPT | Mod: TC,PO

## 2018-12-10 PROCEDURE — 3074F SYST BP LT 130 MM HG: CPT | Mod: CPTII,S$GLB,, | Performed by: FAMILY MEDICINE

## 2018-12-10 PROCEDURE — 3008F BODY MASS INDEX DOCD: CPT | Mod: CPTII,S$GLB,, | Performed by: FAMILY MEDICINE

## 2018-12-10 PROCEDURE — 93005 ELECTROCARDIOGRAM TRACING: CPT | Mod: S$GLB,,, | Performed by: FAMILY MEDICINE

## 2018-12-10 PROCEDURE — 93010 ELECTROCARDIOGRAM REPORT: CPT | Mod: S$GLB,,, | Performed by: INTERNAL MEDICINE

## 2018-12-10 PROCEDURE — 99999 PR PBB SHADOW E&M-EST. PATIENT-LVL IV: CPT | Mod: PBBFAC,,, | Performed by: FAMILY MEDICINE

## 2018-12-10 PROCEDURE — 3079F DIAST BP 80-89 MM HG: CPT | Mod: CPTII,S$GLB,, | Performed by: FAMILY MEDICINE

## 2018-12-10 PROCEDURE — 71046 X-RAY EXAM CHEST 2 VIEWS: CPT | Mod: 26,,, | Performed by: RADIOLOGY

## 2018-12-10 PROCEDURE — 99214 OFFICE O/P EST MOD 30 MIN: CPT | Mod: S$GLB,,, | Performed by: FAMILY MEDICINE

## 2018-12-10 RX ORDER — BENAZEPRIL HYDROCHLORIDE 40 MG/1
40 TABLET ORAL DAILY
Qty: 90 TABLET | Refills: 3 | Status: SHIPPED | OUTPATIENT
Start: 2018-12-10 | End: 2019-04-04 | Stop reason: SDUPTHER

## 2018-12-10 RX ORDER — METOPROLOL SUCCINATE 25 MG/1
25 TABLET, EXTENDED RELEASE ORAL DAILY
Qty: 90 TABLET | Refills: 3 | Status: SHIPPED | OUTPATIENT
Start: 2018-12-10 | End: 2020-07-08 | Stop reason: SDUPTHER

## 2018-12-10 RX ORDER — SILDENAFIL 100 MG/1
100 TABLET, FILM COATED ORAL DAILY PRN
Qty: 10 TABLET | Refills: 11 | Status: SHIPPED | OUTPATIENT
Start: 2018-12-10 | End: 2020-07-08 | Stop reason: SDUPTHER

## 2018-12-10 NOTE — PROGRESS NOTES
Subjective:       Patient ID: Ghulam Ariza is a 58 y.o. male.    Chief Complaint: Pre-op Exam (neck surgery scheduled for 1-7-2019)    HPI 58-year-old  male presents to clinic today for preoperative exam in order to have cervical spine surgery on January 7, 2019.  He continues to be treated for hypertension which remains well controlled on benazepril 40 mg daily and metoprolol 25 mg daily.  He has been followed by Neurosurgery for the past year secondary to chronic neck pain secondary to cervical radiculopathy.  He has a previous past surgical history of L4/5 diskectomy secondary to herniation.  He also has a past surgical history of right foot bunionectomy.  At this time his only complaint is occasional erectile dysfunction for which he has used Viagra in the past and is interested in refill.  Review of Systems   Constitutional: Negative for appetite change, chills, fatigue and fever.   HENT: Negative for congestion, ear pain, hearing loss, postnasal drip, rhinorrhea, sinus pressure, sore throat and tinnitus.    Eyes: Negative for redness, itching and visual disturbance.   Respiratory: Negative for cough, chest tightness and shortness of breath.    Cardiovascular: Negative for chest pain and palpitations.   Gastrointestinal: Negative for abdominal pain, constipation, diarrhea, nausea and vomiting.   Genitourinary: Negative for decreased urine volume, difficulty urinating, dysuria, frequency, hematuria and urgency.   Musculoskeletal: Negative for back pain, myalgias, neck pain and neck stiffness.   Skin: Negative for rash.   Neurological: Negative for dizziness, light-headedness and headaches.   Psychiatric/Behavioral: Negative.        Objective:      Physical Exam   Constitutional: He is oriented to person, place, and time. He appears well-developed and well-nourished. No distress.   HENT:   Head: Normocephalic and atraumatic.   Right Ear: External ear normal.   Left Ear: External ear normal.   Nose:  Nose normal.   Mouth/Throat: Oropharynx is clear and moist. No oropharyngeal exudate.   Eyes: Conjunctivae and EOM are normal. Pupils are equal, round, and reactive to light. Right eye exhibits no discharge. Left eye exhibits no discharge. No scleral icterus.   Neck: Normal range of motion. Neck supple. No JVD present. No tracheal deviation present. No thyromegaly present.   Cardiovascular: Normal rate, regular rhythm, normal heart sounds and intact distal pulses. Exam reveals no gallop and no friction rub.   No murmur heard.  Pulmonary/Chest: Effort normal and breath sounds normal. No stridor. No respiratory distress. He has no wheezes. He has no rales.   Abdominal: Soft. Bowel sounds are normal. He exhibits no distension and no mass. There is no tenderness. There is no rebound and no guarding.   Musculoskeletal: Normal range of motion. He exhibits no edema or tenderness.   Lymphadenopathy:     He has no cervical adenopathy.   Neurological: He is alert and oriented to person, place, and time.   Skin: Skin is warm and dry. No rash noted. He is not diaphoretic. No erythema. No pallor.   Psychiatric: He has a normal mood and affect. His behavior is normal. Judgment and thought content normal.   Nursing note and vitals reviewed.      Assessment:       1. Pre-op exam    2. Cervical spondylosis with radiculopathy    3. Cervical radiculopathy    4. Essential hypertension    5. Gout, unspecified cause, unspecified chronicity, unspecified site    6. Erectile dysfunction, unspecified erectile dysfunction type        Plan:       1.  CBC, CMP, PT, PTT, and INR.  2.  EKG now.  3.  Chest x-ray now.  4.  Continue benazepril 40 mg daily and metoprolol 25 mg daily.  Hypertension is well controlled.  5.  Gout is currently stable.  6.  Viagra 100 mg p.o. p.r.n. erectile dysfunction.  7.  The patient is okay to proceed with surgery as scheduled.  8.  Return to clinic as needed or in 6 months for annual exam.

## 2018-12-11 ENCOUNTER — TELEPHONE (OUTPATIENT)
Dept: INTERNAL MEDICINE | Facility: CLINIC | Age: 58
End: 2018-12-11

## 2019-01-23 RX ORDER — CYCLOBENZAPRINE HCL 10 MG
10 TABLET ORAL 3 TIMES DAILY PRN
Qty: 90 TABLET | Refills: 2 | Status: SHIPPED | OUTPATIENT
Start: 2019-01-23 | End: 2019-05-13 | Stop reason: SDUPTHER

## 2019-01-23 RX ORDER — MELOXICAM 7.5 MG/1
7.5 TABLET ORAL DAILY
Qty: 30 TABLET | Refills: 2 | Status: SHIPPED | OUTPATIENT
Start: 2019-01-23 | End: 2019-05-13 | Stop reason: SDUPTHER

## 2019-04-04 DIAGNOSIS — Z00.00 WELL ADULT EXAM: Primary | ICD-10-CM

## 2019-04-04 DIAGNOSIS — Z12.5 PROSTATE CANCER SCREENING: ICD-10-CM

## 2019-04-04 DIAGNOSIS — E55.9 VITAMIN D DEFICIENCY DISEASE: ICD-10-CM

## 2019-04-04 DIAGNOSIS — I10 ESSENTIAL HYPERTENSION: ICD-10-CM

## 2019-04-04 RX ORDER — BENAZEPRIL HYDROCHLORIDE 40 MG/1
40 TABLET ORAL DAILY
Qty: 90 TABLET | Refills: 0 | Status: SHIPPED | OUTPATIENT
Start: 2019-04-04 | End: 2019-07-24 | Stop reason: SDUPTHER

## 2019-04-04 NOTE — TELEPHONE ENCOUNTER
----- Message from Donna Pat sent at 4/4/2019 12:34 PM CDT -----  Contact: patient [phone]  Patient is calling for an RX refill or new RX.  Is this a refill or new RX:  new  RX name and strength: benazepril (LOTENSIN) 40 MG tablet  DirectionsRoute: Take 1 tablet (40 mg total) by mouth once daily  [id number] - [place], [place] - [address] [phone]   Is this a 30 day or 90 day RX:    Local pharmacy or mail order pharmacy:  local  Pharmacy name and phone [id number] [place] - [address] [phone]   Comments:  Pt also was taking another blood pressure medication . He is no longer taking it because of side effects. Pt would like you to call and he will explain in detail.

## 2019-04-18 NOTE — TELEPHONE ENCOUNTER
Spoke with pt re: needing an annual physical appt.  Made appt with pt.  & labs    Mailed out appt time letter to pt    Labs linked to scheduled appt

## 2019-05-13 RX ORDER — CYCLOBENZAPRINE HCL 10 MG
10 TABLET ORAL 3 TIMES DAILY PRN
Qty: 90 TABLET | Refills: 2 | Status: SHIPPED | OUTPATIENT
Start: 2019-05-13 | End: 2020-01-08

## 2019-05-13 RX ORDER — MELOXICAM 7.5 MG/1
7.5 TABLET ORAL DAILY
Qty: 30 TABLET | Refills: 2 | Status: SHIPPED | OUTPATIENT
Start: 2019-05-13 | End: 2019-08-11

## 2019-05-24 DIAGNOSIS — M47.22 CERVICAL SPONDYLOSIS WITH RADICULOPATHY: Primary | ICD-10-CM

## 2019-06-05 ENCOUNTER — CLINICAL SUPPORT (OUTPATIENT)
Dept: REHABILITATION | Facility: HOSPITAL | Age: 59
End: 2019-06-05
Payer: OTHER MISCELLANEOUS

## 2019-06-05 DIAGNOSIS — M54.2 NECK PAIN: Chronic | ICD-10-CM

## 2019-06-05 DIAGNOSIS — R53.1 DECREASED STRENGTH: ICD-10-CM

## 2019-06-05 DIAGNOSIS — R29.898 DECREASED RANGE OF MOTION OF NECK: ICD-10-CM

## 2019-06-05 PROCEDURE — 97110 THERAPEUTIC EXERCISES: CPT | Mod: PN

## 2019-06-05 PROCEDURE — 97161 PT EVAL LOW COMPLEX 20 MIN: CPT | Mod: PN

## 2019-06-05 NOTE — PLAN OF CARE
"[facility]  Physical Therapy Initial Evaluation    Name: Ghulam Ariza  Clinic Number: [id number]    Therapy Diagnosis:   Encounter Diagnoses   Name Primary?    Neck pain     Decreased range of motion of neck     Decreased strength      Physician: Emmanuel Ramirez PA    Physician Orders: PT Eval and Treat   Medical Diagnosis from Referral: Cervical spondylosis with radiculopathy  Evaluation Date: 6/5/2019  Authorization Period Expiration: 06/29/2019  Plan of Care Expiration: 6/5/2019 to 8/2/2019  Visit # / Visits authorized: 1/ 12  FOTO: 1/10    Time In: 1713  Time Out: 1753  Total Billable Time: 50 minutes (LCE-1, TE-1)    Precautions: Standard and HTN and ACDF C3-7    Subjective     Date of onset: DOS: 1/7/2019; chronic neck pain    History of current condition - Ghulam reports: he had an ACDF January 7, 2019. Was receiving injections starting in 2013 and receiving approximately 1 time per year. Was told her had pinched nerves at C5-6. In 2017 he stated "I banged my head" at work and it took a while to get injections. Dr. Coy in [place] performed his surgery. Currently wearing a bone stimulator for 4 hours per day. Most pain with head movement and with carrying objects. Was told not to lift >25 pounds. Pain and difficulty performing housework. Continues with numbness and tingling into R thumb. Notices continued weakness in R hand and UE.      Medical History:   Past Medical History:   Diagnosis Date    Degenerative disc disease     cervical radiculopathy    Genital herpes     Hx of colonic polyps     Hypertension early 40s    Pinched nerve in neck        Surgical History:   Ghulam Ariza  has a past surgical history that includes back sx; Colonoscopy (N/A, 9/6/2018); Spine surgery; and Bunionectomy (Right).    Medications:   Ghulam has a current medication list which includes the following prescription(s): benazepril, cyclobenzaprine, meloxicam, metoprolol succinate, " sildenafil, and amlodipine.    Allergies:   Review of patient's allergies indicates:  No Known Allergies     Imaging, none recent    Prior Therapy: yes  Social History:  lives with their partner  Occupation:  at [employer]  Prior Level of Function: Independent with lifting and carrying  Current Level of Function: pain and difficulty with lifting and carrying > 10 pounds    Pain:  Current 4/10, worst 7/10, best 0/10   Location: bilateral neck   Description: Aching, Tingling and Numb  Aggravating Factors: Lifting and carrying, driving  Easing Factors: pain medication and rest    Pts goals: decrease pain, increase strength in R UE    Objective     Posture: sitting: slumped with forward head and rounded shoulders  Palpation: no significant tenderness to palpation      Range of Motion/Strength:   CERVICAL AROM Pain/Dysfunction with Movement   Flexion 20 Pain in midline neck   Extension 15 Pain in midline neck   Right side bending 25 Pain in midline neck   Left side bending 25 Pain in midline neck   Right rotation 48 Pain in midline neck   Left rotation 60        Shoulder Right Left Pain/Dysfunction with Movement   AROM      flexion  WFL  WFL    abduction  150*  150* Pain upper trapezius   Internal rotation  L4  L1* Pain in L upper trapezius   ER at 0° abd  C7  C7 Pain in midline neck   *denotes pain    U/E MMT Right Left Pain/Dysfunction with Movement   Shoulder Flexion 4/5 5/5    Shoulder Abduction 4/5 5/5    Shoulder IR 4/5 5/5    Shoulder ER  @ 0* Abduction 4-/5 4+/5    Elbow Flexion  4/5 5/5    Elbow Extension 4/5 5/5         (#)    Right 38.66    Left 51             CMS Impairment/Limitation/Restriction for FOTO Neck Survey    Therapist reviewed FOTO scores for Ghulam Ariza on 6/5/2019.   FOTO documents entered into [employer] - see Media section.    Limitation Score: 49%           TREATMENT   Treatment Time In: 1743  Treatment Time Out: 1753  Total Treatment time separate from Evaluation: 10  "minutes    Ghulam received therapeutic exercises to develop strength, endurance, ROM and posture for 10 minutes including:    Supine over towel roll     5'  Chin tuck      10x5"    Seated scapular retraction    10x5"    Home Exercises and Patient Education Provided    Education provided:   - role of therapy    Written Home Exercises Provided: yes.  Exercises were reviewed and Ghulam was able to demonstrate them prior to the end of the session.  Ghulam demonstrated good  understanding of the education provided.     See EMR under Patient Instructions for exercises provided 6/5/2019.    Assessment   Ghulam is a 59 y.o. male referred to outpatient Physical Therapy with a medical diagnosis of Cervical spondylosis with radiculopathy. Patient presents to PT with pain, decreased cervical ROM, decreased strength, poor posture, and functional deficits with lifting and carrying, and driving.      Pt prognosis is Good.   Pt will benefit from skilled outpatient Physical Therapy to address the deficits stated above and in the chart below, provide pt/family education, and to maximize pt's level of independence.     Plan of care discussed with patient: Yes  Pt's spiritual, cultural and educational needs considered and patient is agreeable to the plan of care and goals as stated below:     Anticipated Barriers for therapy: hx of spinal surgery    Medical Necessity is demonstrated by the following  History  Co-morbidities and personal factors that may impact the plan of care Co-morbidities:   -Arthritis, Back pain, BMI over 30, High Blood Pressure, Prior Surgery    Personal Factors:   no deficits     high   Examination  Body Structures and Functions, activity limitations and participation restrictions that may impact the plan of care Body Regions:   head  neck  upper extremities  trunk    Body Systems:    gross symmetry  ROM  strength  gross coordinated movement    Participation Restrictions:   None stated    Activity limitations: "   Learning and applying knowledge  no deficits    General Tasks and Commands  no deficits    Communication  no deficits    Mobility  lifting and carrying objects  driving (bike, car, motorcycle)    Self care  no deficits    Domestic Life  doing house work (cleaning house, washing dishes, laundry)    Interactions/Relationships  no deficits    Life Areas  no deficits    Community and Social Life  community life  recreation and leisure         high   Clinical Presentation stable and uncomplicated low   Decision Making/ Complexity Score: low     Goals:  Short Term Goals (4 Weeks):   1. Pt will be independent with HEP to supplement PT in improving pain free cervical mobility  2. Pt will improve cervical AROM 5 degrees in all planes to improve cervical mobility.  3. Pt will improve UE MMTs by 1/3 grade in all planes to improve strength for functional tasks.  4. Pt will demonstrate improved sitting posture to decrease pain experienced in head and neck.  5. Pt will improve  strength on R     Long Term Goals (8 Weeks):   1. Pt will improve FOTO to </=40% limitation to improve perceived limitation with changing and maintaining mobility.  2. Pt will improve cervical AROM 10 degrees in all planes to improve cervical mobility.  3. Pt will improve UE MMTs 1 to grossly 4+/5 in all planes to improve strength for functional tasks.  4. Pt will report pain </= 2/10 at worst to promote return to PLOF.  5. Pt will report pain </= 2/10 with cervical AROM in all planes to promote functional QOL.      Plan   Plan of care Certification: 6/5/2019 to 8/2/2019.    Outpatient Physical Therapy 2 times weekly for 8 weeks to include the following interventions: Manual Therapy, Moist Heat/ Ice, Neuromuscular Re-ed, Patient Education, Therapeutic Activites and Therapeutic Exercise.     Sahara Mason, PT

## 2019-06-05 NOTE — PATIENT INSTRUCTIONS
Cervical Range of Motion    1. Look down then up. Move slowly and continuously.  2. Look over one shoulder then the other. Move slowly and continuously.  3. Bring one ear toward your shoulder then other ear to other shoulder. Keep nose pointing forward. Move slowly and continuously.  Do 10 times each direction. Perform 2 times a day.    Pectoral Stretch, Supine on Towel     Copyright © 2392-9793 [employer]  Lie on back on rolled up towel and allow arms to spread wide with palms up, keeping arms relaxed on ground. To increase stretch, bend elbows or slide arms over head. Hold 5 minutes.  Do 2 sessions per day.      Chin Tuck, Supine    Lie on your back, head on small, rolled towel. Gently tuck chin and bring toward your throat while pushing the back of your head down. Do not lift your head or look towards your feet. Hold 5 seconds. Do not hold your breath.  Repeat 10 times per session. Do 2 sessions per day.      Scapular Retraction (Standing)    With arms at sides, squeeze shoulder blades together. Do not shrug and do not hold your breath. Hold 5 seconds.  Repeat 10 times per session. Do 2 sessions per day.       Copyright © I. All rights reserved.

## 2019-06-06 PROBLEM — R29.898 DECREASED RANGE OF MOTION OF NECK: Status: ACTIVE | Noted: 2019-06-06

## 2019-06-06 PROBLEM — R53.1 DECREASED STRENGTH: Status: ACTIVE | Noted: 2019-06-06

## 2019-06-06 PROBLEM — M54.2 NECK PAIN: Chronic | Status: ACTIVE | Noted: 2019-06-05

## 2019-06-07 ENCOUNTER — CLINICAL SUPPORT (OUTPATIENT)
Dept: REHABILITATION | Facility: HOSPITAL | Age: 59
End: 2019-06-07
Payer: OTHER MISCELLANEOUS

## 2019-06-07 DIAGNOSIS — R53.1 DECREASED STRENGTH: ICD-10-CM

## 2019-06-07 DIAGNOSIS — M54.2 NECK PAIN: ICD-10-CM

## 2019-06-07 DIAGNOSIS — R29.898 DECREASED RANGE OF MOTION OF NECK: ICD-10-CM

## 2019-06-07 PROCEDURE — 97140 MANUAL THERAPY 1/> REGIONS: CPT | Mod: PN

## 2019-06-07 PROCEDURE — 97110 THERAPEUTIC EXERCISES: CPT | Mod: PN

## 2019-06-07 NOTE — PROGRESS NOTES
Physical Therapy Daily Treatment Note     Name: Ghulam Ariza  Clinic Number: [id number]    Therapy Diagnosis:   Encounter Diagnoses   Name Primary?    Decreased range of motion of neck     Decreased strength     Neck pain      Physician: Emmanuel Ramirez PA    Visit Date: 6/7/2019    Physician: Emmanuel Ramirez PA     Physician Orders: PT Eval and Treat   Medical Diagnosis from Referral: Cervical spondylosis with radiculopathy  Evaluation Date: 6/5/2019  Authorization Period Expiration: 06/29/2019  Plan of Care Expiration: 6/5/2019 to 8/2/2019  Visit # / Visits authorized: 2/ 12  FOTO: 2/10     Time In: 9:05 am  Time Out: 10:00 am  Total Billable Time: 55 minutes (1 MT + 3 TE)     Precautions: Standard and HTN and ACDF C3-7    Subjective     Pt reports: having some pain in right side of neck and his thumb and index finger is constantly numb  He was compliant with home exercise program.  Response to previous treatment: slight increased stiffness  Functional change: no change    Pain: 4/10  Location: right arm and neck      Objective   O: ACDF from C3-C7, R index finger and thumb    Ghulam received therapeutic exercises to develop strength, endurance, ROM, flexibility, posture and core stabilization for 40 minutes including:  Chin tucks: 2x10 5'' holds  R medium nerve glides: 25x  SL ER: 2x15 B  SL abd: 3x10 R  Lat pull downs: 2x10 GTB    Ghulam received the following manual therapy techniques: Joint mobilizations, Manual traction, Myofacial release and Soft tissue Mobilization were applied to the: cervical spine for 15 minutes, including:  Cervical sideglides C1-C2 Grade III-IV B  Gentle PAs to C1-C2 and to CT junction starting at T1 all in supine  Gentle upper cervical distraction    Home Exercises Provided and Patient Education Provided   Education provided:   - role of therapy  - continue HEP    Written Home Exercises Provided: yes.  Exercises were reviewed and Ghulam was able to demonstrate them prior to the  end of the session.  Ghulam demonstrated good  understanding of the education provided.     See EMR under Patient Instructions for exercises provided 6/5/19.    Assessment     Pt demonstrates neural tension with neurosymptoms and pain in C5-C3 distribution with daily activities and provoked with nerve glides. Pt did well overall, significant weakness in  strength R>L.    Ghulam is progressing well towards his goals.   Pt prognosis is Excellent.     Pt will continue to benefit from skilled outpatient physical therapy to address the deficits listed in the problem list box on initial evaluation, provide pt/family education and to maximize pt's level of independence in the home and community environment.     Pt's spiritual, cultural and educational needs considered and pt agreeable to plan of care and goals.     Anticipated barriers to physical therapy: ACDF C3-C7, large fusion    Goals:  Short Term Goals (4 Weeks):   1. Pt will be independent with HEP to supplement PT in improving pain free cervical mobility  2. Pt will improve cervical AROM 5 degrees in all planes to improve cervical mobility.  3. Pt will improve UE MMTs by 1/3 grade in all planes to improve strength for functional tasks.  4. Pt will demonstrate improved sitting posture to decrease pain experienced in head and neck.  5. Pt will improve  strength on R      Long Term Goals (8 Weeks):   1. Pt will improve FOTO to </=40% limitation to improve perceived limitation with changing and maintaining mobility.  2. Pt will improve cervical AROM 10 degrees in all planes to improve cervical mobility.  3. Pt will improve UE MMTs 1 to grossly 4+/5 in all planes to improve strength for functional tasks.  4. Pt will report pain </= 2/10 at worst to promote return to PLOF.  5. Pt will report pain </= 2/10 with cervical AROM in all planes to promote functional QOL.    Plan     Cont per POC, work on ashia-scapular strenghtening    Nader Ervin, PT

## 2019-06-10 ENCOUNTER — CLINICAL SUPPORT (OUTPATIENT)
Dept: REHABILITATION | Facility: HOSPITAL | Age: 59
End: 2019-06-10
Payer: OTHER MISCELLANEOUS

## 2019-06-10 DIAGNOSIS — M54.2 NECK PAIN: ICD-10-CM

## 2019-06-10 DIAGNOSIS — R29.898 DECREASED RANGE OF MOTION OF NECK: ICD-10-CM

## 2019-06-10 DIAGNOSIS — R53.1 DECREASED STRENGTH: ICD-10-CM

## 2019-06-10 PROCEDURE — 97110 THERAPEUTIC EXERCISES: CPT | Mod: PN

## 2019-06-10 PROCEDURE — 97140 MANUAL THERAPY 1/> REGIONS: CPT | Mod: PN

## 2019-06-10 NOTE — PROGRESS NOTES
Physical Therapy Daily Treatment Note     Name: Ghulam Ariza  Clinic Number: [id number]    Therapy Diagnosis:   Encounter Diagnoses   Name Primary?    Decreased range of motion of neck     Decreased strength     Neck pain      Physician: Emmanuel Ramirez PA    Visit Date: 6/10/2019    Physician: Emmanuel Ramirez PA     Physician Orders: PT Eval and Treat   Medical Diagnosis from Referral: Cervical spondylosis with radiculopathy  Evaluation Date: 6/5/2019  Authorization Period Expiration: 06/29/2019  Plan of Care Expiration: 6/5/2019 to 8/2/2019  Visit # / Visits authorized: 3/ 12  FOTO: 3/10     Time In: 1103  Time Out: 1150  Total Billable Time: 47 minutes (MT-1, TE-2)     Precautions: Standard and HTN and ACDF C3-7    Subjective     Pt reports: having some pain in right side of neck and his thumb and index finger is constantly numb  He was compliant with home exercise program.  Response to previous treatment: slight increased stiffness  Functional change: no change    Pain: 4/10  Location: right arm and neck      Objective     O: ACDF from C3-C7, R index finger and thumb    Ghulam received therapeutic exercises to develop strength, endurance, ROM, flexibility, posture and core stabilization for 32 minutes including:    Chin tucks: 2x10 5'' holds  R medium nerve glides: 25x  SL ER: 2x15 B  SL abd: 3x10 R  Lat pull downs: 2x10 GTB  Supine over towel roll: 5'    Ghulam received the following manual therapy techniques: Joint mobilizations, Manual traction, Myofacial release and Soft tissue Mobilization were applied to the: cervical spine for 15 minutes, including:  Cervical sideglides C1-C2 Grade III-IV B  Gentle PAs to C1-C2 and to CT junction starting at T1 all in supine  Gentle upper cervical distraction    Home Exercises Provided and Patient Education Provided   Education provided:   - importance of continuing HEP for reduction of pain    Written Home Exercises Provided: yes.  Exercises were reviewed and Ghulam  was able to demonstrate them prior to the end of the session.  Ghulam demonstrated good  understanding of the education provided.     See EMR under Patient Instructions for exercises provided 6/5/19.    Assessment     Patient continues to demonstrate neural tension with neurosymptoms and pain in C5-C3 distribution with daily activities, however he did well with nerve glides today.    Ghulam is progressing well towards his goals.   Pt prognosis is Excellent.     Pt will continue to benefit from skilled outpatient physical therapy to address the deficits listed in the problem list box on initial evaluation, provide pt/family education and to maximize pt's level of independence in the home and community environment.     Pt's spiritual, cultural and educational needs considered and pt agreeable to plan of care and goals.     Anticipated barriers to physical therapy: ACDF C3-C7, large fusion    Goals:  Short Term Goals (4 Weeks):   1. Pt will be independent with HEP to supplement PT in improving pain free cervical mobility PROGRESSING, NOT MET 6/10/2019  2. Pt will improve cervical AROM 5 degrees in all planes to improve cervical mobility. PROGRESSING, NOT MET 6/10/2019  3. Pt will improve UE MMTs by 1/3 grade in all planes to improve strength for functional tasks. PROGRESSING, NOT MET 6/10/2019  4. Pt will demonstrate improved sitting posture to decrease pain experienced in head and neck. PROGRESSING, NOT MET 6/10/2019  5. Pt will improve  strength on R  PROGRESSING, NOT MET 6/10/2019     Long Term Goals (8 Weeks):   1. Pt will improve FOTO to </=40% limitation to improve perceived limitation with changing and maintaining mobility. PROGRESSING, NOT MET 6/10/2019  2. Pt will improve cervical AROM 10 degrees in all planes to improve cervical mobility. PROGRESSING, NOT MET 6/10/2019  3. Pt will improve UE MMTs 1 to grossly 4+/5 in all planes to improve strength for functional tasks. PROGRESSING, NOT MET 6/10/2019  4.  Pt will report pain </= 2/10 at worst to promote return to PLOF. PROGRESSING, NOT MET 6/10/2019  5. Pt will report pain </= 2/10 with cervical AROM in all planes to promote functional QOL. PROGRESSING, NOT MET 6/10/2019    Plan     Continue plan of care. Add sidelying should flexion and horizontal abduction, and rows next    Sahara Mason, PT

## 2019-06-12 ENCOUNTER — TELEPHONE (OUTPATIENT)
Dept: INTERNAL MEDICINE | Facility: CLINIC | Age: 59
End: 2019-06-12

## 2019-06-12 NOTE — PROGRESS NOTES
Physical Therapy Daily Treatment Note     Name: Ghulam Ariza  Clinic Number: [id number]    Therapy Diagnosis:   Encounter Diagnoses   Name Primary?    Decreased range of motion of neck     Decreased strength     Neck pain      Physician: Emmanuel Ramirez PA    Visit Date: 6/13/2019    Physician: Emmanuel Ramirez PA     Physician Orders: PT Eval and Treat   Medical Diagnosis from Referral: Cervical spondylosis with radiculopathy  Evaluation Date: 6/5/2019  Authorization Period Expiration: 06/29/2019  Plan of Care Expiration: 6/5/2019 to 8/2/2019  Visit # / Visits authorized: 4/ 12  FOTO: 4/10     Time In: ***  Time Out: ***  Total Billable Time: *** minutes      Precautions: Standard and HTN and ACDF C3-7    Subjective     Pt reports: having some pain in right side of neck and his thumb and index finger is constantly numb  He was compliant with home exercise program.  Response to previous treatment: slight increased stiffness  Functional change: no change    Pain: 4/10  Location: right arm and neck      Objective     O: ACDF from C3-C7, R index finger and thumb    Ghulam received therapeutic exercises to develop strength, endurance, ROM, flexibility, posture and core stabilization for 32 minutes including:    Chin tucks: 2x10 5'' holds  R medium nerve glides: 25x  SL ER: 2x15 B  SL abd: 3x10 R  Lat pull downs: 2x10 GTB  Supine over towel roll: 5'    Ghulam received the following manual therapy techniques: Joint mobilizations, Manual traction, Myofacial release and Soft tissue Mobilization were applied to the: cervical spine for 15 minutes, including:  Cervical sideglides C1-C2 Grade III-IV B  Gentle PAs to C1-C2 and to CT junction starting at T1 all in supine  Gentle upper cervical distraction    Home Exercises Provided and Patient Education Provided   Education provided:   - importance of continuing HEP for reduction of pain    Written Home Exercises Provided: yes.  Exercises were reviewed and Ghulam was able to  demonstrate them prior to the end of the session.  Guhlam demonstrated good  understanding of the education provided.     See EMR under Patient Instructions for exercises provided 6/5/19.    Assessment     Patient continues to demonstrate neural tension with neurosymptoms and pain in C5-C3 distribution with daily activities, however he did well with nerve glides today.    Ghulam is progressing well towards his goals.   Pt prognosis is Excellent.     Pt will continue to benefit from skilled outpatient physical therapy to address the deficits listed in the problem list box on initial evaluation, provide pt/family education and to maximize pt's level of independence in the home and community environment.     Pt's spiritual, cultural and educational needs considered and pt agreeable to plan of care and goals.     Anticipated barriers to physical therapy: ACDF C3-C7, large fusion    Goals:  Short Term Goals (4 Weeks):   1. Pt will be independent with HEP to supplement PT in improving pain free cervical mobility PROGRESSING, NOT MET 6/13/2019  2. Pt will improve cervical AROM 5 degrees in all planes to improve cervical mobility. PROGRESSING, NOT MET 6/13/2019  3. Pt will improve UE MMTs by 1/3 grade in all planes to improve strength for functional tasks. PROGRESSING, NOT MET 6/13/2019  4. Pt will demonstrate improved sitting posture to decrease pain experienced in head and neck. PROGRESSING, NOT MET 6/13/2019  5. Pt will improve  strength on R  PROGRESSING, NOT MET 6/13/2019     Long Term Goals (8 Weeks):   1. Pt will improve FOTO to </=40% limitation to improve perceived limitation with changing and maintaining mobility. PROGRESSING, NOT MET 6/13/2019  2. Pt will improve cervical AROM 10 degrees in all planes to improve cervical mobility. PROGRESSING, NOT MET 6/13/2019  3. Pt will improve UE MMTs 1 to grossly 4+/5 in all planes to improve strength for functional tasks. PROGRESSING, NOT MET 6/13/2019  4. Pt will  report pain </= 2/10 at worst to promote return to PLOF. PROGRESSING, NOT MET 6/13/2019  5. Pt will report pain </= 2/10 with cervical AROM in all planes to promote functional QOL. PROGRESSING, NOT MET 6/13/2019    Plan     Continue plan of care. Add sidelying should flexion and horizontal abduction, and rows next    Sahara Mason, PT

## 2019-06-13 ENCOUNTER — CLINICAL SUPPORT (OUTPATIENT)
Dept: REHABILITATION | Facility: HOSPITAL | Age: 59
End: 2019-06-13
Payer: OTHER MISCELLANEOUS

## 2019-06-13 DIAGNOSIS — M54.2 NECK PAIN: ICD-10-CM

## 2019-06-13 DIAGNOSIS — R53.1 DECREASED STRENGTH: ICD-10-CM

## 2019-06-13 DIAGNOSIS — R29.898 DECREASED RANGE OF MOTION OF NECK: ICD-10-CM

## 2019-06-13 PROCEDURE — 97110 THERAPEUTIC EXERCISES: CPT | Mod: PN

## 2019-06-13 PROCEDURE — 97140 MANUAL THERAPY 1/> REGIONS: CPT | Mod: PN

## 2019-06-13 NOTE — PROGRESS NOTES
Physical Therapy Daily Treatment Note     Name: Ghulam Ariza  Clinic Number: [id number]    Therapy Diagnosis:   Encounter Diagnoses   Name Primary?    Decreased range of motion of neck     Decreased strength     Neck pain      Physician: Emmanuel Ramirez PA    Visit Date: 6/13/2019    Physician: Emmanuel Ramirez PA     Physician Orders: PT Eval and Treat   Medical Diagnosis from Referral: Cervical spondylosis with radiculopathy  Evaluation Date: 6/5/2019  Authorization Period Expiration: 06/29/2019  Plan of Care Expiration: 6/5/2019 to 8/2/2019  Visit # / Visits authorized: 4/ 12  FOTO: 4/10     Time In: 1100  Time Out: 12:00  Total Billable Time: 60 minutes (MT-2, TE-2)     Precautions: Standard and HTN and ACDF C3-7    Subjective     Pt reports:he is improving. Feels more flexible.   He was compliant with home exercise program.  Response to previous treatment: slight increased stiffness  Functional change: no change    Pain: 4/10  Location: right arm and neck      Objective         Ghulam received therapeutic exercises to develop strength, endurance, ROM, flexibility, posture and core stabilization for 35 minutes including:    Chin tucks: 2x10 5'' holds  R medium nerve glides: 25x  SL ER: 2x15 B 1#  SL abd: 3x10 R 1#  SL: shoulder flexion 2x10  SL: horiz abd 2x10  Rows: 2x10 GTB  Lat pull downs: 2x10 GTB  Supine over towel roll: 5' not performed out of time    Ghulam received the following manual therapy techniques: Joint mobilizations, Manual traction, Myofacial release and Soft tissue Mobilization were applied to the: cervical spine for 25 minutes, including:  -Cervical sideglides C1-C2 Grade III-IV B Not performed  -Gentle PAs to C1-C2 and to CT junction starting at T1 all in supine Not performed  -Gentle upper cervical distraction   -Suboccipital release  -Gentle static Deep tissue upper traps  -Upper quadrant STM/MFR  -Scapular mobilizations   -SCM CFM  -SCM muscle belly pull  -Scalene spring  stretch    Home Exercises Provided and Patient Education Provided   Education provided:   - importance of continuing HEP for reduction of pain    Written Home Exercises Provided: yes.  Exercises were reviewed and Ghulam was able to demonstrate them prior to the end of the session.  Ghulam demonstrated good  understanding of the education provided.     See EMR under Patient Instructions for exercises provided 6/5/19.    Assessment     Tolerated interventions well. Noted increased ROM and decreased tightness and pain per patient subjectively.    Ghulam is progressing well towards his goals.   Pt prognosis is Excellent.     Pt will continue to benefit from skilled outpatient physical therapy to address the deficits listed in the problem list box on initial evaluation, provide pt/family education and to maximize pt's level of independence in the home and community environment.     Pt's spiritual, cultural and educational needs considered and pt agreeable to plan of care and goals.     Anticipated barriers to physical therapy: ACDF C3-C7, large fusion    Goals:  Short Term Goals (4 Weeks):   1. Pt will be independent with HEP to supplement PT in improving pain free cervical mobility PROGRESSING, NOT MET 6/13/2019  2. Pt will improve cervical AROM 5 degrees in all planes to improve cervical mobility. PROGRESSING, NOT MET 6/13/2019  3. Pt will improve UE MMTs by 1/3 grade in all planes to improve strength for functional tasks. PROGRESSING, NOT MET 6/13/2019  4. Pt will demonstrate improved sitting posture to decrease pain experienced in head and neck. PROGRESSING, NOT MET 6/13/2019  5. Pt will improve  strength on R  PROGRESSING, NOT MET 6/13/2019     Long Term Goals (8 Weeks):   1. Pt will improve FOTO to </=40% limitation to improve perceived limitation with changing and maintaining mobility. PROGRESSING, NOT MET 6/13/2019  2. Pt will improve cervical AROM 10 degrees in all planes to improve cervical mobility.  PROGRESSING, NOT MET 6/13/2019  3. Pt will improve UE MMTs 1 to grossly 4+/5 in all planes to improve strength for functional tasks. PROGRESSING, NOT MET 6/13/2019  4. Pt will report pain </= 2/10 at worst to promote return to PLOF. PROGRESSING, NOT MET 6/13/2019  5. Pt will report pain </= 2/10 with cervical AROM in all planes to promote functional QOL. PROGRESSING, NOT MET 6/13/2019    Plan     Continue plan of care. Add sidelying should flexion and horizontal abduction, and rows next    Castro Buehl, PTA

## 2019-06-18 ENCOUNTER — CLINICAL SUPPORT (OUTPATIENT)
Dept: REHABILITATION | Facility: HOSPITAL | Age: 59
End: 2019-06-18
Payer: OTHER MISCELLANEOUS

## 2019-06-18 DIAGNOSIS — M54.2 NECK PAIN: ICD-10-CM

## 2019-06-18 DIAGNOSIS — R29.898 DECREASED RANGE OF MOTION OF NECK: ICD-10-CM

## 2019-06-18 DIAGNOSIS — R53.1 DECREASED STRENGTH: ICD-10-CM

## 2019-06-18 PROCEDURE — 97110 THERAPEUTIC EXERCISES: CPT | Mod: PN

## 2019-06-18 NOTE — PROGRESS NOTES
Physical Therapy Daily Treatment Note     Name: Ghulam Ariza  Clinic Number: [id number]    Therapy Diagnosis:   Encounter Diagnoses   Name Primary?    Decreased range of motion of neck     Decreased strength     Neck pain      Physician: Emmanuel Ramirez PA    Visit Date: 6/18/2019     Physician Orders: PT Eval and Treat   Medical Diagnosis from Referral: Cervical spondylosis with radiculopathy  Evaluation Date: 6/5/2019  Authorization Period Expiration: 06/29/2019  Plan of Care Expiration: 6/5/2019 to 8/2/2019  Visit # / Visits authorized: 5/ 12  FOTO: 5/10 done     Time In: 0803  Time Out: 0858  Total Billable Time: 55 minutes (TE-4)     Precautions: Standard and HTN and ACDF C3-7    Subjective     Pt reports: he continues with tingling in his R hand  He was compliant with home exercise program.  Response to previous treatment: increased soreness  Functional change: no change    Pain: 4/10  Location: right arm and neck      Objective     Ghulam received therapeutic exercises to develop strength, endurance, ROM, flexibility, posture and core stabilization for 55 minutes including:    kinesiotaping for cervical postural support and inhibition of B upper trapezius  Chin tucks: 2x10 5'' holds  R medium nerve glides: 25x  SL ER: 2x15 B 1#  SL abd: 3x10 R 1#  SL: shoulder flexion 2x10  SL: horiz abd 2x10  Rows: 2x10 GTB  Lat pull downs: 2x10 GTB  Supine over towel roll: 5' NOT PERFORMED THIS TREATMENT        Home Exercises Provided and Patient Education Provided   Education provided:   - kinesiotaping    Written Home Exercises Provided: yes.  Exercises were reviewed and Ghulam was able to demonstrate them prior to the end of the session.  Ghulam demonstrated good  understanding of the education provided.     See EMR under Patient Instructions for exercises provided 6/5/19.    Assessment     Patient tolerated treatment well. Required verbal cuing for correct posture with exercises.     Ghulam is progressing well  towards his goals.   Pt prognosis is Excellent.     Pt will continue to benefit from skilled outpatient physical therapy to address the deficits listed in the problem list box on initial evaluation, provide pt/family education and to maximize pt's level of independence in the home and community environment.     Pt's spiritual, cultural and educational needs considered and pt agreeable to plan of care and goals.     Anticipated barriers to physical therapy: ACDF C3-C7, large fusion    Goals:  Short Term Goals (4 Weeks):   1. Pt will be independent with HEP to supplement PT in improving pain free cervical mobility PROGRESSING, NOT MET 6/18/2019  2. Pt will improve cervical AROM 5 degrees in all planes to improve cervical mobility. PROGRESSING, NOT MET 6/18/2019  3. Pt will improve UE MMTs by 1/3 grade in all planes to improve strength for functional tasks. PROGRESSING, NOT MET 6/18/2019  4. Pt will demonstrate improved sitting posture to decrease pain experienced in head and neck. PROGRESSING, NOT MET 6/18/2019  5. Pt will improve  strength on R  PROGRESSING, NOT MET 6/18/2019     Long Term Goals (8 Weeks):   1. Pt will improve FOTO to </=40% limitation to improve perceived limitation with changing and maintaining mobility. PROGRESSING, NOT MET 6/18/2019  2. Pt will improve cervical AROM 10 degrees in all planes to improve cervical mobility. PROGRESSING, NOT MET 6/18/2019  3. Pt will improve UE MMTs 1 to grossly 4+/5 in all planes to improve strength for functional tasks. PROGRESSING, NOT MET 6/18/2019  4. Pt will report pain </= 2/10 at worst to promote return to PLOF. PROGRESSING, NOT MET 6/18/2019  5. Pt will report pain </= 2/10 with cervical AROM in all planes to promote functional QOL. PROGRESSING, NOT MET 6/18/2019    Plan     Continue plan of care. Resume manual therapy next    Sahara Mason, PT

## 2019-06-19 ENCOUNTER — TELEPHONE (OUTPATIENT)
Dept: INTERNAL MEDICINE | Facility: CLINIC | Age: 59
End: 2019-06-19

## 2019-06-19 NOTE — TELEPHONE ENCOUNTER
LMOR for pt to [facility] re: he cancelled his annual physical appt & I am trying to get him rescheduled. Please return call to office

## 2019-06-20 ENCOUNTER — CLINICAL SUPPORT (OUTPATIENT)
Dept: REHABILITATION | Facility: HOSPITAL | Age: 59
End: 2019-06-20
Payer: OTHER MISCELLANEOUS

## 2019-06-20 DIAGNOSIS — R29.898 DECREASED RANGE OF MOTION OF NECK: ICD-10-CM

## 2019-06-20 DIAGNOSIS — R53.1 DECREASED STRENGTH: ICD-10-CM

## 2019-06-20 DIAGNOSIS — M54.2 NECK PAIN: ICD-10-CM

## 2019-06-20 PROCEDURE — 97110 THERAPEUTIC EXERCISES: CPT | Mod: PN

## 2019-06-20 NOTE — PATIENT INSTRUCTIONS
SIDELYING EXTERNAL ROTATION WITH TOWEL - ER       Lie on your side with your elbow bent to 90 degrees. Place a rolled up towel between your arm and the side your body as shown.      Squeeze your shoulder blade back and down toward your buttocks and hold that position.      Next, roll your arm upwards from your stomach area towards the ceiling while maintaining your arm against the towel and with your shoulder blade held down and back the entire time. Lower your arm and repeat.      Perform 15 reps. Do 2 sets. Perform 1 session per day.     © [id number] [employer] All Rights Reserved     Sidelying shoulder abduction          Lying on your side with your shoulder back, and thumb pointing upwards, raise your arm straight as a board towards the ceiling to ~90 degrees, then return.      Perform 15 reps. Do 2 sets. Perform 1 session per day.     © [id number] [employer] All Rights Reserved     Sidelying Shoulder Flexion           Lie on your side with your affected arm up. Start with  your top hand by your side. Lift your hand forward and pull your shoulder blade down as the arm lifts up (like a seesaw). Control and lower the weight. Repeat.      Perform 15 reps. Do 2 sets. Perform 1 session per day.     © 8766-6908 [employer] All Rights Reserved      Sidelying Horizontal Abduction        Lie down on side.  Keep shoulder at 90 degrees of flexion. Lift the arm up toward the yayo keeping the elbow straight, and the shoulder in the 90 deg flexion plane. control back down to start position.  Do not cross parallel to the floor.    Perform 15 reps. Do 2 sets. Perform 1 session per day.     © 9536-3958 [employer] All Rights Reserved            THERABAND  ROWS           Close the knotted end of the Theraband in the side of a door.  Grasp the looped end of the band with both hands.  Pull the hands back toward the armpits, drawing the shoulder blades tightly together.  Slowly return the hands to the starting position.   Repeat.     Perform 15 reps. Do 2 sets. Perform 1 session per day.     © 7955-0526 [employer] All Rights Reserved     Low Rows with Elastic Band       With your palms facing down(thumbs in), grab onto a band and step backwards until the band is taunt. Keeping your arms straight, pull your hands to your sides while pulling the band back towards your hips. Pull your shoulder blades together towards your spine.     Perform 15 reps. Do 2 sets. Perform 1 session per day.     © 0053-4995 [employer] All Rights Reserved

## 2019-06-20 NOTE — PROGRESS NOTES
Physical Therapy Daily Treatment Note     Name: Ghulam Ariza  Clinic Number: [id number]    Therapy Diagnosis:   Encounter Diagnoses   Name Primary?    Decreased range of motion of neck     Decreased strength     Neck pain      Physician: Emmanuel Ramirez PA    Visit Date: 6/20/2019     Physician Orders: PT Eval and Treat   Medical Diagnosis from Referral: Cervical spondylosis with radiculopathy  Evaluation Date: 6/5/2019  Authorization Period Expiration: 06/29/2019  Plan of Care Expiration: 6/5/2019 to 8/2/2019  Visit # / Visits authorized: 6/ 12  FOTO: 6/10      Time In: 0906  Time Out: 0954  Total Billable Time: 48 minutes (TE-3)     Precautions: Standard and HTN and ACDF C3-7    Subjective     Pt reports: has not had to take a muscle relaxor since the night of 6/13. Continues with pain when turning head to R/L side  He was compliant with home exercise program.  Response to previous treatment: increased soreness  Functional change: no change    Pain: 0/10  Location: right arm and neck      Objective     Ghulam received therapeutic exercises to develop strength, endurance, ROM, flexibility, posture and core stabilization for 48 minutes including:    kinesiotaping for cervical postural support and inhibition of B upper trapezius NOT PERFORMED THIS TREATMENT  Chin tucks: 2x10 5'' holds  R medium nerve glides: 25x  SL ER: 2x15 B 1#  SL abd: 2x15 R 1#  SL: shoulder flexion 3x10  SL: horiz abd 2x10  Rows: 3x10 GTB  Lat pull downs:3x10 GTB  Supine over towel roll: 5' NOT PERFORMED THIS TREATMENT        Home Exercises Provided and Patient Education Provided       Written Home Exercises Provided: yes.  Exercises were reviewed and Ghulam was able to demonstrate them prior to the end of the session.  Ghulam demonstrated good  understanding of the education provided.     See EMR under Patient Instructions for exercises provided 6/5/19, 6/20/19    Assessment     Patient tolerated progression of exercises well with no  complaints of increased pain or discomfort.. Required verbal cuing for correct posture with exercises.  Manual therapy held this date due to no complaint of pain or soreness.    Ghulam is progressing well towards his goals.   Pt prognosis is Excellent.     Pt will continue to benefit from skilled outpatient physical therapy to address the deficits listed in the problem list box on initial evaluation, provide pt/family education and to maximize pt's level of independence in the home and community environment.     Pt's spiritual, cultural and educational needs considered and pt agreeable to plan of care and goals.     Anticipated barriers to physical therapy: ACDF C3-C7, large fusion    Goals:  Short Term Goals (4 Weeks):   1. Pt will be independent with HEP to supplement PT in improving pain free cervical mobility PROGRESSING, NOT MET 6/20/2019  2. Pt will improve cervical AROM 5 degrees in all planes to improve cervical mobility. PROGRESSING, NOT MET 6/20/2019  3. Pt will improve UE MMTs by 1/3 grade in all planes to improve strength for functional tasks. PROGRESSING, NOT MET 6/20/2019  4. Pt will demonstrate improved sitting posture to decrease pain experienced in head and neck. PROGRESSING, NOT MET 6/20/2019  5. Pt will improve  strength on R  PROGRESSING, NOT MET 6/20/2019     Long Term Goals (8 Weeks):   1. Pt will improve FOTO to </=40% limitation to improve perceived limitation with changing and maintaining mobility. PROGRESSING, NOT MET 6/20/2019  2. Pt will improve cervical AROM 10 degrees in all planes to improve cervical mobility. PROGRESSING, NOT MET 6/20/2019  3. Pt will improve UE MMTs 1 to grossly 4+/5 in all planes to improve strength for functional tasks. PROGRESSING, NOT MET 6/20/2019  4. Pt will report pain </= 2/10 at worst to promote return to PLOF. PROGRESSING, NOT MET 6/20/2019  5. Pt will report pain </= 2/10 with cervical AROM in all planes to promote functional QOL. PROGRESSING, NOT MET  6/20/2019    Plan     Continue plan of care.    Sahara Mason, PT

## 2019-06-25 ENCOUNTER — CLINICAL SUPPORT (OUTPATIENT)
Dept: REHABILITATION | Facility: HOSPITAL | Age: 59
End: 2019-06-25
Payer: OTHER MISCELLANEOUS

## 2019-06-25 DIAGNOSIS — R53.1 DECREASED STRENGTH: ICD-10-CM

## 2019-06-25 DIAGNOSIS — M54.2 NECK PAIN: ICD-10-CM

## 2019-06-25 DIAGNOSIS — R29.898 DECREASED RANGE OF MOTION OF NECK: ICD-10-CM

## 2019-06-25 PROCEDURE — 97110 THERAPEUTIC EXERCISES: CPT | Mod: PN

## 2019-06-25 NOTE — PROGRESS NOTES
Physical Therapy Daily Treatment Note     Name: Ghulam Ariza  Clinic Number: [id number]    Therapy Diagnosis:   Encounter Diagnoses   Name Primary?    Decreased range of motion of neck     Decreased strength     Neck pain      Physician: Emmanuel Ramirez PA    Visit Date: 6/25/2019     Physician Orders: PT Eval and Treat   Medical Diagnosis from Referral: Cervical spondylosis with radiculopathy  Evaluation Date: 6/5/2019  Authorization Period Expiration: 06/29/2019  Plan of Care Expiration: 6/5/2019 to 8/2/2019  Visit # / Visits authorized: 7/12  FOTO: 7/10      Time In: 1105   Time Out: 1200  Total Billable Time: 25 minutes (TE-2)     Precautions: Standard and HTN and ACDF C3-7    Subjective     Pt reports: just some baseline neck pain when he moves his head; no change in right thumb and index finger numbness.  He was compliant with home exercise program.  Response to previous treatment: increased soreness  Functional change: no change    Pain: 0/10  Location: right arm and neck      Objective     Ghulam received therapeutic exercises to develop strength, endurance, ROM, flexibility, posture and core stabilization for 25 minutes 1:1 with PT and 30 mins supervised including:    Chin tucks: 3x10 5'' holds  R medium nerve glides: 30x R  Dowel flexion: 2x10 2#  Dowel chest press c/ serratus punch: 3x10 5#  Supine over towel roll: 5'   Seated biceps curl: 2x15 B 2#  SL ER: 2x15 B 1#  SL abd: 2x15 R 1#  SL: shoulder flexion 3x10 - not performed today  SL: horiz abd 2x10 - not performed today  Rows: 3x10 GTB - not performed today  Lat pull downs:3x10 GTB - not performed today      Home Exercises Provided and Patient Education Provided   Written Home Exercises Provided: yes.  Exercises were reviewed and Ghulam was able to demonstrate them prior to the end of the session.  Ghulam demonstrated good  understanding of the education provided.     See EMR under Patient Instructions for exercises provided 6/5/19,  6/20/19    Assessment     Pt did well with added exercises and can resume other exercises. Trial UBE next visit for more comprehensive shoulder and upper back exercises.    Ghulam is progressing well towards his goals.   Pt prognosis is Excellent.     Pt will continue to benefit from skilled outpatient physical therapy to address the deficits listed in the problem list box on initial evaluation, provide pt/family education and to maximize pt's level of independence in the home and community environment.     Pt's spiritual, cultural and educational needs considered and pt agreeable to plan of care and goals.     Anticipated barriers to physical therapy: ACDF C3-C7, large fusion    Goals:  Short Term Goals (4 Weeks):   1. Pt will be independent with HEP to supplement PT in improving pain free cervical mobility PROGRESSING, NOT MET 6/25/2019  2. Pt will improve cervical AROM 5 degrees in all planes to improve cervical mobility. PROGRESSING, NOT MET 6/25/2019  3. Pt will improve UE MMTs by 1/3 grade in all planes to improve strength for functional tasks. PROGRESSING, NOT MET 6/25/2019  4. Pt will demonstrate improved sitting posture to decrease pain experienced in head and neck. PROGRESSING, NOT MET 6/25/2019  5. Pt will improve  strength on R  PROGRESSING, NOT MET 6/25/2019     Long Term Goals (8 Weeks):   1. Pt will improve FOTO to </=40% limitation to improve perceived limitation with changing and maintaining mobility. PROGRESSING, NOT MET 6/25/2019  2. Pt will improve cervical AROM 10 degrees in all planes to improve cervical mobility. PROGRESSING, NOT MET 6/25/2019  3. Pt will improve UE MMTs 1 to grossly 4+/5 in all planes to improve strength for functional tasks. PROGRESSING, NOT MET 6/25/2019  4. Pt will report pain </= 2/10 at worst to promote return to PLOF. PROGRESSING, NOT MET 6/25/2019  5. Pt will report pain </= 2/10 with cervical AROM in all planes to promote functional QOL. PROGRESSING, NOT MET  6/25/2019    Plan     Continue plan of care.    Nader Ervin, PT    Yes

## 2019-06-27 NOTE — PROGRESS NOTES
"  Physical Therapy Daily Treatment Note     Name: Ghulam Ariza  Clinic Number: [id number]    Therapy Diagnosis:   Encounter Diagnoses   Name Primary?    Decreased range of motion of neck     Decreased strength     Neck pain      Physician: Emmanuel Ramirez PA    Visit Date: 6/28/2019     Physician Orders: PT Eval and Treat   Medical Diagnosis from Referral: Cervical spondylosis with radiculopathy  Evaluation Date: 6/5/2019  Authorization Period Expiration: 06/29/2019  Plan of Care Expiration: 6/5/2019 to 8/2/2019  Visit # / Visits authorized: 7/12  FOTO: 7/10      Time In: 0900 am   Time Out:   Total Billable Time: 55 minutes (TE-4)     Precautions: Standard and HTN and ACDF C3-7    Subjective     Pt reports:  no change in right thumb and index finger numbness.  Increased pain today - "Maybe I slept wrong."  He was compliant with home exercise program.  Response to previous treatment: increased soreness  Functional change: no change    Pain: 4-5/10  Location: right arm and neck      Objective     Ghulam received therapeutic exercises to develop strength, endurance, ROM, flexibility, posture and core stabilization for 55 minutes 1:1 with PTA :    Chin tucks: 3x10 5'' holds  R medium nerve glides: 30x R  Dowel flexion: 3x10 2#  Dowel chest press c/ serratus punch: 3x10 5#  Supine pec stretch over blue 1/2 foam roll: 5'   Seated biceps curl: 2x15 B 2#  SL ER: 2# 2x10 B  SL abd: 2x10 2# B  SL: shoulder flexion 1# 2x10 B  SL: horiz abd 2x10 1# 2x10 B  Rows: 3x10 GTB   Lat pull downs:3x10 GTB    UBE - NEXT      Home Exercises Provided and Patient Education Provided   Written Home Exercises Provided: yes.  Exercises were reviewed and Ghulam was able to demonstrate them prior to the end of the session.  Ghulam demonstrated good  understanding of the education provided.     See EMR under Patient Instructions for exercises provided 6/5/19, 6/20/19    Assessment     Pt did well with resumption of exercises and added weight as " "noted.  Tolerated trial supine pec stretch over blue half foam roll today.  States  "good" after treatment.  Not specific to scale.    Ghulam is progressing well towards his goals.   Pt prognosis is Excellent.     Pt will continue to benefit from skilled outpatient physical therapy to address the deficits listed in the problem list box on initial evaluation, provide pt/family education and to maximize pt's level of independence in the home and community environment.     Pt's spiritual, cultural and educational needs considered and pt agreeable to plan of care and goals.     Anticipated barriers to physical therapy: ACDF C3-C7, large fusion    Goals:  Short Term Goals (4 Weeks):   1. Pt will be independent with HEP to supplement PT in improving pain free cervical mobility PROGRESSING, NOT MET 6/28/2019  2. Pt will improve cervical AROM 5 degrees in all planes to improve cervical mobility. PROGRESSING, NOT MET 6/28/2019  3. Pt will improve UE MMTs by 1/3 grade in all planes to improve strength for functional tasks. PROGRESSING, NOT MET 6/28/2019  4. Pt will demonstrate improved sitting posture to decrease pain experienced in head and neck. PROGRESSING, NOT MET 6/28/2019  5. Pt will improve  strength on R  PROGRESSING, NOT MET 6/28/2019     Long Term Goals (8 Weeks):   1. Pt will improve FOTO to </=40% limitation to improve perceived limitation with changing and maintaining mobility. PROGRESSING, NOT MET 6/28/2019  2. Pt will improve cervical AROM 10 degrees in all planes to improve cervical mobility. PROGRESSING, NOT MET 6/28/2019  3. Pt will improve UE MMTs 1 to grossly 4+/5 in all planes to improve strength for functional tasks. PROGRESSING, NOT MET 6/28/2019  4. Pt will report pain </= 2/10 at worst to promote return to PLOF. PROGRESSING, NOT MET 6/28/2019  5. Pt will report pain </= 2/10 with cervical AROM in all planes to promote functional QOL. PROGRESSING, NOT MET 6/28/2019    Plan     Continue plan of " care, progress toward established goals.  Add UBE next visit if able.    Karlene Cook, PTA

## 2019-06-28 ENCOUNTER — CLINICAL SUPPORT (OUTPATIENT)
Dept: REHABILITATION | Facility: HOSPITAL | Age: 59
End: 2019-06-28
Attending: PHYSICIAN ASSISTANT
Payer: OTHER MISCELLANEOUS

## 2019-06-28 DIAGNOSIS — M54.2 NECK PAIN: ICD-10-CM

## 2019-06-28 DIAGNOSIS — R53.1 DECREASED STRENGTH: ICD-10-CM

## 2019-06-28 DIAGNOSIS — R29.898 DECREASED RANGE OF MOTION OF NECK: ICD-10-CM

## 2019-06-28 PROCEDURE — 97110 THERAPEUTIC EXERCISES: CPT | Mod: PN

## 2019-07-02 ENCOUNTER — CLINICAL SUPPORT (OUTPATIENT)
Dept: REHABILITATION | Facility: HOSPITAL | Age: 59
End: 2019-07-02
Payer: OTHER MISCELLANEOUS

## 2019-07-02 DIAGNOSIS — R53.1 DECREASED STRENGTH: ICD-10-CM

## 2019-07-02 DIAGNOSIS — R29.898 DECREASED RANGE OF MOTION OF NECK: ICD-10-CM

## 2019-07-02 DIAGNOSIS — M54.2 NECK PAIN: ICD-10-CM

## 2019-07-02 PROCEDURE — 97110 THERAPEUTIC EXERCISES: CPT | Mod: PN

## 2019-07-02 PROCEDURE — 97140 MANUAL THERAPY 1/> REGIONS: CPT | Mod: PN

## 2019-07-02 NOTE — PROGRESS NOTES
Physical Therapy Daily Treatment Note     Name: Ghulam Ariza  Clinic Number: [id number]    Therapy Diagnosis:   Encounter Diagnoses   Name Primary?    Decreased range of motion of neck     Decreased strength     Neck pain      Physician: Emmanuel Ramirez PA    Visit Date: 7/2/2019     Physician Orders: PT Eval and Treat   Medical Diagnosis from Referral: Cervical spondylosis with radiculopathy  Evaluation Date: 6/5/2019  Authorization Period Expiration: 06/29/2019  Plan of Care Expiration: 6/5/2019 to 8/2/2019  Visit # / Visits authorized: 8/12  FOTO: 8/10      Time In: 0909  Time Out: 1000  Total Billable Time: 51 minutes (TE-3, MT-1)     Precautions: Standard and HTN and ACDF C3-7    Subjective     Pt reports:  Continues with stiffness and tightness in neck with cervical ROM, especially side-bending  He was compliant with home exercise program.  Response to previous treatment: increased soreness  Functional change: no change    Pain: 4-5/10  Location: right arm and neck      Objective     Ghulam received therapeutic exercises to develop strength, endurance, ROM, flexibility, posture and core stabilization for 43 minutes including:    Chin tucks: 3x10 5'' holds  R medium nerve glides: 30x R  Dowel flexion: 3x10 2#  Dowel chest press c/ serratus punch: 3x10 5#  Prone chin tuck: 20x  Prone scapular retraction: 20x    Supine pec stretch over blue 1/2 foam roll: 5'   Seated biceps curl: 2x15 B 2#  SL ER: 2# 2x10 B  SL abd: 2x10 2# B  SL: shoulder flexion 1# 2x10 B  SL: horiz abd 2x10 1# 2x10 B    Rows: 3x10 green theraband  Lat pull downs: 3x10 green theraband  No Money: 3x10 green theraband    UBE - 3'/3' for more comprehensive shoulder and upper back exercises.    Ghulam received manual therapy consisting of soft tissue mobilization and myofascial release for 8 minutes including:    ASTYM to neck per protocol      Home Exercises Provided and Patient Education Provided   Written Home Exercises Provided:  yes.  Exercises were reviewed and Ghulam was able to demonstrate them prior to the end of the session.  Ghulam demonstrated good  understanding of the education provided.     See EMR under Patient Instructions for exercises provided 6/5/19, 6/20/19    Assessment     Patient presented with increased tissue texture in B upper trapezius and cervical paraspinals. He would benefit from continued ASTYM to improve tissue texture, pain, and stiffness.    Ghulam is progressing well towards his goals.   Pt prognosis is Excellent.     Pt will continue to benefit from skilled outpatient physical therapy to address the deficits listed in the problem list box on initial evaluation, provide pt/family education and to maximize pt's level of independence in the home and community environment.     Pt's spiritual, cultural and educational needs considered and pt agreeable to plan of care and goals.     Anticipated barriers to physical therapy: ACDF C3-C7, large fusion    Goals:  Short Term Goals (4 Weeks):   1. Pt will be independent with HEP to supplement PT in improving pain free cervical mobility  MET 7/2/109  2. Pt will improve cervical AROM 5 degrees in all planes to improve cervical mobility. PROGRESSING, NOT MET 7/2/2019  3. Pt will improve UE MMTs by 1/3 grade in all planes to improve strength for functional tasks. PROGRESSING, NOT MET 7/2/2019  4. Pt will demonstrate improved sitting posture to decrease pain experienced in head and neck. PROGRESSING, NOT MET 7/2/2019  5. Pt will improve  strength on R  PROGRESSING, NOT MET 7/2/2019     Long Term Goals (8 Weeks):   1. Pt will improve FOTO to </=40% limitation to improve perceived limitation with changing and maintaining mobility. PROGRESSING, NOT MET 7/2/2019  2. Pt will improve cervical AROM 10 degrees in all planes to improve cervical mobility. PROGRESSING, NOT MET 7/2/2019  3. Pt will improve UE MMTs 1 to grossly 4+/5 in all planes to improve strength for functional  tasks. PROGRESSING, NOT MET 7/2/2019  4. Pt will report pain </= 2/10 at worst to promote return to PLOF. PROGRESSING, NOT MET 7/2/2019  5. Pt will report pain </= 2/10 with cervical AROM in all planes to promote functional QOL. PROGRESSING, NOT MET 7/2/2019    Plan     Continue plan of care, progress toward established goals.     Sahara Mason, PT

## 2019-07-05 ENCOUNTER — CLINICAL SUPPORT (OUTPATIENT)
Dept: REHABILITATION | Facility: HOSPITAL | Age: 59
End: 2019-07-05
Payer: OTHER MISCELLANEOUS

## 2019-07-05 DIAGNOSIS — R29.898 DECREASED RANGE OF MOTION OF NECK: ICD-10-CM

## 2019-07-05 DIAGNOSIS — M54.2 NECK PAIN: ICD-10-CM

## 2019-07-05 DIAGNOSIS — R53.1 DECREASED STRENGTH: ICD-10-CM

## 2019-07-05 PROCEDURE — 97110 THERAPEUTIC EXERCISES: CPT | Mod: PN

## 2019-07-05 NOTE — PROGRESS NOTES
Physical Therapy Daily Treatment Note     Name: Ghulam Ariza  Clinic Number: [id number]    Therapy Diagnosis:   Encounter Diagnoses   Name Primary?    Decreased range of motion of neck     Decreased strength     Neck pain      Physician: Emmanuel Ramirez PA    Visit Date: 7/5/2019    Physician Orders: PT Eval and Treat   Medical Diagnosis from Referral: Cervical spondylosis with radiculopathy  Evaluation Date: 6/5/2019  Authorization Period Expiration: 06/29/2019  Plan of Care Expiration: 6/5/2019 to 8/2/2019  Visit # / Visits authorized: 9/12  FOTO: 9/10  PTA visit: 1/6      Time In: 0800  Time Out: 0900  Total Billable Time: 54 minutes (TE-4)     Precautions: Standard and HTN and ACDF C3-7      Subjective     Pt reports: he still gets pain with turning head.  Slightly improved since beginning therapy, but still having pain  He was compliant with home exercise program.  Response to previous treatment: no adverse reaction  Functional change: none    Pain: 4-5/10  Location: bilateral neck      Objective       Ghulam received therapeutic exercises to develop strength, ROM, flexibility and posture for 60 minutes including:    Chin tucks: 3x10 5'' holds  R medium nerve glides: 30x R  Dowel flexion: 3x10 2#  Dowel chest press c/ serratus punch: 3x10 5#  Prone chin tuck: 20x  Prone scapular retraction: 20x     Supine pec stretch over blue 1/2 foam roll: 5'   Seated biceps curl: 2x15 B 2#  SL ER: 2# 2x10 B  SL abd: 2x10 2# B  SL: shoulder flexion 1# 2x10 B  SL: horiz abd 2x10 1# 2x10 B     Rows: 3x10 green theraband  Lat pull downs: 3x10 green theraband  No Money: 3x10 green theraband     UBE - 3'/3' for more comprehensive shoulder and upper back exercises. (Not counted, unsupervised)    Home Exercises Provided and Patient Education Provided     Education provided:   - cont HEP regularly to maximize therapy benefits    Written Home Exercises Provided: Patient instructed to cont prior HEP.  Exercises were reviewed and  Ghulam was able to demonstrate them prior to the end of the session.  Ghulam demonstrated good  understanding of the education provided.     See EMR under Patient Instructions for exercises provided 6/5/19 and 6/20/19.      Assessment     Pt tolerates therapy activities without difficulties or c/o increased pain.  Minimal verbal cues for correct technique with sidelying shoulder therex  Ghulam is progressing well towards his goals.   Pt prognosis is Excellent.     Pt will continue to benefit from skilled outpatient physical therapy to address the deficits listed in the problem list box on initial evaluation, provide pt/family education and to maximize pt's level of independence in the home and community environment.     Pt's spiritual, cultural and educational needs considered and pt agreeable to plan of care and goals.    Anticipated barriers to physical therapy: ACDF C3-C7, large fusion    Goals:   Short Term Goals (4 Weeks):   1. Pt will be independent with HEP to supplement PT in improving pain free cervical mobility  MET 7/2/109  2. Pt will improve cervical AROM 5 degrees in all planes to improve cervical mobility. PROGRESSING, NOT MET 7/2/2019  3. Pt will improve UE MMTs by 1/3 grade in all planes to improve strength for functional tasks. PROGRESSING, NOT MET 7/2/2019  4. Pt will demonstrate improved sitting posture to decrease pain experienced in head and neck. PROGRESSING, NOT MET 7/2/2019  5. Pt will improve  strength on R  PROGRESSING, NOT MET 7/2/2019     Long Term Goals (8 Weeks):   1. Pt will improve FOTO to </=40% limitation to improve perceived limitation with changing and maintaining mobility. PROGRESSING, NOT MET 7/2/2019  2. Pt will improve cervical AROM 10 degrees in all planes to improve cervical mobility. PROGRESSING, NOT MET 7/2/2019  3. Pt will improve UE MMTs 1 to grossly 4+/5 in all planes to improve strength for functional tasks. PROGRESSING, NOT MET 7/2/2019  4. Pt will report pain </=  2/10 at worst to promote return to PLOF. PROGRESSING, NOT MET 7/2/2019  5. Pt will report pain </= 2/10 with cervical AROM in all planes to promote functional QOL. PROGRESSING, NOT MET 7/2/2019    Plan     Cont POC to progress towards established goals    Shruti Fuller, PTA

## 2019-07-11 ENCOUNTER — TELEPHONE (OUTPATIENT)
Dept: REHABILITATION | Facility: HOSPITAL | Age: 59
End: 2019-07-11

## 2019-07-15 ENCOUNTER — TELEPHONE (OUTPATIENT)
Dept: REHABILITATION | Facility: HOSPITAL | Age: 59
End: 2019-07-15

## 2019-07-15 DIAGNOSIS — R29.898 DECREASED RANGE OF MOTION OF NECK: ICD-10-CM

## 2019-07-15 DIAGNOSIS — M54.2 NECK PAIN: ICD-10-CM

## 2019-07-15 DIAGNOSIS — R53.1 DECREASED STRENGTH: ICD-10-CM

## 2019-07-24 RX ORDER — BENAZEPRIL HYDROCHLORIDE 40 MG/1
TABLET ORAL
Qty: 90 TABLET | Refills: 0 | Status: SHIPPED | OUTPATIENT
Start: 2019-07-24 | End: 2019-11-11 | Stop reason: SDUPTHER

## 2019-07-26 ENCOUNTER — CLINICAL SUPPORT (OUTPATIENT)
Dept: REHABILITATION | Facility: HOSPITAL | Age: 59
End: 2019-07-26
Payer: OTHER MISCELLANEOUS

## 2019-07-26 DIAGNOSIS — R29.898 DECREASED RANGE OF MOTION OF NECK: ICD-10-CM

## 2019-07-26 DIAGNOSIS — M54.2 NECK PAIN: ICD-10-CM

## 2019-07-26 DIAGNOSIS — R53.1 DECREASED STRENGTH: ICD-10-CM

## 2019-07-26 PROCEDURE — 97110 THERAPEUTIC EXERCISES: CPT | Mod: PN

## 2019-07-26 NOTE — PROGRESS NOTES
Physical Therapy Daily Treatment Note     Name: Ghulam Ariza  Clinic Number: [id number]    Therapy Diagnosis:   Encounter Diagnoses   Name Primary?    Decreased range of motion of neck     Decreased strength     Neck pain      Physician: Emmanuel Ramirez PA    Visit Date: 7/26/2019    Physician Orders: PT Eval and Treat   Medical Diagnosis from Referral: Cervical spondylosis with radiculopathy  Evaluation Date: 6/5/2019  Authorization Period Expiration: 06/29/2019  Plan of Care Expiration: 6/5/2019 to 8/2/2019  Visit # / Visits authorized: 10/12  FOTO: 10/10 done  PTA visit: --     Time In: 0807  Time Out: 0855  Total Billable Time: 23 minutes (TE-2)     Precautions: Standard and HTN and ACDF C3-7      Subjective     Pt reports: he had to miss therapy due to his mother having a medical emergency and he needed to take care of her. Continues with tingling in R hand and neck pain with head movement  He was compliant with home exercise program.  Response to previous treatment: no adverse reaction  Functional change: none    Pain: 4-5/10  Location: bilateral neck      Objective       Ghulam received therapeutic exercises to develop strength, ROM, flexibility and posture for 52 minutes including: below and objective measures taken    Chin tucks: 3x10 5'' holds  R medium nerve glides: 30x R  Dowel flexion: 3x10 2#  Dowel chest press c/ serratus punch: 3x10 5#  Prone chin tuck: 20x  Prone scapular retraction: 20x     Supine pec stretch over blue 1/2 foam roll: 5'   Seated biceps curl: 2x15 B 2#  SL ER: 2# 2x10 B  SL abd: 2x10 2# B  SL: shoulder flexion 1# 2x10 B  SL: horiz abd 2x10 1# 2x10 B     Rows: 3x10 green theraband  Lat pull downs: 3x10 green theraband  No Money: 3x10 green theraband     UBE - 3'/3' for more comprehensive shoulder and upper back exercises. (Not counted, unsupervised)        Posture: sitting: slumped with forward head and rounded shoulders  Palpation: no significant tenderness to  palpation        Range of Motion/Strength:   CERVICAL AROM Pain/Dysfunction with Movement   Flexion 10    Extension 20 Pain in midline neck   Right side bending 20 Pain in L side of neck   Left side bending 20 Pain in L side of neck   Right rotation 48 Pain in midline neck   Left rotation 60           Shoulder Right Left Pain/Dysfunction with Movement   AROM         flexion  WFL  WFL     abduction  150*  150* Pain in L upper trapezius   Internal rotation  L4  L1* Pain in L upper trapezius   ER at 0° abd  C7  C7 Pain in midline neck   *denotes pain     U/E MMT Right Left Pain/Dysfunction with Movement   Shoulder Flexion 4/5 5/5     Shoulder Abduction 4/5 5/5     Shoulder IR 4/5 5/5     Shoulder ER  @ 0* Abduction 4-/5 4+/5     Elbow Flexion  4/5 5/5     Elbow Extension 4/5 5/5            (#)     Right 35.33   Left 50                  CMS Impairment/Limitation/Restriction for FOTO Neck Survey     Therapist reviewed FOTO scores for Ghulam Ariza on 7/26/2019.   FOTO documents entered into [employer] - see Media section.     Limitation Score: 44%                   Home Exercises Provided and Patient Education Provided     Education provided:   - cont HEP regularly to maximize therapy benefits    Written Home Exercises Provided: Patient instructed to cont prior HEP.  Exercises were reviewed and Ghulam was able to demonstrate them prior to the end of the session.  Ghulam demonstrated good  understanding of the education provided.     See EMR under Patient Instructions for exercises provided 6/5/19 and 6/20/19.      Assessment     Patient continues with neck pain and decreased strength and would continue to benefit from skilled physical therapy to address limitations and increase functional mobility.    Ghulam is progressing well towards his goals.   Pt prognosis is Excellent.     Pt will continue to benefit from skilled outpatient physical therapy to address the deficits listed in the problem list box on initial evaluation,  provide pt/family education and to maximize pt's level of independence in the home and community environment.     Pt's spiritual, cultural and educational needs considered and pt agreeable to plan of care and goals.    Anticipated barriers to physical therapy: ACDF C3-C7, large fusion    Goals:   Short Term Goals (4 Weeks):   1. Pt will be independent with HEP to supplement PT in improving pain free cervical mobility  MET 7/2/109  2. Pt will improve cervical AROM 5 degrees in all planes to improve cervical mobility. PROGRESSING, NOT MET 7/26/2019  3. Pt will improve UE MMTs by 1/3 grade in all planes to improve strength for functional tasks. PROGRESSING, NOT MET 7/26/2019  4. Pt will demonstrate improved sitting posture to decrease pain experienced in head and neck. PROGRESSING, NOT MET 7/26/2019  5. Pt will improve  strength on R  PROGRESSING, NOT MET 7/26/2019     Long Term Goals (8 Weeks):   1. Pt will improve FOTO to </=40% limitation to improve perceived limitation with changing and maintaining mobility. PROGRESSING, NOT MET 7/26/2019  2. Pt will improve cervical AROM 10 degrees in all planes to improve cervical mobility. PROGRESSING, NOT MET 7/26/2019  3. Pt will improve UE MMTs 1 to grossly 4+/5 in all planes to improve strength for functional tasks. PROGRESSING, NOT MET 7/26/2019  4. Pt will report pain </= 2/10 at worst to promote return to PLOF. PROGRESSING, NOT MET 7/26/2019  5. Pt will report pain </= 2/10 with cervical AROM in all planes to promote functional QOL. PROGRESSING, NOT MET 7/26/2019    Plan     Cont POC to progress towards established goals    Sahara Mason, PT

## 2019-10-01 DIAGNOSIS — M47.22 CERVICAL SPONDYLOSIS WITH RADICULOPATHY: ICD-10-CM

## 2019-10-01 DIAGNOSIS — M43.00 SPONDYLOLYSIS: Primary | ICD-10-CM

## 2019-10-17 NOTE — PROGRESS NOTES
Subjective:      Patient ID: Ghulam Ariza is a 56 y.o. male.    Chief Complaint: Neck Pain (2wk after injection)    Referred by: No ref. provider found     Pain Scales  Best: 0/10  Worst: 0/10  Usually: 3/10  Today: 2/10    Neck Pain    Pertinent negatives include no chest pain, fever, headaches or weight loss.     56 yoM that presents for follow up for C7-T1 ILSEI on 4/6/17. He says the injection gave him 80-85% pain relief since the injection. He does not have any other changes in his symptoms other than the overall decrease the severity of his pain. He feels the typical pain in his arm is gone although he does have mild numbness at times in his right thumb. He reports that otherwise he is doing well and is happy with his current pain level that is tolerable for him. No bowel or bladder incontinence, weakness, or saddle anesthesia.     Interventional Pain History  C7-T1 ILSEI on 4/6/17 with 80-85% pain relief    Review of Systems   Constitution: Negative for chills, fever, malaise/fatigue, weight gain and weight loss.   HENT: Negative for ear pain, headaches and hoarse voice.    Eyes: Negative for blurred vision, pain and visual disturbance.   Cardiovascular: Negative for chest pain, dyspnea on exertion and irregular heartbeat.   Respiratory: Negative for cough, shortness of breath and wheezing.    Endocrine: Negative for cold intolerance and heat intolerance.   Hematologic/Lymphatic: Negative for adenopathy and bleeding problem. Does not bruise/bleed easily.   Skin: Negative for color change, itching and rash.   Musculoskeletal: Positive for neck pain. Negative for back pain.   Gastrointestinal: Negative for change in bowel habit, diarrhea, hematemesis, hematochezia, melena and vomiting.   Genitourinary: Negative for flank pain, frequency, hematuria and urgency.   Neurological: Negative for difficulty with concentration, dizziness, loss of balance and seizures.   Psychiatric/Behavioral: Negative for altered mental  status, depression and suicidal ideas. The patient is not nervous/anxious.    Allergic/Immunologic: Negative for HIV exposure.     Past Medical History:   Diagnosis Date    Degenerative disc disease     cervical radiculopathy    Genital herpes     Hx of colonic polyps     Hypertension     Pinched nerve in neck        Past Surgical History:   Procedure Laterality Date    back sx      L4/L5 Herniated disk       Review of patient's allergies indicates:  No Known Allergies    Current Outpatient Prescriptions   Medication Sig Dispense Refill    allopurinol (ZYLOPRIM) 300 MG tablet Take 1 tablet (300 mg total) by mouth every evening. 90 tablet 3    benazepril (LOTENSIN) 40 MG tablet Take 1 tablet (40 mg total) by mouth once daily. 90 tablet 3    colchicine 0.6 mg Cap Take 1 capsule (0.6 mg total) by mouth once daily. 60 capsule 6    hydrocodone-acetaminophen 7.5-325mg (NORCO) 7.5-325 mg per tablet Take 1 tablet by mouth every 6 (six) hours as needed for Pain.      indomethacin (INDOCIN SR) 75 mg CpSR CR capsule TAKE ONE PILL BY MOUTH TWICE DAILY- EVERY 12 HOURS  0    metoprolol succinate (TOPROL-XL) 25 MG 24 hr tablet Take 1 tablet (25 mg total) by mouth once daily. 90 tablet 3    valacyclovir (VALTREX) 500 MG tablet Take 1 tablet (500 mg total) by mouth once daily. 30 tablet 11    VITAMIN D2 50,000 unit capsule   2    [DISCONTINUED] amlodipine (NORVASC) 5 MG tablet        No current facility-administered medications for this visit.        Family History   Problem Relation Age of Onset    Hypertension Mother     Stroke Mother 68     TIA    Cancer Father 65     Prostate    Cancer Paternal Uncle     Cancer Brother      Prostate Cancer       Social History     Social History    Marital status: Single     Spouse name: N/A    Number of children: N/A    Years of education: N/A     Occupational History     [employer]     Social History Main Topics    Smoking status: Never Smoker     "Smokeless tobacco: Never Used    Alcohol use 0.0 oz/week     0 Standard drinks or equivalent per week      Comment: every weekend few drinks    Drug use: No    Sexual activity: Yes     Partners: Female     Birth control/ protection: None     Other Topics Concern    Not on file     Social History Narrative           Objective:      /83  Pulse 77  Temp 99.2 °F (37.3 °C) (Oral)   Resp 18  Ht 6' 2" (1.88 m)  Wt 113 kg (249 lb 1.9 oz)  BMI 31.99 kg/m2  Normocephalic.  Atraumatic.  Affect appropriate.  Breathing unlabored.  Extra ocular muscles intact.              Back (L-Spine & T-Spine) / Neck (C-Spine) Exam     Neck (C-Spine) Range of Motion   Flexion:     Normal  Extension: Normal  Right Lateral Bend: normal  Left Lateral Bend: normal  Right Rotation: normal  Left Rotation: normal    Spinal Sensation   Right Side Sensation  C-Spine Level: normal   Left Side Sensation  C-Spine Level: normal    Comments:  No TTP cervical midline, paraspinals, or upper trapezius      Muscle Strength   Right Upper Extremity   Biceps: 5/5/5   Deltoid:  5/5  Triceps:  5/5  Wrist Extension: 5/5/5   Wrist Flexion: 5/5/5   Finger Flexors:  5/5  Left Upper Extremity  Biceps: 5/5/5   Deltoid:  5/5  Triceps:  5/5  Wrist Extension: 5/5/5   Wrist Flexion: 5/5/5   Finger Flexors:  5/5    Reflexes     Left Side  Biceps:  1+  Triceps:  1+  Brachioradialis:  1+    Right Side   Biceps:  1+  Triceps:  1+  Brachioradialis:  1+        Assessment:       Encounter Diagnoses   Name Primary?    Cervical spondylosis with radiculopathy Yes    Cervical radiculopathy          Plan:       Ghulam was seen today for neck pain.    Diagnoses and all orders for this visit:    Cervical spondylosis with radiculopathy    Cervical radiculopathy       We discussed with the patient the assessment and recommendations. The following is the plan we agreed on:    1. He does not want to do physical therapy at this time. He can contact our office in the future " should he like to do physical therapy.  2. He has had great results (80-85% pain relief) after the C7-T1 ILESI done on 4/6/17.   3. Provided him clearance for work today.   4. RTC PRN    Víctor Santiago, DO  [employer] [facility] PGY-II      I have personally taken the history and examined this patient and agree with the resident's note as stated above.     Private car

## 2019-10-23 ENCOUNTER — CLINICAL SUPPORT (OUTPATIENT)
Dept: REHABILITATION | Facility: HOSPITAL | Age: 59
End: 2019-10-23
Payer: OTHER MISCELLANEOUS

## 2019-10-23 DIAGNOSIS — Z78.9 IMPAIRED MOBILITY AND ACTIVITIES OF DAILY LIVING: ICD-10-CM

## 2019-10-23 DIAGNOSIS — M54.2 CHRONIC MIDLINE POSTERIOR NECK PAIN: Primary | ICD-10-CM

## 2019-10-23 DIAGNOSIS — R29.898 WEAKNESS OF RIGHT ARM: ICD-10-CM

## 2019-10-23 DIAGNOSIS — G89.29 CHRONIC MIDLINE POSTERIOR NECK PAIN: Primary | ICD-10-CM

## 2019-10-23 DIAGNOSIS — R29.898 DECREASED ROM OF NECK: ICD-10-CM

## 2019-10-23 DIAGNOSIS — R20.8 IMPAIRED SENSATION TO LIGHT TOUCH: ICD-10-CM

## 2019-10-23 DIAGNOSIS — Z74.09 IMPAIRED MOBILITY AND ACTIVITIES OF DAILY LIVING: ICD-10-CM

## 2019-10-23 PROBLEM — R53.1 DECREASED STRENGTH: Status: RESOLVED | Noted: 2019-06-06 | Resolved: 2019-10-23

## 2019-10-23 PROCEDURE — 97110 THERAPEUTIC EXERCISES: CPT | Mod: PN

## 2019-10-23 PROCEDURE — 97162 PT EVAL MOD COMPLEX 30 MIN: CPT | Mod: PN
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
## 2019-10-30 ENCOUNTER — CLINICAL SUPPORT (OUTPATIENT)
Dept: REHABILITATION | Facility: HOSPITAL | Age: 59
End: 2019-10-30
Payer: OTHER MISCELLANEOUS

## 2019-10-30 DIAGNOSIS — R29.898 DECREASED ROM OF NECK: ICD-10-CM

## 2019-10-30 DIAGNOSIS — R29.898 WEAKNESS OF RIGHT ARM: ICD-10-CM

## 2019-10-30 DIAGNOSIS — G89.29 CHRONIC MIDLINE POSTERIOR NECK PAIN: ICD-10-CM

## 2019-10-30 DIAGNOSIS — R20.8 IMPAIRED SENSATION TO LIGHT TOUCH: ICD-10-CM

## 2019-10-30 DIAGNOSIS — Z74.09 IMPAIRED MOBILITY AND ACTIVITIES OF DAILY LIVING: ICD-10-CM

## 2019-10-30 DIAGNOSIS — M54.2 CHRONIC MIDLINE POSTERIOR NECK PAIN: ICD-10-CM

## 2019-10-30 DIAGNOSIS — Z78.9 IMPAIRED MOBILITY AND ACTIVITIES OF DAILY LIVING: ICD-10-CM

## 2019-10-30 PROCEDURE — 97110 THERAPEUTIC EXERCISES: CPT | Mod: PN

## 2019-10-30 NOTE — PROGRESS NOTES
"                            Physical Therapy Daily Treatment Note     Name: Ghulam Ariza  Clinic Number: [id number]    Therapy Diagnosis:   Encounter Diagnoses   Name Primary?    Chronic midline posterior neck pain     Weakness of right arm     Impaired mobility and activities of daily living     Impaired sensation to light touch     Decreased ROM of neck      Physician: Padilla Coy MD    Visit Date: 10/30/2019    Physician Orders: PT Eval and Treat   Medical Diagnosis from Referral: M47.22 (ICD-10-CM) - Cervical spondylosis with radiculopathy   Evaluation Date: 10/23/2019  Authorization Period Expiration: 9/30/2020  Plan of Care Expiration: 10/23/2019 to 1/17/2019  Visit # / Visits authorized: 2/12  FOTO: 2/5     Time In: 7:06 AM  Time Out: 8:00 AM  Total Billable Time: 40 minutes (3 TE)     Precautions: Standard; hypertension; C3-7 ACDF, no lifting >25 lbs    Subjective     Pt reports: that he's feeling the same since the evaluation, and he tried to perform the exercises and they were "okay". States if he turns his head to the left or right or looks down to long he can feel the pain in his posterior midline back.   He was compliant with home exercise program.  Response to previous treatment: Evaluation last session  Functional change: Evaluation last session    Pain: 5-6/10  Location: Midline cervical area     Objective     Ghulam received therapeutic exercises to develop strength, endurance, ROM and flexibility for 40 minutes 1:1 and 20 minutes supervised including:     strength using hand-held dynamometer:  R: ~10 kg  L: ~20 kg     Supine chin tuck: x20. Verbal cues for relaxation of shoulders and mouth.  Supine over towel roll: 4 minutes  Open books: 1x15, 10" holds, B  Seated chin tuck: x20, 3" holds. Verbal cues for relaxation of shoulders and mouth.  Chair thoracic extension mobilizations against firm bolster: x10, 5" holds with hands laced behind head. X10 5" holds, with arms crossed against " "chest  Upper Trapezius Stretch: 30"x3"  Levator Scap Stretch: 30"x3  Pectoralis Door Stretch: 30"x3    Ghulam received the following manual therapy techniques: Joint mobilizations were applied to the: neck for  Muscle energy technique (MET) to improve L AA rotation: Neck placed in maximal protrusion, rotated slightly left; eye gaze R 10" holds x 3 reps. Multiple trials - NOT TODAY    Home Exercises Provided and Patient Education Provided     Education provided:   -  Role of therapy  - HEP of therapeutic exercises performed above; thoracic extension to be performed with arms crossed against chest    Written Home Exercises Provided: Patient instructed to cont prior HEP.  Exercises were reviewed and Ghulamwas able to demonstrate them prior to the end of the session.  Ghulam demonstrated good  understanding of the education provided.     See EMR under Patient Instructions for exercises provided prior visit.      Assessment     Ghulam tolerated the addition of upper trap, levator scap, and pectoralis stretches without increases in pain or discomfort.  strength tested using hand-held dynamometer with right  strength displaying a deficit of half the strength of left  strength.     Ghulam is progressing well towards his goals.   Pt prognosis is Good.     Pt will continue to benefit from skilled outpatient physical therapy to address the deficits listed in the problem list box on initial evaluation, provide pt/family education and to maximize pt's level of independence in the home and community environment.     Pt's spiritual, cultural and educational needs considered and pt agreeable to plan of care and goals.    Anticipated barriers to physical therapy: Chronic radicular symptoms    Goals:   Short Term Goals (4 Weeks):   1. Pt will be independent with HEP to supplement PT in improving pain free cervical mobility. PROGRESSING, NOT MET 10/30/2019  2. Pt will improve L cervical rotation AROM to >/=45 degrees to " improve mobility for driving. PROGRESSING, NOT MET 10/30/2019  3. Pt will perform overhead flexion and abduction without pain to promote ease of reaching. PROGRESSING, NOT MET 10/30/2019  4. Pt will improve cervical flexion AROM by 10 degrees to improve ease of looking down. PROGRESSING, NOT MET 10/30/2019    Long Term Goals (12 Weeks):   1. Pt will improve FOTO to </=45% limitation to improve perceived limitation with changing and maintaining mobility. PROGRESSING, NOT MET 10/30/2019  2. Pt will improve cervical AROM to WNL in all planes to improve cervical mobility for driving. PROGRESSING, NOT MET 10/30/2019  3. Pt will improve R UE myotomes to be within </=1/2 grade of L grade to improve equality in B UE strength for lifting and carrying tasks.PROGRESSING, NOT MET 10/30/2019  4. Pt will report no pain with lifting 25 lbs to promote return to work. PROGRESSING, NOT MET 10/30/2019  5. Pt will report no pain with cervical AROM in all planes to promote QOL. PROGRESSING, NOT MET 10/30/2019  6. Pt will improve cervical flexion AROM by 10 degrees to improve ease of looking down. PROGRESSING, NOT MET 10/30/2019  7. Pt will drive 30 minutes in low sitting car without pain to promote functional QOL. PROGRESSING, NOT MET 10/30/2019    Plan     Continue to progress towards functional goals.   Monitor response from previous treatment.     Emelyn Nicole, PT, DPT

## 2019-10-30 NOTE — PATIENT INSTRUCTIONS
Flexibility: Upper Trapezius Stretch    Sit up tall and place one hand behind your back or under your thigh and the other on top of your head.  Gently draw your head towards the side of your top hand. Do not over-stretch.  Hold 30 seconds.   Repeat 3 times each side per set. Do 1 sets per session. Do 2 sessions per day.      Levator Scapula Stretch, Sitting    Sit, one hand on same-side shoulder blade, other hand on head. Gently pull head down and away from hand on shoulder blade. Hold 30 seconds.  Repeat 3 times each side per set. Do 1 sets per session. Do 2 sessions per day.      Pec Door Stretch     doorframe with palms, forearms, and elbows against frame. Lean forward until a stretch is felt in the chest. Hold 30 seconds.  Repeat 3 times per session. Do 2 sessions per day.      Pectoral Stretch, Supine on Foam Roller     Copyright © 2552-6681 [employer]  Lie on back on rolled up towel and allow arms to spread wide with palms up, keeping arms relaxed on ground. To increase stretch, bend elbows or slide arms over head. Hold 4 minutes.  Do 2 sessions per day.              Scapular Retraction (Standing)    With arms at sides, squeeze shoulder blades together. Do not shrug and do not hold your breath. Hold 5 seconds.  Repeat 10 times per session. Do 2 sessions per day.       Open Book    Bend knees and hips; reach arm forward, palms together. Lift top arm up toward the ceiling and let the trunk twist open. Keep your hips stacked on top of each other. Follow the top hand with your nose throughout the movement.  Do 10 reps per set each side, with 10 second holds each, 2 sets per day.      Copyright © [employer]. All rights reserved.

## 2019-11-01 ENCOUNTER — CLINICAL SUPPORT (OUTPATIENT)
Dept: REHABILITATION | Facility: HOSPITAL | Age: 59
End: 2019-11-01
Payer: OTHER MISCELLANEOUS

## 2019-11-01 DIAGNOSIS — R20.8 IMPAIRED SENSATION TO LIGHT TOUCH: ICD-10-CM

## 2019-11-01 DIAGNOSIS — Z74.09 IMPAIRED MOBILITY AND ACTIVITIES OF DAILY LIVING: ICD-10-CM

## 2019-11-01 DIAGNOSIS — R29.898 DECREASED ROM OF NECK: ICD-10-CM

## 2019-11-01 DIAGNOSIS — R29.898 WEAKNESS OF RIGHT ARM: ICD-10-CM

## 2019-11-01 DIAGNOSIS — G89.29 CHRONIC MIDLINE POSTERIOR NECK PAIN: ICD-10-CM

## 2019-11-01 DIAGNOSIS — M54.2 CHRONIC MIDLINE POSTERIOR NECK PAIN: ICD-10-CM

## 2019-11-01 DIAGNOSIS — Z78.9 IMPAIRED MOBILITY AND ACTIVITIES OF DAILY LIVING: ICD-10-CM

## 2019-11-01 PROCEDURE — 97140 MANUAL THERAPY 1/> REGIONS: CPT | Mod: PN

## 2019-11-01 PROCEDURE — 97110 THERAPEUTIC EXERCISES: CPT | Mod: PN

## 2019-11-01 NOTE — PROGRESS NOTES
"                            Physical Therapy Daily Treatment Note     Name: Ghulam Ariza  Clinic Number: [id number]    Therapy Diagnosis:   Encounter Diagnoses   Name Primary?    Chronic midline posterior neck pain     Weakness of right arm     Impaired mobility and activities of daily living     Impaired sensation to light touch     Decreased ROM of neck      Physician: Padilla Coy MD    Visit Date: 11/1/2019    Physician Orders: PT Eval and Treat   Medical Diagnosis from Referral: M47.22 (ICD-10-CM) - Cervical spondylosis with radiculopathy   Evaluation Date: 10/23/2019  Authorization Period Expiration: 9/30/2020  Plan of Care Expiration: 10/23/2019 to 1/17/2019  Visit # / Visits authorized: 3/12  FOTO: 3/5     Time In: 7:06 AM  Time Out: 8:00 AM  Total Billable Time: 40 minutes (2 TE, 1 MT)     Precautions: Standard; hypertension; C3-7 ACDF, no lifting >25 lbs    Subjective     Pt reports: reports minor soreness to cervical spine today. He is agreeable to PT session.   He was compliant with home exercise program.  Response to previous treatment: cervical spine soreness  Functional change:     Pain: 5/10  Location: Midline cervical area / thoracic spine    Objective     Ghulam received therapeutic exercises to develop strength, endurance, ROM and flexibility for 30 minutes 1:1 w/ PTA including:     -Supine chin tuck: x20. Verbal cues for relaxation of shoulders and mouth.  -Supine over towel roll: 4 minutes  -Open books: 1x15, 10" holds, B  -Seated chin tuck: x20, 3" holds. Verbal cues for relaxation of shoulders and mouth.  -Chair thoracic extension mobilizations against firm bolster: x10, 5" holds with hands laced behind head. X10 5" holds, with arms crossed against chest  -Upper Trapezius Stretch: 30"x3"  -Levator Scap Stretch: 30"x3  -Pectoralis Door Stretch: 30"x3    Ghulam received the following manual therapy techniques: Joint mobilizations were applied to the: neck for 10 min   -STM to B UT  -STM " to Cervical paraspinals  -STM to upper thoracic paraspinals       Home Exercises Provided and Patient Education Provided     Education provided:   -  Role of therapy  - HEP of therapeutic exercises performed above; thoracic extension to be performed with arms crossed against chest    Written Home Exercises Provided: Patient instructed to cont prior HEP.  Exercises were reviewed and Ghulam was able to demonstrate them prior to the end of the session.  Ghulam demonstrated good  understanding of the education provided.     See EMR under Patient Instructions for exercises provided prior visit.      Assessment     Pt reports no complaints of increased pain post session. He was able to follow verbal instructions on technique and postural awareness today.     Ghulam is progressing well towards his goals.   Pt prognosis is Good.     Pt will continue to benefit from skilled outpatient physical therapy to address the deficits listed in the problem list box on initial evaluation, provide pt/family education and to maximize pt's level of independence in the home and community environment.     Pt's spiritual, cultural and educational needs considered and pt agreeable to plan of care and goals.    Anticipated barriers to physical therapy: Chronic radicular symptoms    Goals:   Short Term Goals (4 Weeks):   1. Pt will be independent with HEP to supplement PT in improving pain free cervical mobility. PROGRESSING, NOT MET 11/1/2019  2. Pt will improve L cervical rotation AROM to >/=45 degrees to improve mobility for driving. PROGRESSING, NOT MET 11/1/2019  3. Pt will perform overhead flexion and abduction without pain to promote ease of reaching. PROGRESSING, NOT MET 11/1/2019  4. Pt will improve cervical flexion AROM by 10 degrees to improve ease of looking down. PROGRESSING, NOT MET 11/1/2019    Long Term Goals (12 Weeks):   1. Pt will improve FOTO to </=45% limitation to improve perceived limitation with changing and maintaining  mobility. PROGRESSING, NOT MET 11/1/2019  2. Pt will improve cervical AROM to WNL in all planes to improve cervical mobility for driving. PROGRESSING, NOT MET 11/1/2019  3. Pt will improve R UE myotomes to be within </=1/2 grade of L grade to improve equality in B UE strength for lifting and carrying tasks.PROGRESSING, NOT MET 11/1/2019  4. Pt will report no pain with lifting 25 lbs to promote return to work. PROGRESSING, NOT MET 11/1/2019  5. Pt will report no pain with cervical AROM in all planes to promote QOL. PROGRESSING, NOT MET 11/1/2019  6. Pt will improve cervical flexion AROM by 10 degrees to improve ease of looking down. PROGRESSING, NOT MET 11/1/2019  7. Pt will drive 30 minutes in low sitting car without pain to promote functional QOL. PROGRESSING, NOT MET 11/1/2019    Plan     Continue to progress towards functional PT  goals.        Sonido Herring, PTA,

## 2019-11-06 ENCOUNTER — CLINICAL SUPPORT (OUTPATIENT)
Dept: REHABILITATION | Facility: HOSPITAL | Age: 59
End: 2019-11-06
Payer: OTHER MISCELLANEOUS

## 2019-11-06 DIAGNOSIS — M54.2 CHRONIC MIDLINE POSTERIOR NECK PAIN: ICD-10-CM

## 2019-11-06 DIAGNOSIS — R20.8 IMPAIRED SENSATION TO LIGHT TOUCH: ICD-10-CM

## 2019-11-06 DIAGNOSIS — R29.898 DECREASED ROM OF NECK: ICD-10-CM

## 2019-11-06 DIAGNOSIS — G89.29 CHRONIC MIDLINE POSTERIOR NECK PAIN: ICD-10-CM

## 2019-11-06 DIAGNOSIS — R29.898 WEAKNESS OF RIGHT ARM: ICD-10-CM

## 2019-11-06 DIAGNOSIS — Z74.09 IMPAIRED MOBILITY AND ACTIVITIES OF DAILY LIVING: ICD-10-CM

## 2019-11-06 DIAGNOSIS — Z78.9 IMPAIRED MOBILITY AND ACTIVITIES OF DAILY LIVING: ICD-10-CM

## 2019-11-06 PROCEDURE — 97140 MANUAL THERAPY 1/> REGIONS: CPT | Mod: PN

## 2019-11-06 PROCEDURE — 97110 THERAPEUTIC EXERCISES: CPT | Mod: PN

## 2019-11-06 NOTE — PROGRESS NOTES
"                            Physical Therapy Daily Treatment Note     Name: Ghulam Ariza  Clinic Number: [id number]    Therapy Diagnosis:   Encounter Diagnoses   Name Primary?    Chronic midline posterior neck pain     Weakness of right arm     Impaired mobility and activities of daily living     Impaired sensation to light touch     Decreased ROM of neck      Physician: Padilla Coy MD    Visit Date: 11/6/2019    Physician Orders: PT Eval and Treat   Medical Diagnosis from Referral: M47.22 (ICD-10-CM) - Cervical spondylosis with radiculopathy   Evaluation Date: 10/23/2019  Authorization Period Expiration: 9/30/2020  Plan of Care Expiration: 10/23/2019 to 1/17/2019  Visit # / Visits authorized: 3/12  FOTO: 3/5     Time In: 7:06 AM  Time Out: 8:00 AM  Total Billable Time: 25 minutes (2 TE, 1 MT)     Precautions: Standard; hypertension; C3-7 ACDF, no lifting >25 lbs    Subjective     Pt reports:"Im hurting a little more than other mornings". Pt was agreeable to PT.   He was compliant with home exercise program.  Response to previous treatment: cervical spine soreness  Functional change: no change reported    Pain: 6/10 B Upper trap.   Location: Midline cervical area / thoracic spine    Objective     Ghulam received therapeutic exercises to develop strength, endurance, ROM and flexibility for 15 minutes 1:1 w/ PTA, 15 Supervised including:     -Supine chin tuck: x20. Verbal cues for relaxation of shoulders and mouth. NOT PERFORMED TODAY  -Supine over towel roll: 4 minutes  -Open books: 1x15, 10" holds, B  -Seated chin tuck: x20, 3" holds. Verbal cues for relaxation of shoulders and mouth.  -Chair thoracic extension mobilizations against firm bolster: x10, 5" holds with hands laced behind head. X10 5" holds, with arms crossed against chest  -Upper Trapezius Stretch: 30"x3"  -Levator Scap Stretch: 30"x3  -Pectoralis Door Stretch: 30"x3  -Supine wand Flex: x 20  5lb wand  -Supine serratus punches: 2x10 5lb wand "   -Median nerve glides B shoulder abduction at 45* and 90* x 3 trials seated    Ghulam received the following manual therapy techniques: Joint mobilizations were applied to the: neck for 10 min   -STM to B UT  -STM to Cervical paraspinals  -STM to upper thoracic paraspinals       Home Exercises Provided and Patient Education Provided     Education provided:   -  Role of therapy  - HEP of therapeutic exercises performed above; thoracic extension to be performed with arms crossed against chest    Written Home Exercises Provided: Patient instructed to cont prior HEP.  Exercises were reviewed and Ghulam was able to demonstrate them prior to the end of the session.  Ghulam demonstrated good  understanding of the education provided.     See EMR under Patient Instructions for exercises provided prior visit.      Assessment     Pt did report any increased pain throughout the session. Median nerve glides did not elicit numbness or tingling in R hand. Pt was able to follow verbal instructions to correct supine exercise techniques. Increased muscular tension noted at mid trapezius and upper thoracic paraspinals which reduced following manual intervention.       Ghulam is progressing well towards his goals.   Pt prognosis is Good.     Pt will continue to benefit from skilled outpatient physical therapy to address the deficits listed in the problem list box on initial evaluation, provide pt/family education and to maximize pt's level of independence in the home and community environment.     Pt's spiritual, cultural and educational needs considered and pt agreeable to plan of care and goals.    Anticipated barriers to physical therapy: Chronic radicular symptoms    Goals:   Short Term Goals (4 Weeks):   1. Pt will be independent with HEP to supplement PT in improving pain free cervical mobility. PROGRESSING, NOT MET 11/6/2019  2. Pt will improve L cervical rotation AROM to >/=45 degrees to improve mobility for driving.  PROGRESSING, NOT MET 11/6/2019  3. Pt will perform overhead flexion and abduction without pain to promote ease of reaching. PROGRESSING, NOT MET 11/6/2019  4. Pt will improve cervical flexion AROM by 10 degrees to improve ease of looking down. PROGRESSING, NOT MET 11/6/2019    Long Term Goals (12 Weeks):   1. Pt will improve FOTO to </=45% limitation to improve perceived limitation with changing and maintaining mobility. PROGRESSING, NOT MET 11/6/2019  2. Pt will improve cervical AROM to WNL in all planes to improve cervical mobility for driving. PROGRESSING, NOT MET 11/6/2019  3. Pt will improve R UE myotomes to be within </=1/2 grade of L grade to improve equality in B UE strength for lifting and carrying tasks.PROGRESSING, NOT MET 11/6/2019  4. Pt will report no pain with lifting 25 lbs to promote return to work. PROGRESSING, NOT MET 11/6/2019  5. Pt will report no pain with cervical AROM in all planes to promote QOL. PROGRESSING, NOT MET 11/6/2019  6. Pt will improve cervical flexion AROM by 10 degrees to improve ease of looking down. PROGRESSING, NOT MET 11/6/2019  7. Pt will drive 30 minutes in low sitting car without pain to promote functional QOL. PROGRESSING, NOT MET 11/6/2019    Plan     Continue to progress towards functional PT  goals.        Sonido Herring PTA,

## 2019-11-08 ENCOUNTER — CLINICAL SUPPORT (OUTPATIENT)
Dept: REHABILITATION | Facility: HOSPITAL | Age: 59
End: 2019-11-08
Payer: OTHER MISCELLANEOUS

## 2019-11-08 DIAGNOSIS — R29.898 DECREASED ROM OF NECK: ICD-10-CM

## 2019-11-08 DIAGNOSIS — Z78.9 IMPAIRED MOBILITY AND ACTIVITIES OF DAILY LIVING: ICD-10-CM

## 2019-11-08 DIAGNOSIS — G89.29 CHRONIC MIDLINE POSTERIOR NECK PAIN: ICD-10-CM

## 2019-11-08 DIAGNOSIS — M54.2 CHRONIC MIDLINE POSTERIOR NECK PAIN: ICD-10-CM

## 2019-11-08 DIAGNOSIS — R29.898 WEAKNESS OF RIGHT ARM: ICD-10-CM

## 2019-11-08 DIAGNOSIS — Z74.09 IMPAIRED MOBILITY AND ACTIVITIES OF DAILY LIVING: ICD-10-CM

## 2019-11-08 DIAGNOSIS — R20.8 IMPAIRED SENSATION TO LIGHT TOUCH: ICD-10-CM

## 2019-11-08 PROCEDURE — 97140 MANUAL THERAPY 1/> REGIONS: CPT | Mod: PN

## 2019-11-08 PROCEDURE — 97110 THERAPEUTIC EXERCISES: CPT | Mod: PN

## 2019-11-08 NOTE — PROGRESS NOTES
"                            Physical Therapy Daily Treatment Note     Name: Ghulam Ariza  Clinic Number: [id number]    Therapy Diagnosis:   Encounter Diagnoses   Name Primary?    Chronic midline posterior neck pain     Weakness of right arm     Impaired mobility and activities of daily living     Impaired sensation to light touch     Decreased ROM of neck      Physician: Padilla Coy MD    Visit Date: 11/8/2019    Physician Orders: PT Eval and Treat   Medical Diagnosis from Referral: M47.22 (ICD-10-CM) - Cervical spondylosis with radiculopathy   Evaluation Date: 10/23/2019  Authorization Period Expiration: 9/30/2020  Plan of Care Expiration: 10/23/2019 to 1/17/2020  Visit # / Visits authorized: 4/12  FOTO: 4/5     Time In: 0703  Time Out: 0805  Total Billable Time: 62 minutes     Precautions: Standard; hypertension; C3-7 ACDF, no lifting >25 lbs    Subjective     Pt reports: no change in numbness/tingling in R thumb or  strength.. Decreased midline and R sided neck pain with manual therapy.   He was compliant with home exercise program.  Response to previous treatment: decreased pain.   Functional change: no change in numbness or  strength.     Pain: 5/10 before session; 2/10 after  Location: midline of lower cervical upper thoracic spine    Objective     Ghulam received therapeutic exercises to develop strength, endurance, ROM and flexibility for 47 minutes including:     Supine:  Chin tucks: 10"x10. Verbal cues for correct technique.  Chick tuck + lift: 5"x10. Verbal cues for correct technique.   Serratus punches: 5# 2x10  Overhead shoulder flexion c/ dumbbell : 5# 2x10 R.   Sidelying open books: x10 B    Seated:  Chair thoracic extension mobilizations against firm bolster: Arms crossed against chest 2x10 at lower thoracic spine, 2x10 at upper thoracic spine    Ghulam received the following manual therapy techniques: were applied to the: neck for 15 minutes including:    Posterior to anterior " grade II-III mobilizations to T1-2 spinous processes in supine, same mobilizations to T2-7 spinous processes in prone.   Suboccipital release    Home Exercises Provided and Patient Education Provided     Education provided:   - Role of therapy.   - Patient given updated HEP including thoracic extension mobilizations and open books.      Written Home Exercises Provided: Yes.  Exercises were reviewed and Ghulam was able to demonstrate them prior to the end of the session.  Ghulam demonstrated good  understanding of the education provided.     See EMR under Patient Instructions for exercises provided 11/8/2019.     Assessment     Decreased pain with thoracic mobilizations in prone and thoracic extension mobilizations. Hypomobility noted at T2-5 segments. Cues required for isolated deep neck flexor activation rather than platysma for chin tuck exercises.      Ghulam is progressing well towards his goals.   Pt prognosis is Good.   Pt will continue to benefit from skilled outpatient physical therapy to address the deficits listed in the problem list box on initial evaluation, provide pt/family education and to maximize pt's level of independence in the home and community environment.     Pt's spiritual, cultural and educational needs considered and pt agreeable to plan of care and goals.  Anticipated barriers to physical therapy: Chronic radicular symptoms    Goals:   Short Term Goals (4 Weeks):   1. Pt will be independent with HEP to supplement PT in improving pain free cervical mobility. PROGRESSING, NOT MET 11/8/2019  2. Pt will improve L cervical rotation AROM to >/=45 degrees to improve mobility for driving. PROGRESSING, NOT MET 11/8/2019  3. Pt will perform overhead flexion and abduction without pain to promote ease of reaching. PROGRESSING, NOT MET 11/8/2019  4. Pt will improve cervical flexion AROM by 10 degrees to improve ease of looking down. PROGRESSING, NOT MET 11/8/2019    Long Term Goals (12 Weeks):   1. Pt  will improve FOTO to </=45% limitation to improve perceived limitation with changing and maintaining mobility. PROGRESSING, NOT MET 11/8/2019  2. Pt will improve cervical AROM to WNL in all planes to improve cervical mobility for driving. PROGRESSING, NOT MET 11/8/2019  3. Pt will improve R UE myotomes to be within </=1/2 grade of L grade to improve equality in B UE strength for lifting and carrying tasks.PROGRESSING, NOT MET 11/8/2019  4. Pt will report no pain with lifting 25 lbs to promote return to work. PROGRESSING, NOT MET 11/8/2019  5. Pt will report no pain with cervical AROM in all planes to promote QOL. PROGRESSING, NOT MET 11/8/2019  6. Pt will improve cervical flexion AROM by 10 degrees to improve ease of looking down. PROGRESSING, NOT MET 11/8/2019  7. Pt will drive 30 minutes in low sitting car without pain to promote functional QOL. PROGRESSING, NOT MET 11/8/2019    Plan     Continue thoracic mobilizations.     Sandy Turcios, PT

## 2019-11-11 ENCOUNTER — CLINICAL SUPPORT (OUTPATIENT)
Dept: REHABILITATION | Facility: HOSPITAL | Age: 59
End: 2019-11-11
Payer: OTHER MISCELLANEOUS

## 2019-11-11 DIAGNOSIS — R29.898 DECREASED ROM OF NECK: ICD-10-CM

## 2019-11-11 DIAGNOSIS — R20.8 IMPAIRED SENSATION TO LIGHT TOUCH: ICD-10-CM

## 2019-11-11 DIAGNOSIS — G89.29 CHRONIC MIDLINE POSTERIOR NECK PAIN: ICD-10-CM

## 2019-11-11 DIAGNOSIS — Z78.9 IMPAIRED MOBILITY AND ACTIVITIES OF DAILY LIVING: ICD-10-CM

## 2019-11-11 DIAGNOSIS — Z74.09 IMPAIRED MOBILITY AND ACTIVITIES OF DAILY LIVING: ICD-10-CM

## 2019-11-11 DIAGNOSIS — M54.2 CHRONIC MIDLINE POSTERIOR NECK PAIN: ICD-10-CM

## 2019-11-11 DIAGNOSIS — R29.898 WEAKNESS OF RIGHT ARM: ICD-10-CM

## 2019-11-11 PROCEDURE — 97110 THERAPEUTIC EXERCISES: CPT | Mod: PN

## 2019-11-11 RX ORDER — BENAZEPRIL HYDROCHLORIDE 40 MG/1
TABLET ORAL
Qty: 90 TABLET | Refills: 0 | Status: SHIPPED | OUTPATIENT
Start: 2019-11-11 | End: 2020-02-05

## 2019-11-11 NOTE — PROGRESS NOTES
"                            Physical Therapy Daily Treatment Note     Name: Ghulam Ariza  Clinic Number: [id number]    Therapy Diagnosis:   Encounter Diagnoses   Name Primary?    Chronic midline posterior neck pain     Weakness of right arm     Impaired mobility and activities of daily living     Impaired sensation to light touch     Decreased ROM of neck      Physician: Padilla Coy MD    Visit Date: 11/11/2019    Physician Orders: PT Eval and Treat   Medical Diagnosis from Referral: M47.22 (ICD-10-CM) - Cervical spondylosis with radiculopathy   Evaluation Date: 10/23/2019  Authorization Period Expiration: 9/30/2020  Plan of Care Expiration: 10/23/2019 to 1/17/2020  Visit # / Visits authorized: 5/12  FOTO: 5/5 DONE     Time In: 1000  Time Out: 1100  Total Billable Time: 55 minutes     Precautions: Standard; hypertension; C3-7 ACDF, no lifting >25 lbs    Subjective     Pt reports: Saturday and Sunday the pt was sore from the new exercises from Friday's session. Pt took some muscle relaxer's to help with soreness.   He was compliant with home exercise program.  Response to previous treatment: decreased pain.   Functional change: no change in numbness or  strength.     Pain: 5/10 before session; 2/10 after  Location: midline of lower cervical upper thoracic spine    Objective     Ghulam received therapeutic exercises to develop strength, endurance, ROM and flexibility for 55 minutes including:     Supine:  Chin tucks: 10"x10. Verbal cues for correct technique.  Chick tuck + lift: 5"x10. Verbal cues for correct technique.    Serratus punches: 5# 3x10B  Overhead shoulder flexion c/ dumbbell : 5# 3x10 R.   Sidelying open books: 2x10 B    Seated:  Chair thoracic extension mobilizations against firm bolster: Arms crossed against chest 2x10 at lower thoracic spine, 2x10 at upper thoracic spine - Did not perform   Smiles with Red Flex Bar 3'  Frowns with Red Flex Bar 3'  Supination with Red Flex Bar " 3'  Pronation with Red Flex Bar 3'  Flex/Ext with Red Flex Bar 3'    Ghulam received the following manual therapy techniques: were applied to the: neck for 0 minutes including:    Posterior to anterior grade II-III mobilizations to T1-2 spinous processes in supine, same mobilizations to T2-7 spinous processes in prone.   Suboccipital release    Home Exercises Provided and Patient Education Provided     Education provided:   - Role of therapy.   - Patient given updated HEP including thoracic extension mobilizations and open books.      Written Home Exercises Provided: Yes.  Exercises were reviewed and Ghulam was able to demonstrate them prior to the end of the session.  Ghulam demonstrated good  understanding of the education provided.     See EMR under Patient Instructions for exercises provided 11/8/2019.     Assessment     Pt was able to tolerate all exercises. Pt progressed with new theraband flexbar exercises for Smiles/Frowns, Supination/Pronation, and Flex/Ext. Pt was given print out for new HEP using the theraband flexbar, as pt was interested in purchasing one for home use. Pt was able to verbalize and demonstrate understanding of new HEP.     Ghulam is progressing well towards his goals.   Pt prognosis is Good.   Pt will continue to benefit from skilled outpatient physical therapy to address the deficits listed in the problem list box on initial evaluation, provide pt/family education and to maximize pt's level of independence in the home and community environment.     Pt's spiritual, cultural and educational needs considered and pt agreeable to plan of care and goals.  Anticipated barriers to physical therapy: Chronic radicular symptoms    Goals:   Short Term Goals (4 Weeks):   1. Pt will be independent with HEP to supplement PT in improving pain free cervical mobility. PROGRESSING, NOT MET 11/11/2019  2. Pt will improve L cervical rotation AROM to >/=45 degrees to improve mobility for driving.  PROGRESSING, NOT MET 11/11/2019  3. Pt will perform overhead flexion and abduction without pain to promote ease of reaching. PROGRESSING, NOT MET 11/11/2019  4. Pt will improve cervical flexion AROM by 10 degrees to improve ease of looking down. PROGRESSING, NOT MET 11/11/2019    Long Term Goals (12 Weeks):   1. Pt will improve FOTO to </=45% limitation to improve perceived limitation with changing and maintaining mobility. PROGRESSING, NOT MET 11/11/2019  2. Pt will improve cervical AROM to WNL in all planes to improve cervical mobility for driving. PROGRESSING, NOT MET 11/11/2019  3. Pt will improve R UE myotomes to be within </=1/2 grade of L grade to improve equality in B UE strength for lifting and carrying tasks.PROGRESSING, NOT MET 11/11/2019  4. Pt will report no pain with lifting 25 lbs to promote return to work. PROGRESSING, NOT MET 11/11/2019  5. Pt will report no pain with cervical AROM in all planes to promote QOL. PROGRESSING, NOT MET 11/11/2019  6. Pt will improve cervical flexion AROM by 10 degrees to improve ease of looking down. PROGRESSING, NOT MET 11/11/2019  7. Pt will drive 30 minutes in low sitting car without pain to promote functional QOL. PROGRESSING, NOT MET 11/11/2019    Plan     Continue thoracic mobilizations.     Esvin Goddard, PT

## 2019-11-13 ENCOUNTER — CLINICAL SUPPORT (OUTPATIENT)
Dept: REHABILITATION | Facility: HOSPITAL | Age: 59
End: 2019-11-13
Payer: OTHER MISCELLANEOUS

## 2019-11-13 DIAGNOSIS — Z78.9 IMPAIRED MOBILITY AND ACTIVITIES OF DAILY LIVING: ICD-10-CM

## 2019-11-13 DIAGNOSIS — Z74.09 IMPAIRED MOBILITY AND ACTIVITIES OF DAILY LIVING: ICD-10-CM

## 2019-11-13 DIAGNOSIS — R29.898 WEAKNESS OF RIGHT ARM: ICD-10-CM

## 2019-11-13 DIAGNOSIS — R20.8 IMPAIRED SENSATION TO LIGHT TOUCH: ICD-10-CM

## 2019-11-13 DIAGNOSIS — R29.898 DECREASED ROM OF NECK: ICD-10-CM

## 2019-11-13 DIAGNOSIS — G89.29 CHRONIC MIDLINE POSTERIOR NECK PAIN: ICD-10-CM

## 2019-11-13 DIAGNOSIS — M54.2 CHRONIC MIDLINE POSTERIOR NECK PAIN: ICD-10-CM

## 2019-11-13 PROCEDURE — 97140 MANUAL THERAPY 1/> REGIONS: CPT | Mod: PN

## 2019-11-13 PROCEDURE — 97110 THERAPEUTIC EXERCISES: CPT | Mod: PN

## 2019-11-13 NOTE — PROGRESS NOTES
"                            Physical Therapy Daily Treatment Note     Name: Ghulam Ariza  Clinic Number: [id number]    Therapy Diagnosis:   Encounter Diagnoses   Name Primary?    Chronic midline posterior neck pain     Weakness of right arm     Impaired mobility and activities of daily living     Impaired sensation to light touch     Decreased ROM of neck      Physician: Padilla Coy MD    Visit Date: 11/13/2019    Physician Orders: PT Eval and Treat   Medical Diagnosis from Referral: M47.22 (ICD-10-CM) - Cervical spondylosis with radiculopathy   Evaluation Date: 10/23/2019  Authorization Period Expiration: 9/30/2020  Plan of Care Expiration: 10/23/2019 to 1/17/2020  Visit # / Visits authorized: 6/12  FOTO: 6/10      Time In: 0704  Time Out: 0803  Total Billable Time: 59 minutes     Precautions: Standard; hypertension; C3-7 ACDF, no lifting >25 lbs    Subjective     Pt reports: plans to buy flexbar to perform  exercises with  He was compliant with home exercise program.  Response to previous treatment: gradual decrease in pain  Functional change: improving      Pain: 5/10 before session; 0/10 after  Location: midline of lower cervical upper thoracic spine    Objective     Ghulam received therapeutic exercises to develop strength, endurance, ROM and flexibility for 47 minutes including:    Pt was able to tolerate all exercises. Pt progressed with new theraband flexbar exercises for Smiles/Frowns, Supination/Pronation, and Flex/Ext. Pt was given print out for new HEP using the theraband flexbar, as pt was interested in purchasing one for home use. Pt was able to verbalize and demonstrate understanding of new HEP.      Supine:  Cervical retraction: 5"x20. Verbal cues for correct technique  Chin tucks: 10"x10.   Chick tuck + lift: 7"x15.   Serratus punches: 10# 3x10B  Overhead shoulder flexion c/ dumbbell : 8# 3x10 R.   Sidelying open books: x10 B    Seated:  Chair upper thoracic extension " mobilizations against towel roll: Sitting in NuStep chair for chair back height, arms crossed against chest. 2x10 at upper thoracic spine    exercises: 1. Smiles, 2. Frowns, 3. Supination, 4. Pronation, 5. Flex/ext. 3' each with red flex bar   Gentle snag cervical extension with towel at T1-2: x10     Ghulam received the following manual therapy techniques: were applied to the: neck for 12 minutes including:    Posterior to anterior grade II-III mobilizations to T1-2 spinous processes in supine, same mobilizations to T2-7 spinous processes in prone.     Home Exercises Provided and Patient Education Provided     Education provided:   - Role of therapy.   - Continue HEP given on 11/8/2019 and 11/11/2019.     Written Home Exercises Provided: Not today.   Exercises were reviewed and Ghulam was able to demonstrate them prior to the end of the session.  Ghulam demonstrated good  understanding of the education provided.     See EMR under Patient Instructions for exercises provided 11/8/2019.     Assessment     Improving subjective pain and  strength report. Fatigue with progression of serratus punch and overhead shoulder flexion c/ dumbbell  weights. No pain with snag cervical extension exercise. Pain absent at session's end.     Ghulam is progressing well towards his goals.   Pt prognosis is Good.   Pt will continue to benefit from skilled outpatient physical therapy to address the deficits listed in the problem list box on initial evaluation, provide pt/family education and to maximize pt's level of independence in the home and community environment.     Pt's spiritual, cultural and educational needs considered and pt agreeable to plan of care and goals.  Anticipated barriers to physical therapy: Chronic radicular symptoms    Goals:   Short Term Goals (4 Weeks):   1. Pt will be independent with HEP to supplement PT in improving pain free cervical mobility. PROGRESSING, NOT MET 11/13/2019  2. Pt will improve L  cervical rotation AROM to >/=45 degrees to improve mobility for driving. PROGRESSING, NOT MET 11/13/2019  3. Pt will perform overhead flexion and abduction without pain to promote ease of reaching. PROGRESSING, NOT MET 11/13/2019  4. Pt will improve cervical flexion AROM by 10 degrees to improve ease of looking down. PROGRESSING, NOT MET 11/13/2019    Long Term Goals (12 Weeks):   1. Pt will improve FOTO to </=45% limitation to improve perceived limitation with changing and maintaining mobility. PROGRESSING, NOT MET 11/13/2019  2. Pt will improve cervical AROM to WNL in all planes to improve cervical mobility for driving. PROGRESSING, NOT MET 11/13/2019  3. Pt will improve R UE myotomes to be within </=1/2 grade of L grade to improve equality in B UE strength for lifting and carrying tasks.PROGRESSING, NOT MET 11/13/2019  4. Pt will report no pain with lifting 25 lbs to promote return to work. PROGRESSING, NOT MET 11/13/2019  5. Pt will report no pain with cervical AROM in all planes to promote QOL. PROGRESSING, NOT MET 11/13/2019  6. Pt will improve cervical flexion AROM by 10 degrees to improve ease of looking down. PROGRESSING, NOT MET 11/13/2019  7. Pt will drive 30 minutes in low sitting car without pain to promote functional QOL. PROGRESSING, NOT MET 11/13/2019    Plan     Continue thoracic mobilizations. Add seated overhead flexion and abduction and farmer's carry next. Measure  strength and cervical AROM next.      Sandy Turcios, PT

## 2019-11-18 ENCOUNTER — CLINICAL SUPPORT (OUTPATIENT)
Dept: REHABILITATION | Facility: HOSPITAL | Age: 59
End: 2019-11-18
Payer: OTHER MISCELLANEOUS

## 2019-11-18 DIAGNOSIS — R20.8 IMPAIRED SENSATION TO LIGHT TOUCH: ICD-10-CM

## 2019-11-18 DIAGNOSIS — M54.2 CHRONIC MIDLINE POSTERIOR NECK PAIN: ICD-10-CM

## 2019-11-18 DIAGNOSIS — G89.29 CHRONIC MIDLINE POSTERIOR NECK PAIN: ICD-10-CM

## 2019-11-18 DIAGNOSIS — Z74.09 IMPAIRED MOBILITY AND ACTIVITIES OF DAILY LIVING: ICD-10-CM

## 2019-11-18 DIAGNOSIS — R29.898 WEAKNESS OF RIGHT ARM: ICD-10-CM

## 2019-11-18 DIAGNOSIS — Z78.9 IMPAIRED MOBILITY AND ACTIVITIES OF DAILY LIVING: ICD-10-CM

## 2019-11-18 DIAGNOSIS — R29.898 DECREASED ROM OF NECK: ICD-10-CM

## 2019-11-18 PROCEDURE — 97110 THERAPEUTIC EXERCISES: CPT | Mod: PN

## 2019-11-18 NOTE — PROGRESS NOTES
"                            Physical Therapy Daily Treatment Note     Name: Ghulam Ariza  Clinic Number: [id number]    Therapy Diagnosis:   Encounter Diagnoses   Name Primary?    Chronic midline posterior neck pain     Weakness of right arm     Impaired mobility and activities of daily living     Impaired sensation to light touch     Decreased ROM of neck      Physician: Padilla Coy MD    Visit Date: 2019    Physician Orders: PT Eval and Treat   Medical Diagnosis from Referral: M47.22 (ICD-10-CM) - Cervical spondylosis with radiculopathy   Evaluation Date: 10/23/2019  Authorization Period Expiration: 2020  Plan of Care Expiration: 10/23/2019 to 2020  Visit # / Visits authorized:   FOTO: 7/10      Time In: 803  Time Out: 904  Total Billable Time: 61 minutes     Precautions: Standard; hypertension; C3-7 ACDF, no lifting >25 lbs    Subjective     Pt reports: very little pain over the weekend and this morning; 4/10 is good for him  He was compliant with home exercise program.  Response to previous treatment: minimal pain over the week  Functional change:  continues to improve    Pain: 4/10 before session; 0/10 after  Location: midline of lower cervical upper thoracic spine    Objective     Ghulam received therapeutic exercises to develop strength, endurance, ROM and flexibility for 61 minutes including:    Cervical AROM: Pain/Dysfunction with Movement:   Flexion: 10 degrees    Extension: 25 degrees    Right side bendin degrees    Left side bendin degrees    Right rotation: 39 degrees    Left rotation: 35 degrees       (#)    Right 17 kg   Left 28 kg     Supine:  Chick tuck + lift: 10"x15.   Serratus punches: 10# 3x10 B  Overhead shoulder flexion c/ dumbbell : 10# 2x10 R.   Sidelying open books: x10 B     Seated:  Chair upper thoracic extension mobilizations against towel roll: Sitting in NuStep chair for chair back height, arms crossed against chest. 2x10 at upper " thoracic spine    exercises: 1. Smiles, 2. Frowns, 3. Supination, 4. Pronation, 5. Flex/ext. 3' each with green flex bar   Gentle snag cervical extension with towel at T1-2: 2x10   Overhead flexion: 2# dumbbells, 3x10  Overhead abduction: 2# dumbbells, 3x10    Farmer's carry with B elbow flexion: 8# dumbbells 3 laps x 140 feet.     Home Exercises Provided and Patient Education Provided     Education provided:   - Role of therapy.   - Continue HEPs given on 11/8/2019 and 11/11/2019.     Written Home Exercises Provided: Not today.   Exercises were reviewed and Ghulam was able to demonstrate them prior to the end of the session.  Ghulam demonstrated good  understanding of the education provided.     See EMR under Patient Instructions for exercises provided 11/8/2019.     Assessment     Fatigue with addition of overhead flexion and abduction and farmer's carry; also with progression of chin tuck and lift duration. Improved B  strength since last measured on 10/30; 7 kg improvement on R and 8 on L, however continued 9 kg deficit L>R. Regression in cervical flexion and extension AROM since last measured on 10/23.     Ghulam is progressing well towards his goals.   Pt prognosis is Good.   Pt will continue to benefit from skilled outpatient physical therapy to address the deficits listed in the problem list box on initial evaluation, provide pt/family education and to maximize pt's level of independence in the home and community environment.     Pt's spiritual, cultural and educational needs considered and pt agreeable to plan of care and goals.  Anticipated barriers to physical therapy: Chronic radicular symptoms    Goals:   Short Term Goals (4 Weeks):   1. Pt will be independent with HEP to supplement PT in improving pain free cervical mobility. PROGRESSING, NOT MET 11/18/2019  2. Pt will improve L cervical rotation AROM to >/=45 degrees to improve mobility for driving. PROGRESSING, NOT MET 11/18/2019  3. Pt will  perform overhead flexion and abduction without pain to promote ease of reaching. PROGRESSING, NOT MET 11/18/2019  4. Pt will improve cervical flexion AROM by 10 degrees to improve ease of looking down. PROGRESSING, NOT MET 11/18/2019    Long Term Goals (12 Weeks):   1. Pt will improve FOTO to </=45% limitation to improve perceived limitation with changing and maintaining mobility. PROGRESSING, NOT MET 11/18/2019  2. Pt will improve cervical AROM to WNL in all planes to improve cervical mobility for driving. PROGRESSING, NOT MET 11/18/2019  3. Pt will improve R UE myotomes to be within </=1/2 grade of L grade to improve equality in B UE strength for lifting and carrying tasks.PROGRESSING, NOT MET 11/18/2019  4. Pt will report no pain with lifting 25 lbs to promote return to work. PROGRESSING, NOT MET 11/18/2019  5. Pt will report no pain with cervical AROM in all planes to promote QOL. PROGRESSING, NOT MET 11/18/2019  6. Pt will improve cervical flexion AROM by 10 degrees to improve ease of looking down. PROGRESSING, NOT MET 11/18/2019  7. Pt will drive 30 minutes in low sitting car without pain to promote functional QOL. PROGRESSING, NOT MET 11/18/2019    Plan     Improve cervical AROM; thoracic mobilizations prn. Progress lifting and overhead motion exercises.     Sandy Turcios, PT

## 2019-11-20 ENCOUNTER — CLINICAL SUPPORT (OUTPATIENT)
Dept: REHABILITATION | Facility: HOSPITAL | Age: 59
End: 2019-11-20
Payer: OTHER MISCELLANEOUS

## 2019-11-20 DIAGNOSIS — Z74.09 IMPAIRED MOBILITY AND ACTIVITIES OF DAILY LIVING: ICD-10-CM

## 2019-11-20 DIAGNOSIS — G89.29 CHRONIC MIDLINE POSTERIOR NECK PAIN: ICD-10-CM

## 2019-11-20 DIAGNOSIS — Z78.9 IMPAIRED MOBILITY AND ACTIVITIES OF DAILY LIVING: ICD-10-CM

## 2019-11-20 DIAGNOSIS — R20.8 IMPAIRED SENSATION TO LIGHT TOUCH: ICD-10-CM

## 2019-11-20 DIAGNOSIS — M54.2 CHRONIC MIDLINE POSTERIOR NECK PAIN: ICD-10-CM

## 2019-11-20 DIAGNOSIS — R29.898 WEAKNESS OF RIGHT ARM: ICD-10-CM

## 2019-11-20 DIAGNOSIS — R29.898 DECREASED ROM OF NECK: ICD-10-CM

## 2019-11-20 PROCEDURE — 97140 MANUAL THERAPY 1/> REGIONS: CPT | Mod: PN

## 2019-11-20 PROCEDURE — 97110 THERAPEUTIC EXERCISES: CPT | Mod: PN

## 2019-11-20 NOTE — PROGRESS NOTES
"                            Physical Therapy Daily Treatment Note     Name: Ghulam Ariza  Clinic Number: [id number]    Therapy Diagnosis:   Encounter Diagnoses   Name Primary?    Chronic midline posterior neck pain     Weakness of right arm     Impaired mobility and activities of daily living     Impaired sensation to light touch     Decreased ROM of neck      Physician: Padilla Coy MD    Visit Date: 11/20/2019    Physician Orders: PT Eval and Treat   Medical Diagnosis from Referral: M47.22 (ICD-10-CM) - Cervical spondylosis with radiculopathy   Evaluation Date: 10/23/2019  Authorization Period Expiration: 9/30/2020  Plan of Care Expiration: 10/23/2019 to 1/17/2020  Visit # / Visits authorized: 8/12  FOTO:8/10      Time In: 0805  Time Out: 0900  Total Billable Time: 55 minutes 1MT, 3 TE     Precautions: Standard; hypertension; C3-7 ACDF, no lifting >25 lbs    Subjective     Pt reports: "I feel like its getting better, its ok today". Pt agreeable to PT session   He was compliant with home exercise program.  Response to previous treatment: minimal pain over the week  Functional change:  continues to improve    Pain: 4/10    Location: midline of lower cervical upper thoracic spine    Objective     Ghulam received manual therapy to cervical area to promote tissue pliability and improved ROM: x 10 min in supine  -STM to B UT and cervical paraspinals  -STM to B Upper thoracic paraspinals    Ghulam received therapeutic exercises to develop strength, endurance, ROM and flexibility for 45 minutes including:    Supine:  Chick tuck + lift: 10"x15.   Serratus punches: 10# 3x10 B  Overhead shoulder flexion c/ dumbbell : 10# 2x10 R.   Sidelying open books: x10 B     Seated:  Chair upper thoracic extension mobilizations against towel roll: Sitting in NuStep chair for chair back height, arms crossed against chest. 2x10 at upper thoracic spine    exercises: 1. Smiles, 2. Frowns, 3. Supination, 4. Pronation, 5. " Flex/ext. 3' each with green flex bar (NOT PERFORMED)  Gentle snag cervical extension with towel at T1-2: 2x10   Overhead flexion: 2# dumbbells, 3x10  Overhead abduction: 2# dumbbells, 3x10    Farmer's carry with B elbow flexion: 8# dumbbells 3 laps x 140 feet.     Home Exercises Provided and Patient Education Provided     Education provided:   - Continue HEPs given on 11/8/2019 and 11/11/2019.     Written Home Exercises Provided: Not today.   Exercises were reviewed and Ghulam was able to demonstrate them prior to the end of the session.  Ghulam demonstrated good  understanding of the education provided.     See EMR under Patient Instructions for exercises provided 11/8/2019.     Assessment     Pt completed session with no reports of increased cervical pain. He is able to perform activities with postural awareness in sitting / standing.     Ghulam is progressing well towards his goals.   Pt prognosis is Good.   Pt will continue to benefit from skilled outpatient physical therapy to address the deficits listed in the problem list box on initial evaluation, provide pt/family education and to maximize pt's level of independence in the home and community environment.     Pt's spiritual, cultural and educational needs considered and pt agreeable to plan of care and goals.  Anticipated barriers to physical therapy: Chronic radicular symptoms    Goals:   Short Term Goals (4 Weeks):   1. Pt will be independent with HEP to supplement PT in improving pain free cervical mobility. PROGRESSING, NOT MET 11/20/2019  2. Pt will improve L cervical rotation AROM to >/=45 degrees to improve mobility for driving. PROGRESSING, NOT MET 11/20/2019  3. Pt will perform overhead flexion and abduction without pain to promote ease of reaching. PROGRESSING, NOT MET 11/20/2019  4. Pt will improve cervical flexion AROM by 10 degrees to improve ease of looking down. PROGRESSING, NOT MET 11/20/2019    Long Term Goals (12 Weeks):   1. Pt will  improve FOTO to </=45% limitation to improve perceived limitation with changing and maintaining mobility. PROGRESSING, NOT MET 11/20/2019  2. Pt will improve cervical AROM to WNL in all planes to improve cervical mobility for driving. PROGRESSING, NOT MET 11/20/2019  3. Pt will improve R UE myotomes to be within </=1/2 grade of L grade to improve equality in B UE strength for lifting and carrying tasks.PROGRESSING, NOT MET 11/20/2019  4. Pt will report no pain with lifting 25 lbs to promote return to work. PROGRESSING, NOT MET 11/20/2019  5. Pt will report no pain with cervical AROM in all planes to promote QOL. PROGRESSING, NOT MET 11/20/2019  6. Pt will improve cervical flexion AROM by 10 degrees to improve ease of looking down. PROGRESSING, NOT MET 11/20/2019  7. Pt will drive 30 minutes in low sitting car without pain to promote functional QOL. PROGRESSING, NOT MET 11/20/2019    Plan     Cont to advance PT as per SERA Herring, KAJAL

## 2019-11-25 ENCOUNTER — CLINICAL SUPPORT (OUTPATIENT)
Dept: REHABILITATION | Facility: HOSPITAL | Age: 59
End: 2019-11-25
Payer: OTHER MISCELLANEOUS

## 2019-11-25 DIAGNOSIS — Z74.09 IMPAIRED MOBILITY AND ACTIVITIES OF DAILY LIVING: ICD-10-CM

## 2019-11-25 DIAGNOSIS — R20.8 IMPAIRED SENSATION TO LIGHT TOUCH: ICD-10-CM

## 2019-11-25 DIAGNOSIS — R29.898 WEAKNESS OF RIGHT ARM: ICD-10-CM

## 2019-11-25 DIAGNOSIS — R29.898 DECREASED ROM OF NECK: ICD-10-CM

## 2019-11-25 DIAGNOSIS — M54.2 CHRONIC MIDLINE POSTERIOR NECK PAIN: ICD-10-CM

## 2019-11-25 DIAGNOSIS — Z78.9 IMPAIRED MOBILITY AND ACTIVITIES OF DAILY LIVING: ICD-10-CM

## 2019-11-25 DIAGNOSIS — G89.29 CHRONIC MIDLINE POSTERIOR NECK PAIN: ICD-10-CM

## 2019-11-25 PROCEDURE — 97110 THERAPEUTIC EXERCISES: CPT | Mod: PN

## 2019-11-25 NOTE — PROGRESS NOTES
"                            Physical Therapy Daily Treatment Note     Name: Ghulam Ariza  Clinic Number: [id number]    Therapy Diagnosis:   Encounter Diagnoses   Name Primary?    Chronic midline posterior neck pain     Weakness of right arm     Impaired mobility and activities of daily living     Impaired sensation to light touch     Decreased ROM of neck      Physician: Padilla Coy MD    Visit Date: 11/25/2019    Physician Orders: PT Eval and Treat   Medical Diagnosis from Referral: M47.22 (ICD-10-CM) - Cervical spondylosis with radiculopathy   Evaluation Date: 10/23/2019  Authorization Period Expiration: 9/30/2020  Plan of Care Expiration: 10/23/2019 to 1/17/2020  Visit # / Visits authorized: 9/12  FOTO: 9/10      Time In: 0804  Time Out: 0904  Total Billable Time: 60 minutes      Precautions: Standard; hypertension; C3-7 ACDF, no lifting >25 lbs    Subjective     Pt reports: 40-50% improvement in pain and function since initial evaluation. Patient reports pain still requires him to take muscle relaxors which he is not able to take at work. Patient has not challenged  outside of therapy; unsure of how much difference there is in this as a result.   He was compliant with home exercise program.  Response to previous treatment: no change  Functional change: able to perform progression  exercises    Pain: 4/10   Before and after session  Location: midline of lower cervical upper thoracic spine    Objective     Ghulam received therapeutic exercises to develop strength, endurance, ROM and flexibility for 60 minutes including:    Supine:  Chick tuck + lift: 15"x10  Sidelying open books: x10 B     Seated:  Chair upper thoracic extension mobilizations against towel roll: Sitting in NuStep chair for chair back height, out of time   exercises: 1. Smiles, 2. Frowns, 3. Supination, 4. Pronation, 5. Flex/ext. 3' each with blue flex bar   Gentle snag cervical extension with towel at T1-2: Out of " time    Standing:  Shoulder flexion flies: to 90 degrees, 3# dumbbells, 2x10   Shoulder abduction flies: to 90 degrees, 3# dumbbells, 2x10   Bent over rows: 10# x10 B  Bent over shoulder extension + external rotation: 5# x10 B  TVAs over Swiss ball: 2x10 B   press: 5# dowel 3x10   Farmer's carry with B elbow flexion: Out of time    Home Exercises Provided and Patient Education Provided     Education provided:   - Continue HEPs given on 11/8/2019 and 11/11/2019. Perform daily, thoracic and cervical extension multiple times a day.  - Increase  based activities throughout the day; stay below 25 lbs as instructed by MD     Written Home Exercises Provided: Not today.   Exercises were reviewed and Ghulam was able to demonstrate them prior to the end of the session.  Ghulam demonstrated good  understanding of the education provided.     See EMR under Patient Instructions for exercises provided 11/8/2019.     Assessment     Pain usually moderate around C7-T1 midline. Increased focus on periscapular and cuff strengthening today; considerable fatigue throughout with trembling observed on bent over exercises.     Ghulam is progressing well towards his goals.   Pt prognosis is Good.   Pt will continue to benefit from skilled outpatient physical therapy to address the deficits listed in the problem list box on initial evaluation, provide pt/family education and to maximize pt's level of independence in the home and community environment.     Pt's spiritual, cultural and educational needs considered and pt agreeable to plan of care and goals.  Anticipated barriers to physical therapy: Chronic radicular symptoms    Goals:   Short Term Goals (4 Weeks):   1. Pt will be independent with HEP to supplement PT in improving pain free cervical mobility. PROGRESSING, NOT MET 11/25/2019  2. Pt will improve L cervical rotation AROM to >/=45 degrees to improve mobility for driving. PROGRESSING, NOT MET 11/25/2019  3. Pt will  perform overhead flexion and abduction without pain to promote ease of reaching. PROGRESSING, NOT MET 11/25/2019  4. Pt will improve cervical flexion AROM by 10 degrees to improve ease of looking down. PROGRESSING, NOT MET 11/25/2019    Long Term Goals (12 Weeks):   1. Pt will improve FOTO to </=45% limitation to improve perceived limitation with changing and maintaining mobility. PROGRESSING, NOT MET 11/25/2019  2. Pt will improve cervical AROM to WNL in all planes to improve cervical mobility for driving. PROGRESSING, NOT MET 11/25/2019  3. Pt will improve R UE myotomes to be within </=1/2 grade of L grade to improve equality in B UE strength for lifting and carrying tasks.PROGRESSING, NOT MET 11/25/2019  4. Pt will report no pain with lifting 25 lbs to promote return to work. PROGRESSING, NOT MET 11/25/2019  5. Pt will report no pain with cervical AROM in all planes to promote QOL. PROGRESSING, NOT MET 11/25/2019  6. Pt will improve cervical flexion AROM by 10 degrees to improve ease of looking down. PROGRESSING, NOT MET 11/25/2019  7. Pt will drive 30 minutes in low sitting car without pain to promote functional QOL. PROGRESSING, NOT MET 11/25/2019    Plan     Increase lifting tasks. Reduce weight on bent over exercises.     Sandy Turcios, PT

## 2019-11-27 ENCOUNTER — CLINICAL SUPPORT (OUTPATIENT)
Dept: REHABILITATION | Facility: HOSPITAL | Age: 59
End: 2019-11-27
Payer: OTHER MISCELLANEOUS

## 2019-11-27 DIAGNOSIS — Z78.9 IMPAIRED MOBILITY AND ACTIVITIES OF DAILY LIVING: ICD-10-CM

## 2019-11-27 DIAGNOSIS — G89.29 CHRONIC MIDLINE POSTERIOR NECK PAIN: ICD-10-CM

## 2019-11-27 DIAGNOSIS — Z74.09 IMPAIRED MOBILITY AND ACTIVITIES OF DAILY LIVING: ICD-10-CM

## 2019-11-27 DIAGNOSIS — M54.2 CHRONIC MIDLINE POSTERIOR NECK PAIN: ICD-10-CM

## 2019-11-27 DIAGNOSIS — R20.8 IMPAIRED SENSATION TO LIGHT TOUCH: ICD-10-CM

## 2019-11-27 DIAGNOSIS — R29.898 WEAKNESS OF RIGHT ARM: ICD-10-CM

## 2019-11-27 DIAGNOSIS — R29.898 DECREASED ROM OF NECK: ICD-10-CM

## 2019-11-27 PROCEDURE — 97110 THERAPEUTIC EXERCISES: CPT | Mod: PN

## 2019-11-27 NOTE — PROGRESS NOTES
Physical Therapy Daily Treatment Note     Name: Ghulam Ariza  Clinic Number: [id number]    Therapy Diagnosis:   Encounter Diagnoses   Name Primary?    Chronic midline posterior neck pain     Weakness of right arm     Impaired mobility and activities of daily living     Impaired sensation to light touch     Decreased ROM of neck      Physician: Padilla Coy MD    Visit Date: 11/27/2019    Physician Orders: PT Eval and Treat   Medical Diagnosis from Referral: M47.22 (ICD-10-CM) - Cervical spondylosis with radiculopathy   Evaluation Date: 10/23/2019  Authorization Period Expiration: 9/30/2020  Plan of Care Expiration: 10/23/2019 to 1/17/2020  Visit # / Visits authorized: 10/12   FOTO: 10/10 done     Time In: 0702  Time Out: 0806  Total Billable Time: 64 minutes      Precautions: Standard; hypertension; C3-7 ACDF, no lifting >25 lbs    Subjective     Pt reports: Initial pain was in B shoulder, pointing to B upper trapezius. Over time it traveled to neck and head. Patient reports current sharp pains with shoulder flexion abduction and horizontal abduction. Lifting a cup of water can provoke pain.   He was compliant with home exercise program.  Response to previous treatment: not having constant neck pain anymore  Functional change: improved tolerance to cervical AROM    Pain: 5/10 before session  Location: midline of lower cervical upper thoracic spine    Objective     Ghulam received therapeutic exercises to develop strength, endurance, ROM and flexibility for 64 minutes including:    CMS Impairment/Limitation/Restriction for FOTO Neck Survey    Therapist reviewed FOTO scores for Ghulam Ariza on 11/27/2019.   FOTO documents entered into [employer] - see Media section.    Limitation Score: 48%  Category: Body Position     Airex mat on floor:  Thoracic foam (large tan) rolling: x10 with arms across chest. Neck pain reported after performance.   Thoracic extension against foam roll: x10 at  upper and middle thoracic spine with hands interlaced behind neck for support. No pain. Reviewed performance in sitting; done in prior sessions.   Child's pose: x10. Demonstrated performance in standing.  Thread the needle: x10. Demonstrated performance in standing.    Seated:   press: 5# dowel 3x10   Shoulder rolls: x20 forwards and backwards  Shoulder shrugs: x20    Standing:  Shoulder flexion flies: to 90 degrees, 3# dumbbells, 2x10   Shoulder abduction flies: to 90 degrees, 3# dumbbells, 2x10   Bent over rows: 5# x10 B  Bent over shoulder extension + external rotation: 3# x10 B  TVAs over Swiss ball: Out of time  Hip hinge: 2x10    Home Exercises Provided and Patient Education Provided     Education provided:   - Continue HEPs given on 11/8/2019 and 11/11/2019. Perform  exercises with flexbar at home. Will e-mail HEP of exercises on Airex mat.   - Increase  based activities throughout the day; stay below 25 lbs as instructed by MD     Written Home Exercises Provided: Not today. Upload next visit.  Exercises were reviewed and Ghulam was able to demonstrate them prior to the end of the session.  Ghulam demonstrated good  understanding of the education provided.     See EMR under Patient Instructions for exercises provided 11/8/2019 and 11/11/2019.     Assessment     Strength deficit noted on R vs L with thread the needle exercise; trembling of R arm when in stable position as L arm moved; frequent breaks during set reps. Regressed weight of UE strengthening today. Increased focus on thoracic mobility today.     Ghulam is progressing well towards his goals.   Pt prognosis is Good.   Pt will continue to benefit from skilled outpatient physical therapy to address the deficits listed in the problem list box on initial evaluation, provide pt/family education and to maximize pt's level of independence in the home and community environment.     Pt's spiritual, cultural and educational needs considered and  pt agreeable to plan of care and goals.  Anticipated barriers to physical therapy: Chronic radicular symptoms    Goals:   Short Term Goals (4 Weeks):   1. Pt will be independent with HEP to supplement PT in improving pain free cervical mobility. PROGRESSING, NOT MET 11/27/2019  2. Pt will improve L cervical rotation AROM to >/=45 degrees to improve mobility for driving. PROGRESSING, NOT MET 11/27/2019  3. Pt will perform overhead flexion and abduction without pain to promote ease of reaching. PROGRESSING, NOT MET 11/27/2019  4. Pt will improve cervical flexion AROM by 10 degrees to improve ease of looking down. PROGRESSING, NOT MET 11/27/2019    Long Term Goals (12 Weeks):   1. Pt will improve FOTO to </=45% limitation to improve perceived limitation with changing and maintaining mobility. PROGRESSING, NOT MET 11/27/2019  2. Pt will improve cervical AROM to WNL in all planes to improve cervical mobility for driving. PROGRESSING, NOT MET 11/27/2019  3. Pt will improve R UE myotomes to be within </=1/2 grade of L grade to improve equality in B UE strength for lifting and carrying tasks.PROGRESSING, NOT MET 11/27/2019  4. Pt will report no pain with lifting 25 lbs to promote return to work. PROGRESSING, NOT MET 11/27/2019  5. Pt will report no pain with cervical AROM in all planes to promote QOL. PROGRESSING, NOT MET 11/27/2019  6. Pt will improve cervical flexion AROM by 10 degrees to improve ease of looking down. PROGRESSING, NOT MET 11/27/2019  7. Pt will drive 30 minutes in low sitting car without pain to promote functional QOL. PROGRESSING, NOT MET 11/27/2019    Plan     Progress hip hinge to weighted dead lift. Resume farmer's carry. Progress other weighted exercises to tolerance.     Sandy Turcios, PT

## 2019-11-29 ENCOUNTER — DOCUMENTATION ONLY (OUTPATIENT)
Dept: REHABILITATION | Facility: HOSPITAL | Age: 59
End: 2019-11-29

## 2019-11-29 DIAGNOSIS — Z78.9 IMPAIRED MOBILITY AND ACTIVITIES OF DAILY LIVING: ICD-10-CM

## 2019-11-29 DIAGNOSIS — M54.2 CHRONIC MIDLINE POSTERIOR NECK PAIN: ICD-10-CM

## 2019-11-29 DIAGNOSIS — Z74.09 IMPAIRED MOBILITY AND ACTIVITIES OF DAILY LIVING: ICD-10-CM

## 2019-11-29 DIAGNOSIS — R29.898 DECREASED ROM OF NECK: ICD-10-CM

## 2019-11-29 DIAGNOSIS — G89.29 CHRONIC MIDLINE POSTERIOR NECK PAIN: ICD-10-CM

## 2019-11-29 DIAGNOSIS — R29.898 WEAKNESS OF RIGHT ARM: ICD-10-CM

## 2019-11-29 DIAGNOSIS — R20.8 IMPAIRED SENSATION TO LIGHT TOUCH: ICD-10-CM

## 2019-11-29 NOTE — PROGRESS NOTES
Face to Face PTA Conference performed on 11/26/2019 regarding patient's current status, overall progress, and plan of care:    Sandy Turcios, PT    Sonido Herring, PTA

## 2019-12-09 ENCOUNTER — CLINICAL SUPPORT (OUTPATIENT)
Dept: REHABILITATION | Facility: HOSPITAL | Age: 59
End: 2019-12-09
Payer: OTHER MISCELLANEOUS

## 2019-12-09 DIAGNOSIS — G89.29 CHRONIC MIDLINE POSTERIOR NECK PAIN: ICD-10-CM

## 2019-12-09 DIAGNOSIS — R20.8 IMPAIRED SENSATION TO LIGHT TOUCH: ICD-10-CM

## 2019-12-09 DIAGNOSIS — R29.898 WEAKNESS OF RIGHT ARM: ICD-10-CM

## 2019-12-09 DIAGNOSIS — R29.898 DECREASED ROM OF NECK: ICD-10-CM

## 2019-12-09 DIAGNOSIS — Z74.09 IMPAIRED MOBILITY AND ACTIVITIES OF DAILY LIVING: ICD-10-CM

## 2019-12-09 DIAGNOSIS — Z78.9 IMPAIRED MOBILITY AND ACTIVITIES OF DAILY LIVING: ICD-10-CM

## 2019-12-09 DIAGNOSIS — M54.2 CHRONIC MIDLINE POSTERIOR NECK PAIN: ICD-10-CM

## 2019-12-09 PROCEDURE — 97110 THERAPEUTIC EXERCISES: CPT | Mod: PN

## 2019-12-09 NOTE — PROGRESS NOTES
Physical Therapy Daily Treatment Note     Name: Ghulam Ariza  Clinic Number: [id number]    Therapy Diagnosis:   Encounter Diagnoses   Name Primary?    Chronic midline posterior neck pain     Weakness of right arm     Impaired mobility and activities of daily living     Impaired sensation to light touch     Decreased ROM of neck      Physician: Padilla Coy MD    Visit Date: 12/9/2019    Physician Orders: PT Eval and Treat   Medical Diagnosis from Referral: M47.22 (ICD-10-CM) - Cervical spondylosis with radiculopathy   Evaluation Date: 10/23/2019  Authorization Period Expiration: 9/30/2020  Plan of Care Expiration: 10/23/2019 to 1/17/2020  Visit # / Visits authorized: 11/12   FOTO: 1/10     Time In: 0806  Time Out: 0906  Total Billable Time: 60 minutes      Precautions: Standard; hypertension; C3-7 ACDF, no lifting >25 lbs    Subjective     Pt reports: 40% improvement since initial evaluation.   He was compliant with home exercise program.  Response to previous treatment: longer term pain relief with HEP compliance.   Functional change:  strength seems to be improving; has not performed any strenuous tasks to challenge this.     Pain: 5/10 before session  Location: midline of lower cervical upper thoracic spine    Objective     Ghulam received therapeutic exercises to develop strength, endurance, ROM and flexibility for 60 minutes including:     Range of Motion/Strength:      CERVICAL AROM Pain/Dysfunction with Movement   Flexion 34    Right rotation 49    Left rotation 57    Tx=treatment     Shoulder Right Left Pain/Dysfunction with Movement   AROM/PROM         flexion  145/160 (empty end feel)  130/156 Reports of difficulty lifting R UE    abduction  130/150  130/147 Reports of difficulty lifting R UE        U/E Myotomes Right Left Pain/Dysfunction with Movement   Shoulder Elevation 5/5 5/5     Shoulder Abduction 4/5 4/5     Elbow Flexion  4+/5 5/5     Wrist Extension 4/5  5/5     Elbow Extension 5/5 5/5     Wrist Flexion 4+/5 5/5     Thumb Extension 4/5 4/5     Digit abduction Grossly 3+/5 Grossly 4/5         (#)     Right 18.6 kg   Left 24.3 kg      Airex mat on floor:  Thoracic foam rolling: 2x10 at upper and middle thoracic spine with hands interlaced behind neck for support  Thoracic extension against foam roll: 2x10 at upper and middle thoracic spine with hands interlaced behind neck for support.    Standing:  Open books: x10 B  Thread the needle: x10 B   Wall push-ups: Shoulders at 45 degrees x10. Angled 30 degrees at mat x5  Alternate shoulder press: 5# dumbbells 2x10 B  Shoulder flexion flies: to 90 degrees, 5# dumbbells 2x10 B  Shoulder abduction flies: to 90 degrees, 5# dumbbells 2x10 B  TVAs over Swiss ball: 1# x10 B    Home Exercises Provided and Patient Education Provided     Education provided:   - Continue HEPs given on 11/8/2019, 11/11/2019 and 12/9/2019. Will e-mail new exercises performed today.   - Increase  based activities throughout the day; stay below 25 lbs as instructed by MD     Written Home Exercises Provided: Yes.   Exercises were reviewed and Ghulam was able to demonstrate them prior to the end of the session.  Ghulam demonstrated good  understanding of the education provided.     See EMR under Patient Instructions for exercises provided 11/8/2019, 11/11/2019, and 12/9/2019.     Assessment      strength on R improving towards L; >10% deficit between sides remains. UE myotome strength improving, fairly comparable B; greatest deficit between digital abduction strength. Patient has met short term cervical ROM goals. Pain typically mid-range near midline of upper thoracic spine; fusion to C7 a major factor.     Ghulam is progressing well towards his goals.   Pt prognosis is Good.   Pt will continue to benefit from skilled outpatient physical therapy to address the deficits listed in the problem list box on initial evaluation, provide pt/family  education and to maximize pt's level of independence in the home and community environment.     Pt's spiritual, cultural and educational needs considered and pt agreeable to plan of care and goals.  Anticipated barriers to physical therapy: Chronic radicular symptoms    Goals:   Short Term Goals (4 Weeks):   1. Pt will be independent with HEP to supplement PT in improving pain free cervical mobility MET  2. Pt will improve L cervical rotation AROM to >/=45 degrees to improve mobility for driving. MET  3. Pt will perform overhead flexion and abduction without pain to promote ease of reaching. MET  4. Pt will improve cervical flexion AROM by 10 degrees to improve ease of looking down. MET    Long Term Goals (12 Weeks):   1. Pt will improve FOTO to </=45% limitation to improve perceived limitation with changing and maintaining mobility. PROGRESSING, NOT MET 12/9/2019  2. Pt will improve cervical AROM to WNL in all planes to improve cervical mobility for driving. PROGRESSING, NOT MET 12/9/2019  3. Pt will improve R UE myotomes to be within </=1/2 grade of L grade to improve equality in B UE strength for lifting and carrying tasks.PARTIALLY MET 12/09/2019   4. Pt will report no pain with lifting 25 lbs to promote return to work. PROGRESSING, NOT MET 12/9/2019  5. Pt will report no pain with cervical AROM in all planes to promote QOL. PROGRESSING, NOT MET 12/9/2019  6. Pt will improve cervical flexion AROM by 10 degrees to improve ease of looking down. Repeat of STG #4, MET  7. Pt will drive 30 minutes in low sitting car without pain to promote functional QOL. PROGRESSING, NOT MET 12/9/2019    Plan     Progress weight of lifting to tolerance.     Sandy Turcios, PT

## 2020-01-01 NOTE — TELEPHONE ENCOUNTER
----- Message from Chidi Duran sent at 3/7/2018  9:08 AM CST -----  Contact: Ghulam Ariza  X_  1st Request  _  2nd Request  _  3rd Request        Who: Ghulam Ariza    Why: Patient's procedure was denied through Worker's Comp. Called to see if Dr. Sanchez will appeal decision       Please return the call at earliest convenience. Thanks!    What Number to Call Back: [phone]      When to Expect a call back: (Within 24 hours)                            
Contacted and spoke to patient regarding his message, he was informed that NIMO Hicks did leave a message asking for a return call for his  to find out what was needed to start the appeal for his procedure. He was informed that he was also contacted at the time but his Vm was full.     Patient is requesting a call with an update once completed.     He did inform staff that his VM is now cleared and that noted  is not very good at returning calls, so we may need to contact them again.    Mr. Ariza was informed that this information will be presented to the NP.     W/c information  Re'cd Denial from [email or web address] [phone].... Phone number [phone].... Pt was denied due to the request not meeting established treament standards of medical necessity.. Sent in-basket to Dr. Office and uploaded denial to media..   
(2) more than 100 beats/min

## 2020-01-08 RX ORDER — CYCLOBENZAPRINE HCL 10 MG
10 TABLET ORAL 3 TIMES DAILY PRN
Qty: 90 TABLET | Refills: 2 | Status: SHIPPED | OUTPATIENT
Start: 2020-01-08 | End: 2020-04-07

## 2020-01-21 ENCOUNTER — DOCUMENTATION ONLY (OUTPATIENT)
Dept: REHABILITATION | Facility: HOSPITAL | Age: 60
End: 2020-01-21

## 2020-01-21 DIAGNOSIS — R29.898 WEAKNESS OF RIGHT ARM: ICD-10-CM

## 2020-01-21 DIAGNOSIS — R20.8 IMPAIRED SENSATION TO LIGHT TOUCH: ICD-10-CM

## 2020-01-21 DIAGNOSIS — Z78.9 IMPAIRED MOBILITY AND ACTIVITIES OF DAILY LIVING: ICD-10-CM

## 2020-01-21 DIAGNOSIS — R29.898 DECREASED ROM OF NECK: ICD-10-CM

## 2020-01-21 DIAGNOSIS — M54.2 CHRONIC MIDLINE POSTERIOR NECK PAIN: ICD-10-CM

## 2020-01-21 DIAGNOSIS — G89.29 CHRONIC MIDLINE POSTERIOR NECK PAIN: ICD-10-CM

## 2020-01-21 DIAGNOSIS — Z74.09 IMPAIRED MOBILITY AND ACTIVITIES OF DAILY LIVING: ICD-10-CM

## 2020-01-21 NOTE — PROGRESS NOTES
Pt was evaluated on 10/23/2019 and was seen 11 times for PT. Pt has not attended PT since 2019; issues with authorization. Patient was given HEP update on 2019. Current status is unknown. Plan of care  2020. Pt to be d/c'd at this time.

## 2020-02-05 DIAGNOSIS — Z12.5 PROSTATE CANCER SCREENING: ICD-10-CM

## 2020-02-05 DIAGNOSIS — E55.9 VITAMIN D DEFICIENCY DISEASE: ICD-10-CM

## 2020-02-05 DIAGNOSIS — Z00.00 WELL ADULT EXAM: Primary | ICD-10-CM

## 2020-02-05 RX ORDER — BENAZEPRIL HYDROCHLORIDE 40 MG/1
TABLET ORAL
Qty: 90 TABLET | Refills: 0 | Status: SHIPPED | OUTPATIENT
Start: 2020-02-05 | End: 2020-05-04

## 2020-02-05 NOTE — TELEPHONE ENCOUNTER
The patient is overdue for a physical exam.  Labs and urine have been ordered.  Please schedule and inform the patient.  Thank you.

## 2020-02-11 ENCOUNTER — HOSPITAL ENCOUNTER (EMERGENCY)
Facility: HOSPITAL | Age: 60
Discharge: HOME | End: 2020-02-11
Attending: EMERGENCY MEDICINE
Payer: COMMERCIAL

## 2020-02-11 VITALS
HEART RATE: 69 BPM | BODY MASS INDEX: 32.08 KG/M2 | HEIGHT: 74 IN | SYSTOLIC BLOOD PRESSURE: 182 MMHG | DIASTOLIC BLOOD PRESSURE: 103 MMHG | WEIGHT: 250 LBS | TEMPERATURE: 98 F | OXYGEN SATURATION: 100 % | RESPIRATION RATE: 19 BRPM

## 2020-02-11 DIAGNOSIS — M62.838 MUSCLE SPASMS OF NECK: ICD-10-CM

## 2020-02-11 DIAGNOSIS — M54.2 NECK PAIN: Primary | ICD-10-CM

## 2020-02-11 PROCEDURE — 63600175 PHARM REV CODE 636 W HCPCS: Performed by: EMERGENCY MEDICINE

## 2020-02-11 PROCEDURE — 99284 EMERGENCY DEPT VISIT MOD MDM: CPT | Mod: 25

## 2020-02-11 PROCEDURE — 96372 THER/PROPH/DIAG INJ SC/IM: CPT

## 2020-02-11 RX ORDER — KETOROLAC TROMETHAMINE 30 MG/ML
60 INJECTION, SOLUTION INTRAMUSCULAR; INTRAVENOUS
Status: COMPLETED | OUTPATIENT
Start: 2020-02-11 | End: 2020-02-11

## 2020-02-11 RX ORDER — IBUPROFEN 600 MG/1
600 TABLET ORAL EVERY 6 HOURS
Qty: 20 TABLET | Refills: 0 | Status: SHIPPED | OUTPATIENT
Start: 2020-02-11 | End: 2020-03-02

## 2020-02-11 RX ORDER — METHOCARBAMOL 750 MG/1
1500 TABLET, FILM COATED ORAL 3 TIMES DAILY
Qty: 30 TABLET | Refills: 0 | Status: SHIPPED | OUTPATIENT
Start: 2020-02-11 | End: 2020-02-16

## 2020-02-11 RX ORDER — HYDROCODONE BITARTRATE AND ACETAMINOPHEN 7.5; 325 MG/1; MG/1
1 TABLET ORAL EVERY 6 HOURS PRN
Qty: 10 TABLET | Refills: 0 | Status: SHIPPED | OUTPATIENT
Start: 2020-02-11 | End: 2020-02-21

## 2020-02-11 RX ADMIN — KETOROLAC TROMETHAMINE 60 MG: 30 INJECTION, SOLUTION INTRAMUSCULAR at 05:02

## 2020-02-11 NOTE — ED PROVIDER NOTES
Encounter Date: 2/11/2020    SCRIBE #1 NOTE: I, Oral Melvin, am scribing for, and in the presence of,  Dr. Mir. I have scribed the entire note.       History     Chief Complaint   Patient presents with    Neck Pain     hx of fusion, pt recently had  a FIT test for work and had to lift weight and states neck has been hurting since      Time seen by provider: 5:28 PM    This is a 59 y.o. male who presents with complaint of neck and back pain. The patient reports he recently did some heavy lifting at work, and has had 3-4 days of gradually worsening pain to his right neck radiating down the right arm with right hand tingling, also with pain to the upper middle back. He reports no relief with muscle relaxers and Tylenol. Patient denies any other symptoms. He reports a history of cervical fusion last year, and recently finished physical therapy.    The history is provided by the patient.     Review of patient's allergies indicates:  No Known Allergies  Past Medical History:   Diagnosis Date    Degenerative disc disease     cervical radiculopathy    Genital herpes     Hx of colonic polyps     Hypertension early 40s    Pinched nerve in neck      Past Surgical History:   Procedure Laterality Date    back sx      L4/L5 Herniated disk    BUNIONECTOMY Right     COLONOSCOPY N/A 9/6/2018    Procedure: COLONOSCOPY;  Surgeon: Mg Lagunas MD;  Location: [facility] ([address] [place] [address]);  Service: Endoscopy;  Laterality: N/A;    SPINE SURGERY      L4/5 laminectomy     Family History   Problem Relation Age of Onset    Hypertension Mother     Stroke Mother 68        TIA    Cancer Father 65        Prostate    Cancer Paternal Uncle     Cancer Brother         Prostate Cancer     Social History     Tobacco Use    Smoking status: Never Smoker    Smokeless tobacco: Never Used   Substance Use Topics    Alcohol use: Yes     Alcohol/week: 0.0 standard drinks     Comment: every weekend few drinks    Drug use: No     Review of  Systems   Constitutional: Negative for chills and fever.   HENT: Negative for sore throat.    Eyes: Negative for redness.   Respiratory: Negative for shortness of breath.    Cardiovascular: Negative for chest pain.   Gastrointestinal: Negative for abdominal pain, diarrhea, nausea and vomiting.   Genitourinary: Negative for dysuria and hematuria.   Musculoskeletal: Positive for back pain and neck pain.   Skin: Negative for rash.   Neurological: Negative for headaches.       Physical Exam     Initial Vitals [02/11/20 1653]   BP Pulse Resp Temp SpO2   (!) 182/103 69 19 98.2 °F (36.8 °C) 100 %      MAP       --         Physical Exam    Nursing note and vitals reviewed.  Constitutional: He appears well-developed and well-nourished. He is not diaphoretic. No distress.   HENT:   Head: Normocephalic and atraumatic.   Mouth/Throat: Oropharynx is clear and moist.   Eyes: Conjunctivae and EOM are normal.   Neck: Normal range of motion. Neck supple.   Cardiovascular: Normal rate, regular rhythm and normal heart sounds.   Pulmonary/Chest: Breath sounds normal. No respiratory distress.   Abdominal: Soft. There is no tenderness.   Musculoskeletal: Normal range of motion. He exhibits tenderness. He exhibits no edema.   Tenderness of the lower cervical vertebrae extending to the right trapezial ridge   Neurological: He is alert and oriented to person, place, and time.   No sensory deficits  Right hand  slightly weaker than left   Skin: Skin is warm and dry.         ED Course   Procedures  Labs Reviewed - No data to display       Imaging Results          X-Ray Cervical Spine AP And Lateral (Final result)  Result time 02/11/20 18:38:19    Final result by Rocío Bro MD (02/11/20 18:38:19)                 Impression:      No acute cervical spine abnormalities or hardware complication identified.      Electronically signed by: Rocío Bro MD  Date:    02/11/2020  Time:    18:38             Narrative:    EXAMINATION:  XR  CERVICAL SPINE AP LATERAL    CLINICAL HISTORY:  neck pain;    TECHNIQUE:  AP, lateral  views of the cervical spine were performed.    COMPARISON:  October 2017.    FINDINGS:  No evidence of acute cervical spine fracture or subluxation.  Extensive postsurgical changes consistent with anterior cervical discectomy and fusion are seen from the C3 through C7 levels.  No hardware complication identified.  Cervical spine alignment is within normal limits.  Odontoid process is obscured on AP projection, but appears intact on lateral projection.  Surrounding soft tissues show no significant abnormalities.                                 Medical Decision Making:   Clinical Tests:   Radiological Study: Ordered and Reviewed  ED Management:  59-year-old male with neck pain. Patient had a cervical fusion in the past.  He believes he injured his neck heavy lifting.  X-ray of the cervical spine shows no acute abnormalities.  He was given a shot of Toradol here in the ED.  I will place him on ibuprofen, Robaxin and Norco.  I have urged close follow-up with his primary physician for re-evaluation and neurosurgical referral if warranted.                                 Clinical Impression:       ICD-10-CM ICD-9-CM   1. Neck pain M54.2 723.1   2. Muscle spasms of neck M62.838 728.85     Disposition:   Disposition: Discharged  Condition: Stable        I, Dr. Maikol Zamora, personally performed the services described in this documentation. All medical record entries made by the scribe were at my direction and in my presence. I have reviewed the chart and agree that the record reflects my personal performance and is accurate and complete. Maikol Zamora MD.  8:05 PM 02/11/2020       Maikol Zamora MD  02/11/20 2007

## 2020-03-02 ENCOUNTER — OFFICE VISIT (OUTPATIENT)
Dept: INTERNAL MEDICINE | Facility: CLINIC | Age: 60
End: 2020-03-02
Payer: COMMERCIAL

## 2020-03-02 VITALS
TEMPERATURE: 98 F | WEIGHT: 246.94 LBS | HEIGHT: 74 IN | DIASTOLIC BLOOD PRESSURE: 88 MMHG | HEART RATE: 68 BPM | SYSTOLIC BLOOD PRESSURE: 130 MMHG | RESPIRATION RATE: 18 BRPM | BODY MASS INDEX: 31.69 KG/M2

## 2020-03-02 DIAGNOSIS — K21.9 GASTROESOPHAGEAL REFLUX DISEASE, ESOPHAGITIS PRESENCE NOT SPECIFIED: Primary | ICD-10-CM

## 2020-03-02 DIAGNOSIS — M70.22 OLECRANON BURSITIS OF LEFT ELBOW: ICD-10-CM

## 2020-03-02 DIAGNOSIS — M94.0 COSTOCHONDRITIS: ICD-10-CM

## 2020-03-02 PROCEDURE — 99214 OFFICE O/P EST MOD 30 MIN: CPT | Mod: S$GLB,,, | Performed by: FAMILY MEDICINE

## 2020-03-02 PROCEDURE — 99214 PR OFFICE/OUTPT VISIT, EST, LEVL IV, 30-39 MIN: ICD-10-PCS | Mod: S$GLB,,, | Performed by: FAMILY MEDICINE

## 2020-03-02 PROCEDURE — 99999 PR PBB SHADOW E&M-EST. PATIENT-LVL III: CPT | Mod: PBBFAC,,, | Performed by: FAMILY MEDICINE

## 2020-03-02 PROCEDURE — 99999 PR PBB SHADOW E&M-EST. PATIENT-LVL III: ICD-10-PCS | Mod: PBBFAC,,, | Performed by: FAMILY MEDICINE

## 2020-03-02 RX ORDER — MELOXICAM 15 MG/1
15 TABLET ORAL DAILY
Qty: 10 TABLET | Refills: 0 | Status: SHIPPED | OUTPATIENT
Start: 2020-03-02 | End: 2020-12-27

## 2020-03-02 RX ORDER — PANTOPRAZOLE SODIUM 40 MG/1
40 TABLET, DELAYED RELEASE ORAL DAILY
Qty: 30 TABLET | Refills: 11 | Status: SHIPPED | OUTPATIENT
Start: 2020-03-02 | End: 2020-07-08 | Stop reason: SDUPTHER

## 2020-03-02 NOTE — PROGRESS NOTES
Subjective:       Patient ID: Ghulam Ariza is a 59 y.o. male.    Chief Complaint: Chest Pain (soreness at top of ribs ) and Elbow Pain    HPI 59-year-old  male presents to clinic today secondary to a complaint of occasional soreness along the xiphoid process with frequent belching.  He reports that the pain predominantly occurs when he is laying on his side.  He has not taken any medication for these symptoms.  Secondarily, he continues to note left elbow pain with occasional of fusion that has previously been treated by Orthopedics.  He has recently been noting increased pain.  Review of Systems   Constitutional: Negative for appetite change, chills, fatigue and fever.   HENT: Negative for congestion, ear pain, hearing loss, postnasal drip, rhinorrhea, sinus pressure, sore throat and tinnitus.    Eyes: Negative for redness, itching and visual disturbance.   Respiratory: Negative for cough, chest tightness and shortness of breath.    Cardiovascular: Negative for chest pain and palpitations.   Gastrointestinal: Negative for abdominal pain, constipation, diarrhea, nausea and vomiting.   Genitourinary: Negative for decreased urine volume, difficulty urinating, dysuria, frequency, hematuria and urgency.   Musculoskeletal: Positive for arthralgias (Chest soreness and left elbow soreness) and joint swelling ( left elbow). Negative for back pain, myalgias, neck pain and neck stiffness.   Skin: Negative for rash.   Neurological: Negative for dizziness, light-headedness and headaches.   Psychiatric/Behavioral: Negative.        Objective:      Physical Exam   Constitutional: He is oriented to person, place, and time. He appears well-developed and well-nourished. No distress.   HENT:   Head: Normocephalic and atraumatic.   Right Ear: External ear normal.   Left Ear: External ear normal.   Nose: Nose normal.   Mouth/Throat: Oropharynx is clear and moist. No oropharyngeal exudate.   Eyes: Pupils are equal, round,  and reactive to light. Conjunctivae and EOM are normal. Right eye exhibits no discharge. Left eye exhibits no discharge. No scleral icterus.   Neck: Normal range of motion. Neck supple. No JVD present. No tracheal deviation present. No thyromegaly present.   Cardiovascular: Normal rate, regular rhythm, normal heart sounds and intact distal pulses. Exam reveals no gallop and no friction rub.   No murmur heard.  Pulmonary/Chest: Effort normal and breath sounds normal. No stridor. No respiratory distress. He has no wheezes. He has no rales. He exhibits tenderness.       Abdominal: Soft. Bowel sounds are normal. He exhibits no distension and no mass. There is no tenderness. There is no rebound and no guarding.   Musculoskeletal: Normal range of motion. He exhibits no edema or tenderness.        Left elbow: He exhibits swelling and effusion.   Lymphadenopathy:     He has no cervical adenopathy.   Neurological: He is alert and oriented to person, place, and time.   Skin: Skin is warm and dry. No rash noted. He is not diaphoretic. No erythema. No pallor.   Psychiatric: He has a normal mood and affect. His behavior is normal. Judgment and thought content normal.   Nursing note and vitals reviewed.      Assessment:       1. Gastroesophageal reflux disease, esophagitis presence not specified    2. Olecranon bursitis of left elbow    3. Costochondritis        Plan:       1.  Recommend starting pantoprazole 40 mg daily.  2.  Meloxicam 15 mg daily for 10 days.  3.  Refer to Orthopedics for further evaluation and treatment of left elbow bursitis.  4.  Return to clinic as needed if symptoms persist or worsen.

## 2020-03-26 ENCOUNTER — DOCUMENTATION ONLY (OUTPATIENT)
Dept: REHABILITATION | Facility: HOSPITAL | Age: 60
End: 2020-03-26

## 2020-03-26 NOTE — PROGRESS NOTES
Pt was evaluated on 6/5/2019 and was seen 10 times for PT. Pt has not attended PT since 7/26/2019. Pt was given HEP. Current status is unknown. Pt to be d/c'd at this time.

## 2020-03-30 ENCOUNTER — TELEPHONE (OUTPATIENT)
Dept: INTERNAL MEDICINE | Facility: CLINIC | Age: 60
End: 2020-03-30

## 2020-03-30 NOTE — TELEPHONE ENCOUNTER
----- Message from Danni Luna sent at 3/30/2020  9:19 AM CDT -----  Contact: self   Caller is requesting an earlier appt than we can schedule.  Caller declined first available appointment listed below. Caller will not accept being placed on the wait list and is requesting a message be sent to the provider.  When is the next available appointment:  7/6  Did you offer to schedule the next available appt and put the patient on the wait list?:   yes  What visit type: EPP  Symptoms:  N/A  Patient preference of timeframe to be scheduled:  Sometime in late April or May  What is the reason the patient is requesting a sooner appointment? (insurance terminating, changing jobs):  N/A  Would the patient rather a call back or a response via [facility]?:  Call back  Comments:

## 2020-04-20 ENCOUNTER — OCCUPATIONAL HEALTH (OUTPATIENT)
Dept: URGENT CARE | Facility: CLINIC | Age: 60
End: 2020-04-20
Payer: MEDICARE

## 2020-04-20 VITALS
OXYGEN SATURATION: 100 % | RESPIRATION RATE: 16 BRPM | TEMPERATURE: 97 F | BODY MASS INDEX: 29.22 KG/M2 | SYSTOLIC BLOOD PRESSURE: 138 MMHG | WEIGHT: 240 LBS | HEIGHT: 76 IN | DIASTOLIC BLOOD PRESSURE: 90 MMHG | HEART RATE: 68 BPM

## 2020-04-20 DIAGNOSIS — Z00.00 PHYSICAL EXAM: Primary | ICD-10-CM

## 2020-04-20 PROCEDURE — 99499 UNLISTED E&M SERVICE: CPT | Mod: S$GLB,,,

## 2020-04-20 PROCEDURE — 99499 COMPLEX PHYSICAL: ICD-10-PCS | Mod: S$GLB,,,

## 2020-04-20 RX ORDER — BENAZEPRIL HYDROCHLORIDE 40 MG/1
TABLET ORAL
COMMUNITY
End: 2020-08-17

## 2020-04-20 RX ORDER — PREDNISONE 50 MG/1
TABLET ORAL
COMMUNITY
End: 2021-06-02

## 2020-04-20 RX ORDER — ERGOCALCIFEROL 1.25 MG/1
CAPSULE ORAL
COMMUNITY
End: 2023-03-03

## 2020-04-20 RX ORDER — HYDROCHLOROTHIAZIDE 12.5 MG/1
CAPSULE ORAL
COMMUNITY
End: 2020-08-17

## 2020-04-20 RX ORDER — ALLOPURINOL 300 MG/1
TABLET ORAL
COMMUNITY
End: 2020-07-12

## 2020-04-20 RX ORDER — HYDROCODONE BITARTRATE AND ACETAMINOPHEN 7.5; 325 MG/1; MG/1
TABLET ORAL
COMMUNITY
End: 2021-05-26 | Stop reason: SDUPTHER

## 2020-04-20 RX ORDER — AMOXICILLIN 500 MG/1
CAPSULE ORAL
COMMUNITY
End: 2020-07-12

## 2020-04-20 RX ORDER — ALLOPURINOL 100 MG/1
TABLET ORAL
COMMUNITY
End: 2020-07-12 | Stop reason: SDUPTHER

## 2020-04-20 RX ORDER — INDOMETHACIN 75 MG/1
CAPSULE, EXTENDED RELEASE ORAL
COMMUNITY
End: 2021-05-24

## 2020-04-20 RX ORDER — INDOMETHACIN 50 MG/1
CAPSULE ORAL
COMMUNITY
End: 2021-05-24

## 2020-04-20 NOTE — PROGRESS NOTES
Subjective:       Patient ID: Ghulam Ariza is a 59 y.o. male.    Chief Complaint: Employment Physical    Released from Neuro Surg two weeks ago, Eval by Dr Kaplan. SH      Constitution: Negative for chills, fatigue and fever.   HENT: Negative for congestion and sore throat.    Neck: Positive for neck pain and neck stiffness. Negative for painful lymph nodes.   Cardiovascular: Negative for chest pain and leg swelling.   Eyes: Negative for double vision and blurred vision.   Respiratory: Negative for cough and shortness of breath.    Gastrointestinal: Negative for nausea, vomiting and diarrhea.   Genitourinary: Negative for dysuria, frequency and urgency.   Musculoskeletal: Positive for joint pain and joint swelling. Negative for muscle cramps and muscle ache.   Skin: Negative for color change, pale and rash.   Allergic/Immunologic: Negative for seasonal allergies.   Neurological: Negative for dizziness, history of vertigo, light-headedness, passing out and headaches.   Hematologic/Lymphatic: Negative for swollen lymph nodes, easy bruising/bleeding and history of blood clots. Does not bruise/bleed easily.   Psychiatric/Behavioral: Negative for nervous/anxious, sleep disturbance and depression. The patient is not nervous/anxious.         Objective:      Physical Exam    Assessment:       No diagnosis found.    Plan:                   No follow-ups on file.

## 2020-05-04 RX ORDER — BENAZEPRIL HYDROCHLORIDE 40 MG/1
TABLET ORAL
Qty: 90 TABLET | Refills: 0 | Status: SHIPPED | OUTPATIENT
Start: 2020-05-04 | End: 2020-07-08 | Stop reason: SDUPTHER

## 2020-06-24 ENCOUNTER — LAB VISIT (OUTPATIENT)
Dept: LAB | Facility: HOSPITAL | Age: 60
End: 2020-06-24
Attending: FAMILY MEDICINE
Payer: MEDICARE

## 2020-06-24 DIAGNOSIS — Z00.00 WELL ADULT EXAM: ICD-10-CM

## 2020-06-24 LAB
BILIRUB UR QL STRIP: NEGATIVE
CLARITY UR REFRACT.AUTO: CLEAR
COLOR UR AUTO: YELLOW
GLUCOSE UR QL STRIP: NEGATIVE
HGB UR QL STRIP: NEGATIVE
KETONES UR QL STRIP: NEGATIVE
LEUKOCYTE ESTERASE UR QL STRIP: NEGATIVE
NITRITE UR QL STRIP: NEGATIVE
PH UR STRIP: 5 [PH] (ref 5–8)
PROT UR QL STRIP: NEGATIVE
SP GR UR STRIP: 1.02 (ref 1–1.03)
URN SPEC COLLECT METH UR: NORMAL

## 2020-06-24 PROCEDURE — 81003 URINALYSIS AUTO W/O SCOPE: CPT

## 2020-07-08 ENCOUNTER — LAB VISIT (OUTPATIENT)
Dept: LAB | Facility: HOSPITAL | Age: 60
End: 2020-07-08
Attending: FAMILY MEDICINE
Payer: MEDICARE

## 2020-07-08 ENCOUNTER — OFFICE VISIT (OUTPATIENT)
Dept: INTERNAL MEDICINE | Facility: CLINIC | Age: 60
End: 2020-07-08
Payer: MEDICARE

## 2020-07-08 VITALS
HEART RATE: 68 BPM | RESPIRATION RATE: 18 BRPM | WEIGHT: 239.88 LBS | DIASTOLIC BLOOD PRESSURE: 92 MMHG | OXYGEN SATURATION: 97 % | BODY MASS INDEX: 30.79 KG/M2 | TEMPERATURE: 98 F | SYSTOLIC BLOOD PRESSURE: 136 MMHG | HEIGHT: 74 IN

## 2020-07-08 DIAGNOSIS — Z00.00 WELL ADULT EXAM: Primary | ICD-10-CM

## 2020-07-08 DIAGNOSIS — M47.22 CERVICAL SPONDYLOSIS WITH RADICULOPATHY: ICD-10-CM

## 2020-07-08 DIAGNOSIS — N52.9 ERECTILE DYSFUNCTION, UNSPECIFIED ERECTILE DYSFUNCTION TYPE: ICD-10-CM

## 2020-07-08 DIAGNOSIS — M10.9 GOUT, UNSPECIFIED CAUSE, UNSPECIFIED CHRONICITY, UNSPECIFIED SITE: ICD-10-CM

## 2020-07-08 DIAGNOSIS — I10 ESSENTIAL HYPERTENSION: ICD-10-CM

## 2020-07-08 DIAGNOSIS — E55.9 VITAMIN D DEFICIENCY DISEASE: ICD-10-CM

## 2020-07-08 DIAGNOSIS — K21.9 GASTROESOPHAGEAL REFLUX DISEASE, ESOPHAGITIS PRESENCE NOT SPECIFIED: ICD-10-CM

## 2020-07-08 PROBLEM — Z12.11 SCREENING FOR COLON CANCER: Status: RESOLVED | Noted: 2018-09-06 | Resolved: 2020-07-08

## 2020-07-08 LAB — URATE SERPL-MCNC: 8.3 MG/DL (ref 3.4–7)

## 2020-07-08 PROCEDURE — 36415 COLL VENOUS BLD VENIPUNCTURE: CPT | Mod: PO

## 2020-07-08 PROCEDURE — 99999 PR PBB SHADOW E&M-EST. PATIENT-LVL IV: CPT | Mod: PBBFAC,,, | Performed by: FAMILY MEDICINE

## 2020-07-08 PROCEDURE — 99396 PREV VISIT EST AGE 40-64: CPT | Mod: S$GLB,,, | Performed by: FAMILY MEDICINE

## 2020-07-08 PROCEDURE — 86677 HELICOBACTER PYLORI ANTIBODY: CPT

## 2020-07-08 PROCEDURE — 99999 PR PBB SHADOW E&M-EST. PATIENT-LVL IV: ICD-10-PCS | Mod: PBBFAC,,, | Performed by: FAMILY MEDICINE

## 2020-07-08 PROCEDURE — 99396 PR PREVENTIVE VISIT,EST,40-64: ICD-10-PCS | Mod: S$GLB,,, | Performed by: FAMILY MEDICINE

## 2020-07-08 PROCEDURE — 99214 OFFICE O/P EST MOD 30 MIN: CPT | Mod: PBBFAC,PO | Performed by: FAMILY MEDICINE

## 2020-07-08 PROCEDURE — 84550 ASSAY OF BLOOD/URIC ACID: CPT

## 2020-07-08 RX ORDER — SILDENAFIL 100 MG/1
100 TABLET, FILM COATED ORAL DAILY PRN
Qty: 10 TABLET | Refills: 11 | Status: SHIPPED | OUTPATIENT
Start: 2020-07-08 | End: 2021-09-24

## 2020-07-08 RX ORDER — METOPROLOL SUCCINATE 25 MG/1
25 TABLET, EXTENDED RELEASE ORAL DAILY
Qty: 90 TABLET | Refills: 3 | Status: SHIPPED | OUTPATIENT
Start: 2020-07-08 | End: 2020-08-17

## 2020-07-08 RX ORDER — PANTOPRAZOLE SODIUM 40 MG/1
40 TABLET, DELAYED RELEASE ORAL DAILY
Qty: 90 TABLET | Refills: 3 | Status: SHIPPED | OUTPATIENT
Start: 2020-07-08 | End: 2021-06-29

## 2020-07-08 RX ORDER — CYCLOBENZAPRINE HCL 10 MG
TABLET ORAL
COMMUNITY
Start: 2020-05-05 | End: 2021-05-24

## 2020-07-08 RX ORDER — BENAZEPRIL HYDROCHLORIDE 40 MG/1
40 TABLET ORAL DAILY
Qty: 90 TABLET | Refills: 3 | Status: SHIPPED | OUTPATIENT
Start: 2020-07-08 | End: 2021-07-06

## 2020-07-08 NOTE — PROGRESS NOTES
Subjective:       Patient ID: Ghulam Ariza is a 60 y.o. male.    Chief Complaint: Annual Exam    HPI 60-year-old  male presents to clinic today for annual physical exam.  He continues to be treated for hypertension but has discontinued metoprolol secondary to running out of the medication.  He continues to use benazepril 40 mg daily but diastolic blood pressure currently remains above 90.  The patient denies any headaches, dizziness, or lightheadedness.  I have recommended continuation of benazepril and restarting Toprol 25 mg daily.  He denies any further gout flares; therefore, he has discontinued allopurinol without issue.  He does continue to note occasional left elbow pain secondary to a fusion but has been unable to schedule an orthopedic appointment secondary to COVID pandemic.  At this time he is interested in trying to set up an appointment for further evaluation.  Vitamin-D deficiency remains well controlled with over-the-counter vitamin-D.  He has a past surgical history of L4/5 laminectomy secondary to herniation and bunionectomy.  He has a strong family history of cancer with his father having prostate cancer at the age of 65.  His brother also has prostate cancer.  He has a family history of his mother having hypertension and having a stroke at the age of 68.  He is up-to-date with all vaccinations and colonoscopy.  Finally, the patient is concerned for possible H pylori as he reports that his girlfriend has been diagnosed with H pylori and he notes similar symptoms of bloating and belching.  Review of Systems   Constitutional: Negative for appetite change, chills, fatigue and fever.   HENT: Negative for nasal congestion, ear pain, hearing loss, postnasal drip, rhinorrhea, sinus pressure/congestion, sore throat and tinnitus.    Eyes: Negative for redness, itching and visual disturbance.   Respiratory: Negative for cough, chest tightness and shortness of breath.    Cardiovascular:  Negative for chest pain and palpitations.   Gastrointestinal: Positive for abdominal distention (Bloating/belching). Negative for abdominal pain, constipation, diarrhea, nausea and vomiting.   Genitourinary: Negative for decreased urine volume, difficulty urinating, dysuria, frequency, hematuria and urgency.   Musculoskeletal: Positive for joint swelling (left elbow). Negative for back pain, myalgias, neck pain and neck stiffness.   Integumentary:  Negative for rash.   Neurological: Negative for dizziness, light-headedness and headaches.   Psychiatric/Behavioral: Negative.          Objective:      Physical Exam  Vitals signs and nursing note reviewed.   Constitutional:       General: He is not in acute distress.     Appearance: He is well-developed. He is not diaphoretic.   HENT:      Head: Normocephalic and atraumatic.      Right Ear: External ear normal.      Left Ear: External ear normal.      Nose: Nose normal.      Mouth/Throat:      Pharynx: No oropharyngeal exudate.   Eyes:      General: No scleral icterus.        Right eye: No discharge.         Left eye: No discharge.      Conjunctiva/sclera: Conjunctivae normal.      Pupils: Pupils are equal, round, and reactive to light.   Neck:      Musculoskeletal: Normal range of motion and neck supple.      Thyroid: No thyromegaly.      Vascular: No JVD.      Trachea: No tracheal deviation.   Cardiovascular:      Rate and Rhythm: Normal rate and regular rhythm.      Heart sounds: Normal heart sounds. No murmur. No friction rub. No gallop.    Pulmonary:      Effort: Pulmonary effort is normal. No respiratory distress.      Breath sounds: Normal breath sounds. No stridor. No wheezing or rales.   Abdominal:      General: Bowel sounds are normal. There is no distension.      Palpations: Abdomen is soft. There is no mass.      Tenderness: There is no abdominal tenderness. There is no guarding or rebound.   Musculoskeletal: Normal range of motion.         General: No  tenderness.      Left elbow: He exhibits effusion.   Lymphadenopathy:      Cervical: No cervical adenopathy.   Skin:     General: Skin is warm and dry.      Coloration: Skin is not pale.      Findings: No erythema or rash.   Neurological:      Mental Status: He is alert and oriented to person, place, and time.   Psychiatric:         Behavior: Behavior normal.         Thought Content: Thought content normal.         Judgment: Judgment normal.         Assessment:       1. Well adult exam    2. Essential hypertension    3. Gastroesophageal reflux disease, esophagitis presence not specified    4. Erectile dysfunction, unspecified erectile dysfunction type    5. Gout, unspecified cause, unspecified chronicity, unspecified site    6. Vitamin D deficiency disease    7. Cervical spondylosis with radiculopathy        Plan:       1.  Labs have been reviewed and are overall within normal limits.  2.  Continue benazepril 40 mg daily and restart metoprolol 25 mg daily.  3.  Continue pantoprazole 40 mg daily and check H pylori level now secondary to patient concern of increased bloating and belching.  4.  Continue use of Viagra as needed.  ED is stable.  5.  Uric acid level now.  6.  Continue over-the-counter vitamin-D 2000 units daily.  7.  Cervical radiculopathy remained stable.  8.  Return to clinic as needed or in 1 year for annual exam.

## 2020-07-11 LAB — H PYLORI IGG SERPL QL IA: POSITIVE

## 2020-07-12 ENCOUNTER — PATIENT MESSAGE (OUTPATIENT)
Dept: INTERNAL MEDICINE | Facility: CLINIC | Age: 60
End: 2020-07-12

## 2020-07-12 DIAGNOSIS — M10.9 GOUT, UNSPECIFIED CAUSE, UNSPECIFIED CHRONICITY, UNSPECIFIED SITE: Primary | ICD-10-CM

## 2020-07-12 DIAGNOSIS — A04.8 H. PYLORI INFECTION: ICD-10-CM

## 2020-07-12 RX ORDER — CLARITHROMYCIN 500 MG/1
500 TABLET, FILM COATED ORAL 2 TIMES DAILY
Qty: 20 TABLET | Refills: 0 | Status: SHIPPED | OUTPATIENT
Start: 2020-07-12 | End: 2020-07-22

## 2020-07-12 RX ORDER — AMOXICILLIN 500 MG/1
1000 TABLET, FILM COATED ORAL 2 TIMES DAILY
Qty: 40 TABLET | Refills: 0 | Status: SHIPPED | OUTPATIENT
Start: 2020-07-12 | End: 2020-07-22

## 2020-07-12 RX ORDER — OMEPRAZOLE 40 MG/1
40 CAPSULE, DELAYED RELEASE ORAL
Qty: 20 CAPSULE | Refills: 0 | Status: ON HOLD | OUTPATIENT
Start: 2020-07-12 | End: 2021-09-30

## 2020-07-12 RX ORDER — ALLOPURINOL 100 MG/1
TABLET ORAL
Qty: 30 TABLET | Refills: 11 | Status: SHIPPED | OUTPATIENT
Start: 2020-07-12 | End: 2020-08-17

## 2020-07-12 NOTE — TELEPHONE ENCOUNTER
Patient started on allopurinol secondary to high uric acid level and was started on triple therapy for H pylori.  Patient informed via [facility].  Thank you.

## 2020-07-21 DIAGNOSIS — M70.22 OLECRANON BURSITIS OF LEFT ELBOW: Primary | ICD-10-CM

## 2020-07-28 ENCOUNTER — PATIENT OUTREACH (OUTPATIENT)
Dept: ADMINISTRATIVE | Facility: OTHER | Age: 60
End: 2020-07-28

## 2020-07-28 ENCOUNTER — TELEPHONE (OUTPATIENT)
Dept: SPORTS MEDICINE | Facility: CLINIC | Age: 60
End: 2020-07-28

## 2020-07-28 NOTE — PROGRESS NOTES
Subjective:     Ghulam Ariza     Chief Complaint   Patient presents with    Left Elbow - Pain, Swelling       HPI    Ghulam is a 60 y.o. male coming in today for bilateral, left>right elbow pain and olecranon swelling that began about 2  week(s) ago, referred by Dr. Valle. Hx of gout- pt noted increased swelling in his olecranon bursa after starting toprol for his blood pressure.  He has been taking Indocin which he has at home for acute gout flares, which has helped his pain.  Pt. describes the pain as a 3/10 achy pain that does not radiate. There was not a fall/injury/ or trauma associated with the onset of symptoms. The pain is better with rest, compression and worse with activity. Pt. Denies any other musculoskeletal complaints at this time. Pt had a cervical fusion 1/2019.    Joint instability? no  Mechanical locking/clicking? no  Affecting ADL's? yes  Affecting sleep? yes    Occupation: , [employer]. Not working currently    Lab review: Elevated blood uric acid level from 07/08/2020 at 8.3    Review of Systems   Constitutional: Negative for chills and fever.   HENT: Negative for hearing loss and tinnitus.    Eyes: Negative for blurred vision and photophobia.   Respiratory: Negative for cough and shortness of breath.    Cardiovascular: Negative for chest pain and leg swelling.   Gastrointestinal: Negative for abdominal pain, heartburn, nausea and vomiting.   Genitourinary: Negative for dysuria and hematuria.   Musculoskeletal: Positive for joint pain and neck pain. Negative for back pain, falls and myalgias.   Skin: Negative for rash.   Neurological: Negative for dizziness, tingling, focal weakness, weakness and headaches.   Endo/Heme/Allergies: Negative for environmental allergies. Does not bruise/bleed easily.   Psychiatric/Behavioral: Negative for depression. The patient is not nervous/anxious.        PAST MEDICAL HISTORY:   Past Medical History:   Diagnosis Date    Degenerative disc disease      cervical radiculopathy    Genital herpes     Hx of colonic polyps     Hypertension early 40s    Pinched nerve in neck      PAST SURGICAL HISTORY:   Past Surgical History:   Procedure Laterality Date    back sx      L4/L5 Herniated disk    BUNIONECTOMY Right     COLONOSCOPY N/A 9/6/2018    Procedure: COLONOSCOPY;  Surgeon: Mg Lagunas MD;  Location: [facility] ([address] [place] [address]);  Service: Endoscopy;  Laterality: N/A;    SPINE SURGERY      L4/5 laminectomy     FAMILY HISTORY:   Family History   Problem Relation Age of Onset    Hypertension Mother     Stroke Mother 68        TIA    Cancer Father 65        Prostate    Cancer Paternal Uncle     Cancer Brother         Prostate Cancer     SOCIAL HISTORY:   Social History     Socioeconomic History    Marital status: Single     Spouse name: Not on file    Number of children: Not on file    Years of education: Not on file    Highest education level: Not on file   Occupational History    Occupation:      Employer: [employer]   Social [place]    Financial resource strain: Not on file    Food insecurity     Worry: Not on file     Inability: Not on file    Transportation needs     Medical: Not on file     Non-medical: Not on file   Tobacco Use    Smoking status: Never Smoker    Smokeless tobacco: Never Used   Substance and Sexual Activity    Alcohol use: Yes     Alcohol/week: 0.0 standard drinks     Comment: every weekend few drinks    Drug use: No    Sexual activity: Yes     Partners: Female     Birth control/protection: None   Lifestyle    Physical activity     Days per week: Not on file     Minutes per session: Not on file    Stress: Not on file   Relationships    Social connections     Talks on phone: Not on file     Gets together: Not on file     Attends Hinduism service: Not on file     Active member of club or organization: Not on file     Attends meetings of clubs or organizations: Not on file     Relationship status: Not on file  "  Other Topics Concern    Not on file   Social History Narrative    Not on file       MEDICATIONS:   Current Outpatient Medications:     allopurinoL (ZYLOPRIM) 100 MG tablet, Take 1 tablet by mouth daily., Disp: 30 tablet, Rfl: 11    benazepriL (LOTENSIN) 40 MG tablet, benazepril 40 mg tablet, Disp: , Rfl:     benazepriL (LOTENSIN) 40 MG tablet, Take 1 tablet (40 mg total) by mouth once daily., Disp: 90 tablet, Rfl: 3    cyclobenzaprine (FLEXERIL) 10 MG tablet, , Disp: , Rfl:     ergocalciferol (ERGOCALCIFEROL) 50,000 unit Cap, Vitamin D2 1,250 mcg (50,000 unit) capsule, Disp: , Rfl:     hydroCHLOROthiazide (MICROZIDE) 12.5 mg capsule, hydrochlorothiazide 12.5 mg capsule, Disp: , Rfl:     HYDROcodone-acetaminophen (NORCO) 7.5-325 mg per tablet, hydrocodone 7.5 mg-acetaminophen 325 mg tablet, Disp: , Rfl:     indomethacin (INDOCIN SR) 75 mg CpSR CR capsule, indomethacin ER 75 mg capsule,extended release, Disp: , Rfl:     indomethacin (INDOCIN) 50 MG capsule, indomethacin 50 mg capsule, Disp: , Rfl:     metoprolol succinate (TOPROL-XL) 25 MG 24 hr tablet, Take 1 tablet (25 mg total) by mouth once daily. (Patient not taking: Reported on 7/29/2020), Disp: 90 tablet, Rfl: 3    omeprazole (PRILOSEC) 40 MG capsule, Take 1 capsule (40 mg total) by mouth 2 (two) times daily before meals. for 10 days, Disp: 20 capsule, Rfl: 0    pantoprazole (PROTONIX) 40 MG tablet, Take 1 tablet (40 mg total) by mouth once daily. (Patient not taking: Reported on 7/29/2020), Disp: 90 tablet, Rfl: 3    predniSONE (DELTASONE) 50 MG Tab, prednisone 50 mg tablet, Disp: , Rfl:     sildenafiL (VIAGRA) 100 MG tablet, Take 1 tablet (100 mg total) by mouth daily as needed for Erectile Dysfunction., Disp: 10 tablet, Rfl: 11  ALLERGIES: Review of patient's allergies indicates:  No Known Allergies        Objective:     VITAL SIGNS: BP (!) 148/98   Ht 6' 2" (1.88 m)   Wt 108.8 kg (239 lb 13.8 oz)   BMI 30.80 kg/m²    General    Nursing " note and vitals reviewed.  Constitutional: He is oriented to person, place, and time. He appears well-developed and well-nourished.   HENT:   Head: Normocephalic and atraumatic.   no nasal discharge, no external ear redness or discharge   Eyes:   EOM is full and smooth  No eye redness or discharge   Neck: Neck supple. No tracheal deviation present.   Cardiovascular: Normal rate.    2+ Radial pulse bilaterally  2+ Dorsalis Pedis pulse bilaterally  No LE edema appreciated   Pulmonary/Chest: Effort normal. No respiratory distress.   Abdominal: He exhibits no distension.   No rigidity   Neurological: He is alert and oriented to person, place, and time. He exhibits normal muscle tone. Coordination normal.   See details below   Psychiatric: He has a normal mood and affect. His behavior is normal.               MUSCULOSKELETAL EXAM  Elbow: left ELBOW  The affected elbow is compared to the contralateral elbow.    Observation:    There is + olecranon bursa edema and mild erythema on left, + mild olecranon bursa edema on right  No ecchymosis.  There is no obvious muscle atrophy, hypertonicity, or hypotonicity of arm muscles.  There is no abnormal carrying angle or gunstock deformity noted.  + right index finger PIP bony edema with associated mucous cyst    ROM (* = with pain):  Active flexion to 150° on left and 150° on right.    Active extension to 0° on left and 0° on right. Without hyperextension bilaterally.   Active pronation to 80° on left and 80° on right.  Active supination to 80° on left and 80° on right.    Active radial deviation to 20° on left and 20° on right.   Active ulnar deviation to 30° on left and 30° on right.  + decreased right index finger PIP flexion    Tenderness To Palpation:  No tenderness at the medial epicondyle or lateral epicondyle.  + mild tenderness at the left olecranon bursa. No tenderness at the right olecranon bursa  No tenderness at the distal humerus or proximal radius/ulna.  No  tenderness at the radial head.  No tenderness over the ulnar and radial collateral ligaments.  No tenderness over the posterior interosseous nerve or distal biceps tendon.  + right index finger PIP tenderess    Strength Testing (* = with pain):  Deltoid - 5/5 on left and 5/5 on right  Biceps - 5/5 on left and 5/5 on right  Triceps - 5/5 on left and 5/5 on right  Wrist extension - 5/5 on left and 5/5 on right  Wrist flexion - 5/5 on left and 5/5 on right   - 5/5 on left and 5/5 on right  Finger extension - 5/5 on left and 5/5 on right  Finger abduction - 5/5 on left and 5/5 on right    Special Tests:  No laxity of the MCL at 0 and 30 degrees.    Milking maneuver - negative  No laxity of the LCL at 0 and 30 degrees.  No laxity with posterolateral and posteromedial rotary testing.    3rd digit extension resistance test - negative  Resisted supination - negative  Resisted pronation - negative  Resisted wrist extension - negative  Resisted wrist flexion - negative    Neurovascular Exam:  2+ radial pulses bilaterally.  Sensation to light touch intact in the forearm and hand.  Negative Tinnels at carpal tunnel.  Negative Tinnels at cubital tunnel.  2+/4 reflexes at triceps, biceps, and brachioradialis.        Key   F= Flexed   E = Extended   R = Rotated   S = Sidebent   TTA = tissue texture abnormality     IMAGIN. X-ray ordered due to left elbow pain. (AP, lateral, and radial capitellar views) taken today.   2. X-ray images were reviewed personally by me and then directly with patient.  3. FINDINGS: Mild DJD.  There is an osteophyte noted arising from the dorsal aspect of the olecranon process.  Soft tissue swelling noted posteriorly consistent with the olecranon bursitis.  No fracture or bone destruction identified  4. IMPRESSION:  Olecranon process osteophyte and posterior soft tissue swelling consistent with olecranon bursitis      Assessment:      Encounter Diagnoses   Name Primary?    Acute drug-induced  gout of left elbow Yes    Acute drug-induced gout of right elbow     Bilateral olecranon bursitis     Pain of both elbows     Mucoid cyst of joint           Plan:   1.  Bilateral elbow pain secondary to olecranon bursitis from drug-induced acute gouty flare (left worse than right).  Recommend left padded elbow compression brace wear, ice of 20 min at a time 2 to 3 times a day and continued use indomethacin ER 75 mg prn for pain control.   - recommend follow-up with Dr. Norman for potential BP medication change, as this acute flair started with the start of metoprolol succinate.   - discussed with patient the high risk of infection with draining olecranon bursitis, potentially requiring surgical removal of the bursa if infection occurs.  Therefore, I would recommend holding off an olecranon bursa aspiration at this point.   -  X-ray images of left elbow taken today (AP, lateral, and radial capitellar views) showed an olecranon process osteophyte and posterior soft tissue swelling consistent with olecranon bursitis. Images were personally reviewed with patient.    2.  Recommend follow-up with orthopedic hand surgery for further evaluation of right index finger PIP mucoid cyst if pain persists following control of acute gouty flare     3. Follow-up as needed if pain deteriorates or new issue arises    4. Patient agreeable to today's plan and all questions were answered    This note is dictated using the M*Modal Fluency Direct word recognition program. There are word recognition mistakes that are occasionally missed on review.

## 2020-07-28 NOTE — TELEPHONE ENCOUNTER
ARLENE appt reminder and inform of XR. Callback number [phone].    Lyiah Matamoros  Orthopedic Clinical Assistant to Dr. Heather Shah

## 2020-07-29 ENCOUNTER — HOSPITAL ENCOUNTER (OUTPATIENT)
Dept: RADIOLOGY | Facility: HOSPITAL | Age: 60
Discharge: HOME | End: 2020-07-29
Attending: NEUROMUSCULOSKELETAL MEDICINE & OMM
Payer: MEDICARE

## 2020-07-29 ENCOUNTER — OFFICE VISIT (OUTPATIENT)
Dept: ORTHOPEDICS | Facility: CLINIC | Age: 60
End: 2020-07-29
Payer: MEDICARE

## 2020-07-29 VITALS
BODY MASS INDEX: 30.79 KG/M2 | SYSTOLIC BLOOD PRESSURE: 148 MMHG | HEIGHT: 74 IN | WEIGHT: 239.88 LBS | DIASTOLIC BLOOD PRESSURE: 98 MMHG

## 2020-07-29 DIAGNOSIS — M70.22 OLECRANON BURSITIS OF LEFT ELBOW: ICD-10-CM

## 2020-07-29 DIAGNOSIS — M70.21 BILATERAL OLECRANON BURSITIS: ICD-10-CM

## 2020-07-29 DIAGNOSIS — M10.221: ICD-10-CM

## 2020-07-29 DIAGNOSIS — M70.22 BILATERAL OLECRANON BURSITIS: ICD-10-CM

## 2020-07-29 DIAGNOSIS — M25.522 PAIN OF BOTH ELBOWS: ICD-10-CM

## 2020-07-29 DIAGNOSIS — M10.222 ACUTE DRUG-INDUCED GOUT OF LEFT ELBOW: Primary | ICD-10-CM

## 2020-07-29 DIAGNOSIS — M25.521 PAIN OF BOTH ELBOWS: ICD-10-CM

## 2020-07-29 DIAGNOSIS — M67.40 MUCOID CYST OF JOINT: ICD-10-CM

## 2020-07-29 PROCEDURE — 73080 XR ELBOW COMPLETE 3 VIEW LEFT: ICD-10-PCS | Mod: 26,HCNC,LT, | Performed by: RADIOLOGY

## 2020-07-29 PROCEDURE — 99999 PR PBB SHADOW E&M-EST. PATIENT-LVL II: ICD-10-PCS | Mod: PBBFAC,HCNC,, | Performed by: NEUROMUSCULOSKELETAL MEDICINE & OMM

## 2020-07-29 PROCEDURE — 3080F DIAST BP >= 90 MM HG: CPT | Mod: HCNC,CPTII,S$GLB, | Performed by: NEUROMUSCULOSKELETAL MEDICINE & OMM

## 2020-07-29 PROCEDURE — 3008F PR BODY MASS INDEX (BMI) DOCUMENTED: ICD-10-PCS | Mod: HCNC,CPTII,S$GLB, | Performed by: NEUROMUSCULOSKELETAL MEDICINE & OMM

## 2020-07-29 PROCEDURE — 99999 PR PBB SHADOW E&M-EST. PATIENT-LVL II: CPT | Mod: PBBFAC,HCNC,, | Performed by: NEUROMUSCULOSKELETAL MEDICINE & OMM

## 2020-07-29 PROCEDURE — 3080F PR MOST RECENT DIASTOLIC BLOOD PRESSURE >= 90 MM HG: ICD-10-PCS | Mod: HCNC,CPTII,S$GLB, | Performed by: NEUROMUSCULOSKELETAL MEDICINE & OMM

## 2020-07-29 PROCEDURE — 73080 X-RAY EXAM OF ELBOW: CPT | Mod: 26,HCNC,LT, | Performed by: RADIOLOGY

## 2020-07-29 PROCEDURE — 99204 OFFICE O/P NEW MOD 45 MIN: CPT | Mod: HCNC,S$GLB,, | Performed by: NEUROMUSCULOSKELETAL MEDICINE & OMM

## 2020-07-29 PROCEDURE — 3008F BODY MASS INDEX DOCD: CPT | Mod: HCNC,CPTII,S$GLB, | Performed by: NEUROMUSCULOSKELETAL MEDICINE & OMM

## 2020-07-29 PROCEDURE — 99204 PR OFFICE/OUTPT VISIT, NEW, LEVL IV, 45-59 MIN: ICD-10-PCS | Mod: HCNC,S$GLB,, | Performed by: NEUROMUSCULOSKELETAL MEDICINE & OMM

## 2020-07-29 PROCEDURE — 73080 X-RAY EXAM OF ELBOW: CPT | Mod: TC,HCNC,PO,LT

## 2020-07-29 PROCEDURE — 3077F PR MOST RECENT SYSTOLIC BLOOD PRESSURE >= 140 MM HG: ICD-10-PCS | Mod: HCNC,CPTII,S$GLB, | Performed by: NEUROMUSCULOSKELETAL MEDICINE & OMM

## 2020-07-29 PROCEDURE — 3077F SYST BP >= 140 MM HG: CPT | Mod: HCNC,CPTII,S$GLB, | Performed by: NEUROMUSCULOSKELETAL MEDICINE & OMM

## 2020-07-29 RX ORDER — COLCHICINE 0.6 MG/1
0.6 TABLET ORAL DAILY
Qty: 3 TABLET | Refills: 0 | Status: CANCELLED | OUTPATIENT
Start: 2020-07-29 | End: 2020-08-01

## 2020-07-29 NOTE — LETTER
July 29, 2020      Reggie Valle MD  [address] [place] [address]           [facility]  [address] [facility]  [place] [address]  Phone: [phone]          Patient: Ghulam Ariza   MR Number: [id number]   YOB: 1960   Date of Visit: 7/29/2020       Dear Dr. Reggie Valle:    Thank you for referring Ghulam Ariza to me for evaluation. Attached you will find relevant portions of my assessment and plan of care.    If you have questions, please do not hesitate to call me. I look forward to following Ghulam Ariza along with you.    Sincerely,    Heather Shah, DO    Enclosure  CC:  No Recipients    If you would like to receive this communication electronically, please contact [email or web address] or [phone] to request more information on [employer] Link access.    For providers and/or their staff who would like to refer a patient to [facility], please contact us through our one-stop-shop provider referral line, Amadou Neil, at [phone].    If you feel you have received this communication in error or would no longer like to receive these types of communications, please e-mail [email or web address]

## 2020-08-06 NOTE — TELEPHONE ENCOUNTER
Reason for Disposition   BP > 140/90 and is taking BP medications    Protocols used: ST HIGH BLOOD PRESSURE-A-OH    Mr. Ariza had a work related injury causing neck and back pain. He states his blood pressure was elevated during visit with workers comp doctor. Patient's blood pressure was 156/112 during office visit today. He denies symptoms at this time. Patient advised to monitor blood pressure at home and take meds to manage pain. Mr. Ariza has a follow up visit for blood pressure with PCP tomorrow.    [facility] Follow-up Visit  July 27th, 2020  TIME: 1300    Chaim Norman is a 46 year old male being seen at home for a follow-up visit.    Present at appointment: Patient    Primary Diagnosis: Respiratory Disorders - other    Chief Complaint   Patient presents with     Outreach       [place] Utilization:   Clinic Utilization  Difficulty keeping appointments:: No  Compliance Concerns: No  No-Show Concerns: No  No PCP office visit in Past Year: No  Utilization    Last refreshed: 8/3/2020 12:46 PM:  Hospital Admissions 1           Last refreshed: 8/3/2020 12:46 PM:  ED Visits 0           Last refreshed: 8/3/2020 12:46 PM:  No Show Count (past year) 1              Current as of: 8/3/2020 12:46 PM              /72   Pulse 100   Resp 14   Wt 90.4 kg (199 lb 3.2 oz)   SpO2 95%   BMI 34.19 kg/m      Clinical Concerns:  Current Medical Concerns:  CHF, Weight management    Current Behavioral Concerns: none    Education Provided to patient: none   Medication set up? No  Pill Box issued: No  Scale issued: No  Health Maintenance Reviewed: Due/Overdue yes    Clinical Pathway: None    No  Face to Face in Home / Community    Current weights:  7/21 202.6  7/22 201.6  7/23 204.0  7/24 203.4  7/25 199.9  7/26 198.8  7/27 199.2    Review of Symptoms/PE    Skin: negative  Eyes: negative  Ears/Nose/Throat: negative  Respiratory: Shortness of breath on excertion  Cardiovascular: dyspnea on exertion and exercise intolerance  Gastrointestinal: negative  Genitourinary: negative  Musculoskeletal: negative  Neurologic: negative  Psychiatric: negative    Pain Management::  Pain (GOAL):: Yes  Type: Acute (<3mo)  Location of chronic pain:: legs, shoulders, hands  Radiating: Yes  Location pain radiates to: bilateral feet, knees and legs  Progression: Unchanged  Description of pain: Burning, Sharp, Numb  Chronic pain severity:: 7  Limitation of routine activities due to chronic pain:: Yes  Description: Able to do light  housework  Alleviating Factors: Pain Medication, Rest  Aggravating Factors: Positioning    Medication Reconciliation  Medication adherence problem (GOAL):: No  Knowledgeable about how to use meds:: Yes  Medication side effects suspected:: No    Diet/Exercise/Sleep  Diet:: Regular  Inadequate nutrition (GOAL):: No  Tube Feeding: No  Inadequate activity/exercise (GOAL):: No  Significant changes in sleep pattern (GOAL): No    Plan:     Time spent with patient: 60    The patient meets one or more of the following criteria:  * Requires services to prevent readmission to a nursing home or hospital    Acute concern/Follow-up recommendations: A form to be completed by Chaim's Dr  for Unemployment was mailed to Dr Chauhan and Dr Atwood. There was a self addressed stamped enveloped included for the completed form to be mailed in.     Next CP visit scheduled: 08/11/20    Issues for Provider to follow up on: Chaim continues to have edema to both lower extremities. Today lower extremities had slightly less edema than last week and Chaim states he can tell that he does not have as much edema to the area above each knee and is less painful. Both feet having pitting edema of +1.      Chaim keeps track of his weight daily and it is listed above    Chaim continues to have extreme shortness of breath when he walks or does minimal moving. He is observed by the Community Paramed[facility] to be short of breath when he walks from the bathroom to the dining room of his apartment.    [facility] is helping Chaim with getting financial assistance for rent, utilities and cell phone. Currently working with [employer], Social Security Disability and [employer]. Social Security packet to be mailed out to Chaim in next 5-7 working days.    Provider follow up visit needed: FRANCISCO

## 2020-08-17 ENCOUNTER — OFFICE VISIT (OUTPATIENT)
Dept: INTERNAL MEDICINE | Facility: CLINIC | Age: 60
End: 2020-08-17
Payer: MEDICARE

## 2020-08-17 VITALS
OXYGEN SATURATION: 97 % | WEIGHT: 238.56 LBS | HEIGHT: 74 IN | DIASTOLIC BLOOD PRESSURE: 96 MMHG | BODY MASS INDEX: 30.62 KG/M2 | SYSTOLIC BLOOD PRESSURE: 146 MMHG | HEART RATE: 69 BPM | TEMPERATURE: 98 F | RESPIRATION RATE: 18 BRPM

## 2020-08-17 DIAGNOSIS — M10.9 GOUT, UNSPECIFIED CAUSE, UNSPECIFIED CHRONICITY, UNSPECIFIED SITE: ICD-10-CM

## 2020-08-17 DIAGNOSIS — I10 ESSENTIAL HYPERTENSION: Primary | ICD-10-CM

## 2020-08-17 PROCEDURE — 3080F DIAST BP >= 90 MM HG: CPT | Mod: HCNC,CPTII,S$GLB, | Performed by: FAMILY MEDICINE

## 2020-08-17 PROCEDURE — 99499 UNLISTED E&M SERVICE: CPT | Mod: S$GLB,,, | Performed by: FAMILY MEDICINE

## 2020-08-17 PROCEDURE — 3008F PR BODY MASS INDEX (BMI) DOCUMENTED: ICD-10-PCS | Mod: HCNC,CPTII,S$GLB, | Performed by: FAMILY MEDICINE

## 2020-08-17 PROCEDURE — 99214 OFFICE O/P EST MOD 30 MIN: CPT | Mod: HCNC,S$GLB,, | Performed by: FAMILY MEDICINE

## 2020-08-17 PROCEDURE — 99999 PR PBB SHADOW E&M-EST. PATIENT-LVL IV: CPT | Mod: PBBFAC,HCNC,, | Performed by: FAMILY MEDICINE

## 2020-08-17 PROCEDURE — 3077F SYST BP >= 140 MM HG: CPT | Mod: HCNC,CPTII,S$GLB, | Performed by: FAMILY MEDICINE

## 2020-08-17 PROCEDURE — 99999 PR PBB SHADOW E&M-EST. PATIENT-LVL IV: ICD-10-PCS | Mod: PBBFAC,HCNC,, | Performed by: FAMILY MEDICINE

## 2020-08-17 PROCEDURE — 99499 RISK ADDL DX/OHS AUDIT: ICD-10-PCS | Mod: S$GLB,,, | Performed by: FAMILY MEDICINE

## 2020-08-17 PROCEDURE — 3008F BODY MASS INDEX DOCD: CPT | Mod: HCNC,CPTII,S$GLB, | Performed by: FAMILY MEDICINE

## 2020-08-17 PROCEDURE — 3080F PR MOST RECENT DIASTOLIC BLOOD PRESSURE >= 90 MM HG: ICD-10-PCS | Mod: HCNC,CPTII,S$GLB, | Performed by: FAMILY MEDICINE

## 2020-08-17 PROCEDURE — 3077F PR MOST RECENT SYSTOLIC BLOOD PRESSURE >= 140 MM HG: ICD-10-PCS | Mod: HCNC,CPTII,S$GLB, | Performed by: FAMILY MEDICINE

## 2020-08-17 PROCEDURE — 99214 PR OFFICE/OUTPT VISIT, EST, LEVL IV, 30-39 MIN: ICD-10-PCS | Mod: HCNC,S$GLB,, | Performed by: FAMILY MEDICINE

## 2020-08-17 RX ORDER — DILTIAZEM HYDROCHLORIDE 60 MG/1
60 CAPSULE, EXTENDED RELEASE ORAL 2 TIMES DAILY
Qty: 60 CAPSULE | Refills: 11 | Status: SHIPPED | OUTPATIENT
Start: 2020-08-17 | End: 2021-10-15

## 2020-08-17 RX ORDER — ALLOPURINOL 300 MG/1
300 TABLET ORAL DAILY
Qty: 30 TABLET | Refills: 11 | Status: SHIPPED | OUTPATIENT
Start: 2020-08-17 | End: 2020-09-16

## 2020-08-17 NOTE — PROGRESS NOTES
Subjective:       Patient ID: Ghulam Ariza is a 60 y.o. male.    Chief Complaint: Gout and Hypertension    HPI  60-year-old  male with hypertension and chronic gout presents to clinic today secondary to concerns of his blood pressure medicine affecting his gout flares.  He has been on benazepril 40 mg daily and has also been attempted on multiple other blood pressure medications without improvement of his blood pressure and with increases in his gout flares.  He was on hydrochlorothiazide which was discontinued secondary to exacerbating gout.  He was on amlodipine but reports rash from the medication; therefore, medication was discontinued.  He was started on metoprolol but medication was discontinued secondary to sexual side effects; therefore, he was started on diltiazem by Cardiology with adequate control of his blood pressure; however, after approximately 1 year he did not follow back up with cardiology and medication was never refilled.  He was started back on metoprolol at that time and did have adequate control of his blood pressure; however, he began noticing worsening gout flares and more recurrent gout flares.  He was last seen approximately 1 month ago secondary to elbow pain with fusion.  He was referred to orthopedics who did note gout flare and recommended re-evaluation of metoprolol as in rare instances metoprolol can exacerbate gout.  Patient discontinue the medication at that time and at this time presents to clinic only on benazepril.  Review of Systems   Constitutional: Negative for appetite change, chills, fatigue and fever.   HENT: Negative for nasal congestion, ear pain, hearing loss, postnasal drip, rhinorrhea, sinus pressure/congestion, sore throat and tinnitus.    Eyes: Negative for redness, itching and visual disturbance.   Respiratory: Negative for cough, chest tightness and shortness of breath.    Cardiovascular: Negative for chest pain and palpitations.   Gastrointestinal:  Negative for abdominal pain, constipation, diarrhea, nausea and vomiting.   Genitourinary: Negative for decreased urine volume, difficulty urinating, dysuria, frequency, hematuria and urgency.   Musculoskeletal: Positive for arthralgias and joint swelling. Negative for back pain, myalgias, neck pain and neck stiffness.   Integumentary:  Negative for rash.   Neurological: Negative for dizziness, light-headedness and headaches.   Psychiatric/Behavioral: Negative.          Objective:      Physical Exam  Vitals signs and nursing note reviewed.   Constitutional:       General: He is not in acute distress.     Appearance: He is well-developed. He is not diaphoretic.   HENT:      Head: Normocephalic and atraumatic.      Right Ear: External ear normal.      Left Ear: External ear normal.      Nose: Nose normal.      Mouth/Throat:      Pharynx: No oropharyngeal exudate.   Eyes:      General: No scleral icterus.        Right eye: No discharge.         Left eye: No discharge.      Conjunctiva/sclera: Conjunctivae normal.      Pupils: Pupils are equal, round, and reactive to light.   Neck:      Musculoskeletal: Normal range of motion and neck supple.      Thyroid: No thyromegaly.      Vascular: No JVD.      Trachea: No tracheal deviation.   Cardiovascular:      Rate and Rhythm: Normal rate and regular rhythm.      Heart sounds: Normal heart sounds. No murmur. No friction rub. No gallop.    Pulmonary:      Effort: Pulmonary effort is normal. No respiratory distress.      Breath sounds: Normal breath sounds. No stridor. No wheezing or rales.   Abdominal:      General: Bowel sounds are normal. There is no distension.      Palpations: Abdomen is soft. There is no mass.      Tenderness: There is no abdominal tenderness. There is no guarding or rebound.   Musculoskeletal: Normal range of motion.         General: No tenderness.      Left elbow: He exhibits effusion.   Lymphadenopathy:      Cervical: No cervical adenopathy.   Skin:      General: Skin is warm and dry.      Coloration: Skin is not pale.      Findings: No erythema or rash.   Neurological:      Mental Status: He is alert and oriented to person, place, and time.   Psychiatric:         Behavior: Behavior normal.         Thought Content: Thought content normal.         Judgment: Judgment normal.         Assessment:       1. Essential hypertension    2. Gout, unspecified cause, unspecified chronicity, unspecified site        Plan:       1.  Continue benazepril 40 mg daily.  Discontinue metoprolol.  Start diltiazem 60 mg b.i.d..  2.  Increase allopurinol to 300 mg daily and repeat uric acid level in 1 month.  3.  Return to clinic as needed or in 1 month for hypertension re-evaluation.

## 2020-09-15 ENCOUNTER — LAB VISIT (OUTPATIENT)
Dept: LAB | Facility: HOSPITAL | Age: 60
End: 2020-09-15
Attending: FAMILY MEDICINE
Payer: MEDICARE

## 2020-09-15 DIAGNOSIS — M10.9 GOUT, UNSPECIFIED CAUSE, UNSPECIFIED CHRONICITY, UNSPECIFIED SITE: ICD-10-CM

## 2020-09-15 LAB — URATE SERPL-MCNC: 8.9 MG/DL (ref 3.4–7)

## 2020-09-15 PROCEDURE — 84550 ASSAY OF BLOOD/URIC ACID: CPT | Mod: HCNC

## 2020-09-15 PROCEDURE — 36415 COLL VENOUS BLD VENIPUNCTURE: CPT | Mod: HCNC,PO

## 2020-09-16 ENCOUNTER — PATIENT MESSAGE (OUTPATIENT)
Dept: INTERNAL MEDICINE | Facility: CLINIC | Age: 60
End: 2020-09-16

## 2020-09-16 DIAGNOSIS — M10.9 GOUT, UNSPECIFIED CAUSE, UNSPECIFIED CHRONICITY, UNSPECIFIED SITE: Primary | ICD-10-CM

## 2020-09-16 RX ORDER — ALLOPURINOL 300 MG/1
300 TABLET ORAL 2 TIMES DAILY
Qty: 60 TABLET | Refills: 11 | Status: SHIPPED | OUTPATIENT
Start: 2020-09-16 | End: 2020-09-17 | Stop reason: SDUPTHER

## 2020-09-17 ENCOUNTER — OFFICE VISIT (OUTPATIENT)
Dept: INTERNAL MEDICINE | Facility: CLINIC | Age: 60
End: 2020-09-17
Payer: MEDICARE

## 2020-09-17 VITALS
BODY MASS INDEX: 31.18 KG/M2 | WEIGHT: 242.94 LBS | HEIGHT: 74 IN | DIASTOLIC BLOOD PRESSURE: 82 MMHG | SYSTOLIC BLOOD PRESSURE: 110 MMHG | TEMPERATURE: 98 F | RESPIRATION RATE: 16 BRPM | HEART RATE: 76 BPM | OXYGEN SATURATION: 98 %

## 2020-09-17 DIAGNOSIS — I10 ESSENTIAL HYPERTENSION: Primary | ICD-10-CM

## 2020-09-17 DIAGNOSIS — M10.9 GOUT, UNSPECIFIED CAUSE, UNSPECIFIED CHRONICITY, UNSPECIFIED SITE: ICD-10-CM

## 2020-09-17 PROCEDURE — 3008F BODY MASS INDEX DOCD: CPT | Mod: HCNC,CPTII,S$GLB, | Performed by: FAMILY MEDICINE

## 2020-09-17 PROCEDURE — 99213 OFFICE O/P EST LOW 20 MIN: CPT | Mod: HCNC,S$GLB,, | Performed by: FAMILY MEDICINE

## 2020-09-17 PROCEDURE — 3008F PR BODY MASS INDEX (BMI) DOCUMENTED: ICD-10-PCS | Mod: HCNC,CPTII,S$GLB, | Performed by: FAMILY MEDICINE

## 2020-09-17 PROCEDURE — 3079F PR MOST RECENT DIASTOLIC BLOOD PRESSURE 80-89 MM HG: ICD-10-PCS | Mod: HCNC,CPTII,S$GLB, | Performed by: FAMILY MEDICINE

## 2020-09-17 PROCEDURE — 3074F PR MOST RECENT SYSTOLIC BLOOD PRESSURE < 130 MM HG: ICD-10-PCS | Mod: HCNC,CPTII,S$GLB, | Performed by: FAMILY MEDICINE

## 2020-09-17 PROCEDURE — 99999 PR PBB SHADOW E&M-EST. PATIENT-LVL V: ICD-10-PCS | Mod: PBBFAC,HCNC,, | Performed by: FAMILY MEDICINE

## 2020-09-17 PROCEDURE — 99213 PR OFFICE/OUTPT VISIT, EST, LEVL III, 20-29 MIN: ICD-10-PCS | Mod: HCNC,S$GLB,, | Performed by: FAMILY MEDICINE

## 2020-09-17 PROCEDURE — 99999 PR PBB SHADOW E&M-EST. PATIENT-LVL V: CPT | Mod: PBBFAC,HCNC,, | Performed by: FAMILY MEDICINE

## 2020-09-17 PROCEDURE — 3074F SYST BP LT 130 MM HG: CPT | Mod: HCNC,CPTII,S$GLB, | Performed by: FAMILY MEDICINE

## 2020-09-17 PROCEDURE — 3079F DIAST BP 80-89 MM HG: CPT | Mod: HCNC,CPTII,S$GLB, | Performed by: FAMILY MEDICINE

## 2020-09-17 RX ORDER — MULTIVITAMIN
1 TABLET ORAL DAILY
COMMUNITY
End: 2022-01-26

## 2020-09-17 RX ORDER — ALLOPURINOL 300 MG/1
300 TABLET ORAL 2 TIMES DAILY
Qty: 60 TABLET | Refills: 11 | Status: SHIPPED | OUTPATIENT
Start: 2020-09-17 | End: 2021-10-15

## 2020-09-17 NOTE — PROGRESS NOTES
Subjective:       Patient ID: Ghulam Ariza is a 60 y.o. male.    Chief Complaint: Follow-up and Hypertension    HPI 60-year-old  male returns to clinic today for hypertension re-evaluation.  He was last seen 1 month ago at which time blood pressure was elevated at 140 6/96.  He had been attempted on multiple blood pressure medications in the past but medications were discontinued secondary to side effect or secondary to exacerbation of gout.  The patient has been on benazepril 40 mg daily without issue.  He had previously been on hydrochlorothiazide which was discontinued secondary to exacerbating gout.  He was on amlodipine which was discontinued secondary to rash.  He was on metoprolol which was discontinued secondary to sexual side effects.  He was last on diltiazem 60 mg b.i.d.; however, the patient was lost to follow-up and medication was never refilled.  At last visit the patient was restarted on diltiazem and returns today without complaints.  He reports no further gout flares.  Review of Systems   Constitutional: Negative for appetite change, chills, fatigue and fever.   HENT: Negative for nasal congestion, ear pain, hearing loss, postnasal drip, rhinorrhea, sinus pressure/congestion, sore throat and tinnitus.    Eyes: Negative for redness, itching and visual disturbance.   Respiratory: Negative for cough, chest tightness and shortness of breath.    Cardiovascular: Negative for chest pain and palpitations.   Gastrointestinal: Negative for abdominal pain, constipation, diarrhea, nausea and vomiting.   Genitourinary: Negative for decreased urine volume, difficulty urinating, dysuria, frequency, hematuria and urgency.   Musculoskeletal: Negative for back pain, myalgias, neck pain and neck stiffness.   Integumentary:  Negative for rash.   Neurological: Negative for dizziness, light-headedness and headaches.   Psychiatric/Behavioral: Negative.          Objective:      Physical Exam  Vitals signs  and nursing note reviewed.   Constitutional:       General: He is not in acute distress.     Appearance: He is well-developed. He is not diaphoretic.   HENT:      Head: Normocephalic and atraumatic.      Right Ear: External ear normal.      Left Ear: External ear normal.      Nose: Nose normal.      Mouth/Throat:      Pharynx: No oropharyngeal exudate.   Eyes:      General: No scleral icterus.        Right eye: No discharge.         Left eye: No discharge.      Conjunctiva/sclera: Conjunctivae normal.      Pupils: Pupils are equal, round, and reactive to light.   Neck:      Musculoskeletal: Normal range of motion and neck supple.      Thyroid: No thyromegaly.      Vascular: No JVD.      Trachea: No tracheal deviation.   Cardiovascular:      Rate and Rhythm: Normal rate and regular rhythm.      Heart sounds: Normal heart sounds. No murmur. No friction rub. No gallop.    Pulmonary:      Effort: Pulmonary effort is normal. No respiratory distress.      Breath sounds: Normal breath sounds. No stridor. No wheezing or rales.   Abdominal:      General: Bowel sounds are normal. There is no distension.      Palpations: Abdomen is soft. There is no mass.      Tenderness: There is no abdominal tenderness. There is no guarding or rebound.   Musculoskeletal: Normal range of motion.         General: No tenderness.   Lymphadenopathy:      Cervical: No cervical adenopathy.   Skin:     General: Skin is warm and dry.      Coloration: Skin is not pale.      Findings: No erythema or rash.   Neurological:      Mental Status: He is alert and oriented to person, place, and time.   Psychiatric:         Behavior: Behavior normal.         Thought Content: Thought content normal.         Judgment: Judgment normal.         Assessment:       1. Essential hypertension    2. Gout, unspecified cause, unspecified chronicity, unspecified site        Plan:       1.  Continue benazepril 40 mg daily and diltiazem 60 mg b.i.d..  Hypertension is well  controlled.  2.  Uric acid level continues to be elevated; therefore, I have recommended increasing allopurinol to 300 mg b.i.d..  Repeat uric acid level in 1 month.  3.  Return to clinic as needed or in 10 months for annual exam.

## 2020-09-17 NOTE — PATIENT INSTRUCTIONS
Gout Diet  Gout is a painful condition caused by an excess of uric acid, a waste product made by the body. Uric acid forms crystals that collect in the joints. The immune response to these crystals brings on symptoms of joint pain and swelling. This is called a gout attack. Often, medications and diet changes are combined to manage gout. Below are some guidelines for changing your diet to help you manage gout and prevent attacks. Your health care provider will help you determine the best eating plan for you.     Eating to manage gout  Weight loss for those who are overweight may help reduce gout attacks.  Eat less of these foods  Eating too many foods containing purines may raise the levels of uric acid in your body. This raises your risk for a gout attack. Try to limit these foods and drinks:  · Alcohol, such as beer and red wine. You may be told to avoid alcohol completely.  · Soft drinks that contain sugar or high fructose corn syrup  · Certain fish, including anchovies, sardines, fish eggs, and herring  · Shellfish  · Certain meats, such as red meat, hot dogs, luncheon meats, and turkey  · Organ meats, such as liver, kidneys, and sweetbreads  · Legumes, such as dried beans and peas  · Other high fat foods such as gravy, whole milk, and high fat cheeses  · Vegetables such as asparagus, cauliflower, spinach, and mushrooms used to be thought to contribute to an increased risk for a gout attack, but recent studies show that high purine vegetables don't increase the risk for a gout attack.  Eat more of these foods  Other foods may be helpful for people with gout. Add some of these foods to your diet:  · Cherries contain chemicals that may lower uric acid.  · Omega fatty acids. These are found in some fatty fish such as salmon, certain oils (flax, olive, or nut), and nuts themselves. Omega fatty acids may help prevent inflammation due to gout.  · Dairy products that are low-fat or fat-free, such as cheese and  yogurt  · Complex carbohydrate foods, including whole grains, brown rice, oats, and beans  · Coffee, in moderation  · Water, approximately 64 ounces per day  Follow-up care  Follow up with your healthcare provider as advised.  When to seek medical advice  Call your healthcare provider right away if any of these occur:  · Return of gout symptoms, usually at night:  · Severe pain, swelling, and heat in a joint, especially the base of the big toe  · Affected joint is hard to move  · Skin of the affected joint is purple or red  · Fever of 100.4°F (38°C) or higher  · Pain that doesn't get better even with prescribed medicine   Date Last Reviewed: 1/12/2016  © 5561-9239 [employer]. [address] [place] [address], [place] [address]. All rights reserved. This information is not intended as a substitute for professional medical care. Always follow your healthcare professional's instructions.        Eating to Prevent Gout  Gout is a painful form of arthritis caused by an excess of uric acid. This is a waste product made by the body. It builds up in the body and forms crystals that collect in the joints, bringing on a gout attack. Alcohol and certain foods can trigger a gout attack. Below are some guidelines for changing your diet to help you manage gout. Your healthcare provider can work with you to determine the best eating plan for you. Know that diet is only one part of managing gout. Take your medicines as prescribed and follow the other guidelines your healthcare provider has given you.  Foods to limit  Eating too many foods containing purines may increase the levels of uric acid in your body and increase your risk for a gout attack. It may be best to limit these high-purine foods:  · Alcohol (beer, red wine). You may be told to avoid alcohol completely.  · Certain fish (anchovies, sardines, fish roes, herring, tuna, mussels, codfish, scallops, trout, and keke)  · Certain meats (red meat, processed meat, marcelo,  turkey, wild game, and goose)  · Sauces and gravies made with meat  · Organ meats (such as liver, kidneys, sweetbreads, and tripe)  · Legumes (such as dried beans, peas)  · Mushrooms, spinach, asparagus, and cauliflower  · Yeast and yeast extract supplements  Foods to try  Some foods may be helpful for people with gout. You may want to try adding some of the following foods to your diet:  · Dark berries: These include blueberries, blackberries, and cherries. These berries contain chemicals that may lower uric acid.  · Tofu: Tofu, which is made from soy, is a good source of protein. Studies have shown that it may be a better choice than meat for people with gout.  · Omega fatty acids: These acids are found in fatty fish (such as salmon), certain oils (such as flax, olive, or nut oils), or nuts. They may help prevent inflammation due to gout.  The following guidelines are recommended by the American Medical Association for people with gout. Your diet should be:  · High in fiber, whole grains, fruits, and vegetables.  · Low in protein (15% of calories should come from protein. Choose lean sources such as soy, lean meats, and poultry).  · Low in fat (no more than 30% of calories should come from fat, with only 10% coming from animal fat).   Date Last Reviewed: 6/17/2015  © 7088-6928 [employer]. [address] [place] [address], [place] [address]. All rights reserved. This information is not intended as a substitute for professional medical care. Always follow your healthcare professional's instructions.

## 2020-09-29 ENCOUNTER — PATIENT MESSAGE (OUTPATIENT)
Dept: OTHER | Facility: OTHER | Age: 60
End: 2020-09-29

## 2020-10-20 ENCOUNTER — LAB VISIT (OUTPATIENT)
Dept: LAB | Facility: HOSPITAL | Age: 60
End: 2020-10-20
Attending: FAMILY MEDICINE
Payer: MEDICARE

## 2020-10-20 DIAGNOSIS — M10.9 GOUT, UNSPECIFIED CAUSE, UNSPECIFIED CHRONICITY, UNSPECIFIED SITE: ICD-10-CM

## 2020-10-20 LAB — URATE SERPL-MCNC: 5.1 MG/DL (ref 3.4–7)

## 2020-10-20 PROCEDURE — 36415 COLL VENOUS BLD VENIPUNCTURE: CPT | Mod: HCNC,PO

## 2020-10-20 PROCEDURE — 84550 ASSAY OF BLOOD/URIC ACID: CPT | Mod: HCNC

## 2020-12-11 ENCOUNTER — PATIENT MESSAGE (OUTPATIENT)
Dept: OTHER | Facility: OTHER | Age: 60
End: 2020-12-11

## 2020-12-30 ENCOUNTER — OFFICE VISIT (OUTPATIENT)
Dept: URGENT CARE | Facility: CLINIC | Age: 60
End: 2020-12-30
Payer: MEDICARE

## 2020-12-30 VITALS
HEART RATE: 82 BPM | HEIGHT: 74 IN | DIASTOLIC BLOOD PRESSURE: 99 MMHG | BODY MASS INDEX: 30.8 KG/M2 | SYSTOLIC BLOOD PRESSURE: 162 MMHG | WEIGHT: 240 LBS | OXYGEN SATURATION: 98 % | TEMPERATURE: 99 F | RESPIRATION RATE: 18 BRPM

## 2020-12-30 DIAGNOSIS — J06.9 UPPER RESPIRATORY VIRUS: Primary | ICD-10-CM

## 2020-12-30 DIAGNOSIS — R05.9 COUGH: ICD-10-CM

## 2020-12-30 LAB
CTP QC/QA: YES
SARS-COV-2 RDRP RESP QL NAA+PROBE: NEGATIVE

## 2020-12-30 PROCEDURE — 3008F BODY MASS INDEX DOCD: CPT | Mod: CPTII,S$GLB,, | Performed by: PHYSICIAN ASSISTANT

## 2020-12-30 PROCEDURE — 99213 PR OFFICE/OUTPT VISIT, EST, LEVL III, 20-29 MIN: ICD-10-PCS | Mod: S$GLB,,, | Performed by: PHYSICIAN ASSISTANT

## 2020-12-30 PROCEDURE — U0002: ICD-10-PCS | Mod: QW,S$GLB,, | Performed by: PHYSICIAN ASSISTANT

## 2020-12-30 PROCEDURE — U0002 COVID-19 LAB TEST NON-CDC: HCPCS | Mod: QW,S$GLB,, | Performed by: PHYSICIAN ASSISTANT

## 2020-12-30 PROCEDURE — 99213 OFFICE O/P EST LOW 20 MIN: CPT | Mod: S$GLB,,, | Performed by: PHYSICIAN ASSISTANT

## 2020-12-30 PROCEDURE — 3008F PR BODY MASS INDEX (BMI) DOCUMENTED: ICD-10-PCS | Mod: CPTII,S$GLB,, | Performed by: PHYSICIAN ASSISTANT

## 2020-12-30 RX ORDER — ALBUTEROL SULFATE 90 UG/1
2 AEROSOL, METERED RESPIRATORY (INHALATION) EVERY 6 HOURS PRN
Qty: 18 G | Refills: 0 | Status: SHIPPED | OUTPATIENT
Start: 2020-12-30 | End: 2022-11-14

## 2020-12-30 RX ORDER — AZITHROMYCIN 250 MG/1
TABLET, FILM COATED ORAL
Qty: 6 TABLET | Refills: 0 | Status: SHIPPED | OUTPATIENT
Start: 2020-12-30 | End: 2021-05-26 | Stop reason: ALTCHOICE

## 2020-12-30 RX ORDER — PROMETHAZINE HYDROCHLORIDE AND DEXTROMETHORPHAN HYDROBROMIDE 6.25; 15 MG/5ML; MG/5ML
5 SYRUP ORAL EVERY 8 HOURS PRN
Qty: 180 ML | Refills: 0 | Status: SHIPPED | OUTPATIENT
Start: 2020-12-30 | End: 2021-01-06

## 2020-12-30 NOTE — PROGRESS NOTES
"Subjective:       Patient ID: Ghulam Ariza is a 60 y.o. male.    Vitals:  height is 6' 2" (1.88 m) and weight is 108.9 kg (240 lb). His temperature is 98.7 °F (37.1 °C). His blood pressure is 162/99 (abnormal) and his pulse is 82. His respiration is 18 and oxygen saturation is 98%.     Chief Complaint: Cough    Mr. Ariza presents for evaluation of cough, congestion, wheezing, sputum production that started 6 days ago.  He denies any COVID-19 exposures.  He denies any fevers, chills, headache, sore throat, shortness of breath, chest pain, leg swelling, nausea, vomiting, diarrhea, anosmia or ageusia.  He has been taking OTC cough suppressant for his symptoms with some relief.         Cough  This is a new problem. Episode onset: 6 days. The problem has been gradually worsening. The problem occurs every few minutes. The cough is productive of sputum. Associated symptoms include nasal congestion and wheezing. Pertinent negatives include no chest pain, chills, ear pain, eye redness, fever, hemoptysis, myalgias, rash, sore throat or shortness of breath. Nothing aggravates the symptoms. He has tried OTC cough suppressant for the symptoms. The treatment provided moderate relief.       Constitution: Negative for chills, sweating, fatigue and fever.   HENT: Positive for congestion. Negative for ear pain, ear discharge, sinus pain, sinus pressure, sore throat and voice change.    Neck: Negative for painful lymph nodes.   Cardiovascular: Negative for chest trauma, chest pain, leg swelling, palpitations, sob on exertion and passing out.   Eyes: Negative for eye redness.   Respiratory: Positive for cough, sputum production and wheezing. Negative for chest tightness, bloody sputum, COPD, shortness of breath, stridor and asthma.    Gastrointestinal: Negative for abdominal pain, nausea, vomiting and diarrhea.   Musculoskeletal: Negative for muscle ache.   Skin: Negative for rash.   Allergic/Immunologic: Negative for seasonal allergies " and asthma.   Hematologic/Lymphatic: Negative for swollen lymph nodes.       Objective:      Physical Exam   Constitutional: He is oriented to person, place, and time. He appears well-developed. He is cooperative.  Non-toxic appearance. He does not appear ill. No distress.   HENT:   Head: Normocephalic and atraumatic.   Ears:   Right Ear: Hearing, tympanic membrane, external ear and ear canal normal.   Left Ear: Hearing, tympanic membrane, external ear and ear canal normal.   Nose: Nose normal. No mucosal edema, rhinorrhea or nasal deformity. No epistaxis. Right sinus exhibits no maxillary sinus tenderness and no frontal sinus tenderness. Left sinus exhibits no maxillary sinus tenderness and no frontal sinus tenderness.   Mouth/Throat: Uvula is midline, oropharynx is clear and moist and mucous membranes are normal. No trismus in the jaw. Normal dentition. No uvula swelling. No oropharyngeal exudate, posterior oropharyngeal edema or posterior oropharyngeal erythema.   Eyes: Conjunctivae and lids are normal. No scleral icterus.   Neck: Trachea normal, full passive range of motion without pain and phonation normal. Neck supple. No neck rigidity. No edema and no erythema present.   Cardiovascular: Normal rate, regular rhythm, normal heart sounds and normal pulses.   Pulmonary/Chest: Effort normal and breath sounds normal. No stridor. No respiratory distress. He has no decreased breath sounds. He has no wheezes. He has no rhonchi. He has no rales.   Abdominal: Normal appearance.   Musculoskeletal: Normal range of motion.         General: No deformity.   Lymphadenopathy:     He has no cervical adenopathy.   Neurological: He is alert and oriented to person, place, and time. He exhibits normal muscle tone. Coordination normal.   Skin: Skin is warm, dry, intact, not diaphoretic and not pale. Psychiatric: His speech is normal and behavior is normal. Judgment and thought content normal.   Nursing note and vitals reviewed.         Results for orders placed or performed in visit on 12/30/20   POCT COVID-19 Rapid Screening   Result Value Ref Range    POC Rapid COVID Negative Negative     Acceptable Yes        Assessment:       1. Upper respiratory virus    2. Cough        Plan:         Upper respiratory virus    Cough  -     POCT COVID-19 Rapid Screening    Other orders  -     albuterol (PROVENTIL HFA) 90 mcg/actuation inhaler; Inhale 2 puffs into the lungs every 6 (six) hours as needed for Wheezing. Rescue  Dispense: 18 g; Refill: 0  -     promethazine-dextromethorphan (PROMETHAZINE-DM) 6.25-15 mg/5 mL Syrp; Take 5 mLs by mouth every 8 (eight) hours as needed.  Dispense: 180 mL; Refill: 0  -     azithromycin (Z-GLADYS) 250 MG tablet; Take 2 tablets by mouth on day 1; Take 1 tablet by mouth on days 2-5  Dispense: 6 tablet; Refill: 0    Wait & see abx given.  Diagnoses and plan discussed with the patient, as well as the expected course and duration of his symptoms.  All questions and concerns were addressed prior to discharge.  He was advised to follow up with his PCP within 1 week if symptoms do not improve.  Emergency department precautions were given.  Patient verbalized understanding and was happy with the plan of care.     Patient Instructions   PLEASE READ YOUR DISCHARGE INSTRUCTIONS ENTIRELY AS IT CONTAINS IMPORTANT INFORMATION.  Please wait 10 days until starting antibiotics.  If you are feeling the same or worse at that time, please start the antibiotics.  - Rest.    - Drink plenty of fluids.    - Tylenol or Ibuprofen as directed as needed for fever/pain.    - If you were prescribed antibiotics, please take them to completion.  - If you are female and on birth control pills - please use additional methods of contraception to prevent pregnancy while on antibiotics and for one cycle after.   - If you were prescribed a narcotic medication or muscle relaxer, do not drive or operate heavy equipment or machinery while taking  "these medications, as they can cause drowsiness.   - If you smoke, please stop smoking.  -You must understand that you've received an Urgent Care treatment only and that you may be released before all your medical problems are known or treated. You, the patient, will    arrange for follow up care as instructed. Please arrange follow up with your primary medical clinic as soon as possible.   - Follow up with your PCP or specialty clinic as directed in the next 1-2 weeks if not improved or as needed.  You can call [phone] to schedule an appointment with the appropriate provider.    - Please return to Urgent Care or to the Emergency Department if your symptoms worsen.    Patient aware and verbalized understanding.     NEGATIVE COVID TEST  o You have tested negative for COVID-19 today.  If you did not have a close exposure (as defined below) you can return to your normal daily activities to include social distancing, wearing a mask and frequent handwashing.  o A "close exposure" is defined as anyone who has had an exposure (masked or unmasked) to a known COVID -19 positive person within 6 ft for longer than 15 minutes. If your exposure meets this definition, you are required by CDC guidelines to quarantine for at least 7-10 days from time of exposure.  o The CDC states that a test can be performed for an asymptomatic patient (someone who does not have any symptoms) after a close exposure, and that a test should be done if you develop symptoms after a close exposure as described above.  o Specifically, you can test at day 5 or later if asymptomatic in order to get released from quarantine on day 7 or later.  If you develop symptoms sooner, you should test when your symptoms start.  o If you developed symptoms since the exposure, and your test was negative today and less than 5 days from your exposure, you still have to quarantine for 7-10 days from the date of the exposure.  o The 7-10 day quarantine begins from " the day you were exposed, not the day of your test.  For example, if your exposure was on a Monday, and you waited until Friday of the same week to get tested and it was negative, your 7-10 day quarantine begins from that Monday, not the Friday you tested negative.  o Please note, if you decide to test as an asymptomatic during your quarantine and you are positive, you will be restarting your quarantine and moving from a possible 10 day quarantine (if you do not test), to a 11 day or greater quarantine.      Viral Upper Respiratory Illness (Adult)  You have a viral upper respiratory illness (URI), which is another term for the common cold. This illness is contagious during the first few days. It is spread through the air by coughing and sneezing. It may also be spread by direct contact (touching the sick person and then touching your own eyes, nose, or mouth). Frequent handwashing will decrease risk of spread. Most viral illnesses go away within 7 to 10 days with rest and simple home remedies. Sometimes the illness may last for several weeks. Antibiotics will not kill a virus, and they are generally not prescribed for this condition.    Home care  · If symptoms are severe, rest at home for the first 2 to 3 days. When you resume activity, don't let yourself get too tired.  · Avoid being exposed to cigarette smoke (yours or others).  · You may use acetaminophen or ibuprofen to control pain and fever, unless another medicine was prescribed. (Note: If you have chronic liver or kidney disease, have ever had a stomach ulcer or gastrointestinal bleeding, or are taking blood-thinning medicines, talk with your healthcare provider before using these medicines.) Aspirin should never be given to anyone under 18 years of age who is ill with a viral infection or fever. It may cause severe liver or brain damage.  · Your appetite may be poor, so a light diet is fine. Avoid dehydration by drinking 6 to 8 glasses of fluids per day  (water, soft drinks, juices, tea, or soup). Extra fluids will help loosen secretions in the nose and lungs.  · Over-the-counter cold medicines will not shorten the length of time youre sick, but they may be helpful for the following symptoms: cough, sore throat, and nasal and sinus congestion. (Note: Do not use decongestants if you have high blood pressure.)  Follow-up care  Follow up with your healthcare provider, or as advised.  When to seek medical advice  Call your healthcare provider right away if any of these occur:  · Cough with lots of colored sputum (mucus)  · Severe headache; face, neck, or ear pain  · Difficulty swallowing due to throat pain  · Fever of 100.4°F (38°C)  Call 911, or get immediate medical care  Call emergency services right away if any of these occur:  · Chest pain, shortness of breath, wheezing, or difficulty breathing  · Coughing up blood  · Inability to swallow due to throat pain  Date Last Reviewed: 9/13/2015  © 2341-1642 [employer]. [address] [place] [address], [place] [address]. All rights reserved. This information is not intended as a substitute for professional medical care. Always follow your healthcare professional's instructions.

## 2020-12-30 NOTE — PATIENT INSTRUCTIONS
"PLEASE READ YOUR DISCHARGE INSTRUCTIONS ENTIRELY AS IT CONTAINS IMPORTANT INFORMATION.  Please wait 10 days until starting antibiotics.  If you are feeling the same or worse at that time, please start the antibiotics.  - Rest.    - Drink plenty of fluids.    - Tylenol or Ibuprofen as directed as needed for fever/pain.    - If you were prescribed antibiotics, please take them to completion.  - If you are female and on birth control pills - please use additional methods of contraception to prevent pregnancy while on antibiotics and for one cycle after.   - If you were prescribed a narcotic medication or muscle relaxer, do not drive or operate heavy equipment or machinery while taking these medications, as they can cause drowsiness.   - If you smoke, please stop smoking.  -You must understand that you've received an Urgent Care treatment only and that you may be released before all your medical problems are known or treated. You, the patient, will    arrange for follow up care as instructed. Please arrange follow up with your primary medical clinic as soon as possible.   - Follow up with your PCP or specialty clinic as directed in the next 1-2 weeks if not improved or as needed.  You can call [phone] to schedule an appointment with the appropriate provider.    - Please return to Urgent Care or to the Emergency Department if your symptoms worsen.    Patient aware and verbalized understanding.     NEGATIVE COVID TEST  o You have tested negative for COVID-19 today.  If you did not have a close exposure (as defined below) you can return to your normal daily activities to include social distancing, wearing a mask and frequent handwashing.  o A "close exposure" is defined as anyone who has had an exposure (masked or unmasked) to a known COVID -19 positive person within 6 ft for longer than 15 minutes. If your exposure meets this definition, you are required by CDC guidelines to quarantine for at least 7-10 days from " time of exposure.  o The CDC states that a test can be performed for an asymptomatic patient (someone who does not have any symptoms) after a close exposure, and that a test should be done if you develop symptoms after a close exposure as described above.  o Specifically, you can test at day 5 or later if asymptomatic in order to get released from quarantine on day 7 or later.  If you develop symptoms sooner, you should test when your symptoms start.  o If you developed symptoms since the exposure, and your test was negative today and less than 5 days from your exposure, you still have to quarantine for 7-10 days from the date of the exposure.  o The 7-10 day quarantine begins from the day you were exposed, not the day of your test.  For example, if your exposure was on a Monday, and you waited until Friday of the same week to get tested and it was negative, your 7-10 day quarantine begins from that Monday, not the Friday you tested negative.  o Please note, if you decide to test as an asymptomatic during your quarantine and you are positive, you will be restarting your quarantine and moving from a possible 10 day quarantine (if you do not test), to a 11 day or greater quarantine.      Viral Upper Respiratory Illness (Adult)  You have a viral upper respiratory illness (URI), which is another term for the common cold. This illness is contagious during the first few days. It is spread through the air by coughing and sneezing. It may also be spread by direct contact (touching the sick person and then touching your own eyes, nose, or mouth). Frequent handwashing will decrease risk of spread. Most viral illnesses go away within 7 to 10 days with rest and simple home remedies. Sometimes the illness may last for several weeks. Antibiotics will not kill a virus, and they are generally not prescribed for this condition.    Home care  · If symptoms are severe, rest at home for the first 2 to 3 days. When you resume activity,  don't let yourself get too tired.  · Avoid being exposed to cigarette smoke (yours or others).  · You may use acetaminophen or ibuprofen to control pain and fever, unless another medicine was prescribed. (Note: If you have chronic liver or kidney disease, have ever had a stomach ulcer or gastrointestinal bleeding, or are taking blood-thinning medicines, talk with your healthcare provider before using these medicines.) Aspirin should never be given to anyone under 18 years of age who is ill with a viral infection or fever. It may cause severe liver or brain damage.  · Your appetite may be poor, so a light diet is fine. Avoid dehydration by drinking 6 to 8 glasses of fluids per day (water, soft drinks, juices, tea, or soup). Extra fluids will help loosen secretions in the nose and lungs.  · Over-the-counter cold medicines will not shorten the length of time youre sick, but they may be helpful for the following symptoms: cough, sore throat, and nasal and sinus congestion. (Note: Do not use decongestants if you have high blood pressure.)  Follow-up care  Follow up with your healthcare provider, or as advised.  When to seek medical advice  Call your healthcare provider right away if any of these occur:  · Cough with lots of colored sputum (mucus)  · Severe headache; face, neck, or ear pain  · Difficulty swallowing due to throat pain  · Fever of 100.4°F (38°C)  Call 911, or get immediate medical care  Call emergency services right away if any of these occur:  · Chest pain, shortness of breath, wheezing, or difficulty breathing  · Coughing up blood  · Inability to swallow due to throat pain  Date Last Reviewed: 9/13/2015 © 2000-2017 [employer]. [address] [place] [address]. All rights reserved. This information is not intended as a substitute for professional medical care. Always follow your healthcare professional's instructions.

## 2021-03-23 NOTE — TELEPHONE ENCOUNTER
I have sent over an in-basket message as well as an IM on 1/11/2018 with appeal process information. No response was made prior from Dr. Jj    no

## 2021-04-13 ENCOUNTER — NURSE TRIAGE (OUTPATIENT)
Dept: ADMINISTRATIVE | Facility: CLINIC | Age: 61
End: 2021-04-13

## 2021-05-24 ENCOUNTER — HOSPITAL ENCOUNTER (EMERGENCY)
Facility: HOSPITAL | Age: 61
Discharge: HOME | End: 2021-05-24
Attending: EMERGENCY MEDICINE
Payer: MEDICARE

## 2021-05-24 VITALS
WEIGHT: 245 LBS | HEART RATE: 82 BPM | DIASTOLIC BLOOD PRESSURE: 102 MMHG | RESPIRATION RATE: 18 BRPM | HEIGHT: 74 IN | BODY MASS INDEX: 31.44 KG/M2 | TEMPERATURE: 99 F | OXYGEN SATURATION: 99 % | SYSTOLIC BLOOD PRESSURE: 156 MMHG

## 2021-05-24 DIAGNOSIS — M54.50 ACUTE RIGHT-SIDED LOW BACK PAIN WITHOUT SCIATICA: Primary | ICD-10-CM

## 2021-05-24 PROCEDURE — 99284 EMERGENCY DEPT VISIT MOD MDM: CPT

## 2021-05-24 PROCEDURE — 99284 EMERGENCY DEPT VISIT MOD MDM: CPT | Mod: ,,, | Performed by: EMERGENCY MEDICINE

## 2021-05-24 PROCEDURE — 99284 PR EMERGENCY DEPT VISIT,LEVEL IV: ICD-10-PCS | Mod: ,,, | Performed by: EMERGENCY MEDICINE

## 2021-05-24 PROCEDURE — 25000003 PHARM REV CODE 250: Performed by: EMERGENCY MEDICINE

## 2021-05-24 RX ORDER — METHOCARBAMOL 750 MG/1
1500 TABLET, FILM COATED ORAL 3 TIMES DAILY PRN
Qty: 30 TABLET | Refills: 0 | Status: SHIPPED | OUTPATIENT
Start: 2021-05-24 | End: 2021-05-29

## 2021-05-24 RX ORDER — NAPROXEN 500 MG/1
500 TABLET ORAL 2 TIMES DAILY PRN
Qty: 30 TABLET | Refills: 0 | Status: ON HOLD | OUTPATIENT
Start: 2021-05-24 | End: 2021-09-30

## 2021-05-24 RX ORDER — NAPROXEN 500 MG/1
500 TABLET ORAL
Status: COMPLETED | OUTPATIENT
Start: 2021-05-24 | End: 2021-05-24

## 2021-05-24 RX ORDER — METHOCARBAMOL 750 MG/1
1500 TABLET, FILM COATED ORAL
Status: COMPLETED | OUTPATIENT
Start: 2021-05-24 | End: 2021-05-24

## 2021-05-24 RX ADMIN — NAPROXEN 500 MG: 500 TABLET ORAL at 08:05

## 2021-05-24 RX ADMIN — METHOCARBAMOL 1500 MG: 750 TABLET ORAL at 08:05

## 2021-05-26 ENCOUNTER — HOSPITAL ENCOUNTER (OUTPATIENT)
Dept: RADIOLOGY | Facility: HOSPITAL | Age: 61
Discharge: HOME | End: 2021-05-26
Attending: HOSPITALIST
Payer: MEDICARE

## 2021-05-26 ENCOUNTER — OFFICE VISIT (OUTPATIENT)
Dept: INTERNAL MEDICINE | Facility: CLINIC | Age: 61
End: 2021-05-26
Payer: MEDICARE

## 2021-05-26 VITALS
WEIGHT: 249.56 LBS | RESPIRATION RATE: 18 BRPM | OXYGEN SATURATION: 97 % | HEART RATE: 75 BPM | BODY MASS INDEX: 32.03 KG/M2 | DIASTOLIC BLOOD PRESSURE: 84 MMHG | TEMPERATURE: 98 F | SYSTOLIC BLOOD PRESSURE: 128 MMHG | HEIGHT: 74 IN

## 2021-05-26 DIAGNOSIS — M25.551 ACUTE RIGHT HIP PAIN: ICD-10-CM

## 2021-05-26 DIAGNOSIS — M25.551 ACUTE RIGHT HIP PAIN: Primary | ICD-10-CM

## 2021-05-26 DIAGNOSIS — M54.50 RIGHT-SIDED LOW BACK PAIN WITHOUT SCIATICA, UNSPECIFIED CHRONICITY: ICD-10-CM

## 2021-05-26 PROCEDURE — 72110 X-RAY EXAM L-2 SPINE 4/>VWS: CPT | Mod: 26,,, | Performed by: RADIOLOGY

## 2021-05-26 PROCEDURE — 73502 XR HIP 2 VIEW RIGHT: ICD-10-PCS | Mod: 26,RT,, | Performed by: RADIOLOGY

## 2021-05-26 PROCEDURE — 1126F PR PAIN SEVERITY QUANTIFIED, NO PAIN PRESENT: ICD-10-PCS | Mod: S$GLB,,, | Performed by: HOSPITALIST

## 2021-05-26 PROCEDURE — 99214 PR OFFICE/OUTPT VISIT, EST, LEVL IV, 30-39 MIN: ICD-10-PCS | Mod: 25,S$GLB,, | Performed by: HOSPITALIST

## 2021-05-26 PROCEDURE — 99999 PR PBB SHADOW E&M-EST. PATIENT-LVL V: ICD-10-PCS | Mod: PBBFAC,,, | Performed by: HOSPITALIST

## 2021-05-26 PROCEDURE — 73502 X-RAY EXAM HIP UNI 2-3 VIEWS: CPT | Mod: TC,FY,PO,RT

## 2021-05-26 PROCEDURE — 99214 OFFICE O/P EST MOD 30 MIN: CPT | Mod: 25,S$GLB,, | Performed by: HOSPITALIST

## 2021-05-26 PROCEDURE — 72110 XR LUMBAR SPINE COMPLETE 5 VIEW: ICD-10-PCS | Mod: 26,,, | Performed by: RADIOLOGY

## 2021-05-26 PROCEDURE — 72110 X-RAY EXAM L-2 SPINE 4/>VWS: CPT | Mod: TC,FY,PO

## 2021-05-26 PROCEDURE — 3008F BODY MASS INDEX DOCD: CPT | Mod: CPTII,S$GLB,, | Performed by: HOSPITALIST

## 2021-05-26 PROCEDURE — 1126F AMNT PAIN NOTED NONE PRSNT: CPT | Mod: S$GLB,,, | Performed by: HOSPITALIST

## 2021-05-26 PROCEDURE — 73502 X-RAY EXAM HIP UNI 2-3 VIEWS: CPT | Mod: 26,RT,, | Performed by: RADIOLOGY

## 2021-05-26 PROCEDURE — 96372 PR INJECTION,THERAP/PROPH/DIAG2ST, IM OR SUBCUT: ICD-10-PCS | Mod: S$GLB,,, | Performed by: HOSPITALIST

## 2021-05-26 PROCEDURE — 96372 THER/PROPH/DIAG INJ SC/IM: CPT | Mod: S$GLB,,, | Performed by: HOSPITALIST

## 2021-05-26 PROCEDURE — 3008F PR BODY MASS INDEX (BMI) DOCUMENTED: ICD-10-PCS | Mod: CPTII,S$GLB,, | Performed by: HOSPITALIST

## 2021-05-26 PROCEDURE — 99999 PR PBB SHADOW E&M-EST. PATIENT-LVL V: CPT | Mod: PBBFAC,,, | Performed by: HOSPITALIST

## 2021-05-26 RX ORDER — KETOROLAC TROMETHAMINE 30 MG/ML
30 INJECTION, SOLUTION INTRAMUSCULAR; INTRAVENOUS
Status: COMPLETED | OUTPATIENT
Start: 2021-05-26 | End: 2021-05-26

## 2021-05-26 RX ORDER — HYDROCODONE BITARTRATE AND ACETAMINOPHEN 7.5; 325 MG/1; MG/1
1 TABLET ORAL EVERY 6 HOURS PRN
Qty: 28 TABLET | Refills: 0 | Status: SHIPPED | OUTPATIENT
Start: 2021-05-26 | End: 2021-06-29 | Stop reason: SDUPTHER

## 2021-05-26 RX ADMIN — KETOROLAC TROMETHAMINE 30 MG: 30 INJECTION, SOLUTION INTRAMUSCULAR; INTRAVENOUS at 10:05

## 2021-05-27 ENCOUNTER — OFFICE VISIT (OUTPATIENT)
Dept: ORTHOPEDICS | Facility: CLINIC | Age: 61
End: 2021-05-27
Payer: MEDICARE

## 2021-05-27 VITALS
WEIGHT: 252.63 LBS | HEART RATE: 73 BPM | DIASTOLIC BLOOD PRESSURE: 115 MMHG | HEIGHT: 74 IN | SYSTOLIC BLOOD PRESSURE: 164 MMHG | BODY MASS INDEX: 32.42 KG/M2

## 2021-05-27 DIAGNOSIS — M25.551 ACUTE RIGHT HIP PAIN: ICD-10-CM

## 2021-05-27 DIAGNOSIS — M54.9 BACK PAIN, UNSPECIFIED BACK LOCATION, UNSPECIFIED BACK PAIN LATERALITY, UNSPECIFIED CHRONICITY: Primary | ICD-10-CM

## 2021-05-27 PROCEDURE — 99203 OFFICE O/P NEW LOW 30 MIN: CPT | Mod: S$GLB,,, | Performed by: PHYSICIAN ASSISTANT

## 2021-05-27 PROCEDURE — 99999 PR PBB SHADOW E&M-EST. PATIENT-LVL IV: ICD-10-PCS | Mod: PBBFAC,,, | Performed by: PHYSICIAN ASSISTANT

## 2021-05-27 PROCEDURE — 1125F AMNT PAIN NOTED PAIN PRSNT: CPT | Mod: S$GLB,,, | Performed by: PHYSICIAN ASSISTANT

## 2021-05-27 PROCEDURE — 1125F PR PAIN SEVERITY QUANTIFIED, PAIN PRESENT: ICD-10-PCS | Mod: S$GLB,,, | Performed by: PHYSICIAN ASSISTANT

## 2021-05-27 PROCEDURE — 99999 PR PBB SHADOW E&M-EST. PATIENT-LVL IV: CPT | Mod: PBBFAC,,, | Performed by: PHYSICIAN ASSISTANT

## 2021-05-27 PROCEDURE — 3008F PR BODY MASS INDEX (BMI) DOCUMENTED: ICD-10-PCS | Mod: CPTII,S$GLB,, | Performed by: PHYSICIAN ASSISTANT

## 2021-05-27 PROCEDURE — 3008F BODY MASS INDEX DOCD: CPT | Mod: CPTII,S$GLB,, | Performed by: PHYSICIAN ASSISTANT

## 2021-05-27 PROCEDURE — 99203 PR OFFICE/OUTPT VISIT, NEW, LEVL III, 30-44 MIN: ICD-10-PCS | Mod: S$GLB,,, | Performed by: PHYSICIAN ASSISTANT

## 2021-05-31 ENCOUNTER — PATIENT OUTREACH (OUTPATIENT)
Dept: ADMINISTRATIVE | Facility: OTHER | Age: 61
End: 2021-05-31

## 2021-05-31 ENCOUNTER — PATIENT MESSAGE (OUTPATIENT)
Dept: ORTHOPEDICS | Facility: CLINIC | Age: 61
End: 2021-05-31

## 2021-06-02 ENCOUNTER — OFFICE VISIT (OUTPATIENT)
Dept: ORTHOPEDICS | Facility: CLINIC | Age: 61
End: 2021-06-02
Payer: MEDICARE

## 2021-06-02 ENCOUNTER — HOSPITAL ENCOUNTER (OUTPATIENT)
Dept: RADIOLOGY | Facility: HOSPITAL | Age: 61
Discharge: HOME | End: 2021-06-02
Attending: ORTHOPAEDIC SURGERY
Payer: MEDICARE

## 2021-06-02 VITALS — BODY MASS INDEX: 31.94 KG/M2 | WEIGHT: 248.88 LBS | HEIGHT: 74 IN

## 2021-06-02 DIAGNOSIS — M54.50 ACUTE RIGHT-SIDED LOW BACK PAIN WITHOUT SCIATICA: ICD-10-CM

## 2021-06-02 DIAGNOSIS — M51.36 DDD (DEGENERATIVE DISC DISEASE), LUMBAR: ICD-10-CM

## 2021-06-02 DIAGNOSIS — M54.16 LUMBAR RADICULOPATHY, ACUTE: Primary | ICD-10-CM

## 2021-06-02 PROCEDURE — 3008F BODY MASS INDEX DOCD: CPT | Mod: CPTII,S$GLB,, | Performed by: ORTHOPAEDIC SURGERY

## 2021-06-02 PROCEDURE — 99214 PR OFFICE/OUTPT VISIT, EST, LEVL IV, 30-39 MIN: ICD-10-PCS | Mod: S$GLB,,, | Performed by: ORTHOPAEDIC SURGERY

## 2021-06-02 PROCEDURE — 99999 PR PBB SHADOW E&M-EST. PATIENT-LVL III: ICD-10-PCS | Mod: PBBFAC,,, | Performed by: ORTHOPAEDIC SURGERY

## 2021-06-02 PROCEDURE — 72110 X-RAY EXAM L-2 SPINE 4/>VWS: CPT | Mod: TC

## 2021-06-02 PROCEDURE — 72110 XR LUMBAR SPINE AP AND LAT WITH FLEX/EXT: ICD-10-PCS | Mod: 26,,, | Performed by: RADIOLOGY

## 2021-06-02 PROCEDURE — 1125F PR PAIN SEVERITY QUANTIFIED, PAIN PRESENT: ICD-10-PCS | Mod: S$GLB,,, | Performed by: ORTHOPAEDIC SURGERY

## 2021-06-02 PROCEDURE — 99214 OFFICE O/P EST MOD 30 MIN: CPT | Mod: S$GLB,,, | Performed by: ORTHOPAEDIC SURGERY

## 2021-06-02 PROCEDURE — 99999 PR PBB SHADOW E&M-EST. PATIENT-LVL III: CPT | Mod: PBBFAC,,, | Performed by: ORTHOPAEDIC SURGERY

## 2021-06-02 PROCEDURE — 3008F PR BODY MASS INDEX (BMI) DOCUMENTED: ICD-10-PCS | Mod: CPTII,S$GLB,, | Performed by: ORTHOPAEDIC SURGERY

## 2021-06-02 PROCEDURE — 72110 X-RAY EXAM L-2 SPINE 4/>VWS: CPT | Mod: 26,,, | Performed by: RADIOLOGY

## 2021-06-02 PROCEDURE — 1125F AMNT PAIN NOTED PAIN PRSNT: CPT | Mod: S$GLB,,, | Performed by: ORTHOPAEDIC SURGERY

## 2021-06-02 RX ORDER — METHYLPREDNISOLONE 4 MG/1
TABLET ORAL
Qty: 1 PACKAGE | Refills: 0 | Status: SHIPPED | OUTPATIENT
Start: 2021-06-02 | End: 2021-06-23

## 2021-06-03 ENCOUNTER — TELEPHONE (OUTPATIENT)
Dept: INTERNAL MEDICINE | Facility: CLINIC | Age: 61
End: 2021-06-03

## 2021-06-03 ENCOUNTER — HOSPITAL ENCOUNTER (OUTPATIENT)
Dept: RADIOLOGY | Facility: HOSPITAL | Age: 61
Discharge: HOME | End: 2021-06-03
Attending: ORTHOPAEDIC SURGERY
Payer: MEDICARE

## 2021-06-03 DIAGNOSIS — M54.16 LUMBAR RADICULOPATHY, ACUTE: ICD-10-CM

## 2021-06-03 PROCEDURE — 72148 MRI LUMBAR SPINE W/O DYE: CPT | Mod: TC

## 2021-06-03 PROCEDURE — 72148 MRI LUMBAR SPINE WITHOUT CONTRAST: ICD-10-PCS | Mod: 26,,, | Performed by: RADIOLOGY

## 2021-06-03 PROCEDURE — 72148 MRI LUMBAR SPINE W/O DYE: CPT | Mod: 26,,, | Performed by: RADIOLOGY

## 2021-06-07 ENCOUNTER — TELEPHONE (OUTPATIENT)
Dept: PAIN MEDICINE | Facility: CLINIC | Age: 61
End: 2021-06-07

## 2021-06-07 DIAGNOSIS — M54.16 LUMBAR RADICULOPATHY, ACUTE: Primary | ICD-10-CM

## 2021-06-08 ENCOUNTER — TELEPHONE (OUTPATIENT)
Dept: PAIN MEDICINE | Facility: CLINIC | Age: 61
End: 2021-06-08

## 2021-06-08 ENCOUNTER — OFFICE VISIT (OUTPATIENT)
Dept: SPINE | Facility: CLINIC | Age: 61
End: 2021-06-08
Attending: ANESTHESIOLOGY
Payer: MEDICARE

## 2021-06-08 ENCOUNTER — TELEPHONE (OUTPATIENT)
Dept: PAIN MEDICINE | Facility: OTHER | Age: 61
End: 2021-06-08

## 2021-06-08 VITALS
SYSTOLIC BLOOD PRESSURE: 167 MMHG | BODY MASS INDEX: 31.94 KG/M2 | HEIGHT: 74 IN | HEART RATE: 95 BPM | DIASTOLIC BLOOD PRESSURE: 95 MMHG | WEIGHT: 248.88 LBS

## 2021-06-08 DIAGNOSIS — M54.17 LUMBOSACRAL RADICULOPATHY: ICD-10-CM

## 2021-06-08 DIAGNOSIS — M51.37 DDD (DEGENERATIVE DISC DISEASE), LUMBOSACRAL: Primary | ICD-10-CM

## 2021-06-08 PROBLEM — M51.379 DDD (DEGENERATIVE DISC DISEASE), LUMBOSACRAL: Status: ACTIVE | Noted: 2021-06-08

## 2021-06-08 PROCEDURE — 1125F AMNT PAIN NOTED PAIN PRSNT: CPT | Mod: S$GLB,,, | Performed by: ANESTHESIOLOGY

## 2021-06-08 PROCEDURE — 99999 PR PBB SHADOW E&M-EST. PATIENT-LVL III: CPT | Mod: PBBFAC,,, | Performed by: ANESTHESIOLOGY

## 2021-06-08 PROCEDURE — 99999 PR PBB SHADOW E&M-EST. PATIENT-LVL III: ICD-10-PCS | Mod: PBBFAC,,, | Performed by: ANESTHESIOLOGY

## 2021-06-08 PROCEDURE — 3008F PR BODY MASS INDEX (BMI) DOCUMENTED: ICD-10-PCS | Mod: CPTII,S$GLB,, | Performed by: ANESTHESIOLOGY

## 2021-06-08 PROCEDURE — 1125F PR PAIN SEVERITY QUANTIFIED, PAIN PRESENT: ICD-10-PCS | Mod: S$GLB,,, | Performed by: ANESTHESIOLOGY

## 2021-06-08 PROCEDURE — 99215 PR OFFICE/OUTPT VISIT, EST, LEVL V, 40-54 MIN: ICD-10-PCS | Mod: S$GLB,,, | Performed by: ANESTHESIOLOGY

## 2021-06-08 PROCEDURE — 99215 OFFICE O/P EST HI 40 MIN: CPT | Mod: S$GLB,,, | Performed by: ANESTHESIOLOGY

## 2021-06-08 PROCEDURE — 3008F BODY MASS INDEX DOCD: CPT | Mod: CPTII,S$GLB,, | Performed by: ANESTHESIOLOGY

## 2021-06-08 RX ORDER — TRAMADOL HYDROCHLORIDE 50 MG/1
50 TABLET ORAL EVERY 12 HOURS PRN
Qty: 40 TABLET | Refills: 0 | Status: SHIPPED | OUTPATIENT
Start: 2021-06-08 | End: 2021-06-29

## 2021-06-09 ENCOUNTER — PATIENT MESSAGE (OUTPATIENT)
Dept: PAIN MEDICINE | Facility: OTHER | Age: 61
End: 2021-06-09

## 2021-06-10 ENCOUNTER — HOSPITAL ENCOUNTER (OUTPATIENT)
Facility: OTHER | Age: 61
Discharge: HOME | End: 2021-06-10
Attending: ANESTHESIOLOGY | Admitting: ANESTHESIOLOGY
Payer: MEDICARE

## 2021-06-10 VITALS
HEART RATE: 94 BPM | BODY MASS INDEX: 31.44 KG/M2 | SYSTOLIC BLOOD PRESSURE: 134 MMHG | WEIGHT: 245 LBS | TEMPERATURE: 98 F | HEIGHT: 74 IN | DIASTOLIC BLOOD PRESSURE: 80 MMHG | OXYGEN SATURATION: 98 % | RESPIRATION RATE: 16 BRPM

## 2021-06-10 DIAGNOSIS — M54.17 LUMBOSACRAL RADICULOPATHY: Primary | ICD-10-CM

## 2021-06-10 DIAGNOSIS — G89.29 CHRONIC PAIN: ICD-10-CM

## 2021-06-10 DIAGNOSIS — M51.37 DDD (DEGENERATIVE DISC DISEASE), LUMBOSACRAL: ICD-10-CM

## 2021-06-10 PROCEDURE — 64483 NJX AA&/STRD TFRM EPI L/S 1: CPT | Mod: RT,,, | Performed by: ANESTHESIOLOGY

## 2021-06-10 PROCEDURE — 64484 NJX AA&/STRD TFRM EPI L/S EA: CPT | Mod: RT | Performed by: ANESTHESIOLOGY

## 2021-06-10 PROCEDURE — 25000003 PHARM REV CODE 250: Performed by: STUDENT IN AN ORGANIZED HEALTH CARE EDUCATION/TRAINING PROGRAM

## 2021-06-10 PROCEDURE — 25500020 PHARM REV CODE 255: Performed by: ANESTHESIOLOGY

## 2021-06-10 PROCEDURE — 64484 NJX AA&/STRD TFRM EPI L/S EA: CPT | Mod: RT,,, | Performed by: ANESTHESIOLOGY

## 2021-06-10 PROCEDURE — 25000003 PHARM REV CODE 250: Performed by: ANESTHESIOLOGY

## 2021-06-10 PROCEDURE — 64484 PRA INJECT ANES/STEROID FORAMEN LUMBAR/SACRAL W IMG GUIDE ,EA ADD LEVEL: ICD-10-PCS | Mod: RT,,, | Performed by: ANESTHESIOLOGY

## 2021-06-10 PROCEDURE — 64483 NJX AA&/STRD TFRM EPI L/S 1: CPT | Mod: RT | Performed by: ANESTHESIOLOGY

## 2021-06-10 PROCEDURE — 63600175 PHARM REV CODE 636 W HCPCS: Performed by: ANESTHESIOLOGY

## 2021-06-10 PROCEDURE — 64483 PR EPIDURAL INJ, ANES/STEROID, TRANSFORAMINAL, LUMB/SACR, SNGL LEVL: ICD-10-PCS | Mod: RT,,, | Performed by: ANESTHESIOLOGY

## 2021-06-10 RX ORDER — LIDOCAINE HYDROCHLORIDE 10 MG/ML
INJECTION INFILTRATION; PERINEURAL
Status: DISCONTINUED | OUTPATIENT
Start: 2021-06-10 | End: 2021-06-10 | Stop reason: HOSPADM

## 2021-06-10 RX ORDER — DEXAMETHASONE SODIUM PHOSPHATE 100 MG/10ML
INJECTION INTRAMUSCULAR; INTRAVENOUS
Status: DISCONTINUED | OUTPATIENT
Start: 2021-06-10 | End: 2021-06-10 | Stop reason: HOSPADM

## 2021-06-10 RX ORDER — LIDOCAINE HYDROCHLORIDE 10 MG/ML
INJECTION, SOLUTION EPIDURAL; INFILTRATION; INTRACAUDAL; PERINEURAL
Status: DISCONTINUED | OUTPATIENT
Start: 2021-06-10 | End: 2021-06-10 | Stop reason: HOSPADM

## 2021-06-10 RX ORDER — FENTANYL CITRATE 50 UG/ML
INJECTION, SOLUTION INTRAMUSCULAR; INTRAVENOUS
Status: DISCONTINUED | OUTPATIENT
Start: 2021-06-10 | End: 2021-06-10 | Stop reason: HOSPADM

## 2021-06-10 RX ORDER — SODIUM CHLORIDE 9 MG/ML
INJECTION, SOLUTION INTRAVENOUS CONTINUOUS
Status: ACTIVE | OUTPATIENT
Start: 2021-06-10

## 2021-06-10 RX ORDER — MIDAZOLAM HYDROCHLORIDE 1 MG/ML
INJECTION INTRAMUSCULAR; INTRAVENOUS
Status: DISCONTINUED | OUTPATIENT
Start: 2021-06-10 | End: 2021-06-10 | Stop reason: HOSPADM

## 2021-06-10 RX ADMIN — SODIUM CHLORIDE: 0.9 INJECTION, SOLUTION INTRAVENOUS at 02:06

## 2021-06-18 ENCOUNTER — TELEPHONE (OUTPATIENT)
Dept: PAIN MEDICINE | Facility: CLINIC | Age: 61
End: 2021-06-18

## 2021-06-29 ENCOUNTER — OFFICE VISIT (OUTPATIENT)
Dept: PAIN MEDICINE | Facility: CLINIC | Age: 61
End: 2021-06-29
Payer: MEDICARE

## 2021-06-29 DIAGNOSIS — M47.816 LUMBAR SPONDYLOSIS: ICD-10-CM

## 2021-06-29 DIAGNOSIS — M51.36 DDD (DEGENERATIVE DISC DISEASE), LUMBAR: Primary | ICD-10-CM

## 2021-06-29 DIAGNOSIS — M54.17 LUMBOSACRAL RADICULOPATHY: ICD-10-CM

## 2021-06-29 PROCEDURE — 99213 PR OFFICE/OUTPT VISIT, EST, LEVL III, 20-29 MIN: ICD-10-PCS | Mod: 95,,, | Performed by: NURSE PRACTITIONER

## 2021-06-29 PROCEDURE — 99213 OFFICE O/P EST LOW 20 MIN: CPT | Mod: 95,,, | Performed by: NURSE PRACTITIONER

## 2021-06-29 RX ORDER — HYDROCODONE BITARTRATE AND ACETAMINOPHEN 7.5; 325 MG/1; MG/1
1 TABLET ORAL
Qty: 30 TABLET | Refills: 0 | Status: SHIPPED | OUTPATIENT
Start: 2021-06-29 | End: 2021-07-29

## 2021-06-30 ENCOUNTER — TELEPHONE (OUTPATIENT)
Dept: PAIN MEDICINE | Facility: CLINIC | Age: 61
End: 2021-06-30

## 2021-07-06 ENCOUNTER — TELEPHONE (OUTPATIENT)
Dept: PAIN MEDICINE | Facility: CLINIC | Age: 61
End: 2021-07-06

## 2021-07-08 ENCOUNTER — HOSPITAL ENCOUNTER (OUTPATIENT)
Facility: OTHER | Age: 61
Discharge: HOME | End: 2021-07-08
Attending: ANESTHESIOLOGY | Admitting: ANESTHESIOLOGY
Payer: MEDICARE

## 2021-07-08 VITALS
DIASTOLIC BLOOD PRESSURE: 97 MMHG | HEART RATE: 66 BPM | OXYGEN SATURATION: 98 % | SYSTOLIC BLOOD PRESSURE: 173 MMHG | RESPIRATION RATE: 12 BRPM

## 2021-07-08 DIAGNOSIS — M51.37 DDD (DEGENERATIVE DISC DISEASE), LUMBOSACRAL: ICD-10-CM

## 2021-07-08 DIAGNOSIS — M54.17 LUMBOSACRAL RADICULOPATHY: Primary | ICD-10-CM

## 2021-07-08 DIAGNOSIS — M51.36 DDD (DEGENERATIVE DISC DISEASE), LUMBAR: ICD-10-CM

## 2021-07-08 PROBLEM — M51.369 DDD (DEGENERATIVE DISC DISEASE), LUMBAR: Status: ACTIVE | Noted: 2021-07-08

## 2021-07-08 PROCEDURE — 25000003 PHARM REV CODE 250: Performed by: ANESTHESIOLOGY

## 2021-07-08 PROCEDURE — 64483 NJX AA&/STRD TFRM EPI L/S 1: CPT | Mod: 50 | Performed by: ANESTHESIOLOGY

## 2021-07-08 PROCEDURE — 25500020 PHARM REV CODE 255: Performed by: ANESTHESIOLOGY

## 2021-07-08 PROCEDURE — 25000003 PHARM REV CODE 250: Performed by: STUDENT IN AN ORGANIZED HEALTH CARE EDUCATION/TRAINING PROGRAM

## 2021-07-08 PROCEDURE — 63600175 PHARM REV CODE 636 W HCPCS: Performed by: ANESTHESIOLOGY

## 2021-07-08 PROCEDURE — A4216 STERILE WATER/SALINE, 10 ML: HCPCS | Performed by: ANESTHESIOLOGY

## 2021-07-08 PROCEDURE — 64483 NJX AA&/STRD TFRM EPI L/S 1: CPT | Mod: 50,,, | Performed by: ANESTHESIOLOGY

## 2021-07-08 PROCEDURE — 64483 PR EPIDURAL INJ, ANES/STEROID, TRANSFORAMINAL, LUMB/SACR, SNGL LEVL: ICD-10-PCS | Mod: 50,,, | Performed by: ANESTHESIOLOGY

## 2021-07-08 RX ORDER — SODIUM CHLORIDE 9 MG/ML
INJECTION, SOLUTION INTRAVENOUS CONTINUOUS
Status: DISCONTINUED | OUTPATIENT
Start: 2021-07-08 | End: 2021-07-08 | Stop reason: HOSPADM

## 2021-07-08 RX ORDER — MIDAZOLAM HYDROCHLORIDE 1 MG/ML
INJECTION INTRAMUSCULAR; INTRAVENOUS
Status: DISCONTINUED | OUTPATIENT
Start: 2021-07-08 | End: 2021-07-08 | Stop reason: HOSPADM

## 2021-07-08 RX ORDER — DEXAMETHASONE SODIUM PHOSPHATE 100 MG/10ML
INJECTION INTRAMUSCULAR; INTRAVENOUS
Status: DISCONTINUED | OUTPATIENT
Start: 2021-07-08 | End: 2021-07-08 | Stop reason: HOSPADM

## 2021-07-08 RX ORDER — LIDOCAINE HYDROCHLORIDE 10 MG/ML
INJECTION INFILTRATION; PERINEURAL
Status: DISCONTINUED | OUTPATIENT
Start: 2021-07-08 | End: 2021-07-08 | Stop reason: HOSPADM

## 2021-07-08 RX ORDER — FENTANYL CITRATE 50 UG/ML
INJECTION, SOLUTION INTRAMUSCULAR; INTRAVENOUS
Status: DISCONTINUED | OUTPATIENT
Start: 2021-07-08 | End: 2021-07-08 | Stop reason: HOSPADM

## 2021-07-08 RX ORDER — SODIUM CHLORIDE 9 MG/ML
INJECTION, SOLUTION INTRAMUSCULAR; INTRAVENOUS; SUBCUTANEOUS
Status: DISCONTINUED | OUTPATIENT
Start: 2021-07-08 | End: 2021-07-08 | Stop reason: HOSPADM

## 2021-07-08 RX ORDER — LIDOCAINE HYDROCHLORIDE 10 MG/ML
INJECTION, SOLUTION EPIDURAL; INFILTRATION; INTRACAUDAL; PERINEURAL
Status: DISCONTINUED | OUTPATIENT
Start: 2021-07-08 | End: 2021-07-08 | Stop reason: HOSPADM

## 2021-07-08 RX ADMIN — SODIUM CHLORIDE: 0.9 INJECTION, SOLUTION INTRAVENOUS at 12:07

## 2021-07-20 ENCOUNTER — TELEPHONE (OUTPATIENT)
Dept: INTERNAL MEDICINE | Facility: CLINIC | Age: 61
End: 2021-07-20

## 2021-07-20 DIAGNOSIS — M51.36 DDD (DEGENERATIVE DISC DISEASE), LUMBAR: Primary | ICD-10-CM

## 2021-07-20 DIAGNOSIS — M54.17 LUMBOSACRAL RADICULOPATHY: ICD-10-CM

## 2021-07-20 DIAGNOSIS — M51.37 DDD (DEGENERATIVE DISC DISEASE), LUMBOSACRAL: ICD-10-CM

## 2021-07-29 ENCOUNTER — PATIENT OUTREACH (OUTPATIENT)
Dept: ADMINISTRATIVE | Facility: OTHER | Age: 61
End: 2021-07-29

## 2021-07-30 ENCOUNTER — TELEPHONE (OUTPATIENT)
Dept: PAIN MEDICINE | Facility: CLINIC | Age: 61
End: 2021-07-30

## 2021-08-02 ENCOUNTER — OFFICE VISIT (OUTPATIENT)
Dept: URGENT CARE | Facility: CLINIC | Age: 61
End: 2021-08-02
Payer: MEDICARE

## 2021-08-02 ENCOUNTER — TELEPHONE (OUTPATIENT)
Dept: PAIN MEDICINE | Facility: CLINIC | Age: 61
End: 2021-08-02

## 2021-08-02 VITALS
OXYGEN SATURATION: 100 % | HEIGHT: 74 IN | RESPIRATION RATE: 19 BRPM | WEIGHT: 245 LBS | HEART RATE: 91 BPM | DIASTOLIC BLOOD PRESSURE: 70 MMHG | TEMPERATURE: 98 F | SYSTOLIC BLOOD PRESSURE: 130 MMHG | BODY MASS INDEX: 31.44 KG/M2

## 2021-08-02 DIAGNOSIS — U07.1 COVID-19 VIRUS INFECTION: Primary | ICD-10-CM

## 2021-08-02 DIAGNOSIS — R09.81 NASAL CONGESTION: ICD-10-CM

## 2021-08-02 DIAGNOSIS — R05.9 COUGH: ICD-10-CM

## 2021-08-02 LAB
CTP QC/QA: YES
SARS-COV-2 RDRP RESP QL NAA+PROBE: POSITIVE

## 2021-08-02 PROCEDURE — 3078F PR MOST RECENT DIASTOLIC BLOOD PRESSURE < 80 MM HG: ICD-10-PCS | Mod: CPTII,S$GLB,, | Performed by: NURSE PRACTITIONER

## 2021-08-02 PROCEDURE — U0002 COVID-19 LAB TEST NON-CDC: HCPCS | Mod: QW,S$GLB,, | Performed by: NURSE PRACTITIONER

## 2021-08-02 PROCEDURE — 3008F BODY MASS INDEX DOCD: CPT | Mod: CPTII,S$GLB,, | Performed by: NURSE PRACTITIONER

## 2021-08-02 PROCEDURE — 3078F DIAST BP <80 MM HG: CPT | Mod: CPTII,S$GLB,, | Performed by: NURSE PRACTITIONER

## 2021-08-02 PROCEDURE — 1159F MED LIST DOCD IN RCRD: CPT | Mod: CPTII,S$GLB,, | Performed by: NURSE PRACTITIONER

## 2021-08-02 PROCEDURE — 1160F PR REVIEW ALL MEDS BY PRESCRIBER/CLIN PHARMACIST DOCUMENTED: ICD-10-PCS | Mod: CPTII,S$GLB,, | Performed by: NURSE PRACTITIONER

## 2021-08-02 PROCEDURE — 99213 PR OFFICE/OUTPT VISIT, EST, LEVL III, 20-29 MIN: ICD-10-PCS | Mod: S$GLB,,, | Performed by: NURSE PRACTITIONER

## 2021-08-02 PROCEDURE — 1159F PR MEDICATION LIST DOCUMENTED IN MEDICAL RECORD: ICD-10-PCS | Mod: CPTII,S$GLB,, | Performed by: NURSE PRACTITIONER

## 2021-08-02 PROCEDURE — 3075F PR MOST RECENT SYSTOLIC BLOOD PRESS GE 130-139MM HG: ICD-10-PCS | Mod: CPTII,S$GLB,, | Performed by: NURSE PRACTITIONER

## 2021-08-02 PROCEDURE — U0002: ICD-10-PCS | Mod: QW,S$GLB,, | Performed by: NURSE PRACTITIONER

## 2021-08-02 PROCEDURE — 3008F PR BODY MASS INDEX (BMI) DOCUMENTED: ICD-10-PCS | Mod: CPTII,S$GLB,, | Performed by: NURSE PRACTITIONER

## 2021-08-02 PROCEDURE — 3075F SYST BP GE 130 - 139MM HG: CPT | Mod: CPTII,S$GLB,, | Performed by: NURSE PRACTITIONER

## 2021-08-02 PROCEDURE — 1160F RVW MEDS BY RX/DR IN RCRD: CPT | Mod: CPTII,S$GLB,, | Performed by: NURSE PRACTITIONER

## 2021-08-02 PROCEDURE — 99213 OFFICE O/P EST LOW 20 MIN: CPT | Mod: S$GLB,,, | Performed by: NURSE PRACTITIONER

## 2021-08-02 RX ORDER — PROMETHAZINE HYDROCHLORIDE AND DEXTROMETHORPHAN HYDROBROMIDE 6.25; 15 MG/5ML; MG/5ML
5 SYRUP ORAL EVERY 8 HOURS PRN
Qty: 118 ML | Refills: 0 | Status: SHIPPED | OUTPATIENT
Start: 2021-08-02 | End: 2021-08-10

## 2021-08-05 ENCOUNTER — PATIENT MESSAGE (OUTPATIENT)
Dept: ORTHOPEDICS | Facility: CLINIC | Age: 61
End: 2021-08-05

## 2021-08-09 ENCOUNTER — PATIENT MESSAGE (OUTPATIENT)
Dept: PAIN MEDICINE | Facility: CLINIC | Age: 61
End: 2021-08-09

## 2021-08-10 ENCOUNTER — OFFICE VISIT (OUTPATIENT)
Dept: PAIN MEDICINE | Facility: CLINIC | Age: 61
End: 2021-08-10
Attending: ANESTHESIOLOGY
Payer: MEDICARE

## 2021-08-10 DIAGNOSIS — M54.17 LUMBOSACRAL RADICULOPATHY: ICD-10-CM

## 2021-08-10 DIAGNOSIS — M96.1 FAILED BACK SYNDROME OF LUMBAR SPINE: Primary | ICD-10-CM

## 2021-08-10 DIAGNOSIS — M51.37 DDD (DEGENERATIVE DISC DISEASE), LUMBOSACRAL: ICD-10-CM

## 2021-08-10 PROCEDURE — 1159F MED LIST DOCD IN RCRD: CPT | Mod: CPTII,95,, | Performed by: ANESTHESIOLOGY

## 2021-08-10 PROCEDURE — 99213 PR OFFICE/OUTPT VISIT, EST, LEVL III, 20-29 MIN: ICD-10-PCS | Mod: 95,,, | Performed by: ANESTHESIOLOGY

## 2021-08-10 PROCEDURE — 99213 OFFICE O/P EST LOW 20 MIN: CPT | Mod: 95,,, | Performed by: ANESTHESIOLOGY

## 2021-08-10 PROCEDURE — 1159F PR MEDICATION LIST DOCUMENTED IN MEDICAL RECORD: ICD-10-PCS | Mod: CPTII,95,, | Performed by: ANESTHESIOLOGY

## 2021-08-10 PROCEDURE — 1160F RVW MEDS BY RX/DR IN RCRD: CPT | Mod: CPTII,95,, | Performed by: ANESTHESIOLOGY

## 2021-08-10 PROCEDURE — 1160F PR REVIEW ALL MEDS BY PRESCRIBER/CLIN PHARMACIST DOCUMENTED: ICD-10-PCS | Mod: CPTII,95,, | Performed by: ANESTHESIOLOGY

## 2021-08-16 ENCOUNTER — OFFICE VISIT (OUTPATIENT)
Dept: ORTHOPEDICS | Facility: CLINIC | Age: 61
End: 2021-08-16
Payer: MEDICARE

## 2021-08-16 DIAGNOSIS — M51.36 DDD (DEGENERATIVE DISC DISEASE), LUMBAR: Primary | ICD-10-CM

## 2021-08-16 DIAGNOSIS — Z98.890 HISTORY OF LUMBAR DISCECTOMY: ICD-10-CM

## 2021-08-16 PROCEDURE — 99214 OFFICE O/P EST MOD 30 MIN: CPT | Mod: S$GLB,,, | Performed by: ORTHOPAEDIC SURGERY

## 2021-08-16 PROCEDURE — 99214 PR OFFICE/OUTPT VISIT, EST, LEVL IV, 30-39 MIN: ICD-10-PCS | Mod: S$GLB,,, | Performed by: ORTHOPAEDIC SURGERY

## 2021-08-16 PROCEDURE — 1159F MED LIST DOCD IN RCRD: CPT | Mod: CPTII,S$GLB,, | Performed by: ORTHOPAEDIC SURGERY

## 2021-08-16 PROCEDURE — 99999 PR PBB SHADOW E&M-EST. PATIENT-LVL III: CPT | Mod: PBBFAC,,, | Performed by: ORTHOPAEDIC SURGERY

## 2021-08-16 PROCEDURE — 1125F AMNT PAIN NOTED PAIN PRSNT: CPT | Mod: CPTII,S$GLB,, | Performed by: ORTHOPAEDIC SURGERY

## 2021-08-16 PROCEDURE — 1125F PR PAIN SEVERITY QUANTIFIED, PAIN PRESENT: ICD-10-PCS | Mod: CPTII,S$GLB,, | Performed by: ORTHOPAEDIC SURGERY

## 2021-08-16 PROCEDURE — 99999 PR PBB SHADOW E&M-EST. PATIENT-LVL III: ICD-10-PCS | Mod: PBBFAC,,, | Performed by: ORTHOPAEDIC SURGERY

## 2021-08-16 PROCEDURE — 1159F PR MEDICATION LIST DOCUMENTED IN MEDICAL RECORD: ICD-10-PCS | Mod: CPTII,S$GLB,, | Performed by: ORTHOPAEDIC SURGERY

## 2021-08-17 ENCOUNTER — TELEPHONE (OUTPATIENT)
Dept: ORTHOPEDICS | Facility: CLINIC | Age: 61
End: 2021-08-17

## 2021-08-17 DIAGNOSIS — Z01.818 PREOP TESTING: Primary | ICD-10-CM

## 2021-08-17 DIAGNOSIS — M54.16 LUMBAR RADICULOPATHY, ACUTE: ICD-10-CM

## 2021-08-17 DIAGNOSIS — Z98.890 HISTORY OF LUMBAR DISCECTOMY: Primary | ICD-10-CM

## 2021-08-17 DIAGNOSIS — Z98.890 S/P SPINAL SURGERY: ICD-10-CM

## 2021-08-17 NOTE — TELEPHONE ENCOUNTER
Spoke with pt and he confirmed 9-30-21 as his surgery date with Dr. Escamilla. Pt also confirmed covid test date of 9-27-21. Pt declined pre-op appt and will just sign consents day of surgery.

## 2021-09-08 ENCOUNTER — TELEPHONE (OUTPATIENT)
Dept: INTERNAL MEDICINE | Facility: CLINIC | Age: 61
End: 2021-09-08

## 2021-09-08 DIAGNOSIS — M79.605 PAIN IN BOTH LOWER EXTREMITIES: ICD-10-CM

## 2021-09-08 DIAGNOSIS — Z01.818 PREOPERATIVE TESTING: Primary | ICD-10-CM

## 2021-09-08 DIAGNOSIS — M79.604 PAIN IN BOTH LOWER EXTREMITIES: ICD-10-CM

## 2021-09-09 ENCOUNTER — TELEPHONE (OUTPATIENT)
Dept: PRIMARY CARE CLINIC | Facility: CLINIC | Age: 61
End: 2021-09-09

## 2021-09-10 ENCOUNTER — TELEPHONE (OUTPATIENT)
Dept: PREADMISSION TESTING | Facility: HOSPITAL | Age: 61
End: 2021-09-10

## 2021-09-10 ENCOUNTER — LAB VISIT (OUTPATIENT)
Dept: LAB | Facility: HOSPITAL | Age: 61
End: 2021-09-10
Attending: STUDENT IN AN ORGANIZED HEALTH CARE EDUCATION/TRAINING PROGRAM
Payer: MEDICARE

## 2021-09-10 ENCOUNTER — OFFICE VISIT (OUTPATIENT)
Dept: PRIMARY CARE CLINIC | Facility: CLINIC | Age: 61
End: 2021-09-10
Payer: MEDICARE

## 2021-09-10 VITALS
TEMPERATURE: 97 F | DIASTOLIC BLOOD PRESSURE: 74 MMHG | SYSTOLIC BLOOD PRESSURE: 124 MMHG | BODY MASS INDEX: 32.45 KG/M2 | WEIGHT: 252.88 LBS | HEART RATE: 82 BPM | RESPIRATION RATE: 18 BRPM | HEIGHT: 74 IN | OXYGEN SATURATION: 97 %

## 2021-09-10 DIAGNOSIS — M54.17 LUMBOSACRAL RADICULOPATHY: ICD-10-CM

## 2021-09-10 DIAGNOSIS — Z01.818 PREOP EXAMINATION: Primary | ICD-10-CM

## 2021-09-10 DIAGNOSIS — T14.8XXA OTHER INJURY OF UNSPECIFIED BODY REGION, INITIAL ENCOUNTER: ICD-10-CM

## 2021-09-10 DIAGNOSIS — Z01.818 PREOP EXAMINATION: ICD-10-CM

## 2021-09-10 DIAGNOSIS — M51.37 DDD (DEGENERATIVE DISC DISEASE), LUMBOSACRAL: ICD-10-CM

## 2021-09-10 LAB
ABO + RH BLD: NORMAL
ANION GAP SERPL CALC-SCNC: 6 MMOL/L (ref 8–16)
APTT BLDCRRT: 27.6 SEC (ref 21–32)
BASOPHILS # BLD AUTO: 0.02 K/UL (ref 0–0.2)
BASOPHILS NFR BLD: 0.6 % (ref 0–1.9)
BLD GP AB SCN CELLS X3 SERPL QL: NORMAL
BUN SERPL-MCNC: 11 MG/DL (ref 8–23)
CALCIUM SERPL-MCNC: 10 MG/DL (ref 8.7–10.5)
CHLORIDE SERPL-SCNC: 103 MMOL/L (ref 95–110)
CO2 SERPL-SCNC: 27 MMOL/L (ref 23–29)
CREAT SERPL-MCNC: 1.1 MG/DL (ref 0.5–1.4)
DIFFERENTIAL METHOD: ABNORMAL
EOSINOPHIL # BLD AUTO: 0.2 K/UL (ref 0–0.5)
EOSINOPHIL NFR BLD: 4.7 % (ref 0–8)
ERYTHROCYTE [DISTWIDTH] IN BLOOD BY AUTOMATED COUNT: 16.1 % (ref 11.5–14.5)
EST. GFR  (AFRICAN AMERICAN): >60 ML/MIN/1.73 M^2
EST. GFR  (NON AFRICAN AMERICAN): >60 ML/MIN/1.73 M^2
GLUCOSE SERPL-MCNC: 98 MG/DL (ref 70–110)
HCT VFR BLD AUTO: 36.2 % (ref 40–54)
HGB BLD-MCNC: 11.9 G/DL (ref 14–18)
IMM GRANULOCYTES # BLD AUTO: 0.01 K/UL (ref 0–0.04)
IMM GRANULOCYTES NFR BLD AUTO: 0.3 % (ref 0–0.5)
INR PPP: 1 (ref 0.8–1.2)
LYMPHOCYTES # BLD AUTO: 1.5 K/UL (ref 1–4.8)
LYMPHOCYTES NFR BLD: 40.3 % (ref 18–48)
MCH RBC QN AUTO: 27.2 PG (ref 27–31)
MCHC RBC AUTO-ENTMCNC: 32.9 G/DL (ref 32–36)
MCV RBC AUTO: 83 FL (ref 82–98)
MONOCYTES # BLD AUTO: 0.3 K/UL (ref 0.3–1)
MONOCYTES NFR BLD: 7.2 % (ref 4–15)
NEUTROPHILS # BLD AUTO: 1.7 K/UL (ref 1.8–7.7)
NEUTROPHILS NFR BLD: 46.9 % (ref 38–73)
NRBC BLD-RTO: 0 /100 WBC
PLATELET # BLD AUTO: 241 K/UL (ref 150–450)
PMV BLD AUTO: 10.4 FL (ref 9.2–12.9)
POTASSIUM SERPL-SCNC: 4.6 MMOL/L (ref 3.5–5.1)
PROTHROMBIN TIME: 10.7 SEC (ref 9–12.5)
RBC # BLD AUTO: 4.37 M/UL (ref 4.6–6.2)
SODIUM SERPL-SCNC: 136 MMOL/L (ref 136–145)
WBC # BLD AUTO: 3.62 K/UL (ref 3.9–12.7)

## 2021-09-10 PROCEDURE — 93010 ELECTROCARDIOGRAM REPORT: CPT | Mod: S$GLB,,, | Performed by: INTERNAL MEDICINE

## 2021-09-10 PROCEDURE — 3008F BODY MASS INDEX DOCD: CPT | Mod: CPTII,S$GLB,, | Performed by: STUDENT IN AN ORGANIZED HEALTH CARE EDUCATION/TRAINING PROGRAM

## 2021-09-10 PROCEDURE — 99214 PR OFFICE/OUTPT VISIT, EST, LEVL IV, 30-39 MIN: ICD-10-PCS | Mod: S$GLB,,, | Performed by: STUDENT IN AN ORGANIZED HEALTH CARE EDUCATION/TRAINING PROGRAM

## 2021-09-10 PROCEDURE — 99999 PR PBB SHADOW E&M-EST. PATIENT-LVL IV: CPT | Mod: PBBFAC,,, | Performed by: STUDENT IN AN ORGANIZED HEALTH CARE EDUCATION/TRAINING PROGRAM

## 2021-09-10 PROCEDURE — 99999 PR PBB SHADOW E&M-EST. PATIENT-LVL IV: ICD-10-PCS | Mod: PBBFAC,,, | Performed by: STUDENT IN AN ORGANIZED HEALTH CARE EDUCATION/TRAINING PROGRAM

## 2021-09-10 PROCEDURE — 85610 PROTHROMBIN TIME: CPT | Performed by: STUDENT IN AN ORGANIZED HEALTH CARE EDUCATION/TRAINING PROGRAM

## 2021-09-10 PROCEDURE — 3074F SYST BP LT 130 MM HG: CPT | Mod: CPTII,S$GLB,, | Performed by: STUDENT IN AN ORGANIZED HEALTH CARE EDUCATION/TRAINING PROGRAM

## 2021-09-10 PROCEDURE — 85730 THROMBOPLASTIN TIME PARTIAL: CPT | Performed by: STUDENT IN AN ORGANIZED HEALTH CARE EDUCATION/TRAINING PROGRAM

## 2021-09-10 PROCEDURE — 93005 EKG 12-LEAD: ICD-10-PCS | Mod: S$GLB,,, | Performed by: STUDENT IN AN ORGANIZED HEALTH CARE EDUCATION/TRAINING PROGRAM

## 2021-09-10 PROCEDURE — 3008F PR BODY MASS INDEX (BMI) DOCUMENTED: ICD-10-PCS | Mod: CPTII,S$GLB,, | Performed by: STUDENT IN AN ORGANIZED HEALTH CARE EDUCATION/TRAINING PROGRAM

## 2021-09-10 PROCEDURE — 3074F PR MOST RECENT SYSTOLIC BLOOD PRESSURE < 130 MM HG: ICD-10-PCS | Mod: CPTII,S$GLB,, | Performed by: STUDENT IN AN ORGANIZED HEALTH CARE EDUCATION/TRAINING PROGRAM

## 2021-09-10 PROCEDURE — 4010F ACE/ARB THERAPY RXD/TAKEN: CPT | Mod: CPTII,S$GLB,, | Performed by: STUDENT IN AN ORGANIZED HEALTH CARE EDUCATION/TRAINING PROGRAM

## 2021-09-10 PROCEDURE — 3078F PR MOST RECENT DIASTOLIC BLOOD PRESSURE < 80 MM HG: ICD-10-PCS | Mod: CPTII,S$GLB,, | Performed by: STUDENT IN AN ORGANIZED HEALTH CARE EDUCATION/TRAINING PROGRAM

## 2021-09-10 PROCEDURE — 1159F MED LIST DOCD IN RCRD: CPT | Mod: CPTII,S$GLB,, | Performed by: STUDENT IN AN ORGANIZED HEALTH CARE EDUCATION/TRAINING PROGRAM

## 2021-09-10 PROCEDURE — 80048 BASIC METABOLIC PNL TOTAL CA: CPT | Performed by: STUDENT IN AN ORGANIZED HEALTH CARE EDUCATION/TRAINING PROGRAM

## 2021-09-10 PROCEDURE — 86900 BLOOD TYPING SEROLOGIC ABO: CPT | Performed by: STUDENT IN AN ORGANIZED HEALTH CARE EDUCATION/TRAINING PROGRAM

## 2021-09-10 PROCEDURE — 93005 ELECTROCARDIOGRAM TRACING: CPT | Mod: S$GLB,,, | Performed by: STUDENT IN AN ORGANIZED HEALTH CARE EDUCATION/TRAINING PROGRAM

## 2021-09-10 PROCEDURE — 93010 EKG 12-LEAD: ICD-10-PCS | Mod: S$GLB,,, | Performed by: INTERNAL MEDICINE

## 2021-09-10 PROCEDURE — 3078F DIAST BP <80 MM HG: CPT | Mod: CPTII,S$GLB,, | Performed by: STUDENT IN AN ORGANIZED HEALTH CARE EDUCATION/TRAINING PROGRAM

## 2021-09-10 PROCEDURE — 99214 OFFICE O/P EST MOD 30 MIN: CPT | Mod: S$GLB,,, | Performed by: STUDENT IN AN ORGANIZED HEALTH CARE EDUCATION/TRAINING PROGRAM

## 2021-09-10 PROCEDURE — 1159F PR MEDICATION LIST DOCUMENTED IN MEDICAL RECORD: ICD-10-PCS | Mod: CPTII,S$GLB,, | Performed by: STUDENT IN AN ORGANIZED HEALTH CARE EDUCATION/TRAINING PROGRAM

## 2021-09-10 PROCEDURE — 36415 COLL VENOUS BLD VENIPUNCTURE: CPT | Mod: PN | Performed by: STUDENT IN AN ORGANIZED HEALTH CARE EDUCATION/TRAINING PROGRAM

## 2021-09-10 PROCEDURE — 4010F PR ACE/ARB THEARPY RXD/TAKEN: ICD-10-PCS | Mod: CPTII,S$GLB,, | Performed by: STUDENT IN AN ORGANIZED HEALTH CARE EDUCATION/TRAINING PROGRAM

## 2021-09-10 PROCEDURE — 85025 COMPLETE CBC W/AUTO DIFF WBC: CPT | Performed by: STUDENT IN AN ORGANIZED HEALTH CARE EDUCATION/TRAINING PROGRAM

## 2021-09-21 ENCOUNTER — HOSPITAL ENCOUNTER (OUTPATIENT)
Dept: PREADMISSION TESTING | Facility: HOSPITAL | Age: 61
Discharge: HOME | End: 2021-09-21
Attending: ORTHOPAEDIC SURGERY
Payer: MEDICARE

## 2021-09-21 ENCOUNTER — HOSPITAL ENCOUNTER (OUTPATIENT)
Dept: CARDIOLOGY | Facility: CLINIC | Age: 61
Discharge: HOME | End: 2021-09-21
Payer: MEDICARE

## 2021-09-21 ENCOUNTER — ANESTHESIA EVENT (OUTPATIENT)
Dept: SURGERY | Facility: HOSPITAL | Age: 61
DRG: 455 | End: 2021-09-21
Payer: MEDICARE

## 2021-09-21 VITALS
HEIGHT: 74 IN | BODY MASS INDEX: 32.21 KG/M2 | OXYGEN SATURATION: 96 % | RESPIRATION RATE: 18 BRPM | HEART RATE: 80 BPM | TEMPERATURE: 99 F | WEIGHT: 251 LBS | SYSTOLIC BLOOD PRESSURE: 156 MMHG | DIASTOLIC BLOOD PRESSURE: 86 MMHG

## 2021-09-21 DIAGNOSIS — Z01.818 PREOPERATIVE TESTING: ICD-10-CM

## 2021-09-21 PROCEDURE — 93005 ELECTROCARDIOGRAM TRACING: CPT | Mod: S$GLB,,, | Performed by: ANESTHESIOLOGY

## 2021-09-21 PROCEDURE — 93005 EKG 12-LEAD: ICD-10-PCS | Mod: S$GLB,,, | Performed by: ANESTHESIOLOGY

## 2021-09-21 PROCEDURE — 93010 EKG 12-LEAD: ICD-10-PCS | Mod: S$GLB,,, | Performed by: INTERNAL MEDICINE

## 2021-09-21 PROCEDURE — 93010 ELECTROCARDIOGRAM REPORT: CPT | Mod: S$GLB,,, | Performed by: INTERNAL MEDICINE

## 2021-09-27 ENCOUNTER — LAB VISIT (OUTPATIENT)
Dept: SPORTS MEDICINE | Facility: CLINIC | Age: 61
DRG: 455 | End: 2021-09-27
Payer: MEDICARE

## 2021-09-27 DIAGNOSIS — Z01.818 PREOP TESTING: ICD-10-CM

## 2021-09-27 LAB
SARS-COV-2 RNA RESP QL NAA+PROBE: NOT DETECTED
SARS-COV-2- CYCLE NUMBER: NORMAL

## 2021-09-27 PROCEDURE — U0005 INFEC AGEN DETEC AMPLI PROBE: HCPCS | Performed by: ORTHOPAEDIC SURGERY

## 2021-09-27 PROCEDURE — U0003 INFECTIOUS AGENT DETECTION BY NUCLEIC ACID (DNA OR RNA); SEVERE ACUTE RESPIRATORY SYNDROME CORONAVIRUS 2 (SARS-COV-2) (CORONAVIRUS DISEASE [COVID-19]), AMPLIFIED PROBE TECHNIQUE, MAKING USE OF HIGH THROUGHPUT TECHNOLOGIES AS DESCRIBED BY CMS-2020-01-R: HCPCS | Mod: HCNC | Performed by: ORTHOPAEDIC SURGERY

## 2021-09-29 ENCOUNTER — TELEPHONE (OUTPATIENT)
Dept: ORTHOPEDICS | Facility: CLINIC | Age: 61
End: 2021-09-29

## 2021-09-29 NOTE — TELEPHONE ENCOUNTER
Spoke to pt and he is aware that his surgery arrival time for 9-30-21 is 9:00 a.m. He is also aware that he is to be NPO after midnight tonight.

## 2021-09-30 ENCOUNTER — HOSPITAL ENCOUNTER (INPATIENT)
Facility: HOSPITAL | Age: 61
LOS: 3 days | Discharge: HOME | DRG: 455 | End: 2021-10-03
Attending: ORTHOPAEDIC SURGERY | Admitting: ORTHOPAEDIC SURGERY
Payer: MEDICARE

## 2021-09-30 ENCOUNTER — ANESTHESIA (OUTPATIENT)
Dept: SURGERY | Facility: HOSPITAL | Age: 61
DRG: 455 | End: 2021-09-30
Payer: MEDICARE

## 2021-09-30 DIAGNOSIS — M54.17 LUMBOSACRAL RADICULOPATHY: Primary | ICD-10-CM

## 2021-09-30 DIAGNOSIS — M54.16 LUMBAR RADICULOPATHY: ICD-10-CM

## 2021-09-30 LAB
ABO + RH BLD: NORMAL
ALBUMIN SERPL BCP-MCNC: 3.7 G/DL (ref 3.5–5.2)
ALP SERPL-CCNC: 55 U/L (ref 55–135)
ALT SERPL W/O P-5'-P-CCNC: 36 U/L (ref 10–44)
ANION GAP SERPL CALC-SCNC: 11 MMOL/L (ref 8–16)
AST SERPL-CCNC: 39 U/L (ref 10–40)
BASOPHILS # BLD AUTO: 0.01 K/UL (ref 0–0.2)
BASOPHILS NFR BLD: 0.1 % (ref 0–1.9)
BILIRUB SERPL-MCNC: 0.4 MG/DL (ref 0.1–1)
BLD GP AB SCN CELLS X3 SERPL QL: NORMAL
BUN SERPL-MCNC: 16 MG/DL (ref 8–23)
CALCIUM SERPL-MCNC: 8.8 MG/DL (ref 8.7–10.5)
CHLORIDE SERPL-SCNC: 102 MMOL/L (ref 95–110)
CO2 SERPL-SCNC: 21 MMOL/L (ref 23–29)
CREAT SERPL-MCNC: 1.3 MG/DL (ref 0.5–1.4)
DIFFERENTIAL METHOD: ABNORMAL
EOSINOPHIL # BLD AUTO: 0 K/UL (ref 0–0.5)
EOSINOPHIL NFR BLD: 0.1 % (ref 0–8)
ERYTHROCYTE [DISTWIDTH] IN BLOOD BY AUTOMATED COUNT: 16 % (ref 11.5–14.5)
EST. GFR  (AFRICAN AMERICAN): >60 ML/MIN/1.73 M^2
EST. GFR  (NON AFRICAN AMERICAN): 58.9 ML/MIN/1.73 M^2
GLUCOSE SERPL-MCNC: 180 MG/DL (ref 70–110)
HCT VFR BLD AUTO: 34.9 % (ref 40–54)
HGB BLD-MCNC: 11.6 G/DL (ref 14–18)
IMM GRANULOCYTES # BLD AUTO: 0.07 K/UL (ref 0–0.04)
IMM GRANULOCYTES NFR BLD AUTO: 0.5 % (ref 0–0.5)
LYMPHOCYTES # BLD AUTO: 1.2 K/UL (ref 1–4.8)
LYMPHOCYTES NFR BLD: 9 % (ref 18–48)
MCH RBC QN AUTO: 27.5 PG (ref 27–31)
MCHC RBC AUTO-ENTMCNC: 33.2 G/DL (ref 32–36)
MCV RBC AUTO: 83 FL (ref 82–98)
MONOCYTES # BLD AUTO: 0.2 K/UL (ref 0.3–1)
MONOCYTES NFR BLD: 1.1 % (ref 4–15)
NEUTROPHILS # BLD AUTO: 11.7 K/UL (ref 1.8–7.7)
NEUTROPHILS NFR BLD: 89.2 % (ref 38–73)
NRBC BLD-RTO: 0 /100 WBC
PLATELET # BLD AUTO: 252 K/UL (ref 150–450)
PMV BLD AUTO: 9.2 FL (ref 9.2–12.9)
POTASSIUM SERPL-SCNC: 4.6 MMOL/L (ref 3.5–5.1)
PROT SERPL-MCNC: 7 G/DL (ref 6–8.4)
RBC # BLD AUTO: 4.22 M/UL (ref 4.6–6.2)
SODIUM SERPL-SCNC: 134 MMOL/L (ref 136–145)
WBC # BLD AUTO: 13.06 K/UL (ref 3.9–12.7)

## 2021-09-30 PROCEDURE — 37000009 HC ANESTHESIA EA ADD 15 MINS: Mod: HCNC | Performed by: ORTHOPAEDIC SURGERY

## 2021-09-30 PROCEDURE — D9220A PRA ANESTHESIA: ICD-10-PCS | Mod: HCNC,CRNA,, | Performed by: NURSE ANESTHETIST, CERTIFIED REGISTERED

## 2021-09-30 PROCEDURE — 22634 ARTHRD CMBN 1NTRSPC EA ADDL: CPT | Mod: AS,HCNC,, | Performed by: PHYSICIAN ASSISTANT

## 2021-09-30 PROCEDURE — D9220A PRA ANESTHESIA: ICD-10-PCS | Mod: HCNC,ANES,, | Performed by: ANESTHESIOLOGY

## 2021-09-30 PROCEDURE — 25000003 PHARM REV CODE 250: Mod: HCNC | Performed by: NURSE ANESTHETIST, CERTIFIED REGISTERED

## 2021-09-30 PROCEDURE — 36000710: Mod: HCNC | Performed by: ORTHOPAEDIC SURGERY

## 2021-09-30 PROCEDURE — 71000033 HC RECOVERY, INTIAL HOUR: Mod: HCNC | Performed by: ORTHOPAEDIC SURGERY

## 2021-09-30 PROCEDURE — 22853 INSJ BIOMECHANICAL DEVICE: CPT | Mod: HCNC,,, | Performed by: ORTHOPAEDIC SURGERY

## 2021-09-30 PROCEDURE — 22634 PR ARTHRODESIS, COMBINED TECHN, SNGL INTERSPACE, EA ADDTL: ICD-10-PCS | Mod: HCNC,,, | Performed by: ORTHOPAEDIC SURGERY

## 2021-09-30 PROCEDURE — 37000008 HC ANESTHESIA 1ST 15 MINUTES: Mod: HCNC | Performed by: ORTHOPAEDIC SURGERY

## 2021-09-30 PROCEDURE — 22853 PR INSERT BIOMECH DEV W/INTERBODY ARTHRODESIS, EA CONTIGUOUS DEFECT: ICD-10-PCS | Mod: HCNC,,, | Performed by: ORTHOPAEDIC SURGERY

## 2021-09-30 PROCEDURE — 20936 SP BONE AGRFT LOCAL ADD-ON: CPT | Mod: HCNC,,, | Performed by: ORTHOPAEDIC SURGERY

## 2021-09-30 PROCEDURE — 22842 INSERT SPINE FIXATION DEVICE: CPT | Mod: HCNC,,, | Performed by: ORTHOPAEDIC SURGERY

## 2021-09-30 PROCEDURE — 22633 ARTHRD CMBN 1NTRSPC LUMBAR: CPT | Mod: AS,HCNC,, | Performed by: PHYSICIAN ASSISTANT

## 2021-09-30 PROCEDURE — D9220A PRA ANESTHESIA: Mod: HCNC,CRNA,, | Performed by: NURSE ANESTHETIST, CERTIFIED REGISTERED

## 2021-09-30 PROCEDURE — 22842 INSERT SPINE FIXATION DEVICE: CPT | Mod: AS,HCNC,, | Performed by: PHYSICIAN ASSISTANT

## 2021-09-30 PROCEDURE — 63600175 PHARM REV CODE 636 W HCPCS: Mod: HCNC | Performed by: NURSE ANESTHETIST, CERTIFIED REGISTERED

## 2021-09-30 PROCEDURE — C1713 ANCHOR/SCREW BN/BN,TIS/BN: HCPCS | Mod: HCNC | Performed by: ORTHOPAEDIC SURGERY

## 2021-09-30 PROCEDURE — 22853 INSJ BIOMECHANICAL DEVICE: CPT | Mod: AS,HCNC,, | Performed by: PHYSICIAN ASSISTANT

## 2021-09-30 PROCEDURE — 11000001 HC ACUTE MED/SURG PRIVATE ROOM: Mod: HCNC

## 2021-09-30 PROCEDURE — 36415 COLL VENOUS BLD VENIPUNCTURE: CPT | Mod: HCNC | Performed by: STUDENT IN AN ORGANIZED HEALTH CARE EDUCATION/TRAINING PROGRAM

## 2021-09-30 PROCEDURE — 22633 PR ARTHRODESIS, COMBINED TECHN, SNGL INTERSPACE, LUMBAR: ICD-10-PCS | Mod: AS,HCNC,, | Performed by: PHYSICIAN ASSISTANT

## 2021-09-30 PROCEDURE — 22633 ARTHRD CMBN 1NTRSPC LUMBAR: CPT | Mod: HCNC,,, | Performed by: ORTHOPAEDIC SURGERY

## 2021-09-30 PROCEDURE — 36000711: Mod: HCNC | Performed by: ORTHOPAEDIC SURGERY

## 2021-09-30 PROCEDURE — 22853 PR INSERT BIOMECH DEV W/INTERBODY ARTHRODESIS, EA CONTIGUOUS DEFECT: ICD-10-PCS | Mod: AS,HCNC,, | Performed by: PHYSICIAN ASSISTANT

## 2021-09-30 PROCEDURE — 25000003 PHARM REV CODE 250: Mod: HCNC | Performed by: STUDENT IN AN ORGANIZED HEALTH CARE EDUCATION/TRAINING PROGRAM

## 2021-09-30 PROCEDURE — 22634 ARTHRD CMBN 1NTRSPC EA ADDL: CPT | Mod: HCNC,,, | Performed by: ORTHOPAEDIC SURGERY

## 2021-09-30 PROCEDURE — 85025 COMPLETE CBC W/AUTO DIFF WBC: CPT | Mod: HCNC | Performed by: STUDENT IN AN ORGANIZED HEALTH CARE EDUCATION/TRAINING PROGRAM

## 2021-09-30 PROCEDURE — 63600175 PHARM REV CODE 636 W HCPCS: Mod: HCNC | Performed by: ORTHOPAEDIC SURGERY

## 2021-09-30 PROCEDURE — 22842 PR POSTERIOR SEGMENTAL INSTRUMENTATION 3-6 VRT SEG: ICD-10-PCS | Mod: HCNC,,, | Performed by: ORTHOPAEDIC SURGERY

## 2021-09-30 PROCEDURE — 22633 PR ARTHRODESIS, COMBINED TECHN, SNGL INTERSPACE, LUMBAR: ICD-10-PCS | Mod: HCNC,,, | Performed by: ORTHOPAEDIC SURGERY

## 2021-09-30 PROCEDURE — 25000003 PHARM REV CODE 250: Mod: HCNC | Performed by: ORTHOPAEDIC SURGERY

## 2021-09-30 PROCEDURE — 80053 COMPREHEN METABOLIC PANEL: CPT | Mod: HCNC | Performed by: STUDENT IN AN ORGANIZED HEALTH CARE EDUCATION/TRAINING PROGRAM

## 2021-09-30 PROCEDURE — 27201423 OPTIME MED/SURG SUP & DEVICES STERILE SUPPLY: Mod: HCNC | Performed by: ORTHOPAEDIC SURGERY

## 2021-09-30 PROCEDURE — 86900 BLOOD TYPING SEROLOGIC ABO: CPT | Mod: HCNC | Performed by: PHYSICIAN ASSISTANT

## 2021-09-30 PROCEDURE — 22842 PR POSTERIOR SEGMENTAL INSTRUMENTATION 3-6 VRT SEG: ICD-10-PCS | Mod: AS,HCNC,, | Performed by: PHYSICIAN ASSISTANT

## 2021-09-30 PROCEDURE — 22634 PR ARTHRODESIS, COMBINED TECHN, SNGL INTERSPACE, EA ADDTL: ICD-10-PCS | Mod: AS,HCNC,, | Performed by: PHYSICIAN ASSISTANT

## 2021-09-30 PROCEDURE — D9220A PRA ANESTHESIA: Mod: HCNC,ANES,, | Performed by: ANESTHESIOLOGY

## 2021-09-30 PROCEDURE — 20936 PR AUTOGRAFT SPINE SURGERY LOCAL FROM SAME INCISION: ICD-10-PCS | Mod: HCNC,,, | Performed by: ORTHOPAEDIC SURGERY

## 2021-09-30 PROCEDURE — 71000015 HC POSTOP RECOV 1ST HR: Mod: HCNC | Performed by: ORTHOPAEDIC SURGERY

## 2021-09-30 PROCEDURE — 63600175 PHARM REV CODE 636 W HCPCS: Mod: HCNC | Performed by: STUDENT IN AN ORGANIZED HEALTH CARE EDUCATION/TRAINING PROGRAM

## 2021-09-30 DEVICE — SCREW CAPSURE PS3 LOK SET: Type: IMPLANTABLE DEVICE | Site: BACK | Status: FUNCTIONAL

## 2021-09-30 DEVICE — CAGE SPINAL EXPAND 25X10X13: Type: IMPLANTABLE DEVICE | Site: BACK | Status: FUNCTIONAL

## 2021-09-30 DEVICE — IMPLANTABLE DEVICE: Type: IMPLANTABLE DEVICE | Site: SPINE LUMBAR | Status: FUNCTIONAL

## 2021-09-30 DEVICE — CAGE LEVA 12MM 25X10X12: Type: IMPLANTABLE DEVICE | Site: BACK | Status: FUNCTIONAL

## 2021-09-30 RX ORDER — CEFAZOLIN SODIUM 1 G/3ML
INJECTION, POWDER, FOR SOLUTION INTRAMUSCULAR; INTRAVENOUS
Status: DISCONTINUED | OUTPATIENT
Start: 2021-09-30 | End: 2021-09-30

## 2021-09-30 RX ORDER — SODIUM CHLORIDE 9 MG/ML
INJECTION, SOLUTION INTRAVENOUS CONTINUOUS
Status: ACTIVE | OUTPATIENT
Start: 2021-09-30 | End: 2021-10-01

## 2021-09-30 RX ORDER — AMOXICILLIN 250 MG
1 CAPSULE ORAL 2 TIMES DAILY
Status: DISCONTINUED | OUTPATIENT
Start: 2021-09-30 | End: 2021-10-03 | Stop reason: HOSPADM

## 2021-09-30 RX ORDER — HYDROMORPHONE HYDROCHLORIDE 1 MG/ML
0.2 INJECTION, SOLUTION INTRAMUSCULAR; INTRAVENOUS; SUBCUTANEOUS EVERY 5 MIN PRN
Status: DISCONTINUED | OUTPATIENT
Start: 2021-09-30 | End: 2021-09-30 | Stop reason: HOSPADM

## 2021-09-30 RX ORDER — HYDROMORPHONE HYDROCHLORIDE 1 MG/ML
0.5 INJECTION, SOLUTION INTRAMUSCULAR; INTRAVENOUS; SUBCUTANEOUS
Status: DISCONTINUED | OUTPATIENT
Start: 2021-09-30 | End: 2021-10-03 | Stop reason: HOSPADM

## 2021-09-30 RX ORDER — OXYCODONE HYDROCHLORIDE 10 MG/1
10 TABLET ORAL EVERY 4 HOURS PRN
Status: DISCONTINUED | OUTPATIENT
Start: 2021-09-30 | End: 2021-10-03 | Stop reason: HOSPADM

## 2021-09-30 RX ORDER — ACETAMINOPHEN 10 MG/ML
INJECTION, SOLUTION INTRAVENOUS
Status: DISCONTINUED | OUTPATIENT
Start: 2021-09-30 | End: 2021-09-30

## 2021-09-30 RX ORDER — PHENYLEPHRINE HCL IN 0.9% NACL 1 MG/10 ML
SYRINGE (ML) INTRAVENOUS
Status: DISCONTINUED | OUTPATIENT
Start: 2021-09-30 | End: 2021-09-30

## 2021-09-30 RX ORDER — LORAZEPAM 2 MG/ML
0.25 INJECTION INTRAMUSCULAR ONCE AS NEEDED
Status: DISCONTINUED | OUTPATIENT
Start: 2021-09-30 | End: 2021-09-30 | Stop reason: HOSPADM

## 2021-09-30 RX ORDER — BUPIVACAINE HYDROCHLORIDE AND EPINEPHRINE 5; 5 MG/ML; UG/ML
INJECTION, SOLUTION EPIDURAL; INTRACAUDAL; PERINEURAL
Status: DISCONTINUED | OUTPATIENT
Start: 2021-09-30 | End: 2021-09-30 | Stop reason: HOSPADM

## 2021-09-30 RX ORDER — ALBUTEROL SULFATE 90 UG/1
2 AEROSOL, METERED RESPIRATORY (INHALATION) EVERY 6 HOURS PRN
Status: DISCONTINUED | OUTPATIENT
Start: 2021-09-30 | End: 2021-10-03 | Stop reason: HOSPADM

## 2021-09-30 RX ORDER — LIDOCAINE HYDROCHLORIDE AND EPINEPHRINE 10; 10 MG/ML; UG/ML
INJECTION, SOLUTION INFILTRATION; PERINEURAL
Status: DISCONTINUED | OUTPATIENT
Start: 2021-09-30 | End: 2021-09-30 | Stop reason: HOSPADM

## 2021-09-30 RX ORDER — OXYCODONE HYDROCHLORIDE 5 MG/1
5 TABLET ORAL EVERY 4 HOURS PRN
Status: DISCONTINUED | OUTPATIENT
Start: 2021-09-30 | End: 2021-10-03 | Stop reason: HOSPADM

## 2021-09-30 RX ORDER — MIDAZOLAM HYDROCHLORIDE 1 MG/ML
INJECTION, SOLUTION INTRAMUSCULAR; INTRAVENOUS
Status: DISCONTINUED | OUTPATIENT
Start: 2021-09-30 | End: 2021-09-30

## 2021-09-30 RX ORDER — ALLOPURINOL 300 MG/1
300 TABLET ORAL 2 TIMES DAILY
Status: DISCONTINUED | OUTPATIENT
Start: 2021-09-30 | End: 2021-10-03 | Stop reason: HOSPADM

## 2021-09-30 RX ORDER — ONDANSETRON 2 MG/ML
8 INJECTION INTRAMUSCULAR; INTRAVENOUS EVERY 12 HOURS PRN
Status: DISCONTINUED | OUTPATIENT
Start: 2021-09-30 | End: 2021-10-03 | Stop reason: HOSPADM

## 2021-09-30 RX ORDER — GABAPENTIN 300 MG/1
300 CAPSULE ORAL 3 TIMES DAILY
Status: DISCONTINUED | OUTPATIENT
Start: 2021-09-30 | End: 2021-10-03 | Stop reason: HOSPADM

## 2021-09-30 RX ORDER — METOPROLOL TARTRATE 1 MG/ML
INJECTION, SOLUTION INTRAVENOUS
Status: DISCONTINUED | OUTPATIENT
Start: 2021-09-30 | End: 2021-09-30

## 2021-09-30 RX ORDER — METOCLOPRAMIDE HYDROCHLORIDE 5 MG/ML
10 INJECTION INTRAMUSCULAR; INTRAVENOUS EVERY 10 MIN PRN
Status: DISCONTINUED | OUTPATIENT
Start: 2021-09-30 | End: 2021-09-30 | Stop reason: HOSPADM

## 2021-09-30 RX ORDER — POLYETHYLENE GLYCOL 3350 17 G/17G
17 POWDER, FOR SOLUTION ORAL DAILY
Status: DISCONTINUED | OUTPATIENT
Start: 2021-10-01 | End: 2021-10-03 | Stop reason: HOSPADM

## 2021-09-30 RX ORDER — HYDROMORPHONE HYDROCHLORIDE 2 MG/ML
INJECTION, SOLUTION INTRAMUSCULAR; INTRAVENOUS; SUBCUTANEOUS
Status: DISCONTINUED | OUTPATIENT
Start: 2021-09-30 | End: 2021-09-30

## 2021-09-30 RX ORDER — METHOCARBAMOL 750 MG/1
750 TABLET, FILM COATED ORAL 3 TIMES DAILY
Status: DISCONTINUED | OUTPATIENT
Start: 2021-09-30 | End: 2021-10-03 | Stop reason: HOSPADM

## 2021-09-30 RX ORDER — ACETAMINOPHEN 325 MG/1
650 TABLET ORAL
Status: DISCONTINUED | OUTPATIENT
Start: 2021-09-30 | End: 2021-10-03 | Stop reason: HOSPADM

## 2021-09-30 RX ORDER — LIDOCAINE HYDROCHLORIDE 10 MG/ML
1 INJECTION, SOLUTION EPIDURAL; INFILTRATION; INTRACAUDAL; PERINEURAL ONCE
Status: DISCONTINUED | OUTPATIENT
Start: 2021-09-30 | End: 2021-09-30 | Stop reason: HOSPADM

## 2021-09-30 RX ORDER — SODIUM CHLORIDE 9 MG/ML
INJECTION, SOLUTION INTRAVENOUS CONTINUOUS
Status: DISCONTINUED | OUTPATIENT
Start: 2021-09-30 | End: 2021-10-03 | Stop reason: HOSPADM

## 2021-09-30 RX ORDER — FAMOTIDINE 20 MG/1
20 TABLET, FILM COATED ORAL DAILY
Status: DISCONTINUED | OUTPATIENT
Start: 2021-10-01 | End: 2021-10-03 | Stop reason: HOSPADM

## 2021-09-30 RX ORDER — DEXAMETHASONE SODIUM PHOSPHATE 4 MG/ML
INJECTION, SOLUTION INTRA-ARTICULAR; INTRALESIONAL; INTRAMUSCULAR; INTRAVENOUS; SOFT TISSUE
Status: DISCONTINUED | OUTPATIENT
Start: 2021-09-30 | End: 2021-09-30

## 2021-09-30 RX ORDER — CELECOXIB 200 MG/1
200 CAPSULE ORAL DAILY
Status: DISCONTINUED | OUTPATIENT
Start: 2021-09-30 | End: 2021-10-03 | Stop reason: HOSPADM

## 2021-09-30 RX ORDER — ONDANSETRON 2 MG/ML
INJECTION INTRAMUSCULAR; INTRAVENOUS
Status: DISCONTINUED | OUTPATIENT
Start: 2021-09-30 | End: 2021-09-30

## 2021-09-30 RX ORDER — LIDOCAINE HYDROCHLORIDE 20 MG/ML
INJECTION INTRAVENOUS
Status: DISCONTINUED | OUTPATIENT
Start: 2021-09-30 | End: 2021-09-30

## 2021-09-30 RX ORDER — SODIUM CHLORIDE 0.9 % (FLUSH) 0.9 %
2 SYRINGE (ML) INJECTION
Status: DISCONTINUED | OUTPATIENT
Start: 2021-09-30 | End: 2021-10-03 | Stop reason: HOSPADM

## 2021-09-30 RX ORDER — PROPOFOL 10 MG/ML
VIAL (ML) INTRAVENOUS
Status: DISCONTINUED | OUTPATIENT
Start: 2021-09-30 | End: 2021-09-30

## 2021-09-30 RX ORDER — ROCURONIUM BROMIDE 10 MG/ML
INJECTION, SOLUTION INTRAVENOUS
Status: DISCONTINUED | OUTPATIENT
Start: 2021-09-30 | End: 2021-09-30

## 2021-09-30 RX ORDER — FENTANYL CITRATE 50 UG/ML
INJECTION, SOLUTION INTRAMUSCULAR; INTRAVENOUS
Status: DISCONTINUED | OUTPATIENT
Start: 2021-09-30 | End: 2021-09-30

## 2021-09-30 RX ORDER — HEPARIN SODIUM 5000 [USP'U]/ML
5000 INJECTION, SOLUTION INTRAVENOUS; SUBCUTANEOUS EVERY 8 HOURS
Status: DISCONTINUED | OUTPATIENT
Start: 2021-10-01 | End: 2021-10-03 | Stop reason: HOSPADM

## 2021-09-30 RX ORDER — CEFAZOLIN SODIUM 1 G/3ML
2 INJECTION, POWDER, FOR SOLUTION INTRAMUSCULAR; INTRAVENOUS
Status: COMPLETED | OUTPATIENT
Start: 2021-09-30 | End: 2021-10-01

## 2021-09-30 RX ORDER — SODIUM CHLORIDE 0.9 % (FLUSH) 0.9 %
3 SYRINGE (ML) INJECTION
Status: DISCONTINUED | OUTPATIENT
Start: 2021-09-30 | End: 2021-10-03 | Stop reason: HOSPADM

## 2021-09-30 RX ORDER — CAPTOPRIL 25 MG/1
50 TABLET ORAL 3 TIMES DAILY
Status: DISCONTINUED | OUTPATIENT
Start: 2021-09-30 | End: 2021-10-03 | Stop reason: HOSPADM

## 2021-09-30 RX ORDER — DILTIAZEM HYDROCHLORIDE 30 MG/1
60 TABLET, FILM COATED ORAL EVERY 12 HOURS
Status: DISCONTINUED | OUTPATIENT
Start: 2021-09-30 | End: 2021-10-03 | Stop reason: HOSPADM

## 2021-09-30 RX ORDER — VANCOMYCIN HYDROCHLORIDE 1 G/20ML
INJECTION, POWDER, LYOPHILIZED, FOR SOLUTION INTRAVENOUS
Status: DISCONTINUED | OUTPATIENT
Start: 2021-09-30 | End: 2021-09-30 | Stop reason: HOSPADM

## 2021-09-30 RX ORDER — VECURONIUM BROMIDE FOR INJECTION 1 MG/ML
INJECTION, POWDER, LYOPHILIZED, FOR SOLUTION INTRAVENOUS
Status: DISCONTINUED | OUTPATIENT
Start: 2021-09-30 | End: 2021-09-30

## 2021-09-30 RX ORDER — KETAMINE HCL IN 0.9 % NACL 50 MG/5 ML
SYRINGE (ML) INTRAVENOUS
Status: DISCONTINUED | OUTPATIENT
Start: 2021-09-30 | End: 2021-09-30

## 2021-09-30 RX ORDER — METOPROLOL SUCCINATE 25 MG/1
25 TABLET, EXTENDED RELEASE ORAL DAILY
Status: DISCONTINUED | OUTPATIENT
Start: 2021-10-01 | End: 2021-10-03 | Stop reason: HOSPADM

## 2021-09-30 RX ORDER — DEXMEDETOMIDINE HYDROCHLORIDE 100 UG/ML
INJECTION, SOLUTION INTRAVENOUS
Status: DISCONTINUED | OUTPATIENT
Start: 2021-09-30 | End: 2021-09-30

## 2021-09-30 RX ADMIN — SODIUM CHLORIDE: 0.9 INJECTION, SOLUTION INTRAVENOUS at 01:09

## 2021-09-30 RX ADMIN — METOROPROLOL TARTRATE 1 MG: 5 INJECTION, SOLUTION INTRAVENOUS at 06:09

## 2021-09-30 RX ADMIN — PROPOFOL 30 MG: 10 INJECTION, EMULSION INTRAVENOUS at 02:09

## 2021-09-30 RX ADMIN — METOROPROLOL TARTRATE 1 MG: 5 INJECTION, SOLUTION INTRAVENOUS at 03:09

## 2021-09-30 RX ADMIN — PROPOFOL 150 MG: 10 INJECTION, EMULSION INTRAVENOUS at 01:09

## 2021-09-30 RX ADMIN — HYDROMORPHONE HYDROCHLORIDE 0.4 MG: 2 INJECTION INTRAMUSCULAR; INTRAVENOUS; SUBCUTANEOUS at 05:09

## 2021-09-30 RX ADMIN — FENTANYL CITRATE 100 MCG: 50 INJECTION, SOLUTION INTRAMUSCULAR; INTRAVENOUS at 01:09

## 2021-09-30 RX ADMIN — OXYCODONE 15 MG: 5 TABLET ORAL at 06:09

## 2021-09-30 RX ADMIN — ALLOPURINOL 300 MG: 300 TABLET ORAL at 09:09

## 2021-09-30 RX ADMIN — ONDANSETRON 8 MG: 2 INJECTION INTRAMUSCULAR; INTRAVENOUS at 09:09

## 2021-09-30 RX ADMIN — ONDANSETRON 4 MG: 2 INJECTION INTRAMUSCULAR; INTRAVENOUS at 05:09

## 2021-09-30 RX ADMIN — ACETAMINOPHEN 1000 MG: 10 INJECTION INTRAVENOUS at 03:09

## 2021-09-30 RX ADMIN — ROCURONIUM BROMIDE 20 MG: 10 INJECTION, SOLUTION INTRAVENOUS at 02:09

## 2021-09-30 RX ADMIN — ROCURONIUM BROMIDE 50 MG: 10 INJECTION, SOLUTION INTRAVENOUS at 02:09

## 2021-09-30 RX ADMIN — SUGAMMADEX 200 MG: 100 INJECTION, SOLUTION INTRAVENOUS at 04:09

## 2021-09-30 RX ADMIN — DEXAMETHASONE SODIUM PHOSPHATE 8 MG: 4 INJECTION, SOLUTION INTRAMUSCULAR; INTRAVENOUS at 02:09

## 2021-09-30 RX ADMIN — CAPTOPRIL 50 MG: 25 TABLET ORAL at 09:09

## 2021-09-30 RX ADMIN — Medication 100 MCG: at 04:09

## 2021-09-30 RX ADMIN — Medication 30 MG: at 01:09

## 2021-09-30 RX ADMIN — Medication 50 MCG: at 05:09

## 2021-09-30 RX ADMIN — Medication 10 MG: at 03:09

## 2021-09-30 RX ADMIN — METOROPROLOL TARTRATE 1 MG: 5 INJECTION, SOLUTION INTRAVENOUS at 05:09

## 2021-09-30 RX ADMIN — CEFAZOLIN 2 G: 330 INJECTION, POWDER, FOR SOLUTION INTRAMUSCULAR; INTRAVENOUS at 02:09

## 2021-09-30 RX ADMIN — Medication 200 MCG: at 05:09

## 2021-09-30 RX ADMIN — DILTIAZEM HYDROCHLORIDE 60 MG: 30 TABLET, FILM COATED ORAL at 09:09

## 2021-09-30 RX ADMIN — ROCURONIUM BROMIDE 15 MG: 10 INJECTION, SOLUTION INTRAVENOUS at 02:09

## 2021-09-30 RX ADMIN — VECURONIUM BROMIDE 3 MG: 10 INJECTION, POWDER, LYOPHILIZED, FOR SOLUTION INTRAVENOUS at 03:09

## 2021-09-30 RX ADMIN — PROPOFOL 50 MG: 10 INJECTION, EMULSION INTRAVENOUS at 02:09

## 2021-09-30 RX ADMIN — PROPOFOL 40 MG: 10 INJECTION, EMULSION INTRAVENOUS at 02:09

## 2021-09-30 RX ADMIN — GABAPENTIN 300 MG: 300 CAPSULE ORAL at 09:09

## 2021-09-30 RX ADMIN — Medication 100 MCG: at 05:09

## 2021-09-30 RX ADMIN — DEXMEDETOMIDINE HYDROCHLORIDE 8 MCG: 100 INJECTION, SOLUTION, CONCENTRATE INTRAVENOUS at 06:09

## 2021-09-30 RX ADMIN — HYDROMORPHONE HYDROCHLORIDE 0.4 MG: 2 INJECTION INTRAMUSCULAR; INTRAVENOUS; SUBCUTANEOUS at 06:09

## 2021-09-30 RX ADMIN — Medication 50 MCG: at 04:09

## 2021-09-30 RX ADMIN — MIDAZOLAM HYDROCHLORIDE 2 MG: 1 INJECTION, SOLUTION INTRAMUSCULAR; INTRAVENOUS at 01:09

## 2021-09-30 RX ADMIN — CELECOXIB 200 MG: 200 CAPSULE ORAL at 06:09

## 2021-09-30 RX ADMIN — METHOCARBAMOL 750 MG: 750 TABLET ORAL at 09:09

## 2021-09-30 RX ADMIN — SENNOSIDES AND DOCUSATE SODIUM 1 TABLET: 8.6; 5 TABLET ORAL at 09:09

## 2021-09-30 RX ADMIN — Medication 10 MG: at 04:09

## 2021-09-30 RX ADMIN — CEFAZOLIN 2 G: 330 INJECTION, POWDER, FOR SOLUTION INTRAMUSCULAR; INTRAVENOUS at 09:09

## 2021-09-30 RX ADMIN — LIDOCAINE HYDROCHLORIDE 100 MG: 20 INJECTION, SOLUTION INTRAVENOUS at 01:09

## 2021-09-30 RX ADMIN — REMIFENTANIL HYDROCHLORIDE 0.2 MCG/KG/MIN: 1 INJECTION, POWDER, LYOPHILIZED, FOR SOLUTION INTRAVENOUS at 02:09

## 2021-10-01 LAB
BASOPHILS # BLD AUTO: 0.01 K/UL (ref 0–0.2)
BASOPHILS NFR BLD: 0.1 % (ref 0–1.9)
DIFFERENTIAL METHOD: ABNORMAL
EOSINOPHIL # BLD AUTO: 0 K/UL (ref 0–0.5)
EOSINOPHIL NFR BLD: 0 % (ref 0–8)
ERYTHROCYTE [DISTWIDTH] IN BLOOD BY AUTOMATED COUNT: 16.1 % (ref 11.5–14.5)
HCT VFR BLD AUTO: 33.2 % (ref 40–54)
HGB BLD-MCNC: 11.1 G/DL (ref 14–18)
IMM GRANULOCYTES # BLD AUTO: 0.03 K/UL (ref 0–0.04)
IMM GRANULOCYTES NFR BLD AUTO: 0.4 % (ref 0–0.5)
LYMPHOCYTES # BLD AUTO: 1.3 K/UL (ref 1–4.8)
LYMPHOCYTES NFR BLD: 15.4 % (ref 18–48)
MCH RBC QN AUTO: 27.3 PG (ref 27–31)
MCHC RBC AUTO-ENTMCNC: 33.4 G/DL (ref 32–36)
MCV RBC AUTO: 82 FL (ref 82–98)
MONOCYTES # BLD AUTO: 0.3 K/UL (ref 0.3–1)
MONOCYTES NFR BLD: 3.5 % (ref 4–15)
NEUTROPHILS # BLD AUTO: 6.9 K/UL (ref 1.8–7.7)
NEUTROPHILS NFR BLD: 80.6 % (ref 38–73)
NRBC BLD-RTO: 0 /100 WBC
PLATELET # BLD AUTO: 243 K/UL (ref 150–450)
PMV BLD AUTO: 10.2 FL (ref 9.2–12.9)
RBC # BLD AUTO: 4.06 M/UL (ref 4.6–6.2)
WBC # BLD AUTO: 8.51 K/UL (ref 3.9–12.7)

## 2021-10-01 PROCEDURE — 11000001 HC ACUTE MED/SURG PRIVATE ROOM: Mod: HCNC

## 2021-10-01 PROCEDURE — 63600175 PHARM REV CODE 636 W HCPCS: Mod: HCNC | Performed by: STUDENT IN AN ORGANIZED HEALTH CARE EDUCATION/TRAINING PROGRAM

## 2021-10-01 PROCEDURE — 97530 THERAPEUTIC ACTIVITIES: CPT | Mod: HCNC

## 2021-10-01 PROCEDURE — 97165 OT EVAL LOW COMPLEX 30 MIN: CPT | Mod: HCNC

## 2021-10-01 PROCEDURE — 25000003 PHARM REV CODE 250: Mod: HCNC | Performed by: STUDENT IN AN ORGANIZED HEALTH CARE EDUCATION/TRAINING PROGRAM

## 2021-10-01 PROCEDURE — 36415 COLL VENOUS BLD VENIPUNCTURE: CPT | Mod: HCNC | Performed by: ORTHOPAEDIC SURGERY

## 2021-10-01 PROCEDURE — 97161 PT EVAL LOW COMPLEX 20 MIN: CPT | Mod: HCNC

## 2021-10-01 PROCEDURE — 97116 GAIT TRAINING THERAPY: CPT | Mod: HCNC

## 2021-10-01 PROCEDURE — 85025 COMPLETE CBC W/AUTO DIFF WBC: CPT | Mod: HCNC | Performed by: ORTHOPAEDIC SURGERY

## 2021-10-01 RX ORDER — DEXTROMETHORPHAN HYDROBROMIDE, GUAIFENESIN 5; 100 MG/5ML; MG/5ML
650 LIQUID ORAL EVERY 6 HOURS PRN
Qty: 56 TABLET | Refills: 0 | Status: SHIPPED | OUTPATIENT
Start: 2021-10-01 | End: 2021-10-27 | Stop reason: SDUPTHER

## 2021-10-01 RX ORDER — GABAPENTIN 100 MG/1
100 CAPSULE ORAL 3 TIMES DAILY
Qty: 42 CAPSULE | Refills: 0 | Status: SHIPPED | OUTPATIENT
Start: 2021-10-01 | End: 2021-10-12 | Stop reason: SDUPTHER

## 2021-10-01 RX ORDER — METHOCARBAMOL 500 MG/1
1000 TABLET, FILM COATED ORAL 3 TIMES DAILY
Qty: 84 TABLET | Refills: 0 | Status: SHIPPED | OUTPATIENT
Start: 2021-10-01 | End: 2021-10-12 | Stop reason: SDUPTHER

## 2021-10-01 RX ORDER — OXYCODONE HYDROCHLORIDE 5 MG/1
5 TABLET ORAL EVERY 4 HOURS PRN
Qty: 20 TABLET | Refills: 0 | Status: SHIPPED | OUTPATIENT
Start: 2021-10-01 | End: 2021-10-27

## 2021-10-01 RX ORDER — CELECOXIB 200 MG/1
200 CAPSULE ORAL DAILY
Qty: 14 CAPSULE | Refills: 0 | Status: SHIPPED | OUTPATIENT
Start: 2021-10-01 | End: 2021-10-12 | Stop reason: SDUPTHER

## 2021-10-01 RX ADMIN — GABAPENTIN 300 MG: 300 CAPSULE ORAL at 03:10

## 2021-10-01 RX ADMIN — HEPARIN SODIUM 5000 UNITS: 5000 INJECTION INTRAVENOUS; SUBCUTANEOUS at 01:10

## 2021-10-01 RX ADMIN — ACETAMINOPHEN 650 MG: 325 TABLET ORAL at 08:10

## 2021-10-01 RX ADMIN — ACETAMINOPHEN 650 MG: 325 TABLET ORAL at 03:10

## 2021-10-01 RX ADMIN — DILTIAZEM HYDROCHLORIDE 60 MG: 30 TABLET, FILM COATED ORAL at 09:10

## 2021-10-01 RX ADMIN — ALLOPURINOL 300 MG: 300 TABLET ORAL at 09:10

## 2021-10-01 RX ADMIN — CEFAZOLIN 2 G: 330 INJECTION, POWDER, FOR SOLUTION INTRAMUSCULAR; INTRAVENOUS at 01:10

## 2021-10-01 RX ADMIN — CAPTOPRIL 50 MG: 25 TABLET ORAL at 09:10

## 2021-10-01 RX ADMIN — ACETAMINOPHEN 650 MG: 325 TABLET ORAL at 09:10

## 2021-10-01 RX ADMIN — GABAPENTIN 300 MG: 300 CAPSULE ORAL at 09:10

## 2021-10-01 RX ADMIN — METHOCARBAMOL 750 MG: 750 TABLET ORAL at 03:10

## 2021-10-01 RX ADMIN — GABAPENTIN 300 MG: 300 CAPSULE ORAL at 08:10

## 2021-10-01 RX ADMIN — ALLOPURINOL 300 MG: 300 TABLET ORAL at 08:10

## 2021-10-01 RX ADMIN — CEFAZOLIN 2 G: 330 INJECTION, POWDER, FOR SOLUTION INTRAMUSCULAR; INTRAVENOUS at 05:10

## 2021-10-01 RX ADMIN — CAPTOPRIL 50 MG: 25 TABLET ORAL at 03:10

## 2021-10-01 RX ADMIN — HEPARIN SODIUM 5000 UNITS: 5000 INJECTION INTRAVENOUS; SUBCUTANEOUS at 08:10

## 2021-10-01 RX ADMIN — SENNOSIDES AND DOCUSATE SODIUM 1 TABLET: 8.6; 5 TABLET ORAL at 09:10

## 2021-10-01 RX ADMIN — FAMOTIDINE 20 MG: 20 TABLET ORAL at 09:10

## 2021-10-01 RX ADMIN — CELECOXIB 200 MG: 200 CAPSULE ORAL at 09:10

## 2021-10-01 RX ADMIN — ACETAMINOPHEN 650 MG: 325 TABLET ORAL at 05:10

## 2021-10-01 RX ADMIN — HEPARIN SODIUM 5000 UNITS: 5000 INJECTION INTRAVENOUS; SUBCUTANEOUS at 05:10

## 2021-10-01 RX ADMIN — METHOCARBAMOL 750 MG: 750 TABLET ORAL at 08:10

## 2021-10-01 RX ADMIN — DILTIAZEM HYDROCHLORIDE 60 MG: 30 TABLET, FILM COATED ORAL at 08:10

## 2021-10-01 RX ADMIN — SENNOSIDES AND DOCUSATE SODIUM 1 TABLET: 8.6; 5 TABLET ORAL at 08:10

## 2021-10-01 RX ADMIN — METHOCARBAMOL 750 MG: 750 TABLET ORAL at 09:10

## 2021-10-01 RX ADMIN — CAPTOPRIL 50 MG: 25 TABLET ORAL at 08:10

## 2021-10-01 RX ADMIN — POLYETHYLENE GLYCOL 3350 17 G: 17 POWDER, FOR SOLUTION ORAL at 09:10

## 2021-10-02 PROCEDURE — 97116 GAIT TRAINING THERAPY: CPT | Mod: HCNC,CQ

## 2021-10-02 PROCEDURE — 11000001 HC ACUTE MED/SURG PRIVATE ROOM: Mod: HCNC

## 2021-10-02 PROCEDURE — 97530 THERAPEUTIC ACTIVITIES: CPT | Mod: HCNC,CQ

## 2021-10-02 PROCEDURE — 63600175 PHARM REV CODE 636 W HCPCS: Mod: HCNC | Performed by: STUDENT IN AN ORGANIZED HEALTH CARE EDUCATION/TRAINING PROGRAM

## 2021-10-02 PROCEDURE — 25000003 PHARM REV CODE 250: Mod: HCNC | Performed by: STUDENT IN AN ORGANIZED HEALTH CARE EDUCATION/TRAINING PROGRAM

## 2021-10-02 RX ADMIN — METHOCARBAMOL 750 MG: 750 TABLET ORAL at 09:10

## 2021-10-02 RX ADMIN — CAPTOPRIL 50 MG: 25 TABLET ORAL at 08:10

## 2021-10-02 RX ADMIN — SENNOSIDES AND DOCUSATE SODIUM 1 TABLET: 8.6; 5 TABLET ORAL at 09:10

## 2021-10-02 RX ADMIN — DILTIAZEM HYDROCHLORIDE 60 MG: 30 TABLET, FILM COATED ORAL at 08:10

## 2021-10-02 RX ADMIN — METHOCARBAMOL 750 MG: 750 TABLET ORAL at 03:10

## 2021-10-02 RX ADMIN — GABAPENTIN 300 MG: 300 CAPSULE ORAL at 03:10

## 2021-10-02 RX ADMIN — DILTIAZEM HYDROCHLORIDE 60 MG: 30 TABLET, FILM COATED ORAL at 09:10

## 2021-10-02 RX ADMIN — POLYETHYLENE GLYCOL 3350 17 G: 17 POWDER, FOR SOLUTION ORAL at 09:10

## 2021-10-02 RX ADMIN — ACETAMINOPHEN 650 MG: 325 TABLET ORAL at 05:10

## 2021-10-02 RX ADMIN — CELECOXIB 200 MG: 200 CAPSULE ORAL at 09:10

## 2021-10-02 RX ADMIN — GABAPENTIN 300 MG: 300 CAPSULE ORAL at 09:10

## 2021-10-02 RX ADMIN — HYDROMORPHONE HYDROCHLORIDE 0.5 MG: 1 INJECTION, SOLUTION INTRAMUSCULAR; INTRAVENOUS; SUBCUTANEOUS at 10:10

## 2021-10-02 RX ADMIN — METHOCARBAMOL 750 MG: 750 TABLET ORAL at 08:10

## 2021-10-02 RX ADMIN — CAPTOPRIL 50 MG: 25 TABLET ORAL at 09:10

## 2021-10-02 RX ADMIN — CAPTOPRIL 50 MG: 25 TABLET ORAL at 03:10

## 2021-10-02 RX ADMIN — ACETAMINOPHEN 650 MG: 325 TABLET ORAL at 08:10

## 2021-10-02 RX ADMIN — ALLOPURINOL 300 MG: 300 TABLET ORAL at 09:10

## 2021-10-02 RX ADMIN — SENNOSIDES AND DOCUSATE SODIUM 1 TABLET: 8.6; 5 TABLET ORAL at 08:10

## 2021-10-02 RX ADMIN — METOPROLOL SUCCINATE 25 MG: 25 TABLET, EXTENDED RELEASE ORAL at 09:10

## 2021-10-02 RX ADMIN — FAMOTIDINE 20 MG: 20 TABLET ORAL at 09:10

## 2021-10-02 RX ADMIN — HEPARIN SODIUM 5000 UNITS: 5000 INJECTION INTRAVENOUS; SUBCUTANEOUS at 05:10

## 2021-10-02 RX ADMIN — HEPARIN SODIUM 5000 UNITS: 5000 INJECTION INTRAVENOUS; SUBCUTANEOUS at 03:10

## 2021-10-02 RX ADMIN — GABAPENTIN 300 MG: 300 CAPSULE ORAL at 08:10

## 2021-10-02 RX ADMIN — HEPARIN SODIUM 5000 UNITS: 5000 INJECTION INTRAVENOUS; SUBCUTANEOUS at 08:10

## 2021-10-02 RX ADMIN — ACETAMINOPHEN 650 MG: 325 TABLET ORAL at 11:10

## 2021-10-03 VITALS
HEIGHT: 74 IN | SYSTOLIC BLOOD PRESSURE: 121 MMHG | HEART RATE: 66 BPM | DIASTOLIC BLOOD PRESSURE: 79 MMHG | RESPIRATION RATE: 19 BRPM | WEIGHT: 247 LBS | BODY MASS INDEX: 31.7 KG/M2 | OXYGEN SATURATION: 99 % | TEMPERATURE: 97 F

## 2021-10-03 PROCEDURE — 63600175 PHARM REV CODE 636 W HCPCS: Mod: HCNC | Performed by: STUDENT IN AN ORGANIZED HEALTH CARE EDUCATION/TRAINING PROGRAM

## 2021-10-03 PROCEDURE — 25000003 PHARM REV CODE 250: Mod: HCNC | Performed by: STUDENT IN AN ORGANIZED HEALTH CARE EDUCATION/TRAINING PROGRAM

## 2021-10-03 PROCEDURE — 97116 GAIT TRAINING THERAPY: CPT | Mod: HCNC,CQ

## 2021-10-03 PROCEDURE — 97530 THERAPEUTIC ACTIVITIES: CPT | Mod: HCNC

## 2021-10-03 RX ADMIN — CELECOXIB 200 MG: 200 CAPSULE ORAL at 09:10

## 2021-10-03 RX ADMIN — SENNOSIDES AND DOCUSATE SODIUM 1 TABLET: 8.6; 5 TABLET ORAL at 09:10

## 2021-10-03 RX ADMIN — FAMOTIDINE 20 MG: 20 TABLET ORAL at 09:10

## 2021-10-03 RX ADMIN — POLYETHYLENE GLYCOL 3350 17 G: 17 POWDER, FOR SOLUTION ORAL at 09:10

## 2021-10-03 RX ADMIN — ACETAMINOPHEN 650 MG: 325 TABLET ORAL at 05:10

## 2021-10-03 RX ADMIN — ACETAMINOPHEN 650 MG: 325 TABLET ORAL at 11:10

## 2021-10-03 RX ADMIN — METHOCARBAMOL 750 MG: 750 TABLET ORAL at 09:10

## 2021-10-03 RX ADMIN — CAPTOPRIL 50 MG: 25 TABLET ORAL at 09:10

## 2021-10-03 RX ADMIN — HEPARIN SODIUM 5000 UNITS: 5000 INJECTION INTRAVENOUS; SUBCUTANEOUS at 05:10

## 2021-10-03 RX ADMIN — OXYCODONE 5 MG: 5 TABLET ORAL at 05:10

## 2021-10-03 RX ADMIN — GABAPENTIN 300 MG: 300 CAPSULE ORAL at 09:10

## 2021-10-03 RX ADMIN — ALLOPURINOL 300 MG: 300 TABLET ORAL at 09:10

## 2021-10-03 RX ADMIN — DILTIAZEM HYDROCHLORIDE 60 MG: 30 TABLET, FILM COATED ORAL at 09:10

## 2021-10-12 DIAGNOSIS — Z98.890 S/P SPINAL SURGERY: Primary | ICD-10-CM

## 2021-10-12 RX ORDER — METHOCARBAMOL 500 MG/1
1000 TABLET, FILM COATED ORAL 3 TIMES DAILY
Qty: 84 TABLET | Refills: 0 | Status: SHIPPED | OUTPATIENT
Start: 2021-10-12 | End: 2021-10-27 | Stop reason: SDUPTHER

## 2021-10-12 RX ORDER — CELECOXIB 200 MG/1
200 CAPSULE ORAL DAILY
Qty: 14 CAPSULE | Refills: 0 | Status: SHIPPED | OUTPATIENT
Start: 2021-10-12 | End: 2021-10-27 | Stop reason: SDUPTHER

## 2021-10-12 RX ORDER — GABAPENTIN 100 MG/1
100 CAPSULE ORAL 3 TIMES DAILY
Qty: 42 CAPSULE | Refills: 0 | Status: SHIPPED | OUTPATIENT
Start: 2021-10-12 | End: 2021-10-27 | Stop reason: SDUPTHER

## 2021-10-12 NOTE — TELEPHONE ENCOUNTER
----- Message from Anastasiia Son sent at 10/12/2021 10:07 AM CDT -----  Regarding: Patient Call Back  Who Called:FLAKO FRANCOIS [[id number]]    What is the reason for the call:Would like a call back in regards to his medication running low please contact to further discuss.    Can patient be contacted on KALt:NO     Call back number:[phone]

## 2021-10-12 NOTE — TELEPHONE ENCOUNTER
Refills sent to [facility] to address. Spoke with pt and he is aware that his refills will be sent to his pharmacy today.

## 2021-10-25 ENCOUNTER — LAB VISIT (OUTPATIENT)
Dept: LAB | Facility: HOSPITAL | Age: 61
End: 2021-10-25
Attending: FAMILY MEDICINE
Payer: MEDICARE

## 2021-10-25 DIAGNOSIS — Z12.5 PROSTATE CANCER SCREENING: ICD-10-CM

## 2021-10-25 DIAGNOSIS — E55.9 VITAMIN D DEFICIENCY DISEASE: ICD-10-CM

## 2021-10-25 DIAGNOSIS — Z00.00 WELL ADULT EXAM: ICD-10-CM

## 2021-10-25 DIAGNOSIS — N52.9 ERECTILE DYSFUNCTION, UNSPECIFIED ERECTILE DYSFUNCTION TYPE: ICD-10-CM

## 2021-10-25 LAB
25(OH)D3+25(OH)D2 SERPL-MCNC: 29 NG/ML (ref 30–96)
ALBUMIN SERPL BCP-MCNC: 4 G/DL (ref 3.5–5.2)
ALP SERPL-CCNC: 128 U/L (ref 55–135)
ALT SERPL W/O P-5'-P-CCNC: 48 U/L (ref 10–44)
ANION GAP SERPL CALC-SCNC: 8 MMOL/L (ref 8–16)
AST SERPL-CCNC: 25 U/L (ref 10–40)
BASOPHILS # BLD AUTO: 0.02 K/UL (ref 0–0.2)
BASOPHILS NFR BLD: 0.5 % (ref 0–1.9)
BILIRUB SERPL-MCNC: 0.4 MG/DL (ref 0.1–1)
BUN SERPL-MCNC: 17 MG/DL (ref 8–23)
CALCIUM SERPL-MCNC: 9.9 MG/DL (ref 8.7–10.5)
CHLORIDE SERPL-SCNC: 104 MMOL/L (ref 95–110)
CHOLEST SERPL-MCNC: 201 MG/DL (ref 120–199)
CHOLEST/HDLC SERPL: 3.9 {RATIO} (ref 2–5)
CO2 SERPL-SCNC: 26 MMOL/L (ref 23–29)
COMPLEXED PSA SERPL-MCNC: 1 NG/ML (ref 0–4)
CREAT SERPL-MCNC: 1.2 MG/DL (ref 0.5–1.4)
DIFFERENTIAL METHOD: ABNORMAL
EOSINOPHIL # BLD AUTO: 0.2 K/UL (ref 0–0.5)
EOSINOPHIL NFR BLD: 5.3 % (ref 0–8)
ERYTHROCYTE [DISTWIDTH] IN BLOOD BY AUTOMATED COUNT: 16 % (ref 11.5–14.5)
EST. GFR  (AFRICAN AMERICAN): >60 ML/MIN/1.73 M^2
EST. GFR  (NON AFRICAN AMERICAN): >60 ML/MIN/1.73 M^2
ESTIMATED AVG GLUCOSE: 108 MG/DL (ref 68–131)
GLUCOSE SERPL-MCNC: 96 MG/DL (ref 70–110)
HBA1C MFR BLD: 5.4 % (ref 4–5.6)
HCT VFR BLD AUTO: 32.9 % (ref 40–54)
HDLC SERPL-MCNC: 51 MG/DL (ref 40–75)
HDLC SERPL: 25.4 % (ref 20–50)
HGB BLD-MCNC: 10.7 G/DL (ref 14–18)
IMM GRANULOCYTES # BLD AUTO: 0.01 K/UL (ref 0–0.04)
IMM GRANULOCYTES NFR BLD AUTO: 0.2 % (ref 0–0.5)
LDLC SERPL CALC-MCNC: 122.2 MG/DL (ref 63–159)
LYMPHOCYTES # BLD AUTO: 1.6 K/UL (ref 1–4.8)
LYMPHOCYTES NFR BLD: 39.7 % (ref 18–48)
MCH RBC QN AUTO: 26.9 PG (ref 27–31)
MCHC RBC AUTO-ENTMCNC: 32.5 G/DL (ref 32–36)
MCV RBC AUTO: 83 FL (ref 82–98)
MONOCYTES # BLD AUTO: 0.3 K/UL (ref 0.3–1)
MONOCYTES NFR BLD: 6.3 % (ref 4–15)
NEUTROPHILS # BLD AUTO: 2 K/UL (ref 1.8–7.7)
NEUTROPHILS NFR BLD: 48 % (ref 38–73)
NONHDLC SERPL-MCNC: 150 MG/DL
NRBC BLD-RTO: 0 /100 WBC
PLATELET # BLD AUTO: 342 K/UL (ref 150–450)
PMV BLD AUTO: 9.6 FL (ref 9.2–12.9)
POTASSIUM SERPL-SCNC: 4.5 MMOL/L (ref 3.5–5.1)
PROT SERPL-MCNC: 7.6 G/DL (ref 6–8.4)
RBC # BLD AUTO: 3.98 M/UL (ref 4.6–6.2)
SODIUM SERPL-SCNC: 138 MMOL/L (ref 136–145)
TRIGL SERPL-MCNC: 139 MG/DL (ref 30–150)
WBC # BLD AUTO: 4.13 K/UL (ref 3.9–12.7)

## 2021-10-25 PROCEDURE — 80061 LIPID PANEL: CPT | Mod: HCNC | Performed by: FAMILY MEDICINE

## 2021-10-25 PROCEDURE — 36415 COLL VENOUS BLD VENIPUNCTURE: CPT | Mod: HCNC,PO | Performed by: FAMILY MEDICINE

## 2021-10-25 PROCEDURE — 84153 ASSAY OF PSA TOTAL: CPT | Mod: HCNC | Performed by: FAMILY MEDICINE

## 2021-10-25 PROCEDURE — 85025 COMPLETE CBC W/AUTO DIFF WBC: CPT | Mod: HCNC | Performed by: FAMILY MEDICINE

## 2021-10-25 PROCEDURE — 80053 COMPREHEN METABOLIC PANEL: CPT | Mod: HCNC | Performed by: FAMILY MEDICINE

## 2021-10-25 PROCEDURE — 83036 HEMOGLOBIN GLYCOSYLATED A1C: CPT | Mod: HCNC | Performed by: FAMILY MEDICINE

## 2021-10-25 PROCEDURE — 82306 VITAMIN D 25 HYDROXY: CPT | Mod: HCNC | Performed by: FAMILY MEDICINE

## 2021-10-27 ENCOUNTER — HOSPITAL ENCOUNTER (OUTPATIENT)
Dept: RADIOLOGY | Facility: HOSPITAL | Age: 61
Discharge: HOME | End: 2021-10-27
Attending: ORTHOPAEDIC SURGERY
Payer: MEDICARE

## 2021-10-27 ENCOUNTER — OFFICE VISIT (OUTPATIENT)
Dept: ORTHOPEDICS | Facility: CLINIC | Age: 61
End: 2021-10-27
Payer: MEDICARE

## 2021-10-27 VITALS — BODY MASS INDEX: 31.7 KG/M2 | HEIGHT: 74 IN | WEIGHT: 247 LBS

## 2021-10-27 DIAGNOSIS — Z98.890 S/P SPINAL SURGERY: ICD-10-CM

## 2021-10-27 DIAGNOSIS — Z98.1 S/P LUMBAR FUSION: Primary | ICD-10-CM

## 2021-10-27 PROCEDURE — 99024 PR POST-OP FOLLOW-UP VISIT: ICD-10-PCS | Mod: HCNC,S$GLB,, | Performed by: PHYSICIAN ASSISTANT

## 2021-10-27 PROCEDURE — 1159F PR MEDICATION LIST DOCUMENTED IN MEDICAL RECORD: ICD-10-PCS | Mod: HCNC,CPTII,S$GLB, | Performed by: PHYSICIAN ASSISTANT

## 2021-10-27 PROCEDURE — 4010F PR ACE/ARB THEARPY RXD/TAKEN: ICD-10-PCS | Mod: HCNC,CPTII,S$GLB, | Performed by: PHYSICIAN ASSISTANT

## 2021-10-27 PROCEDURE — 1160F PR REVIEW ALL MEDS BY PRESCRIBER/CLIN PHARMACIST DOCUMENTED: ICD-10-PCS | Mod: HCNC,CPTII,S$GLB, | Performed by: PHYSICIAN ASSISTANT

## 2021-10-27 PROCEDURE — 72100 XR LUMBAR SPINE AP AND LATERAL: ICD-10-PCS | Mod: 26,HCNC,, | Performed by: RADIOLOGY

## 2021-10-27 PROCEDURE — 99999 PR PBB SHADOW E&M-EST. PATIENT-LVL III: CPT | Mod: PBBFAC,HCNC,, | Performed by: PHYSICIAN ASSISTANT

## 2021-10-27 PROCEDURE — 99999 PR PBB SHADOW E&M-EST. PATIENT-LVL III: ICD-10-PCS | Mod: PBBFAC,HCNC,, | Performed by: PHYSICIAN ASSISTANT

## 2021-10-27 PROCEDURE — 72100 X-RAY EXAM L-S SPINE 2/3 VWS: CPT | Mod: TC,HCNC

## 2021-10-27 PROCEDURE — 1160F RVW MEDS BY RX/DR IN RCRD: CPT | Mod: HCNC,CPTII,S$GLB, | Performed by: PHYSICIAN ASSISTANT

## 2021-10-27 PROCEDURE — 3008F BODY MASS INDEX DOCD: CPT | Mod: HCNC,CPTII,S$GLB, | Performed by: PHYSICIAN ASSISTANT

## 2021-10-27 PROCEDURE — 72100 X-RAY EXAM L-S SPINE 2/3 VWS: CPT | Mod: 26,HCNC,, | Performed by: RADIOLOGY

## 2021-10-27 PROCEDURE — 3044F HG A1C LEVEL LT 7.0%: CPT | Mod: HCNC,CPTII,S$GLB, | Performed by: PHYSICIAN ASSISTANT

## 2021-10-27 PROCEDURE — 3008F PR BODY MASS INDEX (BMI) DOCUMENTED: ICD-10-PCS | Mod: HCNC,CPTII,S$GLB, | Performed by: PHYSICIAN ASSISTANT

## 2021-10-27 PROCEDURE — 1159F MED LIST DOCD IN RCRD: CPT | Mod: HCNC,CPTII,S$GLB, | Performed by: PHYSICIAN ASSISTANT

## 2021-10-27 PROCEDURE — 99024 POSTOP FOLLOW-UP VISIT: CPT | Mod: HCNC,S$GLB,, | Performed by: PHYSICIAN ASSISTANT

## 2021-10-27 PROCEDURE — 4010F ACE/ARB THERAPY RXD/TAKEN: CPT | Mod: HCNC,CPTII,S$GLB, | Performed by: PHYSICIAN ASSISTANT

## 2021-10-27 PROCEDURE — 3044F PR MOST RECENT HEMOGLOBIN A1C LEVEL <7.0%: ICD-10-PCS | Mod: HCNC,CPTII,S$GLB, | Performed by: PHYSICIAN ASSISTANT

## 2021-10-27 RX ORDER — METHOCARBAMOL 500 MG/1
1000 TABLET, FILM COATED ORAL 3 TIMES DAILY
Qty: 84 TABLET | Refills: 0 | Status: SHIPPED | OUTPATIENT
Start: 2021-10-27 | End: 2023-03-03

## 2021-10-27 RX ORDER — GABAPENTIN 100 MG/1
100 CAPSULE ORAL 3 TIMES DAILY
Qty: 90 CAPSULE | Refills: 0 | Status: SHIPPED | OUTPATIENT
Start: 2021-10-27 | End: 2023-03-03

## 2021-10-27 RX ORDER — CELECOXIB 200 MG/1
200 CAPSULE ORAL DAILY
Qty: 60 CAPSULE | Refills: 0 | Status: SHIPPED | OUTPATIENT
Start: 2021-10-27 | End: 2023-03-03

## 2021-10-27 RX ORDER — DEXTROMETHORPHAN HYDROBROMIDE, GUAIFENESIN 5; 100 MG/5ML; MG/5ML
650 LIQUID ORAL EVERY 8 HOURS
Qty: 56 TABLET | Refills: 0 | Status: SHIPPED | OUTPATIENT
Start: 2021-10-27 | End: 2023-03-03

## 2021-11-10 ENCOUNTER — OFFICE VISIT (OUTPATIENT)
Dept: INTERNAL MEDICINE | Facility: CLINIC | Age: 61
End: 2021-11-10
Payer: MEDICARE

## 2021-11-10 VITALS
RESPIRATION RATE: 16 BRPM | TEMPERATURE: 98 F | WEIGHT: 253.06 LBS | BODY MASS INDEX: 32.48 KG/M2 | SYSTOLIC BLOOD PRESSURE: 124 MMHG | OXYGEN SATURATION: 97 % | DIASTOLIC BLOOD PRESSURE: 84 MMHG | HEART RATE: 86 BPM | HEIGHT: 74 IN

## 2021-11-10 DIAGNOSIS — M10.221: ICD-10-CM

## 2021-11-10 DIAGNOSIS — Z00.00 WELL ADULT EXAM: Primary | ICD-10-CM

## 2021-11-10 DIAGNOSIS — E55.9 VITAMIN D DEFICIENCY DISEASE: ICD-10-CM

## 2021-11-10 DIAGNOSIS — I10 ESSENTIAL HYPERTENSION: ICD-10-CM

## 2021-11-10 PROCEDURE — 3044F HG A1C LEVEL LT 7.0%: CPT | Mod: CPTII,S$GLB,, | Performed by: FAMILY MEDICINE

## 2021-11-10 PROCEDURE — 3074F SYST BP LT 130 MM HG: CPT | Mod: CPTII,S$GLB,, | Performed by: FAMILY MEDICINE

## 2021-11-10 PROCEDURE — 1160F RVW MEDS BY RX/DR IN RCRD: CPT | Mod: CPTII,S$GLB,, | Performed by: FAMILY MEDICINE

## 2021-11-10 PROCEDURE — 1160F PR REVIEW ALL MEDS BY PRESCRIBER/CLIN PHARMACIST DOCUMENTED: ICD-10-PCS | Mod: CPTII,S$GLB,, | Performed by: FAMILY MEDICINE

## 2021-11-10 PROCEDURE — 4010F ACE/ARB THERAPY RXD/TAKEN: CPT | Mod: CPTII,S$GLB,, | Performed by: FAMILY MEDICINE

## 2021-11-10 PROCEDURE — 3079F PR MOST RECENT DIASTOLIC BLOOD PRESSURE 80-89 MM HG: ICD-10-PCS | Mod: CPTII,S$GLB,, | Performed by: FAMILY MEDICINE

## 2021-11-10 PROCEDURE — 1159F MED LIST DOCD IN RCRD: CPT | Mod: CPTII,S$GLB,, | Performed by: FAMILY MEDICINE

## 2021-11-10 PROCEDURE — 1159F PR MEDICATION LIST DOCUMENTED IN MEDICAL RECORD: ICD-10-PCS | Mod: CPTII,S$GLB,, | Performed by: FAMILY MEDICINE

## 2021-11-10 PROCEDURE — 99396 PR PREVENTIVE VISIT,EST,40-64: ICD-10-PCS | Mod: S$GLB,,, | Performed by: FAMILY MEDICINE

## 2021-11-10 PROCEDURE — 3074F PR MOST RECENT SYSTOLIC BLOOD PRESSURE < 130 MM HG: ICD-10-PCS | Mod: CPTII,S$GLB,, | Performed by: FAMILY MEDICINE

## 2021-11-10 PROCEDURE — 3044F PR MOST RECENT HEMOGLOBIN A1C LEVEL <7.0%: ICD-10-PCS | Mod: CPTII,S$GLB,, | Performed by: FAMILY MEDICINE

## 2021-11-10 PROCEDURE — 3008F PR BODY MASS INDEX (BMI) DOCUMENTED: ICD-10-PCS | Mod: CPTII,S$GLB,, | Performed by: FAMILY MEDICINE

## 2021-11-10 PROCEDURE — 3079F DIAST BP 80-89 MM HG: CPT | Mod: CPTII,S$GLB,, | Performed by: FAMILY MEDICINE

## 2021-11-10 PROCEDURE — 99999 PR PBB SHADOW E&M-EST. PATIENT-LVL IV: CPT | Mod: PBBFAC,,, | Performed by: FAMILY MEDICINE

## 2021-11-10 PROCEDURE — 3008F BODY MASS INDEX DOCD: CPT | Mod: CPTII,S$GLB,, | Performed by: FAMILY MEDICINE

## 2021-11-10 PROCEDURE — 4010F PR ACE/ARB THEARPY RXD/TAKEN: ICD-10-PCS | Mod: CPTII,S$GLB,, | Performed by: FAMILY MEDICINE

## 2021-11-10 PROCEDURE — 99999 PR PBB SHADOW E&M-EST. PATIENT-LVL IV: ICD-10-PCS | Mod: PBBFAC,,, | Performed by: FAMILY MEDICINE

## 2021-11-10 PROCEDURE — 99396 PREV VISIT EST AGE 40-64: CPT | Mod: S$GLB,,, | Performed by: FAMILY MEDICINE

## 2021-11-10 RX ORDER — ACETAMINOPHEN 500 MG
1000 TABLET ORAL EVERY 6 HOURS PRN
COMMUNITY
End: 2022-01-26

## 2021-11-10 RX ORDER — BENAZEPRIL HYDROCHLORIDE 40 MG/1
40 TABLET ORAL DAILY
Qty: 90 TABLET | Refills: 3 | Status: SHIPPED | OUTPATIENT
Start: 2021-11-10 | End: 2022-04-08 | Stop reason: SDUPTHER

## 2022-01-26 ENCOUNTER — OFFICE VISIT (OUTPATIENT)
Dept: ORTHOPEDICS | Facility: CLINIC | Age: 62
End: 2022-01-26
Payer: MEDICARE

## 2022-01-26 ENCOUNTER — HOSPITAL ENCOUNTER (OUTPATIENT)
Dept: RADIOLOGY | Facility: HOSPITAL | Age: 62
Discharge: HOME | End: 2022-01-26
Attending: PHYSICIAN ASSISTANT
Payer: MEDICARE

## 2022-01-26 VITALS — WEIGHT: 220.44 LBS | BODY MASS INDEX: 28.29 KG/M2 | HEIGHT: 74 IN

## 2022-01-26 DIAGNOSIS — Z98.1 S/P LUMBAR FUSION: ICD-10-CM

## 2022-01-26 DIAGNOSIS — Z98.1 S/P LUMBAR FUSION: Primary | ICD-10-CM

## 2022-01-26 PROCEDURE — 72100 X-RAY EXAM L-S SPINE 2/3 VWS: CPT | Mod: 26,HCNC,, | Performed by: RADIOLOGY

## 2022-01-26 PROCEDURE — 1159F PR MEDICATION LIST DOCUMENTED IN MEDICAL RECORD: ICD-10-PCS | Mod: HCNC,CPTII,S$GLB, | Performed by: PHYSICIAN ASSISTANT

## 2022-01-26 PROCEDURE — 72100 X-RAY EXAM L-S SPINE 2/3 VWS: CPT | Mod: TC,HCNC

## 2022-01-26 PROCEDURE — 99213 PR OFFICE/OUTPT VISIT, EST, LEVL III, 20-29 MIN: ICD-10-PCS | Mod: HCNC,S$GLB,, | Performed by: PHYSICIAN ASSISTANT

## 2022-01-26 PROCEDURE — 72100 XR LUMBAR SPINE AP AND LATERAL: ICD-10-PCS | Mod: 26,HCNC,, | Performed by: RADIOLOGY

## 2022-01-26 PROCEDURE — 99999 PR PBB SHADOW E&M-EST. PATIENT-LVL III: CPT | Mod: PBBFAC,HCNC,, | Performed by: PHYSICIAN ASSISTANT

## 2022-01-26 PROCEDURE — 1160F RVW MEDS BY RX/DR IN RCRD: CPT | Mod: HCNC,CPTII,S$GLB, | Performed by: PHYSICIAN ASSISTANT

## 2022-01-26 PROCEDURE — 99213 OFFICE O/P EST LOW 20 MIN: CPT | Mod: HCNC,S$GLB,, | Performed by: PHYSICIAN ASSISTANT

## 2022-01-26 PROCEDURE — 3008F PR BODY MASS INDEX (BMI) DOCUMENTED: ICD-10-PCS | Mod: HCNC,CPTII,S$GLB, | Performed by: PHYSICIAN ASSISTANT

## 2022-01-26 PROCEDURE — 3008F BODY MASS INDEX DOCD: CPT | Mod: HCNC,CPTII,S$GLB, | Performed by: PHYSICIAN ASSISTANT

## 2022-01-26 PROCEDURE — 1159F MED LIST DOCD IN RCRD: CPT | Mod: HCNC,CPTII,S$GLB, | Performed by: PHYSICIAN ASSISTANT

## 2022-01-26 PROCEDURE — 99999 PR PBB SHADOW E&M-EST. PATIENT-LVL III: ICD-10-PCS | Mod: PBBFAC,HCNC,, | Performed by: PHYSICIAN ASSISTANT

## 2022-01-26 PROCEDURE — 1160F PR REVIEW ALL MEDS BY PRESCRIBER/CLIN PHARMACIST DOCUMENTED: ICD-10-PCS | Mod: HCNC,CPTII,S$GLB, | Performed by: PHYSICIAN ASSISTANT

## 2022-01-26 NOTE — PROGRESS NOTES
Date: 01/26/2022    Supervising Physician: Pako Escamilla M.D.    Date of Surgery: 9/30/2021    Procedure: L2-4 TLIF    History: Ghulam Ariza is seen today for follow-up following the above listed procedure. Overall the patient is doing well but today notes his pain is 2/10.  He is very pleased.  He is ambulating without a cane.  He is not taking anything for pain.      Exam:  Incision is healing well.   There is no sign of infection. Neuro exam is stable. No signs of DVT.    Radiographs: imaging today shows hardware in place, no evidence of failure.     Assessment/Plan: 4 months post op.    Doing well postoperatively. He may progress activities as tolerated.     I will plan to see the patient back for the next postop visit at a year from surgery.    Thank you for the opportunity to participate in this patient's care. Please give me a call if there are any concerns or questions.

## 2022-01-31 ENCOUNTER — TELEPHONE (OUTPATIENT)
Dept: ORTHOPEDICS | Facility: CLINIC | Age: 62
End: 2022-01-31
Payer: MEDICARE

## 2022-01-31 DIAGNOSIS — M79.641 BILATERAL HAND PAIN: Primary | ICD-10-CM

## 2022-01-31 DIAGNOSIS — M79.642 BILATERAL HAND PAIN: Primary | ICD-10-CM

## 2022-01-31 NOTE — TELEPHONE ENCOUNTER
Spoke with the patient. Informed of X-rays scheduled prior to appointment. Patient verbalized understanding and was thankful.       Ivonne Gibson MA  Medical Assistant to Dr. Ross Dunbar [facility]

## 2022-02-01 ENCOUNTER — OFFICE VISIT (OUTPATIENT)
Dept: ORTHOPEDICS | Facility: CLINIC | Age: 62
End: 2022-02-01
Payer: MEDICARE

## 2022-02-01 ENCOUNTER — HOSPITAL ENCOUNTER (OUTPATIENT)
Dept: RADIOLOGY | Facility: HOSPITAL | Age: 62
Discharge: HOME | End: 2022-02-01
Attending: ORTHOPAEDIC SURGERY
Payer: MEDICARE

## 2022-02-01 DIAGNOSIS — M10.9 ACUTE GOUT OF WRIST, UNSPECIFIED CAUSE, UNSPECIFIED LATERALITY: Primary | ICD-10-CM

## 2022-02-01 DIAGNOSIS — M79.642 BILATERAL HAND PAIN: ICD-10-CM

## 2022-02-01 DIAGNOSIS — M79.641 BILATERAL HAND PAIN: ICD-10-CM

## 2022-02-01 PROCEDURE — 1159F PR MEDICATION LIST DOCUMENTED IN MEDICAL RECORD: ICD-10-PCS | Mod: HCNC,CPTII,S$GLB, | Performed by: ORTHOPAEDIC SURGERY

## 2022-02-01 PROCEDURE — 73130 X-RAY EXAM OF HAND: CPT | Mod: TC,50,HCNC

## 2022-02-01 PROCEDURE — 1159F MED LIST DOCD IN RCRD: CPT | Mod: HCNC,CPTII,S$GLB, | Performed by: ORTHOPAEDIC SURGERY

## 2022-02-01 PROCEDURE — 20605 INTERMEDIATE JOINT ASPIRATION/INJECTION: L RADIOCARPAL: ICD-10-PCS | Mod: HCNC,LT,S$GLB, | Performed by: ORTHOPAEDIC SURGERY

## 2022-02-01 PROCEDURE — 99204 OFFICE O/P NEW MOD 45 MIN: CPT | Mod: HCNC,25,S$GLB, | Performed by: ORTHOPAEDIC SURGERY

## 2022-02-01 PROCEDURE — 73130 X-RAY EXAM OF HAND: CPT | Mod: 26,50,HCNC, | Performed by: RADIOLOGY

## 2022-02-01 PROCEDURE — 20605 DRAIN/INJ JOINT/BURSA W/O US: CPT | Mod: HCNC,LT,S$GLB, | Performed by: ORTHOPAEDIC SURGERY

## 2022-02-01 PROCEDURE — 99999 PR PBB SHADOW E&M-EST. PATIENT-LVL III: ICD-10-PCS | Mod: PBBFAC,HCNC,, | Performed by: ORTHOPAEDIC SURGERY

## 2022-02-01 PROCEDURE — 99999 PR PBB SHADOW E&M-EST. PATIENT-LVL III: CPT | Mod: PBBFAC,HCNC,, | Performed by: ORTHOPAEDIC SURGERY

## 2022-02-01 PROCEDURE — 99204 PR OFFICE/OUTPT VISIT, NEW, LEVL IV, 45-59 MIN: ICD-10-PCS | Mod: HCNC,25,S$GLB, | Performed by: ORTHOPAEDIC SURGERY

## 2022-02-01 PROCEDURE — 73130 XR HAND COMPLETE 3 VIEWS BILATERAL: ICD-10-PCS | Mod: 26,50,HCNC, | Performed by: RADIOLOGY

## 2022-02-01 RX ORDER — METHYLPREDNISOLONE 4 MG/1
TABLET ORAL
Qty: 1 EACH | Refills: 0 | Status: SHIPPED | OUTPATIENT
Start: 2022-02-01 | End: 2023-03-03

## 2022-02-01 RX ORDER — TRIAMCINOLONE ACETONIDE 40 MG/ML
20 INJECTION, SUSPENSION INTRA-ARTICULAR; INTRAMUSCULAR
Status: DISCONTINUED | OUTPATIENT
Start: 2022-02-01 | End: 2022-02-01 | Stop reason: HOSPADM

## 2022-02-01 RX ADMIN — TRIAMCINOLONE ACETONIDE 20 MG: 40 INJECTION, SUSPENSION INTRA-ARTICULAR; INTRAMUSCULAR at 09:02

## 2022-02-01 NOTE — PROGRESS NOTES
[facility]  History & Physical  Orthopedics    SUBJECTIVE:      COVID-19 attestation:  This patient was treated during the COVID-19 pandemic.  This was discussed with the patient, they are aware of our current policies and procedures, were given the option of delaying their visit and or switching to a virtual visit, delaying their surgery when applicable, and they elect to proceed.    Chief Complaint:  Left wrist pain    Referring Provider: Self, Aaareferral     History of Present Illness:  Patient is a 61 y.o. right hand dominant male who presents today with complaints of left wrist pain.  The patient notes that longstanding history of gout for which he is currently on allopurinol.  He notes 2 weeks ago he had a flare in his left wrist and has had significant pain and swelling since that time as well as in his left index finger PIP joint.  Symptoms have improved somewhat but have still persist.  He has seen a rheumatologist in the remote past but typically his PCP treat his gouty arthritis.  He denies any other complaints today presents for initial evaluation.     The patient is a/an retired.    Onset of symptoms/DOI was 2 weeks ago.    Symptoms are aggravated by activity and movement.    Symptoms are alleviated by rest and medication.    Symptoms consist of pain, swelling and decreased ROM.    The patient rates their pain as a 9/10.    Attempted treatment(s) and/or interventions include activity modifications, rest, Allopurinol.     The patient denies any fevers, chills, N/V, D/C and presents for evaluation.       Past Medical History:   Diagnosis Date    Degenerative disc disease     cervical radiculopathy    Genital herpes     Hx of colonic polyps     Hypertension early 40s    Pinched nerve in neck      Past Surgical History:   Procedure Laterality Date    back sx      L4/L5 Herniated disk    BUNIONECTOMY Right     COLONOSCOPY N/A 9/6/2018    Procedure: COLONOSCOPY;  Surgeon: Mg  CORDELIA Lagunas MD;  Location: [facility] ENDO (4TH FLR);  Service: Endoscopy;  Laterality: N/A;    SPINE SURGERY      L4/5 laminectomy    TRANSFORAMINAL EPIDURAL INJECTION OF STEROID Right 6/10/2021    Procedure: INJECTION, STEROID, EPIDURAL, TRANSFORAMINAL APPROACH, L3-L4 AND L4-L5 URGENT CASE;  Surgeon: Enrique Sacnhez MD;  Location: [facility] PAIN MGT;  Service: Pain Management;  Laterality: Right;    TRANSFORAMINAL EPIDURAL INJECTION OF STEROID Bilateral 7/8/2021    Procedure: INJECTION, STEROID, EPIDURAL, TRANSFORAMINAL APPROACH Bilateral L4/5 TFESI MEDICALLY URGENT CASE;  Surgeon: Enrique Sanchez MD;  Location: [facility] PAIN MGT;  Service: Pain Management;  Laterality: Bilateral;  consent needed     Review of patient's allergies indicates:  No Known Allergies  Social History     Social History Narrative    Not on file     Family History   Problem Relation Age of Onset    Hypertension Mother     Stroke Mother 68        TIA    Cancer Father 65        Prostate    Cancer Paternal Uncle     Cancer Brother         Prostate Cancer         Current Outpatient Medications:     acetaminophen (TYLENOL) 650 MG TbSR, Take 1 tablet (650 mg total) by mouth every 8 (eight) hours., Disp: 56 tablet, Rfl: 0    albuterol (PROVENTIL HFA) 90 mcg/actuation inhaler, Inhale 2 puffs into the lungs every 6 (six) hours as needed for Wheezing. Rescue (Patient not taking: No sig reported), Disp: 18 g, Rfl: 0    allopurinoL (ZYLOPRIM) 300 MG tablet, TAKE 1 TABLET (300 MG TOTAL) BY MOUTH 2 (TWO) TIMES DAILY., Disp: 60 tablet, Rfl: 11    benazepriL (LOTENSIN) 40 MG tablet, Take 1 tablet (40 mg total) by mouth once daily., Disp: 90 tablet, Rfl: 3    celecoxib (CELEBREX) 200 MG capsule, Take 1 capsule (200 mg total) by mouth once daily., Disp: 60 capsule, Rfl: 0    diltiaZEM (CARDIZEM SR) 60 MG Cp12, TAKE 1 CAPSULE (60 MG TOTAL) BY MOUTH 2 (TWO) TIMES DAILY., Disp: 60 capsule, Rfl: 11    ergocalciferol (ERGOCALCIFEROL) 50,000 unit Cap, Vitamin D2 1,250  mcg (50,000 unit) capsule, Disp: , Rfl:     gabapentin (NEURONTIN) 100 MG capsule, Take 1 capsule (100 mg total) by mouth 3 (three) times daily., Disp: 90 capsule, Rfl: 0    methocarbamoL (ROBAXIN) 500 MG Tab, Take 2 tablets (1,000 mg total) by mouth 3 (three) times daily., Disp: 84 tablet, Rfl: 0    sildenafiL (VIAGRA) 100 MG tablet, TAKE 1 TABLET BY MOUTH EVERY DAY AS NEEDED FOR ERECTILE DYSFUNCTION, Disp: 3 tablet, Rfl: 0  No current facility-administered medications for this visit.    Facility-Administered Medications Ordered in Other Visits:     0.9%  NaCl infusion, , Intravenous, Continuous, Rika Yang DO, Last Rate: 25 mL/hr at 06/10/21 1419, New Bag at 06/10/21 1419      Review of Systems:  As per HPI otherwise noncontributory    OBJECTIVE:      Vital Signs (Most Recent):  There were no vitals filed for this visit.  There is no height or weight on file to calculate BMI.      Physical Exam:  Constitutional: The patient appears well-developed and well-nourished. No distress.   Skin: No lesions appreciated  Head: Normocephalic and atraumatic.   Nose: Nose normal.   Ears: No deformities seen  Eyes: Conjunctivae and EOM are normal.   Neck: No tracheal deviation present.   Cardiovascular: Normal rate and intact distal pulses.    Pulmonary/Chest: Effort normal. No respiratory distress.   Abdominal: There is no guarding.   Neurological: The patient is alert.   Psychiatric: The patient has a normal mood and affect.     Left Hand/Wrist Examination:    Observation/Inspection:  Swelling  left wrist, left index finger PIP joint    Deformity  none  Discoloration  none     Scars   none    Atrophy  none    HAND/WRIST EXAMINATION:  Finkelstein's Test   Neg  WHAT Test    Neg  Snuff box tenderness   Neg  Cedillo's Test    Neg  Hook of Hamate Tenderness  Neg  CMC grind    Neg  Circumduction test   Neg    Neurovascular Exam:  Digits WWP, brisk CR < 3s throughout  NVI motor/LTS to M/R/U nerves, radial pulse 2+  Tinel's Test  - Carpal Tunnel  Neg  Tinel's Test - Cubital Tunnel  Neg  Phalen's Test    Neg  Median Nerve Compression Test Neg    ROM hand full, painless except the left 2nd PIP joint which is decreased secondary to gout flare    ROM wrist is restricted and painful secondary to gout flare    ROM elbow full, painless    Abdomen not guarded  Respirations nonlabored  Perfusion intact    Diagnostic Results:     Imaging - I independently viewed the patient's imaging as well as the radiology report.  Xrays of the patient's bilateral hand  demonstrate no evidence of any acute fractures or dislocations with some inflammatory changes at the left 2nd PIP joint    EMG - none    ASSESSMENT/PLAN:      61 y.o. yo male with gout flare left wrist, index finger PIP  Plan: The patient and I had a thorough discussion today.  We discussed the working diagnosis as well as several other potential alternative diagnoses.  Treatment options were discussed, both conservative and surgical.  Conservative treatment options would include things such as activity modifications, workplace modifications, a period of rest, oral vs topical OTC and prescription anti-inflammatory medications, occupational therapy, splinting/bracing, immobilization, corticosteroid injections, and others.  Surgical options were discussed as well.     At this time, we will attempt a corticosteroid injection to the patient's left wrist as well as providing a left wrist brace and a Medrol Dosepak to treat this gout flare.  Also strongly recommend he establish care with a rheumatologist and a referral will be placed today so that he can maintain tighter control of his hyperuricemia.  Follow-up in 6 weeks to determine his response.  We could always consider a wrist arthroscopic debridement and washout should his wrist gout flare persist or fail to improve.    Should the patient's symptoms worsen, persist, or fail to improve they should return for reevaluation and I would be happy to see  them back anytime.        Antolin Hair M.D.     Please be aware that this note has been generated with the assistance of [employer] voice-to-text.  Please excuse any spelling or grammatical errors.    Thank you for choosing Dr. Antolin Hair for your orthopedic hand and upper extremity care. It is our goal to provide you with exceptional care that will help keep you healthy, active, and get you back in the game.     If you felt that you received exemplary care today, please consider leaving feedback for Dr. Hair on [employer] at [email or web address].    Please do not hesitate to reach out to us via email, phone, or MyChart with any questions, concerns, or feedback.

## 2022-02-01 NOTE — PROCEDURES
Intermediate Joint Aspiration/Injection: L radiocarpal    Date/Time: 2/1/2022 9:45 AM  Performed by: Antolin Hair MD  Authorized by: Antolin Hair MD     Consent Done?: Yes (Verbal)  Indications: Pain  Site marked: The procedure site was marked    Timeout: Prior to procedure the correct patient, procedure, and site was verified      Location:  Wrist  Site:  L radiocarpal  Prep: Patient was prepped and draped in usual sterile fashion    Ultrasonic Guidance for needle placement: No  Needle size:  25 G  Approach:  Dorsal  Medications:  20 mg triamcinolone acetonide 40 mg/mL  Patient tolerance:  Patient tolerated the procedure well with no immediate complications

## 2022-03-21 ENCOUNTER — TELEPHONE (OUTPATIENT)
Dept: ORTHOPEDICS | Facility: CLINIC | Age: 62
End: 2022-03-21
Payer: MEDICARE

## 2022-04-08 DIAGNOSIS — M10.9 GOUT, UNSPECIFIED CAUSE, UNSPECIFIED CHRONICITY, UNSPECIFIED SITE: ICD-10-CM

## 2022-04-08 RX ORDER — DILTIAZEM HYDROCHLORIDE 60 MG/1
60 CAPSULE, EXTENDED RELEASE ORAL 2 TIMES DAILY
Qty: 180 CAPSULE | Refills: 3 | Status: SHIPPED | OUTPATIENT
Start: 2022-04-08 | End: 2022-07-25 | Stop reason: SDUPTHER

## 2022-04-08 RX ORDER — METOPROLOL SUCCINATE 25 MG/1
TABLET, EXTENDED RELEASE ORAL
COMMUNITY
Start: 2022-02-14 | End: 2022-12-15

## 2022-04-08 RX ORDER — BENAZEPRIL HYDROCHLORIDE 40 MG/1
40 TABLET ORAL DAILY
Qty: 90 TABLET | Refills: 3 | Status: SHIPPED | OUTPATIENT
Start: 2022-04-08 | End: 2022-07-13 | Stop reason: SDUPTHER

## 2022-04-08 RX ORDER — ALLOPURINOL 300 MG/1
300 TABLET ORAL 2 TIMES DAILY
Qty: 180 TABLET | Refills: 3 | Status: SHIPPED | OUTPATIENT
Start: 2022-04-08 | End: 2022-07-14 | Stop reason: SDUPTHER

## 2022-04-08 NOTE — TELEPHONE ENCOUNTER
LRF:    Allopurinol: 10/15/21    Benazepril: 11/10/21    Diltiazem:10/15/21      LOV:    10/10/21

## 2022-04-08 NOTE — TELEPHONE ENCOUNTER
Care Due:                  Date            Visit Type   Department     Provider  --------------------------------------------------------------------------------                                EP -                              PRIMARY      MET INTERNAL  Last Visit: 11-      CARE ([facility])   MEDICINE       Reggie Valle  Next Visit: None Scheduled  None         None Found                                                            Last  Test          Frequency    Reason                     Performed    Due Date  --------------------------------------------------------------------------------    Uric Acid...  12 months..  allopurinoL..............  10-   10-    Powered by [employer] by [employer]. Reference number: [id number].   4/08/2022 2:49:42 PM CDT

## 2022-07-01 ENCOUNTER — PES CALL (OUTPATIENT)
Dept: ADMINISTRATIVE | Facility: CLINIC | Age: 62
End: 2022-07-01
Payer: MEDICARE

## 2022-07-06 DIAGNOSIS — M10.9 GOUT, UNSPECIFIED CAUSE, UNSPECIFIED CHRONICITY, UNSPECIFIED SITE: ICD-10-CM

## 2022-07-06 RX ORDER — DILTIAZEM HYDROCHLORIDE 60 MG/1
CAPSULE, EXTENDED RELEASE ORAL
Qty: 180 CAPSULE | Refills: 0 | OUTPATIENT
Start: 2022-07-06

## 2022-07-06 RX ORDER — ALLOPURINOL 300 MG/1
TABLET ORAL
Qty: 180 TABLET | Refills: 0 | OUTPATIENT
Start: 2022-07-06

## 2022-07-06 RX ORDER — BENAZEPRIL HYDROCHLORIDE 40 MG/1
TABLET ORAL
Qty: 90 TABLET | Refills: 0 | OUTPATIENT
Start: 2022-07-06

## 2022-07-06 NOTE — TELEPHONE ENCOUNTER
No new care gaps identified.  [facility] Embedded Care Gaps. Reference number: [id number]. 7/06/2022   2:31:43 PM CDT

## 2022-07-06 NOTE — TELEPHONE ENCOUNTER
No new care gaps identified.  Health system Embedded Care Gaps. Reference number: [id number]. 7/06/2022   2:30:41 PM CDT

## 2022-07-06 NOTE — TELEPHONE ENCOUNTER
[facility] Note  Quick DC. Inappropriate Request   Refill request requires further review by MD: NO   Medication Therapy Plan: Pharmacy is requesting new script(s) for the following medications without required information, (sig/ frequency/qty/etc)     [facility] action(s):  Quick Discontinue      Encounter Details:    Pharmacies have been requesting medications for patients without required information, (sig, frequency, qty, etc.). In addition, requests are sent for medication(s) pt. are currently not taking, and medications patients have never taken.    We have spoken to the pharmacies about these request types and advised their teams previously that we are unable to assess these New Script requests and require all details for these requests. This is a known issue and has been reported.       Automatic Epic Generated Protocol Data Below:   Requested Prescriptions   Pending Prescriptions Disp Refills    benazepriL (LOTENSIN) 40 MG tablet [Pharmacy Med Name: BENAZEPRIL 40MG TAB] 90 tablet 0            Appointments      Date Provider   Last Visit   11/10/2021 Reggie Valle MD   Next Visit   7/6/2022 Reggie Valle MD        Note composed:2:56 PM 07/06/2022

## 2022-07-06 NOTE — TELEPHONE ENCOUNTER
[facility] Note  Quick DC. Inappropriate Request   Refill request requires further review by MD: NO   Medication Therapy Plan: Pharmacy is requesting new script(s) for the following medications without required information, (sig/ frequency/qty/etc)     [facility] action(s):  Quick Discontinue      Encounter Details:    Pharmacies have been requesting medications for patients without required information, (sig, frequency, qty, etc.). In addition, requests are sent for medication(s) pt. are currently not taking, and medications patients have never taken.    We have spoken to the pharmacies about these request types and advised their teams previously that we are unable to assess these New Script requests and require all details for these requests. This is a known issue and has been reported.       Automatic Epic Generated Protocol Data Below:   Requested Prescriptions   Pending Prescriptions Disp Refills    allopurinoL (ZYLOPRIM) 300 MG tablet [Pharmacy Med Name: ALLOPURINOL 300MG TAB] 180 tablet 0            Appointments      Date Provider   Last Visit   11/10/2021 Reggie Valle MD   Next Visit   Visit date not found Reggie Valle MD        Note composed:2:56 PM 07/06/2022

## 2022-07-06 NOTE — TELEPHONE ENCOUNTER
[facility] Note  Quick DC. Inappropriate Request   Refill request requires further review by MD: NO   Medication Therapy Plan: Pharmacy is requesting new script(s) for the following medications without required information, (sig/ frequency/qty/etc)     [facility] action(s):  Quick Discontinue      Encounter Details:    Pharmacies have been requesting medications for patients without required information, (sig, frequency, qty, etc.). In addition, requests are sent for medication(s) pt. are currently not taking, and medications patients have never taken.    We have spoken to the pharmacies about these request types and advised their teams previously that we are unable to assess these New Script requests and require all details for these requests. This is a known issue and has been reported.       Automatic Epic Generated Protocol Data Below:   Requested Prescriptions   Pending Prescriptions Disp Refills    diltiaZEM (CARDIZEM SR) 60 MG Cp12 [Pharmacy Med Name: DILTIAZEM ER  60MG CAP 12 HR] 180 capsule 0            Appointments      Date Provider   Last Visit   11/10/2021 Reggie Valle MD   Next Visit   7/6/2022 Reggie Valle MD        Note composed:2:56 PM 07/06/2022

## 2022-07-06 NOTE — TELEPHONE ENCOUNTER
No new care gaps identified.  [facility] Embedded Care Gaps. Reference number: [id number]. 7/06/2022   2:32:54 PM CDT

## 2022-07-13 DIAGNOSIS — M10.9 GOUT, UNSPECIFIED CAUSE, UNSPECIFIED CHRONICITY, UNSPECIFIED SITE: ICD-10-CM

## 2022-07-13 RX ORDER — DILTIAZEM HYDROCHLORIDE 60 MG/1
CAPSULE, EXTENDED RELEASE ORAL
Qty: 180 CAPSULE | Refills: 0 | OUTPATIENT
Start: 2022-07-13

## 2022-07-13 RX ORDER — BENAZEPRIL HYDROCHLORIDE 40 MG/1
TABLET ORAL
Qty: 90 TABLET | Refills: 0 | OUTPATIENT
Start: 2022-07-13

## 2022-07-13 RX ORDER — BENAZEPRIL HYDROCHLORIDE 40 MG/1
40 TABLET ORAL DAILY
Qty: 90 TABLET | Refills: 1 | Status: SHIPPED | OUTPATIENT
Start: 2022-07-13 | End: 2022-07-14 | Stop reason: SDUPTHER

## 2022-07-13 RX ORDER — ALLOPURINOL 300 MG/1
TABLET ORAL
Qty: 180 TABLET | Refills: 0 | OUTPATIENT
Start: 2022-07-13

## 2022-07-13 NOTE — TELEPHONE ENCOUNTER
No new care gaps identified.  [facility] Embedded Care Gaps. Reference number: [id number]. 7/13/2022   11:18:26 AM TREVINT

## 2022-07-13 NOTE — TELEPHONE ENCOUNTER
Refill Decision Note   Ghulam Ariza  is requesting a refill authorization.  Brief Assessment and Rationale for Refill:  Quick Discontinue     Medication Therapy Plan:    Pharmacy is requesting new scripts for the following medications without required information, (sig/ frequency/qty/etc)      Medication Reconciliation Completed: No     Comments: Pharmacies have been requesting medications for patients without required information, (sig, frequency, qty, etc.). In addition, requests are sent for medication(s) pt. are currently not taking, and medications patients have never taken.    We have spoken to the pharmacies about these request types and advised their teams previously that we are unable to assess these New Script requests and require all details for these requests. This is a known issue and has been reported.     Note composed:1:46 PM 07/13/2022
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
## 2022-07-13 NOTE — TELEPHONE ENCOUNTER
No new care gaps identified.  [facility] Embedded Care Gaps. Reference number: [id number]. 7/13/2022   10:58:35 AM TREVINT

## 2022-07-13 NOTE — TELEPHONE ENCOUNTER
No new care gaps identified.  [facility] Embedded Care Gaps. Reference number: [id number]. 7/13/2022   11:17:48 AM CDT

## 2022-07-13 NOTE — TELEPHONE ENCOUNTER
No new care gaps identified.  [facility] Embedded Care Gaps. Reference number: [id number]. 7/13/2022   11:17:14 AM TREVINT

## 2022-07-13 NOTE — TELEPHONE ENCOUNTER
Refill Decision Note   Ghulam Ariza  is requesting a refill authorization.  Brief Assessment and Rationale for Refill:  Quick Discontinue     Medication Therapy Plan:    Pharmacy is requesting new scripts for the following medications without required information, (sig/ frequency/qty/etc)      Medication Reconciliation Completed: No     Comments: Pharmacies have been requesting medications for patients without required information, (sig, frequency, qty, etc.). In addition, requests are sent for medication(s) pt. are currently not taking, and medications patients have never taken.    We have spoken to the pharmacies about these request types and advised their teams previously that we are unable to assess these New Script requests and require all details for these requests. This is a known issue and has been reported.     Note composed:1:47 PM 07/13/2022

## 2022-07-14 DIAGNOSIS — M10.9 GOUT, UNSPECIFIED CAUSE, UNSPECIFIED CHRONICITY, UNSPECIFIED SITE: ICD-10-CM

## 2022-07-14 DIAGNOSIS — E55.9 VITAMIN D DEFICIENCY DISEASE: ICD-10-CM

## 2022-07-14 DIAGNOSIS — Z12.5 PROSTATE CANCER SCREENING: ICD-10-CM

## 2022-07-14 DIAGNOSIS — G89.4 CHRONIC PAIN SYNDROME: ICD-10-CM

## 2022-07-14 DIAGNOSIS — Z00.00 WELL ADULT EXAM: Primary | ICD-10-CM

## 2022-07-14 DIAGNOSIS — N52.9 ERECTILE DYSFUNCTION, UNSPECIFIED ERECTILE DYSFUNCTION TYPE: ICD-10-CM

## 2022-07-14 DIAGNOSIS — A04.8 H. PYLORI INFECTION: ICD-10-CM

## 2022-07-14 DIAGNOSIS — I10 ESSENTIAL HYPERTENSION: ICD-10-CM

## 2022-07-14 RX ORDER — BENAZEPRIL HYDROCHLORIDE 40 MG/1
40 TABLET ORAL DAILY
Qty: 90 TABLET | Refills: 0 | Status: SHIPPED | OUTPATIENT
Start: 2022-07-14 | End: 2022-07-25 | Stop reason: SDUPTHER

## 2022-07-14 RX ORDER — ALLOPURINOL 300 MG/1
300 TABLET ORAL 2 TIMES DAILY
Qty: 180 TABLET | Refills: 3 | Status: SHIPPED | OUTPATIENT
Start: 2022-07-14 | End: 2022-07-25 | Stop reason: SDUPTHER

## 2022-07-14 NOTE — TELEPHONE ENCOUNTER
No new care gaps identified.  [facility] Embedded Care Gaps. Reference number: [id number]. 7/14/2022   3:07:12 PM CDT

## 2022-07-25 DIAGNOSIS — I10 ESSENTIAL HYPERTENSION: ICD-10-CM

## 2022-07-25 DIAGNOSIS — M10.9 GOUT, UNSPECIFIED CAUSE, UNSPECIFIED CHRONICITY, UNSPECIFIED SITE: ICD-10-CM

## 2022-07-25 RX ORDER — DILTIAZEM HYDROCHLORIDE 60 MG/1
60 CAPSULE, EXTENDED RELEASE ORAL 2 TIMES DAILY
Qty: 180 CAPSULE | Refills: 3 | Status: SHIPPED | OUTPATIENT
Start: 2022-07-25 | End: 2022-11-14 | Stop reason: SDUPTHER

## 2022-07-25 RX ORDER — BENAZEPRIL HYDROCHLORIDE 40 MG/1
40 TABLET ORAL DAILY
Qty: 90 TABLET | Refills: 0 | Status: SHIPPED | OUTPATIENT
Start: 2022-07-25 | End: 2022-10-27 | Stop reason: SDUPTHER

## 2022-07-25 RX ORDER — ALLOPURINOL 300 MG/1
300 TABLET ORAL 2 TIMES DAILY
Qty: 180 TABLET | Refills: 3 | Status: SHIPPED | OUTPATIENT
Start: 2022-07-25 | End: 2022-11-14 | Stop reason: SDUPTHER

## 2022-07-25 NOTE — TELEPHONE ENCOUNTER
Care Due:                  Date            Visit Type   Department     Provider  --------------------------------------------------------------------------------                                EP -                              PRIMARY      [facility] INTERNAL  Last Visit: 11-      [facility] ([facility])   WINSOME Valle                              EP -                              PRIMARY      [facility] INTERNAL  Next Visit: 11-      [facility] ([facility])   WINSOME Valle                                                            Last  Test          Frequency    Reason                     Performed    Due Date  --------------------------------------------------------------------------------    CBC.........  12 months..  allopurinoL..............  10-   10-    CMP.........  12 months..  allopurinoL, benazepriL..  10-   10-    [facility] Embedded Care Gaps. Reference number: [id number]. 7/25/2022   2:29:34 PM CDT

## 2022-09-15 ENCOUNTER — PES CALL (OUTPATIENT)
Dept: ADMINISTRATIVE | Facility: CLINIC | Age: 62
End: 2022-09-15
Payer: MEDICARE

## 2022-10-26 DIAGNOSIS — M10.9 GOUT, UNSPECIFIED CAUSE, UNSPECIFIED CHRONICITY, UNSPECIFIED SITE: ICD-10-CM

## 2022-10-26 DIAGNOSIS — I10 ESSENTIAL HYPERTENSION: ICD-10-CM

## 2022-10-26 RX ORDER — DILTIAZEM HYDROCHLORIDE 60 MG/1
CAPSULE, EXTENDED RELEASE ORAL
Qty: 180 CAPSULE | Refills: 0 | OUTPATIENT
Start: 2022-10-26

## 2022-10-26 RX ORDER — BENAZEPRIL HYDROCHLORIDE 40 MG/1
TABLET ORAL
Qty: 90 TABLET | Refills: 0 | OUTPATIENT
Start: 2022-10-26

## 2022-10-26 RX ORDER — ALLOPURINOL 300 MG/1
TABLET ORAL
Qty: 180 TABLET | Refills: 0 | OUTPATIENT
Start: 2022-10-26

## 2022-10-26 NOTE — TELEPHONE ENCOUNTER
Care Due:                  Date            Visit Type   Department     Provider  --------------------------------------------------------------------------------                                EP -                              PRIMARY      [facility] INTERNAL  Last Visit: 11-      CARE ([facility])   WINSOME ROWLAND  [facility]                               -                              PRIMARY      [facility] INTERNAL  Next Visit: 11-      [facility] ([facility])   [facility]       Reggie ROWLAND  [facility]                                                            Last  Test          Frequency    Reason                     Performed    Due Date  --------------------------------------------------------------------------------    CBC.........  12 months..  allopurinoL..............  10-   10-    CMP.........  12 months..  allopurinoL, benazepriL..  10-   10-    Uric Acid...  12 months..  allopurinoL..............  Not Found    Overdue    [employer] Embedded Care Gaps. Reference number: [id number]. 10/26/2022   4:56:54 PM CDT

## 2022-10-27 DIAGNOSIS — I10 ESSENTIAL HYPERTENSION: ICD-10-CM

## 2022-10-27 RX ORDER — BENAZEPRIL HYDROCHLORIDE 40 MG/1
40 TABLET ORAL DAILY
Qty: 30 TABLET | Refills: 1 | Status: SHIPPED | OUTPATIENT
Start: 2022-10-27 | End: 2022-11-14 | Stop reason: SDUPTHER

## 2022-10-27 NOTE — TELEPHONE ENCOUNTER
Refill Decision Note   Ghulam Ariza  is requesting a refill authorization.  Brief Assessment and Rationale for Refill:  Quick Discontinue     Medication Therapy Plan:    Pharmacy is requesting new scripts for the following medications without required information, (sig/ frequency/qty/etc)      Medication Reconciliation Completed: No     Comments: Pharmacies have been requesting medications for patients without required information, (sig, frequency, qty, etc.). In addition, requests are sent for medication(s) pt. are currently not taking, and medications patients have never taken.    We have spoken to the pharmacies about these request types and advised their teams previously that we are unable to assess these New Script requests and require all details for these requests. This is a known issue and has been reported.     Note composed:9:22 PM 10/26/2022

## 2022-10-27 NOTE — TELEPHONE ENCOUNTER
No new care gaps identified.  [facility] Embedded Care Gaps. Reference number: [id number]. 10/27/2022   11:03:38 AM LORY

## 2022-11-07 ENCOUNTER — LAB VISIT (OUTPATIENT)
Dept: LAB | Facility: HOSPITAL | Age: 62
End: 2022-11-07
Payer: MEDICARE

## 2022-11-07 DIAGNOSIS — G89.4 CHRONIC PAIN SYNDROME: ICD-10-CM

## 2022-11-07 DIAGNOSIS — Z00.00 WELL ADULT EXAM: ICD-10-CM

## 2022-11-07 DIAGNOSIS — Z12.5 PROSTATE CANCER SCREENING: ICD-10-CM

## 2022-11-07 DIAGNOSIS — I10 ESSENTIAL HYPERTENSION: ICD-10-CM

## 2022-11-07 DIAGNOSIS — N52.9 ERECTILE DYSFUNCTION, UNSPECIFIED ERECTILE DYSFUNCTION TYPE: ICD-10-CM

## 2022-11-07 DIAGNOSIS — A04.8 H. PYLORI INFECTION: ICD-10-CM

## 2022-11-07 DIAGNOSIS — M10.9 GOUT, UNSPECIFIED CAUSE, UNSPECIFIED CHRONICITY, UNSPECIFIED SITE: ICD-10-CM

## 2022-11-07 DIAGNOSIS — E55.9 VITAMIN D DEFICIENCY DISEASE: ICD-10-CM

## 2022-11-07 LAB
BILIRUB UR QL STRIP: NEGATIVE
CLARITY UR REFRACT.AUTO: CLEAR
COLOR UR AUTO: YELLOW
GLUCOSE UR QL STRIP: NEGATIVE
HGB UR QL STRIP: NEGATIVE
KETONES UR QL STRIP: NEGATIVE
LEUKOCYTE ESTERASE UR QL STRIP: NEGATIVE
NITRITE UR QL STRIP: NEGATIVE
PH UR STRIP: 6 [PH] (ref 5–8)
PROT UR QL STRIP: NEGATIVE
SP GR UR STRIP: 1.01 (ref 1–1.03)
URN SPEC COLLECT METH UR: NORMAL

## 2022-11-07 PROCEDURE — 81003 URINALYSIS AUTO W/O SCOPE: CPT | Performed by: FAMILY MEDICINE

## 2022-11-14 ENCOUNTER — OFFICE VISIT (OUTPATIENT)
Dept: INTERNAL MEDICINE | Facility: CLINIC | Age: 62
End: 2022-11-14
Payer: MEDICARE

## 2022-11-14 VITALS
SYSTOLIC BLOOD PRESSURE: 150 MMHG | OXYGEN SATURATION: 97 % | RESPIRATION RATE: 20 BRPM | TEMPERATURE: 98 F | BODY MASS INDEX: 31.24 KG/M2 | HEART RATE: 80 BPM | WEIGHT: 243.38 LBS | DIASTOLIC BLOOD PRESSURE: 96 MMHG | HEIGHT: 74 IN

## 2022-11-14 DIAGNOSIS — Z00.01 ENCOUNTER FOR GENERAL ADULT MEDICAL EXAMINATION WITH ABNORMAL FINDINGS: Primary | ICD-10-CM

## 2022-11-14 DIAGNOSIS — I10 ESSENTIAL HYPERTENSION: ICD-10-CM

## 2022-11-14 DIAGNOSIS — M10.9 GOUT, UNSPECIFIED CAUSE, UNSPECIFIED CHRONICITY, UNSPECIFIED SITE: ICD-10-CM

## 2022-11-14 DIAGNOSIS — E55.9 VITAMIN D DEFICIENCY DISEASE: ICD-10-CM

## 2022-11-14 DIAGNOSIS — N52.9 ERECTILE DYSFUNCTION, UNSPECIFIED ERECTILE DYSFUNCTION TYPE: ICD-10-CM

## 2022-11-14 PROCEDURE — 4010F ACE/ARB THERAPY RXD/TAKEN: CPT | Mod: CPTII,S$GLB,, | Performed by: FAMILY MEDICINE

## 2022-11-14 PROCEDURE — 99396 PR PREVENTIVE VISIT,EST,40-64: ICD-10-PCS | Mod: S$GLB,,, | Performed by: FAMILY MEDICINE

## 2022-11-14 PROCEDURE — 3044F PR MOST RECENT HEMOGLOBIN A1C LEVEL <7.0%: ICD-10-PCS | Mod: CPTII,S$GLB,, | Performed by: FAMILY MEDICINE

## 2022-11-14 PROCEDURE — 3080F DIAST BP >= 90 MM HG: CPT | Mod: CPTII,S$GLB,, | Performed by: FAMILY MEDICINE

## 2022-11-14 PROCEDURE — 1159F MED LIST DOCD IN RCRD: CPT | Mod: CPTII,S$GLB,, | Performed by: FAMILY MEDICINE

## 2022-11-14 PROCEDURE — 1159F PR MEDICATION LIST DOCUMENTED IN MEDICAL RECORD: ICD-10-PCS | Mod: CPTII,S$GLB,, | Performed by: FAMILY MEDICINE

## 2022-11-14 PROCEDURE — 3044F HG A1C LEVEL LT 7.0%: CPT | Mod: CPTII,S$GLB,, | Performed by: FAMILY MEDICINE

## 2022-11-14 PROCEDURE — 4010F PR ACE/ARB THEARPY RXD/TAKEN: ICD-10-PCS | Mod: CPTII,S$GLB,, | Performed by: FAMILY MEDICINE

## 2022-11-14 PROCEDURE — 99999 PR PBB SHADOW E&M-EST. PATIENT-LVL III: ICD-10-PCS | Mod: PBBFAC,,, | Performed by: FAMILY MEDICINE

## 2022-11-14 PROCEDURE — 3077F PR MOST RECENT SYSTOLIC BLOOD PRESSURE >= 140 MM HG: ICD-10-PCS | Mod: CPTII,S$GLB,, | Performed by: FAMILY MEDICINE

## 2022-11-14 PROCEDURE — 99999 PR PBB SHADOW E&M-EST. PATIENT-LVL III: CPT | Mod: PBBFAC,,, | Performed by: FAMILY MEDICINE

## 2022-11-14 PROCEDURE — 3008F BODY MASS INDEX DOCD: CPT | Mod: CPTII,S$GLB,, | Performed by: FAMILY MEDICINE

## 2022-11-14 PROCEDURE — 3080F PR MOST RECENT DIASTOLIC BLOOD PRESSURE >= 90 MM HG: ICD-10-PCS | Mod: CPTII,S$GLB,, | Performed by: FAMILY MEDICINE

## 2022-11-14 PROCEDURE — 3008F PR BODY MASS INDEX (BMI) DOCUMENTED: ICD-10-PCS | Mod: CPTII,S$GLB,, | Performed by: FAMILY MEDICINE

## 2022-11-14 PROCEDURE — 99396 PREV VISIT EST AGE 40-64: CPT | Mod: S$GLB,,, | Performed by: FAMILY MEDICINE

## 2022-11-14 PROCEDURE — 3077F SYST BP >= 140 MM HG: CPT | Mod: CPTII,S$GLB,, | Performed by: FAMILY MEDICINE

## 2022-11-14 PROCEDURE — 1160F RVW MEDS BY RX/DR IN RCRD: CPT | Mod: CPTII,S$GLB,, | Performed by: FAMILY MEDICINE

## 2022-11-14 PROCEDURE — 1160F PR REVIEW ALL MEDS BY PRESCRIBER/CLIN PHARMACIST DOCUMENTED: ICD-10-PCS | Mod: CPTII,S$GLB,, | Performed by: FAMILY MEDICINE

## 2022-11-14 RX ORDER — ALLOPURINOL 300 MG/1
300 TABLET ORAL 2 TIMES DAILY
Qty: 180 TABLET | Refills: 3 | Status: SHIPPED | OUTPATIENT
Start: 2022-11-14 | End: 2023-09-04

## 2022-11-14 RX ORDER — DILTIAZEM HYDROCHLORIDE 60 MG/1
60 CAPSULE, EXTENDED RELEASE ORAL 2 TIMES DAILY
Qty: 180 CAPSULE | Refills: 3 | Status: SHIPPED | OUTPATIENT
Start: 2022-11-14 | End: 2023-09-04

## 2022-11-14 RX ORDER — BENAZEPRIL HYDROCHLORIDE 40 MG/1
40 TABLET ORAL DAILY
Qty: 90 TABLET | Refills: 3 | Status: SHIPPED | OUTPATIENT
Start: 2022-11-14 | End: 2023-09-04

## 2022-11-14 RX ORDER — SILDENAFIL 100 MG/1
100 TABLET, FILM COATED ORAL DAILY PRN
Qty: 30 TABLET | Refills: 11 | Status: SHIPPED | OUTPATIENT
Start: 2022-11-14 | End: 2023-11-15 | Stop reason: SDUPTHER
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
## 2022-12-15 ENCOUNTER — OFFICE VISIT (OUTPATIENT)
Dept: INTERNAL MEDICINE | Facility: CLINIC | Age: 62
End: 2022-12-15
Payer: MEDICARE

## 2022-12-15 VITALS
WEIGHT: 242.75 LBS | BODY MASS INDEX: 32.17 KG/M2 | DIASTOLIC BLOOD PRESSURE: 85 MMHG | SYSTOLIC BLOOD PRESSURE: 122 MMHG | TEMPERATURE: 98 F | OXYGEN SATURATION: 99 % | HEIGHT: 73 IN | HEART RATE: 80 BPM | RESPIRATION RATE: 20 BRPM

## 2022-12-15 DIAGNOSIS — Z23 NEED FOR PROPHYLACTIC VACCINATION AND INOCULATION AGAINST INFLUENZA: ICD-10-CM

## 2022-12-15 DIAGNOSIS — Z09 FOLLOW-UP EXAM: Primary | ICD-10-CM

## 2022-12-15 DIAGNOSIS — I10 ESSENTIAL HYPERTENSION: ICD-10-CM

## 2022-12-15 PROCEDURE — 1160F RVW MEDS BY RX/DR IN RCRD: CPT | Mod: CPTII,S$GLB,, | Performed by: FAMILY MEDICINE

## 2022-12-15 PROCEDURE — 99213 PR OFFICE/OUTPT VISIT, EST, LEVL III, 20-29 MIN: ICD-10-PCS | Mod: S$GLB,,, | Performed by: FAMILY MEDICINE

## 2022-12-15 PROCEDURE — 3074F PR MOST RECENT SYSTOLIC BLOOD PRESSURE < 130 MM HG: ICD-10-PCS | Mod: CPTII,S$GLB,, | Performed by: FAMILY MEDICINE

## 2022-12-15 PROCEDURE — 1159F MED LIST DOCD IN RCRD: CPT | Mod: CPTII,S$GLB,, | Performed by: FAMILY MEDICINE

## 2022-12-15 PROCEDURE — 90686 FLU VACCINE (QUAD) GREATER THAN OR EQUAL TO 3YO PRESERVATIVE FREE IM: ICD-10-PCS | Mod: S$GLB,,, | Performed by: FAMILY MEDICINE

## 2022-12-15 PROCEDURE — 90686 IIV4 VACC NO PRSV 0.5 ML IM: CPT | Mod: S$GLB,,, | Performed by: FAMILY MEDICINE

## 2022-12-15 PROCEDURE — 3044F HG A1C LEVEL LT 7.0%: CPT | Mod: CPTII,S$GLB,, | Performed by: FAMILY MEDICINE

## 2022-12-15 PROCEDURE — 3079F PR MOST RECENT DIASTOLIC BLOOD PRESSURE 80-89 MM HG: ICD-10-PCS | Mod: CPTII,S$GLB,, | Performed by: FAMILY MEDICINE

## 2022-12-15 PROCEDURE — 3079F DIAST BP 80-89 MM HG: CPT | Mod: CPTII,S$GLB,, | Performed by: FAMILY MEDICINE

## 2022-12-15 PROCEDURE — 99213 OFFICE O/P EST LOW 20 MIN: CPT | Mod: S$GLB,,, | Performed by: FAMILY MEDICINE

## 2022-12-15 PROCEDURE — 99999 PR PBB SHADOW E&M-EST. PATIENT-LVL IV: ICD-10-PCS | Mod: PBBFAC,,, | Performed by: FAMILY MEDICINE

## 2022-12-15 PROCEDURE — 1160F PR REVIEW ALL MEDS BY PRESCRIBER/CLIN PHARMACIST DOCUMENTED: ICD-10-PCS | Mod: CPTII,S$GLB,, | Performed by: FAMILY MEDICINE

## 2022-12-15 PROCEDURE — 3044F PR MOST RECENT HEMOGLOBIN A1C LEVEL <7.0%: ICD-10-PCS | Mod: CPTII,S$GLB,, | Performed by: FAMILY MEDICINE

## 2022-12-15 PROCEDURE — 4010F PR ACE/ARB THEARPY RXD/TAKEN: ICD-10-PCS | Mod: CPTII,S$GLB,, | Performed by: FAMILY MEDICINE

## 2022-12-15 PROCEDURE — 3008F BODY MASS INDEX DOCD: CPT | Mod: CPTII,S$GLB,, | Performed by: FAMILY MEDICINE

## 2022-12-15 PROCEDURE — 1159F PR MEDICATION LIST DOCUMENTED IN MEDICAL RECORD: ICD-10-PCS | Mod: CPTII,S$GLB,, | Performed by: FAMILY MEDICINE

## 2022-12-15 PROCEDURE — 3074F SYST BP LT 130 MM HG: CPT | Mod: CPTII,S$GLB,, | Performed by: FAMILY MEDICINE

## 2022-12-15 PROCEDURE — 4010F ACE/ARB THERAPY RXD/TAKEN: CPT | Mod: CPTII,S$GLB,, | Performed by: FAMILY MEDICINE

## 2022-12-15 PROCEDURE — G0008 ADMIN INFLUENZA VIRUS VAC: HCPCS | Mod: S$GLB,,, | Performed by: FAMILY MEDICINE

## 2022-12-15 PROCEDURE — 3008F PR BODY MASS INDEX (BMI) DOCUMENTED: ICD-10-PCS | Mod: CPTII,S$GLB,, | Performed by: FAMILY MEDICINE

## 2022-12-15 PROCEDURE — 99999 PR PBB SHADOW E&M-EST. PATIENT-LVL IV: CPT | Mod: PBBFAC,,, | Performed by: FAMILY MEDICINE

## 2022-12-15 PROCEDURE — G0008 FLU VACCINE (QUAD) GREATER THAN OR EQUAL TO 3YO PRESERVATIVE FREE IM: ICD-10-PCS | Mod: S$GLB,,, | Performed by: FAMILY MEDICINE

## 2022-12-15 NOTE — PROGRESS NOTES
Subjective:       Patient ID: Ghulam Ariza is a 62 y.o. male.    Chief Complaint: Hypertension  62-year-old  male presents to clinic today for hypertension re-evaluation.  He was last seen 1 month ago at which time blood pressure was elevated at 150/96.  At that time the patient was taken benazepril 40 mg daily and diltiazem 60 mg daily as he did not realize that diltiazem was prescribed to be taken twice daily.  Since last visit he has been taking diltiazem twice daily and has continued benazepril 40 mg daily.  He returns today without complaints.  Hypertension  Pertinent negatives include no chest pain, headaches, neck pain, palpitations or shortness of breath.   Review of Systems   Constitutional:  Negative for appetite change, chills, fatigue and fever.   HENT:  Negative for nasal congestion, ear pain, hearing loss, postnasal drip, rhinorrhea, sinus pressure/congestion, sore throat and tinnitus.    Eyes:  Negative for redness, itching and visual disturbance.   Respiratory:  Negative for cough, chest tightness and shortness of breath.    Cardiovascular:  Negative for chest pain and palpitations.   Gastrointestinal:  Negative for abdominal pain, constipation, diarrhea, nausea and vomiting.   Genitourinary:  Negative for decreased urine volume, difficulty urinating, dysuria, frequency, hematuria and urgency.   Musculoskeletal:  Negative for back pain, myalgias, neck pain and neck stiffness.   Integumentary:  Negative for rash.   Neurological:  Negative for dizziness, light-headedness and headaches.   Psychiatric/Behavioral: Negative.         Objective:      Physical Exam  Vitals and nursing note reviewed.   Constitutional:       General: He is not in acute distress.     Appearance: Normal appearance. He is well-developed. He is not diaphoretic.   HENT:      Head: Normocephalic and atraumatic.      Right Ear: External ear normal.      Left Ear: External ear normal.      Nose: Nose normal.       Mouth/Throat:      Pharynx: No oropharyngeal exudate.   Eyes:      General: No scleral icterus.        Right eye: No discharge.         Left eye: No discharge.      Conjunctiva/sclera: Conjunctivae normal.      Pupils: Pupils are equal, round, and reactive to light.   Neck:      Thyroid: No thyromegaly.      Vascular: No JVD.      Trachea: No tracheal deviation.   Cardiovascular:      Rate and Rhythm: Normal rate and regular rhythm.      Heart sounds: Normal heart sounds. No murmur heard.    No friction rub. No gallop.   Pulmonary:      Effort: Pulmonary effort is normal. No respiratory distress.      Breath sounds: Normal breath sounds. No stridor. No wheezing, rhonchi or rales.   Chest:      Chest wall: No tenderness.   Abdominal:      General: Bowel sounds are normal. There is no distension.      Palpations: Abdomen is soft. There is no mass.      Tenderness: There is no abdominal tenderness. There is no guarding or rebound.   Musculoskeletal:         General: No tenderness. Normal range of motion.      Cervical back: Normal range of motion and neck supple.   Lymphadenopathy:      Cervical: No cervical adenopathy.   Skin:     General: Skin is warm and dry.      Coloration: Skin is not pale.      Findings: No erythema or rash.   Neurological:      Mental Status: He is alert and oriented to person, place, and time.   Psychiatric:         Mood and Affect: Mood normal.         Behavior: Behavior normal.         Thought Content: Thought content normal.         Judgment: Judgment normal.       Assessment:       Problem List Items Addressed This Visit       Essential hypertension     Other Visit Diagnoses       Follow-up exam    -  Primary    Need for prophylactic vaccination and inoculation against influenza                Plan:         1. Continue benazepril 40 mg daily and diltiazem 60 mg b.i.d..  Hypertension is well controlled.    2. Flu vaccine given.    3. Return to clinic as needed or in 11 months for annual  physical exam.

## 2023-01-03 ENCOUNTER — OFFICE VISIT (OUTPATIENT)
Dept: OPHTHALMOLOGY | Facility: CLINIC | Age: 63
End: 2023-01-03
Payer: MEDICARE

## 2023-01-03 DIAGNOSIS — H04.129 DRY EYE: ICD-10-CM

## 2023-01-03 DIAGNOSIS — H40.013 OPEN ANGLE WITH BORDERLINE FINDINGS AND LOW GLAUCOMA RISK IN BOTH EYES: Primary | ICD-10-CM

## 2023-01-03 DIAGNOSIS — H40.9 GLAUCOMA, UNSPECIFIED GLAUCOMA TYPE, UNSPECIFIED LATERALITY: ICD-10-CM

## 2023-01-03 DIAGNOSIS — H25.13 AGE-RELATED NUCLEAR CATARACT OF BOTH EYES: ICD-10-CM

## 2023-01-03 PROCEDURE — 1160F RVW MEDS BY RX/DR IN RCRD: CPT | Mod: CPTII,S$GLB,, | Performed by: OPHTHALMOLOGY

## 2023-01-03 PROCEDURE — 76514 PR  US, EYE, FOR CORNEAL THICKNESS: ICD-10-PCS | Mod: S$GLB,,, | Performed by: OPHTHALMOLOGY

## 2023-01-03 PROCEDURE — 92250 FUNDUS PHOTOGRAPHY W/I&R: CPT | Mod: S$GLB,,, | Performed by: OPHTHALMOLOGY

## 2023-01-03 PROCEDURE — 92020 GONIOSCOPY: CPT | Mod: S$GLB,,, | Performed by: OPHTHALMOLOGY

## 2023-01-03 PROCEDURE — 92004 COMPRE OPH EXAM NEW PT 1/>: CPT | Mod: S$GLB,,, | Performed by: OPHTHALMOLOGY

## 2023-01-03 PROCEDURE — 76514 ECHO EXAM OF EYE THICKNESS: CPT | Mod: S$GLB,,, | Performed by: OPHTHALMOLOGY

## 2023-01-03 PROCEDURE — 1159F MED LIST DOCD IN RCRD: CPT | Mod: CPTII,S$GLB,, | Performed by: OPHTHALMOLOGY

## 2023-01-03 PROCEDURE — 1159F PR MEDICATION LIST DOCUMENTED IN MEDICAL RECORD: ICD-10-PCS | Mod: CPTII,S$GLB,, | Performed by: OPHTHALMOLOGY

## 2023-01-03 PROCEDURE — 99999 PR PBB SHADOW E&M-EST. PATIENT-LVL III: ICD-10-PCS | Mod: PBBFAC,,, | Performed by: OPHTHALMOLOGY

## 2023-01-03 PROCEDURE — 92250 COLOR FUNDUS PHOTOGRAPHY - OU - BOTH EYES: ICD-10-PCS | Mod: S$GLB,,, | Performed by: OPHTHALMOLOGY

## 2023-01-03 PROCEDURE — 1160F PR REVIEW ALL MEDS BY PRESCRIBER/CLIN PHARMACIST DOCUMENTED: ICD-10-PCS | Mod: CPTII,S$GLB,, | Performed by: OPHTHALMOLOGY

## 2023-01-03 PROCEDURE — 92004 PR EYE EXAM, NEW PATIENT,COMPREHESV: ICD-10-PCS | Mod: S$GLB,,, | Performed by: OPHTHALMOLOGY

## 2023-01-03 PROCEDURE — 92020 PR SPECIAL EYE EVAL,GONIOSCOPY: ICD-10-PCS | Mod: S$GLB,,, | Performed by: OPHTHALMOLOGY

## 2023-01-03 PROCEDURE — 99999 PR PBB SHADOW E&M-EST. PATIENT-LVL III: CPT | Mod: PBBFAC,,, | Performed by: OPHTHALMOLOGY

## 2023-01-03 NOTE — PROGRESS NOTES
HPI    Pt states he was referred by [facility] for a glaucoma eval;  Pt states he believes his mother has glaucoma. Pt states he's been   noticing decrease in vision in his OS especially at a distance. Pt denies   any eye pain.    Meds;  No GTTS  Last edited by Stephanie Sebastian on 1/3/2023  9:11 AM.            Assessment /Plan     For exam results, see Encounter Report.    Open angle with borderline findings and low glaucoma risk in both eyes  -     Persaud Visual Field - OU - Extended - Both Eyes; Future  -     Posterior Segment OCT Optic Nerve- Both eyes; Future  -     Color Fundus Photography - OU - Both Eyes    Age-related nuclear cataract of both eyes    Dry eye    Glaucoma, unspecified glaucoma type, unspecified laterality           Glaucoma (type and duration)    suspect 2/2 lrg CDR's and ? FmHx    First HVF      First photos   - 1/2023    Treatment / Drops started   - none            Family history   mother         Glaucoma meds    none        H/O adverse rxn to glaucoma drops    none        LASERS    none        GLAUCOMA SURGERIES    none        OTHER EYE SURGERIES    none        CDR    0.75/0.75        Tbase    17// 16        Tmax    17//16            Ttarget                 HVF    ? test 20? to  20/ - ? od // ? os        Gonio    2-3//3; somewhat shallow AC        CCT    542//569        OCT     test 20 to 20 - RNFL -  od //  os        Disc photos    - 1/3/2023    - Ttoday    17//16  - Test done today    establish care / IOP / gonio / DFE / photos  / CCT       2. Cataracts   - nonVS, ctm     3. FERNANDA   - AT [facility]     PLAN   Photos today - 1/3/2023   F/U 2-3 months with HVF / OCT - do NOT dilate and repeat gonio AND NEEDS AR/MR OU - appears to have a refractive error at least os

## 2023-02-09 DIAGNOSIS — Z00.00 ENCOUNTER FOR MEDICARE ANNUAL WELLNESS EXAM: ICD-10-CM

## 2023-02-26 NOTE — PROGRESS NOTES
HPI     Glaucoma     Additional comments: HVF and OCT review today and pt states he is still   having difficulty with vision OS and driving at night           Comments    DLS: 1/3/23    1. OAG Suspect  2. NS OU  3. FERNANDA          Last edited by Shannan Peters MA on 3/3/2023  2:44 PM.            Assessment /Plan     For exam results, see Encounter Report.    Open angle with borderline findings and low glaucoma risk in both eyes    Age-related nuclear cataract of both eyes    Dry eye             Glaucoma (type and duration)    suspect 2/2 lrg CDR's and ? FmHx    First HVF  3/2023     First photos   - 1/2023    Treatment / Drops started   - none            Family history   mother         Glaucoma meds    none        H/O adverse rxn to glaucoma drops    none        LASERS    none        GLAUCOMA SURGERIES    none        OTHER EYE SURGERIES    none        CDR    0.75/0.75        Tbase    15-17// 13-16        Tmax    17//16            Ttarget    ?             HVF    1 test 2023 to  2023 - full od // full os        Gonio    2-3//3; somewhat shallow AC        CCT    542//569        OCT    1 test 2023 to 2023 - RNFL -  nl od //  nl os        Disc photos    - 1/3/2023    - Ttoday    15/13  - Test done today  HVF / OCT / refraction       2. Cataracts   - nonVS, ctm   -BCVA 20/20 ou    3. FERNANDA   - AT  [facility]     PLAN  3/3/2023   Nl HVF  Nl OCT   Generous CDR     Given rx for glasses - but really can use OTC readers if prefers - near emmetropia od and mild myopia os     Rec yearly dilated exams - can be done with optometry -  very low index of suspicion for glaucoma - re-consult prn

## 2023-03-03 ENCOUNTER — OFFICE VISIT (OUTPATIENT)
Dept: OPHTHALMOLOGY | Facility: CLINIC | Age: 63
End: 2023-03-03
Payer: MEDICARE

## 2023-03-03 ENCOUNTER — CLINICAL SUPPORT (OUTPATIENT)
Dept: OPHTHALMOLOGY | Facility: CLINIC | Age: 63
End: 2023-03-03
Payer: MEDICARE

## 2023-03-03 DIAGNOSIS — H25.13 AGE-RELATED NUCLEAR CATARACT OF BOTH EYES: ICD-10-CM

## 2023-03-03 DIAGNOSIS — H40.013 OPEN ANGLE WITH BORDERLINE FINDINGS AND LOW GLAUCOMA RISK IN BOTH EYES: Primary | ICD-10-CM

## 2023-03-03 DIAGNOSIS — H40.013 OPEN ANGLE WITH BORDERLINE FINDINGS AND LOW GLAUCOMA RISK IN BOTH EYES: ICD-10-CM

## 2023-03-03 DIAGNOSIS — H04.129 DRY EYE: ICD-10-CM

## 2023-03-03 PROCEDURE — 4010F ACE/ARB THERAPY RXD/TAKEN: CPT | Mod: HCNC,CPTII,S$GLB, | Performed by: OPHTHALMOLOGY

## 2023-03-03 PROCEDURE — 99999 PR PBB SHADOW E&M-EST. PATIENT-LVL II: CPT | Mod: PBBFAC,HCNC,, | Performed by: OPHTHALMOLOGY

## 2023-03-03 PROCEDURE — 92015 PR REFRACTION: ICD-10-PCS | Mod: HCNC,S$GLB,, | Performed by: OPHTHALMOLOGY

## 2023-03-03 PROCEDURE — 4010F PR ACE/ARB THEARPY RXD/TAKEN: ICD-10-PCS | Mod: HCNC,CPTII,S$GLB, | Performed by: OPHTHALMOLOGY

## 2023-03-03 PROCEDURE — 1159F MED LIST DOCD IN RCRD: CPT | Mod: HCNC,CPTII,S$GLB, | Performed by: OPHTHALMOLOGY

## 2023-03-03 PROCEDURE — 99213 PR OFFICE/OUTPT VISIT, EST, LEVL III, 20-29 MIN: ICD-10-PCS | Mod: HCNC,S$GLB,, | Performed by: OPHTHALMOLOGY

## 2023-03-03 PROCEDURE — 1160F RVW MEDS BY RX/DR IN RCRD: CPT | Mod: HCNC,CPTII,S$GLB, | Performed by: OPHTHALMOLOGY

## 2023-03-03 PROCEDURE — 1160F PR REVIEW ALL MEDS BY PRESCRIBER/CLIN PHARMACIST DOCUMENTED: ICD-10-PCS | Mod: HCNC,CPTII,S$GLB, | Performed by: OPHTHALMOLOGY

## 2023-03-03 PROCEDURE — 99213 OFFICE O/P EST LOW 20 MIN: CPT | Mod: HCNC,S$GLB,, | Performed by: OPHTHALMOLOGY

## 2023-03-03 PROCEDURE — 99999 PR PBB SHADOW E&M-EST. PATIENT-LVL II: ICD-10-PCS | Mod: PBBFAC,HCNC,, | Performed by: OPHTHALMOLOGY

## 2023-03-03 PROCEDURE — 1159F PR MEDICATION LIST DOCUMENTED IN MEDICAL RECORD: ICD-10-PCS | Mod: HCNC,CPTII,S$GLB, | Performed by: OPHTHALMOLOGY

## 2023-03-03 PROCEDURE — 92015 DETERMINE REFRACTIVE STATE: CPT | Mod: HCNC,S$GLB,, | Performed by: OPHTHALMOLOGY

## 2023-07-20 NOTE — TELEPHONE ENCOUNTER
Left message regarding no shows for appointments last week. reiterated attendance policy and patient was informed that if he no showed for tomorrow's appointment he would be taken off of the schedule. He was instructed to call and cancel appointment if unable to attend therapy. Reminded patient about appointment tomorrow at 9am.   General

## 2023-09-03 DIAGNOSIS — I10 ESSENTIAL HYPERTENSION: ICD-10-CM

## 2023-09-03 DIAGNOSIS — M10.9 GOUT, UNSPECIFIED CAUSE, UNSPECIFIED CHRONICITY, UNSPECIFIED SITE: ICD-10-CM

## 2023-09-04 RX ORDER — ALLOPURINOL 300 MG/1
300 TABLET ORAL 2 TIMES DAILY
Qty: 180 TABLET | Refills: 0 | Status: SHIPPED | OUTPATIENT
Start: 2023-09-04 | End: 2023-11-15 | Stop reason: SDUPTHER

## 2023-09-04 RX ORDER — DILTIAZEM HYDROCHLORIDE 60 MG/1
60 CAPSULE, EXTENDED RELEASE ORAL 2 TIMES DAILY
Qty: 180 CAPSULE | Refills: 1 | Status: SHIPPED | OUTPATIENT
Start: 2023-09-04 | End: 2023-11-15 | Stop reason: SDUPTHER

## 2023-09-04 RX ORDER — BENAZEPRIL HYDROCHLORIDE 40 MG/1
40 TABLET ORAL
Qty: 90 TABLET | Refills: 0 | Status: SHIPPED | OUTPATIENT
Start: 2023-09-04 | End: 2023-11-15 | Stop reason: SDUPTHER

## 2023-09-04 NOTE — TELEPHONE ENCOUNTER
Provider Staff:  Action required for this patient     Please see care gap opportunities below in Care Due Message.    Thanks!  [facility]     Appointments      Date Provider   Last Visit   12/15/2022 Reggie Valle MD   Next Visit   Visit date not found Reggie Valle MD      Refill Decision Note   Ghulam Ariza  is requesting a refill authorization.  Brief Assessment and Rationale for Refill:  Approve     Medication Therapy Plan:         Comments:     Note composed:4:09 AM 09/04/2023

## 2023-09-04 NOTE — TELEPHONE ENCOUNTER
Care Due:                  Date            Visit Type   Department     Provider  --------------------------------------------------------------------------------                                EP -                              PRIMARY      [facility] INTERNAL  Last Visit: 12-      CARE ([facility])   MEDICINE       Reggie Valle  Next Visit: None Scheduled  None         None Found                                                            Last  Test          Frequency    Reason                     Performed    Due Date  --------------------------------------------------------------------------------    CBC.........  12 months..  allopurinoL..............  11- 11-    CMP.........  12 months..  allopurinoL, benazepriL..  11- 11-    Uric Acid...  12 months..  allopurinoL..............  11- 11-    [employer] Embedded Care Due Messages. Reference number: [id number].   9/04/2023 4:05:08 AM CDT

## 2023-10-09 ENCOUNTER — TELEPHONE (OUTPATIENT)
Dept: INTERNAL MEDICINE | Facility: CLINIC | Age: 63
End: 2023-10-09
Payer: MEDICARE

## 2023-10-09 DIAGNOSIS — R79.9 ABNORMAL FINDING OF BLOOD CHEMISTRY, UNSPECIFIED: ICD-10-CM

## 2023-10-09 DIAGNOSIS — Z00.01 ENCOUNTER FOR GENERAL ADULT MEDICAL EXAMINATION WITH ABNORMAL FINDINGS: Primary | ICD-10-CM

## 2023-10-09 DIAGNOSIS — Z12.5 ENCOUNTER FOR SCREENING FOR MALIGNANT NEOPLASM OF PROSTATE: ICD-10-CM

## 2023-10-09 DIAGNOSIS — I10 ESSENTIAL HYPERTENSION: ICD-10-CM

## 2023-10-09 DIAGNOSIS — E55.9 VITAMIN D DEFICIENCY DISEASE: ICD-10-CM

## 2023-10-09 DIAGNOSIS — M10.9 GOUT, UNSPECIFIED CAUSE, UNSPECIFIED CHRONICITY, UNSPECIFIED SITE: ICD-10-CM

## 2023-10-09 NOTE — TELEPHONE ENCOUNTER
----- Message from Kaylan Godoy sent at 10/6/2023  4:58 PM CDT -----  Contact: [phone]  type: Lab    Caller is requesting to schedule their Lab appointment prior to annual appointment.  Order is not listed in EPIC.  Please enter order and contact patient to schedule.    Name of Caller:pt     Preferred Date and Time of Labs: 11/13/23     Date of Annual Physical Appointment:11/15/23    Where would they like the lab performed?[facility]    Would the patient rather a call back or a response via [facility]? Call back     Best Call Back Number:[phone]    Additional Information:

## 2023-11-06 NOTE — PROGRESS NOTES
Care Everywhere: updated  Immunization: no profile in links  Health Maintenance:   Media Review:   Legacy Review:   Order placed:   Upcoming appts:          
For information on Fall & Injury Prevention, visit: [email or web address] OR  [email or web address] OR  https://www.cdc.gov/steadi/patient.html

## 2023-11-13 ENCOUNTER — LAB VISIT (OUTPATIENT)
Dept: LAB | Facility: HOSPITAL | Age: 63
End: 2023-11-13
Payer: MEDICARE

## 2023-11-13 DIAGNOSIS — E55.9 VITAMIN D DEFICIENCY DISEASE: ICD-10-CM

## 2023-11-13 DIAGNOSIS — R79.9 ABNORMAL FINDING OF BLOOD CHEMISTRY, UNSPECIFIED: ICD-10-CM

## 2023-11-13 DIAGNOSIS — M10.9 GOUT, UNSPECIFIED CAUSE, UNSPECIFIED CHRONICITY, UNSPECIFIED SITE: ICD-10-CM

## 2023-11-13 DIAGNOSIS — I10 ESSENTIAL HYPERTENSION: ICD-10-CM

## 2023-11-13 DIAGNOSIS — Z00.01 ENCOUNTER FOR GENERAL ADULT MEDICAL EXAMINATION WITH ABNORMAL FINDINGS: ICD-10-CM

## 2023-11-13 DIAGNOSIS — Z12.5 ENCOUNTER FOR SCREENING FOR MALIGNANT NEOPLASM OF PROSTATE: ICD-10-CM

## 2023-11-13 LAB
25(OH)D3+25(OH)D2 SERPL-MCNC: 28 NG/ML (ref 30–96)
ALBUMIN SERPL BCP-MCNC: 3.9 G/DL (ref 3.5–5.2)
ALP SERPL-CCNC: 71 U/L (ref 55–135)
ALT SERPL W/O P-5'-P-CCNC: 29 U/L (ref 10–44)
ANION GAP SERPL CALC-SCNC: 7 MMOL/L (ref 8–16)
AST SERPL-CCNC: 32 U/L (ref 10–40)
BASOPHILS # BLD AUTO: 0.01 K/UL (ref 0–0.2)
BASOPHILS NFR BLD: 0.2 % (ref 0–1.9)
BILIRUB SERPL-MCNC: 0.3 MG/DL (ref 0.1–1)
BILIRUB UR QL STRIP: NEGATIVE
BUN SERPL-MCNC: 15 MG/DL (ref 8–23)
CALCIUM SERPL-MCNC: 9.7 MG/DL (ref 8.7–10.5)
CHLORIDE SERPL-SCNC: 105 MMOL/L (ref 95–110)
CHOLEST SERPL-MCNC: 185 MG/DL (ref 120–199)
CHOLEST/HDLC SERPL: 3.6 {RATIO} (ref 2–5)
CLARITY UR REFRACT.AUTO: CLEAR
CO2 SERPL-SCNC: 27 MMOL/L (ref 23–29)
COLOR UR AUTO: YELLOW
COMPLEXED PSA SERPL-MCNC: 0.78 NG/ML (ref 0–4)
CREAT SERPL-MCNC: 1.4 MG/DL (ref 0.5–1.4)
DIFFERENTIAL METHOD: ABNORMAL
EOSINOPHIL # BLD AUTO: 0.3 K/UL (ref 0–0.5)
EOSINOPHIL NFR BLD: 4.9 % (ref 0–8)
ERYTHROCYTE [DISTWIDTH] IN BLOOD BY AUTOMATED COUNT: 15.9 % (ref 11.5–14.5)
EST. GFR  (NO RACE VARIABLE): 56.5 ML/MIN/1.73 M^2
ESTIMATED AVG GLUCOSE: 117 MG/DL (ref 68–131)
GLUCOSE SERPL-MCNC: 102 MG/DL (ref 70–110)
GLUCOSE UR QL STRIP: NEGATIVE
HBA1C MFR BLD: 5.7 % (ref 4–5.6)
HCT VFR BLD AUTO: 40.2 % (ref 40–54)
HDLC SERPL-MCNC: 52 MG/DL (ref 40–75)
HDLC SERPL: 28.1 % (ref 20–50)
HGB BLD-MCNC: 12.8 G/DL (ref 14–18)
HGB UR QL STRIP: NEGATIVE
IMM GRANULOCYTES # BLD AUTO: 0.01 K/UL (ref 0–0.04)
IMM GRANULOCYTES NFR BLD AUTO: 0.2 % (ref 0–0.5)
KETONES UR QL STRIP: NEGATIVE
LDLC SERPL CALC-MCNC: 118.2 MG/DL (ref 63–159)
LEUKOCYTE ESTERASE UR QL STRIP: NEGATIVE
LYMPHOCYTES # BLD AUTO: 2.1 K/UL (ref 1–4.8)
LYMPHOCYTES NFR BLD: 41 % (ref 18–48)
MCH RBC QN AUTO: 26.3 PG (ref 27–31)
MCHC RBC AUTO-ENTMCNC: 31.8 G/DL (ref 32–36)
MCV RBC AUTO: 83 FL (ref 82–98)
MONOCYTES # BLD AUTO: 0.3 K/UL (ref 0.3–1)
MONOCYTES NFR BLD: 6.3 % (ref 4–15)
NEUTROPHILS # BLD AUTO: 2.4 K/UL (ref 1.8–7.7)
NEUTROPHILS NFR BLD: 47.4 % (ref 38–73)
NITRITE UR QL STRIP: NEGATIVE
NONHDLC SERPL-MCNC: 133 MG/DL
NRBC BLD-RTO: 0 /100 WBC
PH UR STRIP: 6 [PH] (ref 5–8)
PLATELET # BLD AUTO: 285 K/UL (ref 150–450)
PMV BLD AUTO: 10.3 FL (ref 9.2–12.9)
POTASSIUM SERPL-SCNC: 4 MMOL/L (ref 3.5–5.1)
PROT SERPL-MCNC: 7.5 G/DL (ref 6–8.4)
PROT UR QL STRIP: NEGATIVE
RBC # BLD AUTO: 4.86 M/UL (ref 4.6–6.2)
SODIUM SERPL-SCNC: 139 MMOL/L (ref 136–145)
SP GR UR STRIP: 1.02 (ref 1–1.03)
T4 FREE SERPL-MCNC: 0.87 NG/DL (ref 0.71–1.51)
TRIGL SERPL-MCNC: 74 MG/DL (ref 30–150)
TSH SERPL DL<=0.005 MIU/L-ACNC: 0.92 UIU/ML (ref 0.4–4)
URATE SERPL-MCNC: 4.9 MG/DL (ref 3.4–7)
URN SPEC COLLECT METH UR: NORMAL
WBC # BLD AUTO: 5.12 K/UL (ref 3.9–12.7)

## 2023-11-13 PROCEDURE — 82306 VITAMIN D 25 HYDROXY: CPT | Mod: HCNC | Performed by: FAMILY MEDICINE

## 2023-11-13 PROCEDURE — 84153 ASSAY OF PSA TOTAL: CPT | Mod: HCNC | Performed by: FAMILY MEDICINE

## 2023-11-13 PROCEDURE — 84550 ASSAY OF BLOOD/URIC ACID: CPT | Mod: HCNC | Performed by: FAMILY MEDICINE

## 2023-11-13 PROCEDURE — 83036 HEMOGLOBIN GLYCOSYLATED A1C: CPT | Mod: HCNC | Performed by: FAMILY MEDICINE

## 2023-11-13 PROCEDURE — 81003 URINALYSIS AUTO W/O SCOPE: CPT | Mod: HCNC | Performed by: FAMILY MEDICINE

## 2023-11-13 PROCEDURE — 80061 LIPID PANEL: CPT | Mod: HCNC | Performed by: FAMILY MEDICINE

## 2023-11-13 PROCEDURE — 84439 ASSAY OF FREE THYROXINE: CPT | Mod: HCNC | Performed by: FAMILY MEDICINE

## 2023-11-13 PROCEDURE — 85025 COMPLETE CBC W/AUTO DIFF WBC: CPT | Mod: HCNC | Performed by: FAMILY MEDICINE

## 2023-11-13 PROCEDURE — 84443 ASSAY THYROID STIM HORMONE: CPT | Mod: HCNC | Performed by: FAMILY MEDICINE

## 2023-11-13 PROCEDURE — 36415 COLL VENOUS BLD VENIPUNCTURE: CPT | Mod: HCNC,PO | Performed by: FAMILY MEDICINE

## 2023-11-13 PROCEDURE — 80053 COMPREHEN METABOLIC PANEL: CPT | Mod: HCNC | Performed by: FAMILY MEDICINE

## 2023-11-15 ENCOUNTER — OFFICE VISIT (OUTPATIENT)
Dept: INTERNAL MEDICINE | Facility: CLINIC | Age: 63
End: 2023-11-15
Payer: MEDICARE

## 2023-11-15 VITALS
WEIGHT: 226.88 LBS | SYSTOLIC BLOOD PRESSURE: 130 MMHG | HEIGHT: 73 IN | DIASTOLIC BLOOD PRESSURE: 84 MMHG | RESPIRATION RATE: 17 BRPM | TEMPERATURE: 97 F | HEART RATE: 80 BPM | BODY MASS INDEX: 30.07 KG/M2 | OXYGEN SATURATION: 96 %

## 2023-11-15 DIAGNOSIS — N52.9 ERECTILE DYSFUNCTION, UNSPECIFIED ERECTILE DYSFUNCTION TYPE: ICD-10-CM

## 2023-11-15 DIAGNOSIS — E55.9 VITAMIN D DEFICIENCY DISEASE: ICD-10-CM

## 2023-11-15 DIAGNOSIS — Z23 NEED FOR PROPHYLACTIC VACCINATION AND INOCULATION AGAINST INFLUENZA: ICD-10-CM

## 2023-11-15 DIAGNOSIS — I10 ESSENTIAL HYPERTENSION: ICD-10-CM

## 2023-11-15 DIAGNOSIS — Z00.00 PREVENTATIVE HEALTH CARE: Primary | ICD-10-CM

## 2023-11-15 DIAGNOSIS — M10.9 GOUT, UNSPECIFIED CAUSE, UNSPECIFIED CHRONICITY, UNSPECIFIED SITE: ICD-10-CM

## 2023-11-15 PROCEDURE — 1159F PR MEDICATION LIST DOCUMENTED IN MEDICAL RECORD: ICD-10-PCS | Mod: HCNC,CPTII,S$GLB, | Performed by: FAMILY MEDICINE

## 2023-11-15 PROCEDURE — 3008F PR BODY MASS INDEX (BMI) DOCUMENTED: ICD-10-PCS | Mod: HCNC,CPTII,S$GLB, | Performed by: FAMILY MEDICINE

## 2023-11-15 PROCEDURE — G0008 FLU VACCINE (QUAD) GREATER THAN OR EQUAL TO 3YO PRESERVATIVE FREE IM: ICD-10-PCS | Mod: HCNC,S$GLB,, | Performed by: FAMILY MEDICINE

## 2023-11-15 PROCEDURE — 99999 PR PBB SHADOW E&M-EST. PATIENT-LVL IV: ICD-10-PCS | Mod: PBBFAC,HCNC,, | Performed by: FAMILY MEDICINE

## 2023-11-15 PROCEDURE — 1159F MED LIST DOCD IN RCRD: CPT | Mod: HCNC,CPTII,S$GLB, | Performed by: FAMILY MEDICINE

## 2023-11-15 PROCEDURE — 4010F ACE/ARB THERAPY RXD/TAKEN: CPT | Mod: HCNC,CPTII,S$GLB, | Performed by: FAMILY MEDICINE

## 2023-11-15 PROCEDURE — 99999 PR PBB SHADOW E&M-EST. PATIENT-LVL IV: CPT | Mod: PBBFAC,HCNC,, | Performed by: FAMILY MEDICINE

## 2023-11-15 PROCEDURE — 3044F PR MOST RECENT HEMOGLOBIN A1C LEVEL <7.0%: ICD-10-PCS | Mod: HCNC,CPTII,S$GLB, | Performed by: FAMILY MEDICINE

## 2023-11-15 PROCEDURE — 3044F HG A1C LEVEL LT 7.0%: CPT | Mod: HCNC,CPTII,S$GLB, | Performed by: FAMILY MEDICINE

## 2023-11-15 PROCEDURE — 3079F DIAST BP 80-89 MM HG: CPT | Mod: HCNC,CPTII,S$GLB, | Performed by: FAMILY MEDICINE

## 2023-11-15 PROCEDURE — 99396 PR PREVENTIVE VISIT,EST,40-64: ICD-10-PCS | Mod: HCNC,S$GLB,, | Performed by: FAMILY MEDICINE

## 2023-11-15 PROCEDURE — 1160F RVW MEDS BY RX/DR IN RCRD: CPT | Mod: HCNC,CPTII,S$GLB, | Performed by: FAMILY MEDICINE

## 2023-11-15 PROCEDURE — 3075F PR MOST RECENT SYSTOLIC BLOOD PRESS GE 130-139MM HG: ICD-10-PCS | Mod: HCNC,CPTII,S$GLB, | Performed by: FAMILY MEDICINE

## 2023-11-15 PROCEDURE — 3075F SYST BP GE 130 - 139MM HG: CPT | Mod: HCNC,CPTII,S$GLB, | Performed by: FAMILY MEDICINE

## 2023-11-15 PROCEDURE — 90686 IIV4 VACC NO PRSV 0.5 ML IM: CPT | Mod: HCNC,S$GLB,, | Performed by: FAMILY MEDICINE

## 2023-11-15 PROCEDURE — 1160F PR REVIEW ALL MEDS BY PRESCRIBER/CLIN PHARMACIST DOCUMENTED: ICD-10-PCS | Mod: HCNC,CPTII,S$GLB, | Performed by: FAMILY MEDICINE

## 2023-11-15 PROCEDURE — 99396 PREV VISIT EST AGE 40-64: CPT | Mod: HCNC,S$GLB,, | Performed by: FAMILY MEDICINE

## 2023-11-15 PROCEDURE — 3008F BODY MASS INDEX DOCD: CPT | Mod: HCNC,CPTII,S$GLB, | Performed by: FAMILY MEDICINE

## 2023-11-15 PROCEDURE — 90686 FLU VACCINE (QUAD) GREATER THAN OR EQUAL TO 3YO PRESERVATIVE FREE IM: ICD-10-PCS | Mod: HCNC,S$GLB,, | Performed by: FAMILY MEDICINE

## 2023-11-15 PROCEDURE — 4010F PR ACE/ARB THEARPY RXD/TAKEN: ICD-10-PCS | Mod: HCNC,CPTII,S$GLB, | Performed by: FAMILY MEDICINE

## 2023-11-15 PROCEDURE — 3079F PR MOST RECENT DIASTOLIC BLOOD PRESSURE 80-89 MM HG: ICD-10-PCS | Mod: HCNC,CPTII,S$GLB, | Performed by: FAMILY MEDICINE

## 2023-11-15 PROCEDURE — G0008 ADMIN INFLUENZA VIRUS VAC: HCPCS | Mod: HCNC,S$GLB,, | Performed by: FAMILY MEDICINE

## 2023-11-15 RX ORDER — SILDENAFIL 100 MG/1
100 TABLET, FILM COATED ORAL DAILY PRN
Qty: 30 TABLET | Refills: 11 | Status: SHIPPED | OUTPATIENT
Start: 2023-11-15

## 2023-11-15 RX ORDER — ALLOPURINOL 300 MG/1
300 TABLET ORAL 2 TIMES DAILY
Qty: 180 TABLET | Refills: 3 | Status: SHIPPED | OUTPATIENT
Start: 2023-11-15

## 2023-11-15 RX ORDER — DILTIAZEM HYDROCHLORIDE 60 MG/1
60 CAPSULE, EXTENDED RELEASE ORAL 2 TIMES DAILY
Qty: 180 CAPSULE | Refills: 3 | Status: SHIPPED | OUTPATIENT
Start: 2023-11-15

## 2023-11-15 RX ORDER — BENAZEPRIL HYDROCHLORIDE 40 MG/1
40 TABLET ORAL DAILY
Qty: 90 TABLET | Refills: 3 | Status: SHIPPED | OUTPATIENT
Start: 2023-11-15

## 2023-11-15 NOTE — PROGRESS NOTES
Subjective     Patient ID: Ghulam Ariza is a 63 y.o. male.    Chief Complaint: Annual Exam    HPI 63-year-old  male presents to clinic today for annual physical exam.  Hypertension remains well controlled on benazepril 40 mg daily and diltiazem 60 mg b.i.d..  Gout remains well controlled on allopurinol 300 mg b.i.d. without any recent exacerbations.  Vitamin-D remained stable on over-the-counter vitamin-D 2000 units daily.  He continues to use Viagra as needed for erectile dysfunction with stability of symptoms.  He has a past surgical history of L4/5 laminectomy secondary to herniation, bunionectomy, and lumbar fusion.  He has a strong family history of cancer with his father having prostate cancer at the age of 65.  His brother also has prostate cancer.  His mother has hypertension and had a stroke at the age of 68.  He is up-to-date with all screening exams.  Flu vaccine has been discussed and will be given today.    Review of Systems   Constitutional:  Negative for appetite change, chills, fatigue and fever.   HENT:  Negative for nasal congestion, ear pain, hearing loss, postnasal drip, rhinorrhea, sinus pressure/congestion, sore throat and tinnitus.    Eyes:  Negative for redness, itching and visual disturbance.   Respiratory:  Negative for cough, chest tightness and shortness of breath.    Cardiovascular:  Negative for chest pain and palpitations.   Gastrointestinal:  Negative for abdominal pain, constipation, diarrhea, nausea and vomiting.   Genitourinary:  Negative for decreased urine volume, difficulty urinating, dysuria, frequency, hematuria and urgency.   Musculoskeletal:  Negative for back pain, myalgias, neck pain and neck stiffness.   Integumentary:  Negative for rash.   Neurological:  Negative for dizziness, light-headedness and headaches.   Psychiatric/Behavioral: Negative.            Objective     Physical Exam  Vitals and nursing note reviewed.   Constitutional:       General: He is  not in acute distress.     Appearance: Normal appearance. He is well-developed. He is not diaphoretic.   HENT:      Head: Normocephalic and atraumatic.      Right Ear: External ear normal.      Left Ear: External ear normal.      Nose: Nose normal.      Mouth/Throat:      Pharynx: No oropharyngeal exudate.   Eyes:      General: No scleral icterus.        Right eye: No discharge.         Left eye: No discharge.      Conjunctiva/sclera: Conjunctivae normal.      Pupils: Pupils are equal, round, and reactive to light.   Neck:      Thyroid: No thyromegaly.      Vascular: No JVD.      Trachea: No tracheal deviation.   Cardiovascular:      Rate and Rhythm: Normal rate and regular rhythm.      Heart sounds: Normal heart sounds. No murmur heard.     No friction rub. No gallop.   Pulmonary:      Effort: Pulmonary effort is normal. No respiratory distress.      Breath sounds: Normal breath sounds. No stridor. No wheezing, rhonchi or rales.   Chest:      Chest wall: No tenderness.   Abdominal:      General: Bowel sounds are normal. There is no distension.      Palpations: Abdomen is soft. There is no mass.      Tenderness: There is no abdominal tenderness. There is no guarding or rebound.   Musculoskeletal:         General: No tenderness. Normal range of motion.      Cervical back: Normal range of motion and neck supple.   Lymphadenopathy:      Cervical: No cervical adenopathy.   Skin:     General: Skin is warm and dry.      Coloration: Skin is not pale.      Findings: No erythema or rash.   Neurological:      Mental Status: He is alert and oriented to person, place, and time.   Psychiatric:         Mood and Affect: Mood normal.         Behavior: Behavior normal.         Thought Content: Thought content normal.         Judgment: Judgment normal.            Assessment and Plan     1. Preventative health care    2. Essential hypertension  -     benazepriL (LOTENSIN) 40 MG tablet; Take 1 tablet (40 mg total) by mouth once daily.   Dispense: 90 tablet; Refill: 3  -     diltiaZEM (CARDIZEM SR) 60 MG Cp12; Take 1 capsule (60 mg total) by mouth 2 (two) times daily.  Dispense: 180 capsule; Refill: 3    3. Vitamin D deficiency disease    4. Gout, unspecified cause, unspecified chronicity, unspecified site  -     allopurinoL (ZYLOPRIM) 300 MG tablet; Take 1 tablet (300 mg total) by mouth 2 (two) times daily.  Dispense: 180 tablet; Refill: 3    5. Erectile dysfunction, unspecified erectile dysfunction type  -     sildenafiL (VIAGRA) 100 MG tablet; Take 1 tablet (100 mg total) by mouth daily as needed for Erectile Dysfunction.  Dispense: 30 tablet; Refill: 11    6. Need for prophylactic vaccination and inoculation against influenza  -     Influenza - Quadrivalent (PF)        1. Labs have been reviewed and are overall within normal limits.    2. Continue benazepril 40 mg daily and diltiazem 60 mg b.i.d..  Hypertension is well controlled.    3. Continue over-the-counter vitamin-D 2000 units daily.  Vitamin-D deficiency is stable.  4. Continue allopurinol 300 mg b.i.d..  Gout is well controlled.   5. Continue use of Viagra as needed.  Erectile dysfunction is stable.  6. Flu vaccine given.  7. Return to clinic as needed or in 1 year for annual physical exam.               Follow up in about 1 year (around 11/15/2024), or if symptoms worsen or fail to improve, for Annual exam.

## 2024-04-04 DIAGNOSIS — I10 ESSENTIAL HYPERTENSION: ICD-10-CM

## 2024-04-04 RX ORDER — BENAZEPRIL HYDROCHLORIDE 40 MG/1
TABLET ORAL
Qty: 90 TABLET | Refills: 2 | Status: SHIPPED | OUTPATIENT
Start: 2024-04-04

## 2024-04-04 NOTE — TELEPHONE ENCOUNTER
No care due was identified.  Health [facility] Embedded Care Due Messages. Reference number: [id number].   4/04/2024 1:02:14 AM CDT

## 2024-04-05 NOTE — TELEPHONE ENCOUNTER
Refill Decision Note   Ghulamtaylor Ariza  is requesting a refill authorization.  Brief Assessment and Rationale for Refill:  Approve     Medication Therapy Plan:         Comments:     Note composed:11:46 PM 04/04/2024

## 2024-10-07 ENCOUNTER — TELEPHONE (OUTPATIENT)
Dept: ADMINISTRATIVE | Facility: CLINIC | Age: 64
End: 2024-10-07
Payer: MEDICARE

## 2024-10-08 ENCOUNTER — TELEPHONE (OUTPATIENT)
Dept: FAMILY MEDICINE | Facility: CLINIC | Age: 64
End: 2024-10-08
Payer: MEDICARE

## 2024-10-08 NOTE — TELEPHONE ENCOUNTER
----- Message from Maura sent at 10/8/2024  8:35 AM CDT -----  Regarding: Pt called to cancel/reschedule his 9 am wellness visit and pt would like a call back this morning  Type:  Appointment Request    Name of Caller: Pt called to cancel/reschedule his 9 am wellness visit and pt would like a call back this morning  When is the first available appointment? unknown  Symptoms: Wellness Visit  Best Call Back Number: [phone]

## 2024-10-18 ENCOUNTER — HOSPITAL ENCOUNTER (EMERGENCY)
Facility: HOSPITAL | Age: 64
Discharge: HOME | End: 2024-10-19
Attending: EMERGENCY MEDICINE
Payer: MEDICARE

## 2024-10-18 VITALS
TEMPERATURE: 98 F | RESPIRATION RATE: 18 BRPM | HEIGHT: 73 IN | OXYGEN SATURATION: 95 % | WEIGHT: 226 LBS | SYSTOLIC BLOOD PRESSURE: 154 MMHG | DIASTOLIC BLOOD PRESSURE: 91 MMHG | BODY MASS INDEX: 29.95 KG/M2 | HEART RATE: 60 BPM

## 2024-10-18 DIAGNOSIS — R10.31 RIGHT LOWER QUADRANT ABDOMINAL PAIN: Primary | ICD-10-CM

## 2024-10-18 DIAGNOSIS — N30.00 ACUTE CYSTITIS WITHOUT HEMATURIA: ICD-10-CM

## 2024-10-18 LAB
ALBUMIN SERPL BCP-MCNC: 3.9 G/DL (ref 3.5–5.2)
ALP SERPL-CCNC: 59 U/L (ref 40–150)
ALT SERPL W/O P-5'-P-CCNC: 20 U/L (ref 10–44)
ANION GAP SERPL CALC-SCNC: 10 MMOL/L (ref 8–16)
AST SERPL-CCNC: 16 U/L (ref 10–40)
BACTERIA #/AREA URNS HPF: ABNORMAL /HPF
BASOPHILS # BLD AUTO: 0.01 K/UL (ref 0–0.2)
BASOPHILS NFR BLD: 0.2 % (ref 0–1.9)
BILIRUB SERPL-MCNC: 0.2 MG/DL (ref 0.1–1)
BILIRUB UR QL STRIP: NEGATIVE
BUN SERPL-MCNC: 17 MG/DL (ref 8–23)
CALCIUM SERPL-MCNC: 9.5 MG/DL (ref 8.7–10.5)
CHLORIDE SERPL-SCNC: 105 MMOL/L (ref 95–110)
CLARITY UR: CLEAR
CO2 SERPL-SCNC: 23 MMOL/L (ref 23–29)
COLOR UR: YELLOW
CREAT SERPL-MCNC: 1.4 MG/DL (ref 0.5–1.4)
DIFFERENTIAL METHOD BLD: ABNORMAL
EOSINOPHIL # BLD AUTO: 0.2 K/UL (ref 0–0.5)
EOSINOPHIL NFR BLD: 4.8 % (ref 0–8)
ERYTHROCYTE [DISTWIDTH] IN BLOOD BY AUTOMATED COUNT: 15.1 % (ref 11.5–14.5)
EST. GFR  (NO RACE VARIABLE): 56 ML/MIN/1.73 M^2
GLUCOSE SERPL-MCNC: 110 MG/DL (ref 70–110)
GLUCOSE UR QL STRIP: NEGATIVE
HCT VFR BLD AUTO: 38.3 % (ref 40–54)
HCV AB SERPL QL IA: NEGATIVE
HGB BLD-MCNC: 12.8 G/DL (ref 14–18)
HGB UR QL STRIP: NEGATIVE
HIV 1+2 AB+HIV1 P24 AG SERPL QL IA: NEGATIVE
HYALINE CASTS #/AREA URNS LPF: 0 /LPF
IMM GRANULOCYTES # BLD AUTO: 0.01 K/UL (ref 0–0.04)
IMM GRANULOCYTES NFR BLD AUTO: 0.2 % (ref 0–0.5)
KETONES UR QL STRIP: NEGATIVE
LEUKOCYTE ESTERASE UR QL STRIP: NEGATIVE
LIPASE SERPL-CCNC: 35 U/L (ref 4–60)
LYMPHOCYTES # BLD AUTO: 2.4 K/UL (ref 1–4.8)
LYMPHOCYTES NFR BLD: 51.2 % (ref 18–48)
MCH RBC QN AUTO: 27.4 PG (ref 27–31)
MCHC RBC AUTO-ENTMCNC: 33.4 G/DL (ref 32–36)
MCV RBC AUTO: 82 FL (ref 82–98)
MICROSCOPIC COMMENT: ABNORMAL
MONOCYTES # BLD AUTO: 0.3 K/UL (ref 0.3–1)
MONOCYTES NFR BLD: 5.7 % (ref 4–15)
NEUTROPHILS # BLD AUTO: 1.8 K/UL (ref 1.8–7.7)
NEUTROPHILS NFR BLD: 37.9 % (ref 38–73)
NITRITE UR QL STRIP: NEGATIVE
NRBC BLD-RTO: 0 /100 WBC
OTHER ELEMENTS URNS MICRO: ABNORMAL
PH UR STRIP: 6 [PH] (ref 5–8)
PLATELET # BLD AUTO: 208 K/UL (ref 150–450)
PMV BLD AUTO: 9.6 FL (ref 9.2–12.9)
POTASSIUM SERPL-SCNC: 3.8 MMOL/L (ref 3.5–5.1)
PROT SERPL-MCNC: 7.2 G/DL (ref 6–8.4)
PROT UR QL STRIP: ABNORMAL
RBC # BLD AUTO: 4.68 M/UL (ref 4.6–6.2)
RBC #/AREA URNS HPF: 1 /HPF (ref 0–4)
SODIUM SERPL-SCNC: 138 MMOL/L (ref 136–145)
SP GR UR STRIP: 1.02 (ref 1–1.03)
SQUAMOUS #/AREA URNS HPF: 1 /HPF
URN SPEC COLLECT METH UR: ABNORMAL
UROBILINOGEN UR STRIP-ACNC: NEGATIVE EU/DL
WBC # BLD AUTO: 4.75 K/UL (ref 3.9–12.7)
WBC #/AREA URNS HPF: 0 /HPF (ref 0–5)

## 2024-10-18 PROCEDURE — 36415 COLL VENOUS BLD VENIPUNCTURE: CPT

## 2024-10-18 PROCEDURE — 87389 HIV-1 AG W/HIV-1&-2 AB AG IA: CPT | Performed by: EMERGENCY MEDICINE

## 2024-10-18 PROCEDURE — 96374 THER/PROPH/DIAG INJ IV PUSH: CPT | Mod: 59

## 2024-10-18 PROCEDURE — 63600175 PHARM REV CODE 636 W HCPCS: Performed by: EMERGENCY MEDICINE

## 2024-10-18 PROCEDURE — 80053 COMPREHEN METABOLIC PANEL: CPT

## 2024-10-18 PROCEDURE — 83690 ASSAY OF LIPASE: CPT

## 2024-10-18 PROCEDURE — 63600175 PHARM REV CODE 636 W HCPCS

## 2024-10-18 PROCEDURE — 25500020 PHARM REV CODE 255

## 2024-10-18 PROCEDURE — 86803 HEPATITIS C AB TEST: CPT | Performed by: EMERGENCY MEDICINE

## 2024-10-18 PROCEDURE — 85025 COMPLETE CBC W/AUTO DIFF WBC: CPT

## 2024-10-18 PROCEDURE — 99285 EMERGENCY DEPT VISIT HI MDM: CPT | Mod: 25

## 2024-10-18 PROCEDURE — 96375 TX/PRO/DX INJ NEW DRUG ADDON: CPT

## 2024-10-18 PROCEDURE — 81000 URINALYSIS NONAUTO W/SCOPE: CPT

## 2024-10-18 PROCEDURE — 36415 COLL VENOUS BLD VENIPUNCTURE: CPT | Performed by: EMERGENCY MEDICINE

## 2024-10-18 RX ORDER — CIPROFLOXACIN 500 MG/1
500 TABLET ORAL 2 TIMES DAILY
Qty: 20 TABLET | Refills: 0 | Status: SHIPPED | OUTPATIENT
Start: 2024-10-18 | End: 2024-10-19

## 2024-10-18 RX ORDER — CEFTRIAXONE 1 G/1
1 INJECTION, POWDER, FOR SOLUTION INTRAMUSCULAR; INTRAVENOUS
Status: COMPLETED | OUTPATIENT
Start: 2024-10-18 | End: 2024-10-18

## 2024-10-18 RX ORDER — KETOROLAC TROMETHAMINE 30 MG/ML
15 INJECTION, SOLUTION INTRAMUSCULAR; INTRAVENOUS
Status: COMPLETED | OUTPATIENT
Start: 2024-10-18 | End: 2024-10-18

## 2024-10-18 RX ADMIN — CEFTRIAXONE SODIUM 1 G: 1 INJECTION, POWDER, FOR SOLUTION INTRAMUSCULAR; INTRAVENOUS at 11:10

## 2024-10-18 RX ADMIN — KETOROLAC TROMETHAMINE 15 MG: 30 INJECTION, SOLUTION INTRAMUSCULAR; INTRAVENOUS at 08:10

## 2024-10-18 RX ADMIN — IOHEXOL 100 ML: 350 INJECTION, SOLUTION INTRAVENOUS at 09:10

## 2024-10-18 NOTE — ED PROVIDER NOTES
Encounter Date: 10/18/2024       History     Chief Complaint   Patient presents with    Abdominal Pain     Rt. Lower abd. X 2 weeks  / hx. Diverticulitis      64-year-old male with a past medical history of hypertension, degenerative disease, and diverticulitis presents presents to the ED for abdominal pain.  Patient states it started 1 week ago and getting worse.  Patient complaining of right lower quadrant burning constant 8/10 pain.  No medications prior to arrival.   Patient also states he has a history of diverticulitis with has been years.  Patient denies any history kidney stones.  Patient denies any previous abdominal surgeries.  Patient denies fever, sweats, chills, nausea, vomiting, diarrhea constipation,  symptoms, headaches, dizziness, lightheadedness, and rashes.      Review of patient's allergies indicates:  No Known Allergies  Past Medical History:   Diagnosis Date    Cataract     Degenerative disc disease     cervical radiculopathy    Genital herpes     Glaucoma     Hx of colonic polyps     Hypertension early 40s    Pinched nerve in neck      Past Surgical History:   Procedure Laterality Date    back sx      L4/L5 Herniated disk    BUNIONECTOMY Right     COLONOSCOPY N/A 9/6/2018    Procedure: COLONOSCOPY;  Surgeon: Mg Lagunas MD;  Location: [address]);  Service: Endoscopy;  Laterality: N/A;    SPINE SURGERY      L4/5 laminectomy    TRANSFORAMINAL EPIDURAL INJECTION OF STEROID Right 6/10/2021    Procedure: INJECTION, STEROID, EPIDURAL, TRANSFORAMINAL APPROACH, L3-L4 AND L4-L5 URGENT CASE;  Surgeon: Enrique Sanchez MD;  Location: [facility];  Service: Pain Management;  Laterality: Right;    TRANSFORAMINAL EPIDURAL INJECTION OF STEROID Bilateral 7/8/2021    Procedure: INJECTION, STEROID, EPIDURAL, TRANSFORAMINAL APPROACH Bilateral L4/5 TFESI MEDICALLY URGENT CASE;  Surgeon: Enrique Sanchez MD;  Location: [facility];  Service: Pain Management;  Laterality: Bilateral;  consent needed      Family History   Problem Relation Name Age of Onset    Glaucoma Mother      Hypertension Mother      Stroke Mother  68        TIA    Cancer Father  65        Prostate    Cancer Brother          Prostate Cancer    Cancer Paternal Uncle      Amblyopia Neg Hx      Blindness Neg Hx      Cataracts Neg Hx      Diabetes Neg Hx      Macular degeneration Neg Hx      Retinal detachment Neg Hx       Social History     Tobacco Use    Smoking status: Never    Smokeless tobacco: Never   Substance Use Topics    Alcohol use: Yes     Alcohol/week: 0.0 standard drinks of alcohol     Comment: every weekend few drinks    Drug use: No     Review of Systems   Constitutional:  Negative for chills, diaphoresis and fever.   Respiratory:  Negative for shortness of breath.    Cardiovascular:  Negative for chest pain.   Gastrointestinal:  Positive for abdominal pain. Negative for constipation, diarrhea, nausea and vomiting.   Genitourinary:  Negative for decreased urine volume, difficulty urinating, dysuria, frequency, hematuria, penile discharge, penile pain, penile swelling, scrotal swelling, testicular pain and urgency.   Musculoskeletal:  Negative for myalgias.   Skin:  Negative for rash.   Neurological:  Negative for dizziness, weakness, light-headedness and headaches.       Physical Exam     Initial Vitals [10/18/24 1818]   BP Pulse Resp Temp SpO2   (!) 186/102 69 18 97.9 °F (36.6 °C) 99 %      MAP       --         Physical Exam    Nursing note and vitals reviewed.  Constitutional: He appears well-developed and well-nourished. He is not diaphoretic. He is active. He does not appear ill. No distress.   HENT:   Head: Normocephalic and atraumatic.   Right Ear: External ear normal.   Left Ear: External ear normal.   Nose: Nose normal.   Eyes: Lids are normal. Pupils are equal, round, and reactive to light. Right eye exhibits no discharge. Left eye exhibits no discharge. No scleral icterus.   Neck: Phonation normal. Neck supple.   Normal  range of motion.   Full passive range of motion without pain.     Cardiovascular:  Normal rate and regular rhythm.           Pulmonary/Chest: Effort normal and breath sounds normal. No respiratory distress.   Abdominal: Abdomen is soft. He exhibits no distension. There is abdominal tenderness in the right lower quadrant. There is guarding. There is no rebound and no tenderness at McBurney's point. negative Rovsing's sign  Musculoskeletal:         General: Normal range of motion.      Cervical back: Full passive range of motion without pain, normal range of motion and neck supple.     Neurological: He is alert and oriented to person, place, and time. GCS eye subscore is 4. GCS verbal subscore is 5. GCS motor subscore is 6.   Skin: Skin is dry. Capillary refill takes less than 2 seconds. No rash noted.         ED Course   Procedures  Labs Reviewed   CBC W/ AUTO DIFFERENTIAL - Abnormal       Result Value    WBC 4.75      RBC 4.68      Hemoglobin 12.8 (*)     Hematocrit 38.3 (*)     MCV 82      MCH 27.4      MCHC 33.4      RDW 15.1 (*)     Platelets 208      MPV 9.6      Immature Granulocytes 0.2      Gran # (ANC) 1.8      Immature Grans (Abs) 0.01      Lymph # 2.4      Mono # 0.3      Eos # 0.2      Baso # 0.01      nRBC 0      Gran % 37.9 (*)     Lymph % 51.2 (*)     Mono % 5.7      Eosinophil % 4.8      Basophil % 0.2      Differential Method Automated     COMPREHENSIVE METABOLIC PANEL - Abnormal    Sodium 138      Potassium 3.8      Chloride 105      CO2 23      Glucose 110      BUN 17      Creatinine 1.4      Calcium 9.5      Total Protein 7.2      Albumin 3.9      Total Bilirubin 0.2      Alkaline Phosphatase 59      AST 16      ALT 20      eGFR 56 (*)     Anion Gap 10     URINALYSIS, REFLEX TO URINE CULTURE - Abnormal    Specimen UA Urine, Clean Catch      Color, UA Yellow      Appearance, UA Clear      pH, UA 6.0      Specific Gravity, UA 1.020      Protein, UA 1+ (*)     Glucose, UA Negative      Ketones, UA  Negative      Bilirubin (UA) Negative      Occult Blood UA Negative      Nitrite, UA Negative      Urobilinogen, UA Negative      Leukocytes, UA Negative      Narrative:     Specimen Source->Urine   URINALYSIS MICROSCOPIC - Abnormal    RBC, UA 1      WBC, UA 0      Bacteria Rare      Squam Epithel, UA 1      Hyaline Casts, UA 0      Other (U/A) Occasional (*)     Microscopic Comment SEE COMMENT      Narrative:     Specimen Source->Urine   HIV 1 / 2 ANTIBODY    HIV 1/2 Ag/Ab Negative      Narrative:     Release to patient->Immediate   HEPATITIS C ANTIBODY    Hepatitis C Ab Negative      Narrative:     Release to patient->Immediate   LIPASE    Lipase 35            Imaging Results    None          Medications   iohexoL (OMNIPAQUE 350) 350 mg iodine/mL injection (has no administration in time range)   ketorolac injection 15 mg (15 mg Intravenous Given 10/18/24 2018)     Medical Decision Making  64-year-old male with a past medical history of hypertension, degenerative disease, and diverticulitis presents presents to the ED for abdominal pain.   Patient's chart and medical history reviewed.    Ddx:  Cholecystitis  Choledocholithiasis  Pancreatitis  Gastritis  GERD  SBO  Colitis  Appendicitis  UTI    Patient's vitals reviewed.  Afebrile, no respiratory distress, and nontoxic-appearing in the ED. Patient had right lower quadrant tenderness palpation with guarding.  Patient given Toradol for pain.  CBC showed normal red blood cell count with an H&H of 12.8 and 38.3 respectively, no need for transfusion at this time.  No leukocytosis.  Lipase in normal range, pancreatitis unlikely.  CMP showed a GFR 56, otherwise unremarkable.  Normal BUN and creatinine. UA unremarkable infection. Patient handed off to Dr. Chou at shift change pending CT and final dispo. 10/18/2024 @2100    Amount and/or Complexity of Data Reviewed  External Data Reviewed: labs and radiology.  Labs: ordered.  Radiology: ordered.    Risk  Prescription drug  management.                                      Clinical Impression:  Final diagnoses:  [R10.31] Right lower quadrant abdominal pain (Primary)                 Holdsworth, Alayna, PA-C  10/18/24 [id number]

## 2024-10-19 RX ORDER — CIPROFLOXACIN 500 MG/1
500 TABLET ORAL 2 TIMES DAILY
Qty: 20 TABLET | Refills: 0 | Status: SHIPPED | OUTPATIENT
Start: 2024-10-19 | End: 2024-10-29

## 2024-11-06 ENCOUNTER — TELEPHONE (OUTPATIENT)
Dept: INTERNAL MEDICINE | Facility: CLINIC | Age: 64
End: 2024-11-06
Payer: MEDICARE

## 2024-11-06 DIAGNOSIS — Z00.01 ENCOUNTER FOR GENERAL ADULT MEDICAL EXAMINATION WITH ABNORMAL FINDINGS: ICD-10-CM

## 2024-11-06 DIAGNOSIS — M10.9 GOUT, UNSPECIFIED CAUSE, UNSPECIFIED CHRONICITY, UNSPECIFIED SITE: ICD-10-CM

## 2024-11-06 DIAGNOSIS — Z12.5 ENCOUNTER FOR SCREENING FOR MALIGNANT NEOPLASM OF PROSTATE: ICD-10-CM

## 2024-11-06 DIAGNOSIS — E55.9 VITAMIN D DEFICIENCY DISEASE: ICD-10-CM

## 2024-11-06 DIAGNOSIS — R79.9 ABNORMAL FINDING OF BLOOD CHEMISTRY, UNSPECIFIED: ICD-10-CM

## 2024-11-06 DIAGNOSIS — I10 ESSENTIAL HYPERTENSION: ICD-10-CM

## 2024-11-06 RX ORDER — DILTIAZEM HYDROCHLORIDE 60 MG/1
60 CAPSULE, EXTENDED RELEASE ORAL 2 TIMES DAILY
Qty: 180 CAPSULE | Refills: 0 | Status: SHIPPED | OUTPATIENT
Start: 2024-11-06

## 2024-11-06 RX ORDER — ALLOPURINOL 300 MG/1
300 TABLET ORAL 2 TIMES DAILY
Qty: 180 TABLET | Refills: 0 | Status: SHIPPED | OUTPATIENT
Start: 2024-11-06

## 2024-11-06 RX ORDER — BENAZEPRIL HYDROCHLORIDE 40 MG/1
40 TABLET ORAL
Qty: 90 TABLET | Refills: 0 | Status: SHIPPED | OUTPATIENT
Start: 2024-11-06

## 2024-11-06 NOTE — TELEPHONE ENCOUNTER
LOV 11-15-23  LF 11-15-23    Pt due for annual visit. I tried calling pt to assist with scheduling. No answer. Left VM for pt to call the clinic.    Rx request from pt's pharmacy  Thanks

## 2024-11-06 NOTE — TELEPHONE ENCOUNTER
Pt's labs have been ordered. Sent pt a message in [email or web address] to schedule via [facility] or call office.

## 2024-12-03 ENCOUNTER — LAB VISIT (OUTPATIENT)
Dept: LAB | Facility: HOSPITAL | Age: 64
End: 2024-12-03
Attending: NURSE PRACTITIONER
Payer: MEDICARE

## 2024-12-03 ENCOUNTER — OFFICE VISIT (OUTPATIENT)
Dept: FAMILY MEDICINE | Facility: CLINIC | Age: 64
End: 2024-12-03
Payer: MEDICARE

## 2024-12-03 ENCOUNTER — TELEPHONE (OUTPATIENT)
Dept: GASTROENTEROLOGY | Facility: CLINIC | Age: 64
End: 2024-12-03
Payer: MEDICARE

## 2024-12-03 VITALS
TEMPERATURE: 99 F | SYSTOLIC BLOOD PRESSURE: 118 MMHG | DIASTOLIC BLOOD PRESSURE: 72 MMHG | BODY MASS INDEX: 29.7 KG/M2 | HEIGHT: 73 IN | WEIGHT: 224.13 LBS | OXYGEN SATURATION: 99 % | HEART RATE: 65 BPM

## 2024-12-03 DIAGNOSIS — Z12.11 SCREENING FOR COLON CANCER: ICD-10-CM

## 2024-12-03 DIAGNOSIS — I10 ESSENTIAL HYPERTENSION: ICD-10-CM

## 2024-12-03 DIAGNOSIS — M10.9 GOUT, UNSPECIFIED CAUSE, UNSPECIFIED CHRONICITY, UNSPECIFIED SITE: ICD-10-CM

## 2024-12-03 DIAGNOSIS — Z23 NEED FOR INFLUENZA VACCINATION: ICD-10-CM

## 2024-12-03 DIAGNOSIS — Z00.00 ANNUAL PHYSICAL EXAM: Primary | ICD-10-CM

## 2024-12-03 DIAGNOSIS — Z12.5 SCREENING FOR PROSTATE CANCER: ICD-10-CM

## 2024-12-03 DIAGNOSIS — N52.9 ERECTILE DYSFUNCTION, UNSPECIFIED ERECTILE DYSFUNCTION TYPE: ICD-10-CM

## 2024-12-03 DIAGNOSIS — R10.31 RLQ ABDOMINAL PAIN: ICD-10-CM

## 2024-12-03 DIAGNOSIS — R73.02 IGT (IMPAIRED GLUCOSE TOLERANCE): ICD-10-CM

## 2024-12-03 LAB
ALBUMIN SERPL BCP-MCNC: 4.1 G/DL (ref 3.5–5.2)
ALBUMIN/CREAT UR: NORMAL UG/MG (ref 0–30)
ALP SERPL-CCNC: 55 U/L (ref 40–150)
ALT SERPL W/O P-5'-P-CCNC: 21 U/L (ref 10–44)
ANION GAP SERPL CALC-SCNC: 7 MMOL/L (ref 8–16)
AST SERPL-CCNC: 18 U/L (ref 10–40)
BASOPHILS # BLD AUTO: 0.01 K/UL (ref 0–0.2)
BASOPHILS NFR BLD: 0.3 % (ref 0–1.9)
BILIRUB SERPL-MCNC: 0.4 MG/DL (ref 0.1–1)
BUN SERPL-MCNC: 11 MG/DL (ref 8–23)
CALCIUM SERPL-MCNC: 9.8 MG/DL (ref 8.7–10.5)
CHLORIDE SERPL-SCNC: 105 MMOL/L (ref 95–110)
CHOLEST SERPL-MCNC: 185 MG/DL (ref 120–199)
CHOLEST/HDLC SERPL: 3.4 {RATIO} (ref 2–5)
CO2 SERPL-SCNC: 27 MMOL/L (ref 23–29)
COMPLEXED PSA SERPL-MCNC: 0.84 NG/ML (ref 0–4)
CREAT SERPL-MCNC: 1.4 MG/DL (ref 0.5–1.4)
CREAT UR-MCNC: 91 MG/DL (ref 23–375)
DIFFERENTIAL METHOD BLD: ABNORMAL
EOSINOPHIL # BLD AUTO: 0.3 K/UL (ref 0–0.5)
EOSINOPHIL NFR BLD: 7 % (ref 0–8)
ERYTHROCYTE [DISTWIDTH] IN BLOOD BY AUTOMATED COUNT: 15.2 % (ref 11.5–14.5)
EST. GFR  (NO RACE VARIABLE): 56.1 ML/MIN/1.73 M^2
ESTIMATED AVG GLUCOSE: 120 MG/DL (ref 68–131)
GLUCOSE SERPL-MCNC: 104 MG/DL (ref 70–110)
HBA1C MFR BLD: 5.8 % (ref 4–5.6)
HCT VFR BLD AUTO: 40.4 % (ref 40–54)
HDLC SERPL-MCNC: 54 MG/DL (ref 40–75)
HDLC SERPL: 29.2 % (ref 20–50)
HGB BLD-MCNC: 13.2 G/DL (ref 14–18)
IMM GRANULOCYTES # BLD AUTO: 0.01 K/UL (ref 0–0.04)
IMM GRANULOCYTES NFR BLD AUTO: 0.3 % (ref 0–0.5)
LDLC SERPL CALC-MCNC: 108.8 MG/DL (ref 63–159)
LYMPHOCYTES # BLD AUTO: 1.5 K/UL (ref 1–4.8)
LYMPHOCYTES NFR BLD: 41.4 % (ref 18–48)
MCH RBC QN AUTO: 26.9 PG (ref 27–31)
MCHC RBC AUTO-ENTMCNC: 32.7 G/DL (ref 32–36)
MCV RBC AUTO: 82 FL (ref 82–98)
MICROALBUMIN UR DL<=1MG/L-MCNC: <5 UG/ML
MONOCYTES # BLD AUTO: 0.2 K/UL (ref 0.3–1)
MONOCYTES NFR BLD: 5.6 % (ref 4–15)
NEUTROPHILS # BLD AUTO: 1.7 K/UL (ref 1.8–7.7)
NEUTROPHILS NFR BLD: 45.4 % (ref 38–73)
NONHDLC SERPL-MCNC: 131 MG/DL
NRBC BLD-RTO: 0 /100 WBC
PLATELET # BLD AUTO: 235 K/UL (ref 150–450)
PMV BLD AUTO: 10.6 FL (ref 9.2–12.9)
POTASSIUM SERPL-SCNC: 4.6 MMOL/L (ref 3.5–5.1)
PROT SERPL-MCNC: 7.4 G/DL (ref 6–8.4)
RBC # BLD AUTO: 4.9 M/UL (ref 4.6–6.2)
SODIUM SERPL-SCNC: 139 MMOL/L (ref 136–145)
TRIGL SERPL-MCNC: 111 MG/DL (ref 30–150)
TSH SERPL DL<=0.005 MIU/L-ACNC: 0.53 UIU/ML (ref 0.4–4)
URATE SERPL-MCNC: 8.1 MG/DL (ref 3.4–7)
WBC # BLD AUTO: 3.72 K/UL (ref 3.9–12.7)

## 2024-12-03 PROCEDURE — 84550 ASSAY OF BLOOD/URIC ACID: CPT | Mod: HCNC | Performed by: NURSE PRACTITIONER

## 2024-12-03 PROCEDURE — 84153 ASSAY OF PSA TOTAL: CPT | Mod: HCNC | Performed by: NURSE PRACTITIONER

## 2024-12-03 PROCEDURE — 84443 ASSAY THYROID STIM HORMONE: CPT | Mod: HCNC | Performed by: NURSE PRACTITIONER

## 2024-12-03 PROCEDURE — 85025 COMPLETE CBC W/AUTO DIFF WBC: CPT | Mod: HCNC | Performed by: NURSE PRACTITIONER

## 2024-12-03 PROCEDURE — 80053 COMPREHEN METABOLIC PANEL: CPT | Mod: HCNC | Performed by: NURSE PRACTITIONER

## 2024-12-03 PROCEDURE — 80061 LIPID PANEL: CPT | Mod: HCNC | Performed by: NURSE PRACTITIONER

## 2024-12-03 PROCEDURE — 82570 ASSAY OF URINE CREATININE: CPT | Mod: HCNC | Performed by: NURSE PRACTITIONER

## 2024-12-03 PROCEDURE — 83036 HEMOGLOBIN GLYCOSYLATED A1C: CPT | Mod: HCNC | Performed by: NURSE PRACTITIONER

## 2024-12-03 PROCEDURE — 99999 PR PBB SHADOW E&M-EST. PATIENT-LVL IV: CPT | Mod: PBBFAC,HCNC,, | Performed by: NURSE PRACTITIONER

## 2024-12-03 RX ORDER — SILDENAFIL 100 MG/1
100 TABLET, FILM COATED ORAL DAILY PRN
Qty: 30 TABLET | Refills: 2 | Status: SHIPPED | OUTPATIENT
Start: 2024-12-03

## 2024-12-03 NOTE — PROGRESS NOTES
SUBJECTIVE:      Patient ID: Ghulam Ariza is a 64 y.o. male.    Chief Complaint: Establish Care    History of Present Illness    CHIEF COMPLAINT:  Mr. Ariza presents for an annual wellness visit, labs, and to discuss abdominal pain and schedule a colonoscopy.    HPI:  Mr. Ariza reports intermittent abdominal pain in the right lower quadrant since October when he visited the emergency room, but is asymptomatic today. He had a large polyp found during a colonoscopy at approximately age 50. His last colonoscopy was in 2018, about 6 years ago, and he believes he was on a 5-year follow-up schedule due to the previous finding.    In October, he visited the emergency room due to abdominal pain. Various tests were conducted, and he was diagnosed with a urinary tract infection, for which he was prescribed Ciprofloxacin.    Mr. Ariza has a history of gout but has not taken gout medication for over 3 years after discovering that shellfish was triggering his attacks. By avoiding shellfish, including crabs and shrimp, he reports no gout attacks in over 3 years.    Mr. Ariza denies any current pain during abdominal palpation and any irregularity in bowel movements.    MEDICATIONS:  Mr. Ariza is on Diltiazem 60 mg twice daily and Benazepril 40 mg daily for hypertension. He is also taking Sildenafil as needed for erectile dysfunction.    MEDICAL HISTORY:  Mr. Ariza has a history of gout, hypertension, and erectile dysfunction. He was diagnosed with a urinary tract infection in October during an ER visit. He received a shingles vaccine a few years ago.    SURGICAL HISTORY:  Mr. Ariza underwent a colonoscopy in 2018 where a large polyp was found. He had another colonoscopy around 2005, when he was approximately 50 years old, which also revealed a large polyp.    TEST RESULTS:  A urinalysis conducted in October showed an abnormality, leading to a UTI diagnosis.    IMAGING:  Mr. Ariza had a CT Abdomen in October, which showed no acute  findings.    ALLERGIES:  Mr. Ariza is allergic to shellfish (crabs, shrimps), which can trigger gout flare-ups.    SOCIAL HISTORY:  Mr. Ariza consumes alcohol occasionally, about 2 drinks on weekends. He denies smoking and drug use. Mr. Ariza is medically retired and previously worked at a chemical plant for about 20 years.        Review of Systems   Constitutional:  Negative for activity change, appetite change, chills, diaphoresis, fatigue, fever and unexpected weight change.   HENT:  Negative for congestion, ear pain, sinus pressure, sore throat, trouble swallowing and voice change.    Eyes:  Negative for pain, discharge and visual disturbance.   Respiratory:  Negative for cough, chest tightness, shortness of breath and wheezing.    Cardiovascular:  Negative for chest pain and palpitations.   Gastrointestinal:  Positive for abdominal pain (RLQ). Negative for constipation, diarrhea, nausea and vomiting.   Genitourinary:  Negative for difficulty urinating, flank pain, frequency and urgency.   Musculoskeletal:  Negative for back pain and joint swelling.   Skin:  Negative for color change and rash.   Neurological:  Negative for dizziness, seizures, syncope, weakness, numbness and headaches.   Hematological:  Negative for adenopathy.   Psychiatric/Behavioral:  Negative for dysphoric mood and sleep disturbance. The patient is not nervous/anxious.        Family History   Problem Relation Name Age of Onset    Glaucoma Mother      Hypertension Mother      Stroke Mother  68        TIA    Cancer Father  65        Prostate    Cancer Brother          Prostate Cancer    Cancer Paternal Uncle      Amblyopia Neg Hx      Blindness Neg Hx      Cataracts Neg Hx      Diabetes Neg Hx      Macular degeneration Neg Hx      Retinal detachment Neg Hx        Social History     Socioeconomic History    Marital status: Single   Occupational History    Occupation:      Employer: [employer]   Tobacco Use    Smoking status: Never     Smokeless tobacco: Never   Substance and Sexual Activity    Alcohol use: Yes     Alcohol/week: 0.0 standard drinks of alcohol     Comment: every weekend few drinks    Drug use: No    Sexual activity: Yes     Partners: Female     Birth control/protection: None     Social Drivers of Health     Financial Resource Strain: Low Risk  (12/2/2024)    Overall Financial Resource Strain (CARDIA)     Difficulty of Paying Living Expenses: Not hard at all   Food Insecurity: No Food Insecurity (12/2/2024)    Hunger Vital Sign     Worried About Running Out of Food in the Last Year: Never true     Ran Out of Food in the Last Year: Never true   Physical Activity: Insufficiently Active (12/2/2024)    Exercise Vital Sign     Days of Exercise per Week: 2 days     Minutes of Exercise per Session: 30 min   Stress: No Stress Concern Present (12/2/2024)    [employer] - Occupational Stress Questionnaire     Feeling of Stress : Not at all   Housing Stability: Unknown (12/2/2024)    Housing Stability Vital Sign     Unable to Pay for Housing in the Last Year: No     Current Outpatient Medications   Medication Sig Dispense Refill    benazepriL (LOTENSIN) 40 MG tablet TAKE 1 TABLET ONE TIME DAILY 90 tablet 0    diltiaZEM (CARDIZEM SR) 60 MG Cp12 TAKE 1 CAPSULE TWICE DAILY 180 capsule 0    allopurinoL (ZYLOPRIM) 300 MG tablet TAKE 1 TABLET TWICE DAILY (Patient not taking: Reported on 12/3/2024) 180 tablet 0    sildenafiL (VIAGRA) 100 MG tablet Take 1 tablet (100 mg total) by mouth daily as needed for Erectile Dysfunction. 30 tablet 2     Current Facility-Administered Medications   Medication Dose Route Frequency Provider Last Rate Last Admin    influenza (Flulaval, Fluzone, Fluarix) 45 mcg/0.5 mL IM vaccine (> or = 6 mo) 0.5 mL  0.5 mL Intramuscular 1 time in Clinic/HOD          Facility-Administered Medications Ordered in Other Visits   Medication Dose Route Frequency Provider Last Rate Last Admin    0.9%  NaCl  "infusion   Intravenous Continuous Rika Yang DO 25 mL/hr at 06/10/21 1419 New Bag at 06/10/21 1419     Review of patient's allergies indicates:  No Known Allergies   Past Medical History:   Diagnosis Date    Cataract     Degenerative disc disease     cervical radiculopathy    Genital herpes     Glaucoma     Hx of colonic polyps     Hypertension early 40s    Pinched nerve in neck      Past Surgical History:   Procedure Laterality Date    back sx      L4/L5 Herniated disk    BUNIONECTOMY Right     COLONOSCOPY N/A 9/6/2018    Procedure: COLONOSCOPY;  Surgeon: Mg Lagunas MD;  Location: [facility] (4TH FLR);  Service: Endoscopy;  Laterality: N/A;    SPINE SURGERY      L4/5 laminectomy    TRANSFORAMINAL EPIDURAL INJECTION OF STEROID Right 6/10/2021    Procedure: INJECTION, STEROID, EPIDURAL, TRANSFORAMINAL APPROACH, L3-L4 AND L4-L5 URGENT CASE;  Surgeon: Enrique Sanchez MD;  Location: [facility], [place], operated by [facility];  Service: Pain Management;  Laterality: Right;    TRANSFORAMINAL EPIDURAL INJECTION OF STEROID Bilateral 7/8/2021    Procedure: INJECTION, STEROID, EPIDURAL, TRANSFORAMINAL APPROACH Bilateral L4/5 TFESI MEDICALLY URGENT CASE;  Surgeon: Enrique Sanchez MD;  Location: [facility], [place], operated by [facility];  Service: Pain Management;  Laterality: Bilateral;  consent needed          OBJECTIVE:      Vitals:    12/03/24 0853   BP: 118/72   BP Location: Left arm   Patient Position: Sitting   Pulse: 65   Temp: 98.9 °F (37.2 °C)   TempSrc: Oral   SpO2: 99%   Weight: 101.7 kg (224 lb 1.6 oz)   Height: 6' 1" (1.854 m)     Physical Exam  Vitals and nursing note reviewed.   Constitutional:       General: He is awake. He is not in acute distress.     Appearance: He is well-developed, well-groomed and overweight. He is not ill-appearing, toxic-appearing or diaphoretic.   HENT:      Head: Normocephalic and atraumatic.      Right Ear: Tympanic membrane, ear canal and external ear normal.      Left Ear: Tympanic membrane, ear canal and external ear normal.      " Nose: Nose normal.      Mouth/Throat:      Lips: Pink.      Mouth: Mucous membranes are moist.      Tongue: Tongue does not deviate from midline.      Pharynx: Oropharynx is clear. Uvula midline.   Eyes:      General: Lids are normal. Gaze aligned appropriately.      Conjunctiva/sclera: Conjunctivae normal.      Right eye: Right conjunctiva is not injected.      Left eye: Left conjunctiva is not injected.      Pupils: Pupils are equal, round, and reactive to light.   Neck:      Thyroid: No thyromegaly or thyroid tenderness.   Cardiovascular:      Rate and Rhythm: Normal rate and regular rhythm.      Pulses: Normal pulses.      Heart sounds: Normal heart sounds, S1 normal and S2 normal. No murmur heard.     No friction rub. No gallop.   Pulmonary:      Effort: Pulmonary effort is normal. No respiratory distress.      Breath sounds: Normal breath sounds. No stridor. No decreased breath sounds, wheezing, rhonchi or rales.   Chest:      Chest wall: No tenderness.   Abdominal:      General: There is no distension.      Palpations: Abdomen is soft.      Tenderness: There is no abdominal tenderness. There is no guarding or rebound.   Musculoskeletal:      Cervical back: Neck supple.      Right lower leg: No edema.      Left lower leg: No edema.   Lymphadenopathy:      Cervical: No cervical adenopathy.   Skin:     General: Skin is warm and dry.      Capillary Refill: Capillary refill takes less than 2 seconds.      Findings: No erythema or rash.   Neurological:      Mental Status: He is alert and oriented to person, place, and time. Mental status is at baseline.   Psychiatric:         Attention and Perception: Attention normal.         Mood and Affect: Mood normal.         Speech: Speech normal.         Behavior: Behavior normal. Behavior is cooperative.         Thought Content: Thought content normal.         Judgment: Judgment normal.            Assessment:       1. Annual physical exam    2. RLQ abdominal pain    3.  Essential hypertension    4. Erectile dysfunction, unspecified erectile dysfunction type    5. Gout, unspecified cause, unspecified chronicity, unspecified site    6. IGT (impaired glucose tolerance)    7. Screening for prostate cancer    8. Need for influenza vaccination    9. Screening for colon cancer        Plan:       Assessment & Plan    Reviewed patient's history of colonoscopy in 2018 with large polyp found  Assessed intermittent right lower quadrant abdominal pain since October ER visit  Evaluated October ER visit findings, including UTI diagnosis and treatment with Ciprofloxacin  Considered restarting allopurinol based on uric acid level, but patient managing gout through shellfish avoidance  Reviewed current hypertension management with diltiazem and benazepril  Counseled on age and gender appropriate medical preventative services, including cancer screenings, immunizations, overall nutritional health, need for a consistent exercise regimen and an overall push towards maintaining a vigorous and active lifestyle.     HYPERTENSION:  - Continued diltiazem 60 mg twice daily.  - Continued benazepril daily.  - Reduce the amount of salt in your diet; Lose weight; Avoid drinking too much alcohol; Exercise at least 30 minutes per day most days of the week.  Continue current medications and home BP monitoring.    ERECTILE DYSFUNCTION:  - Refilled sildenafil.  - Continued Sildenafil 100 mg prn.     DIGESTIVE SYSTEM ISSUES AND ABDOMINAL PAIN:  - Explained GI referral for colonoscopy and abdominal pain evaluation.  - Referred to Gastroenterology for colonoscopy and evaluation of right lower quadrant abdominal pain.  - Contact the office if unable to reach GI for colonoscopy results.    GOUT PREVENTION:  - Mr. Ariza to continue avoiding shellfish to prevent gout flares.  - Recommend low purine diet.  - Ordered fasting lab work, including uric acid level.    PREVENTIVE CARE:  - Discussed importance of shingles vaccine  booster.  - Flu shot administered in office.  - RSV vaccine encouraged.     FOLLOW-UP AND RESULTS:  - Follow up in 6 months.  - Results of lab work will be messaged via patient portal.        Annual physical exam    RLQ abdominal pain  -     Ambulatory referral/consult to Gastroenterology; Future; Expected date: 12/10/2024    Essential hypertension  -     CBC Auto Differential; Future; Expected date: 12/03/2024  -     Comprehensive Metabolic Panel; Future; Expected date: 12/03/2024  -     Lipid Panel; Future; Expected date: 12/03/2024  -     TSH; Future; Expected date: 12/03/2024  -     Microalbumin/Creatinine Ratio, Urine; Future; Expected date: 12/03/2024    Erectile dysfunction, unspecified erectile dysfunction type  -     CBC Auto Differential; Future; Expected date: 12/03/2024  -     Comprehensive Metabolic Panel; Future; Expected date: 12/03/2024  -     TSH; Future; Expected date: 12/03/2024  -     Hemoglobin A1C; Future; Expected date: 12/03/2024  -     sildenafiL (VIAGRA) 100 MG tablet; Take 1 tablet (100 mg total) by mouth daily as needed for Erectile Dysfunction.  Dispense: 30 tablet; Refill: 2    Gout, unspecified cause, unspecified chronicity, unspecified site  -     CBC Auto Differential; Future; Expected date: 12/03/2024  -     Comprehensive Metabolic Panel; Future; Expected date: 12/03/2024  -     URIC ACID; Future; Expected date: 12/03/2024    IGT (impaired glucose tolerance)  -     Comprehensive Metabolic Panel; Future; Expected date: 12/03/2024  -     Hemoglobin A1C; Future; Expected date: 12/03/2024    Screening for prostate cancer  -     PSA, Screening; Future; Expected date: 12/03/2024    Need for influenza vaccination  -     influenza (Flulaval, Fluzone, Fluarix) 45 mcg/0.5 mL IM vaccine (> or = 6 mo) 0.5 mL    Screening for colon cancer  -     Ambulatory referral/consult to Gastroenterology; Future; Expected date: 12/10/2024    I spent a total of 20 minutes on the day of the visit.This  includes face to face time and non-face to face time preparing to see the patient (eg, review of tests), obtaining and/or reviewing separately obtained history, documenting clinical information in the electronic or other health record, independently interpreting results and communicating results to the patient/family/caregiver, or care coordinator.    Follow up in about 6 months (around 6/3/2025) for HTN, Gout, ED.          12/3/2024 MITUL Ennis, IRLANDA    This note was generated with the assistance of ambient listening technology. Verbal consent was obtained by the patient and accompanying visitor(s) for the recording of patient appointment to facilitate this note. I attest to having reviewed and edited the generated note for accuracy, though some syntax or spelling errors may persist. Please contact the author of this note for any clarification.

## 2024-12-04 ENCOUNTER — TELEPHONE (OUTPATIENT)
Dept: GASTROENTEROLOGY | Facility: CLINIC | Age: 64
End: 2024-12-04
Payer: MEDICARE

## 2024-12-04 NOTE — TELEPHONE ENCOUNTER
Talked to patient and sent message to contact scheduling nurse, does not have any pain or discomfort, only needs a colonoscopy for History of Polyps. Number provided.

## 2024-12-04 NOTE — PROGRESS NOTES
Subjective:       Patient ID: Ghulam Ariza is a 64 y.o. male Body mass index is 29.76 kg/m².    Chief Complaint: Abdominal Pain    This patient is new to me.  Referring Provider: Cristhian Navarro* for abdominal pain.     Comes and goes - hx of colon polyps. Worried that the pain is due to a large polyp     10/2018 CT A/P  FINDINGS:  Visualized lung bases are essentially clear     Liver;   unremarkable     Gallbladder and bile ducts; no calcified stones or CT findings of acute cholecystitis.  No biliary duct dilatation.  Gallbladder is contracted at time of the exam     Spleen; not enlarged.  Small splenule.     Adrenal glands; unremarkable     Pancreas; unremarkable     Abdominal aorta;    no aneurysm     Stomach, bowel, mesentery; normal appearance of the appendix.  Mild fat stranding in the central mesentery nonspecific but suggesting panniculitis.     Kidneys, ureters, bladder; symmetrical renal enhancement and no hydronephrosis.  Urinary bladder mildly to mildly distended at time of the exam and as visualized is unremarkable appearance     Prostate gland and seminal vesicles mildly prominent.  No acute findings     Small fat containing umbilical hernia     Osseous degenerative changes without obvious aggressive appearing osseous lesion.  Postoperative changes in the lumbar spine.  L2-L4 slight reversal of the usual lordosis in the lower lumbar spine.  Mild retrolisthesis L4-5     Impression:     No acute findings.     Findings detailed above      9/2018 Colonoscopy with Dr Lagunas  Findings:       The entire examined colon appeared normal.   Impression:           - The entire examined colon is normal.                         - No specimens collected.   Recommendation:       - Repeat colonoscopy in 7 years for screening                         purposes.Talk with his primay M.D. about his occ.                         right lower quad pain and consider CT SCAN                         - Resume previous diet  indefinitely.                         - Continue present medications.                         - Discharge patient to home (ambulatory).         Review of Systems   Constitutional:  Positive for activity change. Negative for appetite change, fatigue, fever and unexpected weight change.   HENT:  Negative for sore throat and trouble swallowing.    Respiratory:  Negative for cough and shortness of breath.    Cardiovascular:  Negative for chest pain.   Gastrointestinal:  Positive for abdominal pain. Negative for abdominal distention, anal bleeding, blood in stool, constipation, diarrhea, nausea, rectal pain and vomiting.       No LMP for male patient.  Past Medical History:   Diagnosis Date    Cataract     Degenerative disc disease     cervical radiculopathy    Genital herpes     Glaucoma     Hx of colonic polyps     Hypertension early 40s    Pinched nerve in neck      Past Surgical History:   Procedure Laterality Date    back sx      L4/L5 Herniated disk    BUNIONECTOMY Right     COLONOSCOPY N/A 9/6/2018    Procedure: COLONOSCOPY;  Surgeon: Mg Lagunas MD;  Location: [address]);  Service: Endoscopy;  Laterality: N/A;    SPINE SURGERY      L4/5 laminectomy    TRANSFORAMINAL EPIDURAL INJECTION OF STEROID Right 6/10/2021    Procedure: INJECTION, STEROID, EPIDURAL, TRANSFORAMINAL APPROACH, L3-L4 AND L4-L5 URGENT CASE;  Surgeon: Enrique Sanchez MD;  Location: [facility];  Service: Pain Management;  Laterality: Right;    TRANSFORAMINAL EPIDURAL INJECTION OF STEROID Bilateral 7/8/2021    Procedure: INJECTION, STEROID, EPIDURAL, TRANSFORAMINAL APPROACH Bilateral L4/5 TFESI MEDICALLY URGENT CASE;  Surgeon: Enrique Sanchez MD;  Location: [facility];  Service: Pain Management;  Laterality: Bilateral;  consent needed     Family History   Problem Relation Name Age of Onset    Glaucoma Mother      Hypertension Mother      Stroke Mother  68        TIA    Cancer Father  65        Prostate    Cancer Brother           Prostate Cancer    Cancer Paternal Uncle      Amblyopia Neg Hx      Blindness Neg Hx      Cataracts Neg Hx      Diabetes Neg Hx      Macular degeneration Neg Hx      Retinal detachment Neg Hx       Social History     Tobacco Use    Smoking status: Never    Smokeless tobacco: Never   Substance Use Topics    Alcohol use: Yes     Alcohol/week: 0.0 standard drinks of alcohol     Comment: every weekend few drinks    Drug use: No     Wt Readings from Last 10 Encounters:   12/11/24 102.3 kg (225 lb 8.5 oz)   12/03/24 101.7 kg (224 lb 1.6 oz)   10/18/24 102.5 kg (226 lb)   11/15/23 102.9 kg (226 lb 13.7 oz)   12/15/22 110.1 kg (242 lb 11.6 oz)   11/14/22 110.4 kg (243 lb 6.2 oz)   01/26/22 100 kg (220 lb 7.4 oz)   11/10/21 114.8 kg (253 lb 1.4 oz)   10/27/21 112 kg (247 lb)   09/30/21 112 kg (247 lb)     Lab Results   Component Value Date    WBC 3.72 (L) 12/03/2024    HGB 13.2 (L) 12/03/2024    HCT 40.4 12/03/2024    MCV 82 12/03/2024     12/03/2024     CMP  Sodium   Date Value Ref Range Status   12/03/2024 139 136 - 145 mmol/L Final     Potassium   Date Value Ref Range Status   12/03/2024 4.6 3.5 - 5.1 mmol/L Final     Chloride   Date Value Ref Range Status   12/03/2024 105 95 - 110 mmol/L Final     CO2   Date Value Ref Range Status   12/03/2024 27 23 - 29 mmol/L Final     Glucose   Date Value Ref Range Status   12/03/2024 104 70 - 110 mg/dL Final     BUN   Date Value Ref Range Status   12/03/2024 11 8 - 23 mg/dL Final     Creatinine   Date Value Ref Range Status   12/03/2024 1.4 0.5 - 1.4 mg/dL Final     Calcium   Date Value Ref Range Status   12/03/2024 9.8 8.7 - 10.5 mg/dL Final     Total Protein   Date Value Ref Range Status   12/03/2024 7.4 6.0 - 8.4 g/dL Final     Albumin   Date Value Ref Range Status   12/03/2024 4.1 3.5 - 5.2 g/dL Final     Total Bilirubin   Date Value Ref Range Status   12/03/2024 0.4 0.1 - 1.0 mg/dL Final     Comment:     For infants and newborns, interpretation of results should be  "based  on gestational age, weight and in agreement with clinical  observations.    Premature Infant recommended reference ranges:  Up to 24 hours.............<8.0 mg/dL  Up to 48 hours............<12.0 mg/dL  3-5 days..................<15.0 mg/dL  6-29 days.................<15.0 mg/dL       Alkaline Phosphatase   Date Value Ref Range Status   12/03/2024 55 40 - 150 U/L Final     AST   Date Value Ref Range Status   12/03/2024 18 10 - 40 U/L Final     ALT   Date Value Ref Range Status   12/03/2024 21 10 - 44 U/L Final     Anion Gap   Date Value Ref Range Status   12/03/2024 7 (L) 8 - 16 mmol/L Final     eGFR if    Date Value Ref Range Status   10/25/2021 >60.0 >60 mL/min/1.73 m^2 Final     eGFR if non    Date Value Ref Range Status   10/25/2021 >60.0 >60 mL/min/1.73 m^2 Final     Comment:     Calculation used to obtain the estimated glomerular filtration  rate (eGFR) is the CKD-EPI equation.        No results found for: "AMYLASE"  Lab Results   Component Value Date    LIPASE 35 10/18/2024     No results found for: "LIPASERES"  Lab Results   Component Value Date    TSH 0.529 12/03/2024       Reviewed prior medical records including radiology report of CT A/P 10/2024, ER visit 10/2024 & endoscopy history (see surgical history).    Objective:      Physical Exam  Vitals and nursing note reviewed.   Constitutional:       General: He is not in acute distress.     Appearance: He is not ill-appearing.   HENT:      Head: Normocephalic and atraumatic.      Mouth/Throat:      Mouth: Mucous membranes are moist.      Pharynx: Oropharynx is clear.   Eyes:      Conjunctiva/sclera: Conjunctivae normal.   Cardiovascular:      Rate and Rhythm: Normal rate and regular rhythm.      Pulses: Normal pulses.   Pulmonary:      Effort: Pulmonary effort is normal. No respiratory distress.   Abdominal:      General: Abdomen is flat. Bowel sounds are normal. There is no distension.      Palpations: Abdomen is soft. "      Tenderness: There is abdominal tenderness.   Skin:     General: Skin is warm and dry.      Capillary Refill: Capillary refill takes 2 to 3 seconds.   Neurological:      Mental Status: He is alert and oriented to person, place, and time.         Assessment:       1. Abdominal pain, unspecified abdominal location    2. RLQ abdominal pain    3. Screening for colon cancer        Plan:       RLQ abdominal pain  X-ray to rule out severe constipation  - schedule Colonoscopy, discussed procedure with the patient, including risks and benefits, patient verbalized understanding    Reviewed recent imaging which did not show any acute abnormalities  -     Ambulatory referral/consult to Gastroenterology  -     X-Ray Abdomen AP 1 View; Future; Expected date: 12/11/2024    Screening for colon cancer  - schedule Colonoscopy, discussed procedure with the patient, including risks and benefits, patient verbalized understanding  Pt with history of large colon polyps on last colonoscopy  -     Ambulatory referral/consult to Gastroenterology      Follow up if symptoms worsen or fail to improve.      If no improvement in symptoms or symptoms worsen, call/follow-up at clinic or go to ER.       MITUL Vasquez, FNP-C    Encounter includes face to face time and non-face to face time preparing to see the patient (eg, review of tests), obtaining and/or reviewing separately obtained history, documenting clinical information in the electronic or other health record, independently interpreting results (not separately reported) and communicating results to the patient/family/caregiver, or care coordination (not separately reported).     A dictation software program was used for this note. Please expect some simple typographical errors in this note.

## 2024-12-11 ENCOUNTER — HOSPITAL ENCOUNTER (OUTPATIENT)
Dept: RADIOLOGY | Facility: HOSPITAL | Age: 64
Discharge: HOME | End: 2024-12-11
Payer: MEDICARE

## 2024-12-11 ENCOUNTER — OFFICE VISIT (OUTPATIENT)
Dept: GASTROENTEROLOGY | Facility: CLINIC | Age: 64
End: 2024-12-11
Payer: MEDICARE

## 2024-12-11 VITALS
SYSTOLIC BLOOD PRESSURE: 150 MMHG | DIASTOLIC BLOOD PRESSURE: 77 MMHG | BODY MASS INDEX: 29.88 KG/M2 | HEART RATE: 65 BPM | HEIGHT: 73 IN | WEIGHT: 225.5 LBS

## 2024-12-11 DIAGNOSIS — R10.31 RLQ ABDOMINAL PAIN: ICD-10-CM

## 2024-12-11 DIAGNOSIS — R10.9 ABDOMINAL PAIN, UNSPECIFIED ABDOMINAL LOCATION: Primary | ICD-10-CM

## 2024-12-11 DIAGNOSIS — Z12.11 SCREENING FOR COLON CANCER: ICD-10-CM

## 2024-12-11 PROCEDURE — 74018 RADEX ABDOMEN 1 VIEW: CPT | Mod: TC

## 2024-12-11 PROCEDURE — 74018 RADEX ABDOMEN 1 VIEW: CPT | Mod: 26,,, | Performed by: RADIOLOGY

## 2024-12-11 PROCEDURE — 99999 PR PBB SHADOW E&M-EST. PATIENT-LVL III: CPT | Mod: PBBFAC,HCNC,,

## 2024-12-12 ENCOUNTER — TELEPHONE (OUTPATIENT)
Dept: GASTROENTEROLOGY | Facility: CLINIC | Age: 64
End: 2024-12-12
Payer: MEDICARE

## 2024-12-12 NOTE — TELEPHONE ENCOUNTER
Returned call to patient to advised that he will need call endoscopy for arrival time. Number provided.

## 2024-12-12 NOTE — TELEPHONE ENCOUNTER
----- Message from Katherine sent at 12/12/2024  9:19 AM CST -----  Contact: self  Type: Needs Medical Advice  Who Called:  pt    Best Call Back Number: [phone]   Additional Information: pt would like to verify address and arrival time for procedure on 1216.please call

## 2024-12-16 ENCOUNTER — ANESTHESIA EVENT (OUTPATIENT)
Dept: ENDOSCOPY | Facility: HOSPITAL | Age: 64
End: 2024-12-16
Payer: MEDICARE

## 2024-12-16 ENCOUNTER — HOSPITAL ENCOUNTER (OUTPATIENT)
Facility: HOSPITAL | Age: 64
Discharge: HOME | End: 2024-12-16
Attending: INTERNAL MEDICINE | Admitting: INTERNAL MEDICINE
Payer: MEDICARE

## 2024-12-16 ENCOUNTER — ANESTHESIA (OUTPATIENT)
Dept: ENDOSCOPY | Facility: HOSPITAL | Age: 64
End: 2024-12-16
Payer: MEDICARE

## 2024-12-16 DIAGNOSIS — Z86.0100 HISTORY OF COLON POLYPS: ICD-10-CM

## 2024-12-16 PROCEDURE — 25000003 PHARM REV CODE 250: Performed by: INTERNAL MEDICINE

## 2024-12-16 PROCEDURE — 88305 TISSUE EXAM BY PATHOLOGIST: CPT | Mod: TC | Performed by: PATHOLOGY

## 2024-12-16 PROCEDURE — 45385 COLONOSCOPY W/LESION REMOVAL: CPT | Mod: PT | Performed by: INTERNAL MEDICINE

## 2024-12-16 PROCEDURE — D9220A PRA ANESTHESIA: Mod: PT,ANES,, | Performed by: ANESTHESIOLOGY

## 2024-12-16 PROCEDURE — 37000009 HC ANESTHESIA EA ADD 15 MINS: Performed by: INTERNAL MEDICINE

## 2024-12-16 PROCEDURE — 45385 COLONOSCOPY W/LESION REMOVAL: CPT | Mod: PT,,, | Performed by: INTERNAL MEDICINE

## 2024-12-16 PROCEDURE — 63600175 PHARM REV CODE 636 W HCPCS: Performed by: NURSE ANESTHETIST, CERTIFIED REGISTERED

## 2024-12-16 PROCEDURE — 37000008 HC ANESTHESIA 1ST 15 MINUTES: Performed by: INTERNAL MEDICINE

## 2024-12-16 PROCEDURE — 27201089 HC SNARE, DISP (ANY): Performed by: INTERNAL MEDICINE

## 2024-12-16 PROCEDURE — D9220A PRA ANESTHESIA: Mod: PT,CRNA,, | Performed by: NURSE ANESTHETIST, CERTIFIED REGISTERED

## 2024-12-16 PROCEDURE — 27201012 HC FORCEPS, HOT/COLD, DISP: Performed by: INTERNAL MEDICINE

## 2024-12-16 RX ORDER — SODIUM CHLORIDE 9 MG/ML
INJECTION, SOLUTION INTRAVENOUS CONTINUOUS
Status: DISCONTINUED | OUTPATIENT
Start: 2024-12-16 | End: 2024-12-16 | Stop reason: HOSPADM

## 2024-12-16 RX ORDER — LIDOCAINE HYDROCHLORIDE 20 MG/ML
INJECTION INTRAVENOUS
Status: DISCONTINUED | OUTPATIENT
Start: 2024-12-16 | End: 2024-12-16

## 2024-12-16 RX ORDER — PROPOFOL 10 MG/ML
VIAL (ML) INTRAVENOUS
Status: DISCONTINUED | OUTPATIENT
Start: 2024-12-16 | End: 2024-12-16

## 2024-12-16 RX ADMIN — SODIUM CHLORIDE: 9 INJECTION, SOLUTION INTRAVENOUS at 09:12

## 2024-12-16 RX ADMIN — PROPOFOL 50 MG: 10 INJECTION, EMULSION INTRAVENOUS at 10:12

## 2024-12-16 RX ADMIN — PROPOFOL 120 MG: 10 INJECTION, EMULSION INTRAVENOUS at 10:12

## 2024-12-16 RX ADMIN — LIDOCAINE HYDROCHLORIDE 100 MG: 20 INJECTION, SOLUTION INTRAVENOUS at 10:12

## 2024-12-16 RX ADMIN — PROPOFOL 30 MG: 10 INJECTION, EMULSION INTRAVENOUS at 10:12

## 2024-12-16 NOTE — H&P
CC: History of colon polyps    64 year old male with above. States that symptoms are absent, no alleviating/exacerbating factors. No family history of colorectal CA. Positive personal history of polyps. No bleeding or weight loss.     ROS:  No headache, no fever/chills, no chest pain/SOB, no nausea/vomiting/diarrhea/constipation/GI bleeding/abdominal pain, no dysuria/hematuria.    VSSAF   Exam:   Alert and oriented x 3; no apparent distress   PERRLA, sclera anicteric  CV: Regular rate/rhythm, normal PMI   Lungs: Clear bilaterally with no wheeze/rales   Abdomen: Soft, NT/ND, normal bowel sounds   Ext: No cyanosis, clubbing     Impression:   As above    Plan:   Proceed with endoscopy. Further recs to follow.

## 2024-12-16 NOTE — ANESTHESIA POSTPROCEDURE EVALUATION
Anesthesia Post Evaluation    Patient: Ghulam Ariza    Procedure(s) Performed: Procedure(s) (LRB):  COLONOSCOPY (N/A)    Final Anesthesia Type: general      Patient location during evaluation: PACU  Patient participation: Yes- Able to Participate  Level of consciousness: awake and alert  Post-procedure vital signs: reviewed and stable  Pain management: adequate  Airway patency: patent    PONV status at discharge: No PONV  Anesthetic complications: no      Cardiovascular status: blood pressure returned to baseline  Respiratory status: unassisted and room air  Hydration status: euvolemic  Follow-up not needed.              Vitals Value Taken Time   /77 12/16/24 1119   Temp  12/16/24 1209   Pulse 54 12/16/24 1121   Resp 10 12/16/24 1121   SpO2 95 % 12/16/24 1121   Vitals shown include unfiled device data.      Event Time   Out of Recovery 11:30:39         Pain/Stuart Score: Stuart Score: 10 (12/16/2024 11:15 AM)

## 2024-12-16 NOTE — PROVATION PATIENT INSTRUCTIONS
Discharge Summary/Instructions after an Endoscopic Procedure  Patient Name: Ghulam Ariza  Patient MRN: [id number]  Patient YOB: 1960  Monday, December 16, 2024  Rene Gooden MD  Dear patient,  As a result of recent federal legislation (The Federal Cures Act), you may   receive lab or pathology results from your procedure in your [facility]   account before your physician is able to contact you. Your physician or   their representative will relay the results to you with their   recommendations at their soonest availability.  Thank you,  RESTRICTIONS:  During your procedure today, you received medications for sedation.  These   medications may affect your judgment, balance and coordination.  Therefore,   for 24 hours, you have the following restrictions:   - DO NOT drive a car, operate machinery, make legal/financial decisions,   sign important papers or drink alcohol.    ACTIVITY:  Today: no heavy lifting, straining or running due to procedural   sedation/anesthesia.  The following day: return to full activity including work.  DIET:  Eat and drink normally unless instructed otherwise.     TREATMENT FOR COMMON SIDE EFFECTS:  - Mild abdominal pain, nausea, belching, bloating or excessive gas:  rest,   eat lightly and use a heating pad.  - Sore Throat: treat with throat lozenges and/or gargle with warm salt   water.  - Because air was used during the procedure, expelling large amounts of air   from your rectum or belching is normal.  - If a bowel prep was taken, you may not have a bowel movement for 1-3 days.    This is normal.  SYMPTOMS TO WATCH FOR AND REPORT TO YOUR PHYSICIAN:  1. Abdominal pain or bloating, other than gas cramps.  2. Chest pain.  3. Back pain.  4. Signs of infection such as: chills or fever occurring within 24 hours   after the procedure.  5. Rectal bleeding, which would show as bright red, maroon, or black stools.   (A tablespoon of blood from the rectum is not serious, especially if    hemorrhoids are present.)  6. Vomiting.  7. Weakness or dizziness.  GO DIRECTLY TO THE NEAREST EMERGENCY ROOM IF YOU HAVE ANY OF THE FOLLOWING:      Difficulty breathing              Chills and/or fever over 101 F   Persistent vomiting and/or vomiting blood   Severe abdominal pain   Severe chest pain   Black, tarry stools   Bleeding- more than one tablespoon   Any other symptom or condition that you feel may need urgent attention  Your doctor recommends these additional instructions:  If any biopsies were taken, your doctors clinic will contact you in 1 to 2   weeks with any results.  - Patient has a contact number available for emergencies.  The signs and   symptoms of potential delayed complications were discussed with the   patient.  Return to normal activities tomorrow.  Written discharge   instructions were provided to the patient.   - High fiber diet.   - Continue present medications.   - Await pathology results.   - Repeat colonoscopy in 5 years for surveillance.   - Discharge patient to home (ambulatory).   - Return to GI office PRN.  For questions, problems or results please call your physician - Rene Gooden MD at Work:  [phone].  [facility], EMERGENCY ROOM PHONE NUMBER: [phone]  IF A COMPLICATION OR EMERGENCY SITUATION ARISES AND YOU ARE UNABLE TO REACH   YOUR PHYSICIAN - GO DIRECTLY TO THE EMERGENCY ROOM.  Rene Gooden MD  12/16/2024 10:54:49 AM  This report has been verified and signed electronically.  Dear patient,  As a result of recent federal legislation (The Federal Cures Act), you may   receive lab or pathology results from your procedure in your [facility]   account before your physician is able to contact you. Your physician or   their representative will relay the results to you with their   recommendations at their soonest availability.  Thank you,  PROVATION

## 2024-12-16 NOTE — TELEPHONE ENCOUNTER
Contacted Pt to schedule colonoscopy,Pt not ready to schedule at this time,  Pt to call back to schedule, number provided [phone].     Please advise patient; I reviewed the mammogram results done on 12/13 and  there is official reading of left breast mass and the need for additional imaging, : please confirm the patient is aware of the mammogram finding and the follow-up imaging indicated..    Thank you for follow-up.    Dr Torrez [covering for PCP/Dr. Duc Stokes.]

## 2024-12-16 NOTE — ANESTHESIA PREPROCEDURE EVALUATION
12/16/2024  Ghulam Ariza is a 64 y.o., male.      Pre-op Assessment          Review of Systems  Cardiovascular:     Hypertension                                    Hypertension         Musculoskeletal:  Arthritis        Arthritis          Neurological:    Neuromuscular Disease,           Arthritis                         Neuromuscular Disease       Physical Exam  General: Well nourished        Anesthesia Plan  Type of Anesthesia, risks & benefits discussed:    Anesthesia Type: Gen Natural Airway  Intra-op Monitoring Plan: Standard ASA Monitors  Induction:  IV  Informed Consent: Informed consent signed with the Patient and all parties understand the risks and agree with anesthesia plan.  All questions answered.   ASA Score: 2  Day of Surgery Review of History & Physical: H&P Update referred to the surgeon/provider.    Ready For Surgery From Anesthesia Perspective.     .

## 2024-12-16 NOTE — TRANSFER OF CARE
"Anesthesia Transfer of Care Note    Patient: Ghulam Ariza    Procedure(s) Performed: Procedure(s) (LRB):  COLONOSCOPY (N/A)    Patient location: PACU    Anesthesia Type: general    Transport from OR: Transported from OR on 2-3 L/min O2 by NC with adequate spontaneous ventilation    Post pain: adequate analgesia    Post assessment: no apparent anesthetic complications and tolerated procedure well    Post vital signs: stable    Level of consciousness: awake, alert and oriented    Nausea/Vomiting: no nausea/vomiting    Complications: none    Transfer of care protocol was followed      Last vitals: Visit Vitals  /80 (BP Location: Left arm, Patient Position: Lying)   Pulse 65   Temp 37 °C (98.6 °F) (Skin)   Ht 6' 1" (1.854 m)   Wt 102.1 kg (225 lb)   SpO2 97%   BMI 29.69 kg/m²     "

## 2024-12-17 VITALS
BODY MASS INDEX: 29.82 KG/M2 | HEART RATE: 58 BPM | HEIGHT: 73 IN | OXYGEN SATURATION: 95 % | DIASTOLIC BLOOD PRESSURE: 73 MMHG | TEMPERATURE: 99 F | SYSTOLIC BLOOD PRESSURE: 122 MMHG | RESPIRATION RATE: 13 BRPM | WEIGHT: 225 LBS

## 2025-01-03 ENCOUNTER — OFFICE VISIT (OUTPATIENT)
Dept: INTERNAL MEDICINE | Facility: CLINIC | Age: 65
End: 2025-01-03
Payer: MEDICARE

## 2025-01-03 ENCOUNTER — LAB VISIT (OUTPATIENT)
Dept: LAB | Facility: HOSPITAL | Age: 65
End: 2025-01-03
Payer: MEDICARE

## 2025-01-03 VITALS
HEIGHT: 73 IN | WEIGHT: 227 LBS | HEART RATE: 77 BPM | SYSTOLIC BLOOD PRESSURE: 124 MMHG | TEMPERATURE: 98 F | DIASTOLIC BLOOD PRESSURE: 80 MMHG | OXYGEN SATURATION: 98 % | RESPIRATION RATE: 16 BRPM | BODY MASS INDEX: 30.09 KG/M2

## 2025-01-03 DIAGNOSIS — Z86.19 HISTORY OF HELICOBACTER PYLORI INFECTION: ICD-10-CM

## 2025-01-03 DIAGNOSIS — E55.9 VITAMIN D DEFICIENCY DISEASE: ICD-10-CM

## 2025-01-03 DIAGNOSIS — M79.3 PANNICULITIS: Primary | ICD-10-CM

## 2025-01-03 DIAGNOSIS — N18.31 CHRONIC KIDNEY DISEASE, STAGE 3A: ICD-10-CM

## 2025-01-03 LAB — 25(OH)D3+25(OH)D2 SERPL-MCNC: 26 NG/ML (ref 30–96)

## 2025-01-03 PROCEDURE — 36415 COLL VENOUS BLD VENIPUNCTURE: CPT | Mod: HCNC,PO | Performed by: NURSE PRACTITIONER

## 2025-01-03 PROCEDURE — 3074F SYST BP LT 130 MM HG: CPT | Mod: HCNC,CPTII,S$GLB, | Performed by: NURSE PRACTITIONER

## 2025-01-03 PROCEDURE — 82306 VITAMIN D 25 HYDROXY: CPT | Mod: HCNC | Performed by: NURSE PRACTITIONER

## 2025-01-03 PROCEDURE — 99999 PR PBB SHADOW E&M-EST. PATIENT-LVL IV: CPT | Mod: PBBFAC,HCNC,, | Performed by: NURSE PRACTITIONER

## 2025-01-03 PROCEDURE — 99214 OFFICE O/P EST MOD 30 MIN: CPT | Mod: HCNC,S$GLB,, | Performed by: NURSE PRACTITIONER

## 2025-01-03 PROCEDURE — 1160F RVW MEDS BY RX/DR IN RCRD: CPT | Mod: HCNC,CPTII,S$GLB, | Performed by: NURSE PRACTITIONER

## 2025-01-03 PROCEDURE — 1159F MED LIST DOCD IN RCRD: CPT | Mod: HCNC,CPTII,S$GLB, | Performed by: NURSE PRACTITIONER

## 2025-01-03 PROCEDURE — 3079F DIAST BP 80-89 MM HG: CPT | Mod: HCNC,CPTII,S$GLB, | Performed by: NURSE PRACTITIONER

## 2025-01-03 PROCEDURE — 3008F BODY MASS INDEX DOCD: CPT | Mod: HCNC,CPTII,S$GLB, | Performed by: NURSE PRACTITIONER

## 2025-01-03 NOTE — PROGRESS NOTES
Subjective:       Patient ID: Ghulam Ariza is a 64 y.o. male.    Chief Complaint: Abdominal Pain (Off & on.  0 pain today.  Says bowels are normal. Have had a couple of months now.  Colonoscopy was fine.    )      History of Present Illness    CHIEF COMPLAINT:  Mr. Ariza presents today with discomfort in the right lower quadrant.    PRESENT ILLNESS:  He experiences a burning sensation in the right lower quadrant that worsens with tight clothing. The pain remains localized without radiation. He denies nausea, vomiting, and reports normal bowel movements without blood or mucus in stools. He suspects diet soda may trigger the discomfort.    GI HISTORY:  Last colonoscopy in 2018 revealed two small polyps. He has history of a large polyp that required additional procedure for complete removal. He has history of H. pylori, treated approximately one year after his fiancée's treatment for the same condition. He expresses concern about potential reinfection due to asynchronous treatment timing.    RECENT EMERGENCY ROOM VISIT:  CT showed paniculitis and umbilical hernia.     SURGICAL HISTORY:  Two back surgeries within last 2 years: herniated disc repair and fusion surgery for bulging disc. He experiences occasional stiffness but denies severe pain requiring pain medication.    DIET AND BOWEL HABITS:  He maintains a 3-year bowel regimen consisting of 2 tablespoons of olive oil with lemon juice followed by coffee to stimulate bowel movements.      ROS:  Gastrointestinal: +abdominal pain, -nausea, -vomiting  Musculoskeletal: +back pain         Review of patient's allergies indicates:  No Known Allergies    Medication List with Changes/Refills   New Medications    AMOXICILLIN-CLAVULANATE 875-125MG (AUGMENTIN) 875-125 MG PER TABLET    Take 1 tablet by mouth every 12 (twelve) hours. for 10 days   Current Medications    ALLOPURINOL (ZYLOPRIM) 300 MG TABLET    TAKE 1 TABLET TWICE DAILY    BENAZEPRIL (LOTENSIN) 40 MG TABLET    TAKE 1  "TABLET ONE TIME DAILY    DILTIAZEM (CARDIZEM SR) 60 MG CP12    TAKE 1 CAPSULE TWICE DAILY    SILDENAFIL (VIAGRA) 100 MG TABLET    Take 1 tablet (100 mg total) by mouth daily as needed for Erectile Dysfunction.     Objective:   /80 (BP Location: Left arm, Patient Position: Sitting)   Pulse 77   Temp 97.9 °F (36.6 °C) (Skin)   Resp 16   Ht 6' 1" (1.854 m)   Wt 103 kg (227 lb)   SpO2 98%   BMI 29.95 kg/m²     Physical Exam  Vitals reviewed.   Constitutional:       Appearance: Normal appearance.   HENT:      Head: Normocephalic and atraumatic.   Cardiovascular:      Rate and Rhythm: Normal rate and regular rhythm.      Heart sounds: Normal heart sounds. No murmur heard.  Pulmonary:      Effort: Pulmonary effort is normal.      Breath sounds: Normal breath sounds. No wheezing.   Abdominal:      General: Bowel sounds are normal.      Palpations: Abdomen is soft.      Tenderness: There is abdominal tenderness.       Skin:     General: Skin is warm and dry.   Neurological:      Mental Status: He is alert and oriented to person, place, and time.         I reviewed and independently interpreted the labs and imaging below:  Last Labs:  Glucose   Date Value Ref Range Status   12/03/2024 104 70 - 110 mg/dL Final   10/18/2024 110 70 - 110 mg/dL Final     BUN   Date Value Ref Range Status   12/03/2024 11 8 - 23 mg/dL Final   10/18/2024 17 8 - 23 mg/dL Final     Creatinine   Date Value Ref Range Status   12/03/2024 1.4 0.5 - 1.4 mg/dL Final   10/18/2024 1.4 0.5 - 1.4 mg/dL Final     Cholesterol   Date Value Ref Range Status   12/03/2024 185 120 - 199 mg/dL Final     Comment:     The National Cholesterol Education Program (NCEP) has set the  following guidelines (reference ranges) for Cholesterol:  Optimal.....................<200 mg/dL  Borderline High.............200-239 mg/dL  High........................> or = 240 mg/dL     11/13/2023 185 120 - 199 mg/dL Final     Comment:     The National Cholesterol Education " Program (NCEP) has set the  following guidelines (reference ranges) for Cholesterol:  Optimal.....................<200 mg/dL  Borderline High.............200-239 mg/dL  High........................> or = 240 mg/dL       Hemoglobin A1C   Date Value Ref Range Status   12/03/2024 5.8 (H) 4.0 - 5.6 % Final     Comment:     ADA Screening Guidelines:  5.7-6.4%  Consistent with prediabetes  >or=6.5%  Consistent with diabetes    High levels of fetal hemoglobin interfere with the HbA1C  assay. Heterozygous hemoglobin variants (HbS, HgC, etc)do  not significantly interfere with this assay.   However, presence of multiple variants may affect accuracy.     11/13/2023 5.7 (H) 4.0 - 5.6 % Final     Comment:     ADA Screening Guidelines:  5.7-6.4%  Consistent with prediabetes  >or=6.5%  Consistent with diabetes    High levels of fetal hemoglobin interfere with the HbA1C  assay. Heterozygous hemoglobin variants (HbS, HgC, etc)do  not significantly interfere with this assay.   However, presence of multiple variants may affect accuracy.       Hemoglobin   Date Value Ref Range Status   12/03/2024 13.2 (L) 14.0 - 18.0 g/dL Final   10/18/2024 12.8 (L) 14.0 - 18.0 g/dL Final     POC Hematocrit   Date Value Ref Range Status   01/08/2018 40 36 - 54 %PCV Final     Hematocrit   Date Value Ref Range Status   12/03/2024 40.4 40.0 - 54.0 % Final   10/18/2024 38.3 (L) 40.0 - 54.0 % Final       Assessment and Plan:     Considered paniculitis as potential cause of abdominal discomfort based on previous CT findings  Considered possibility of gas pains secondary to carbonated beverage consumption  Evaluated for potential ulcers, but location of pain not consistent with typical presentation  Ordered upper GI endoscopy to rule out stomach ulcers and further evaluate symptoms per patient request     - Ordered stool sample test for H. pylori, to be returned within 2 hours of collection to any [facility] facility.  - Noted the patient's daily regimen of  olive oil and lemon juice for bowel movements.    1. Panniculitis (Primary)    - amoxicillin-clavulanate 875-125mg (AUGMENTIN) 875-125 mg per tablet; Take 1 tablet by mouth every 12 (twelve) hours. for 10 days  Dispense: 20 tablet; Refill: 0    2. History of Helicobacter pylori infection    - H. pylori antigen, stool; Future  - Ambulatory referral/consult to Endo Procedure ; Future    3. Vitamin D deficiency disease    Chronic, stable, continue OTC vitamin D supplement     - Vitamin D; Future    4. Chronic kidney disease, stage 3a    Chronic, stable          This note was generated with the assistance of ambient listening technology. Verbal consent was obtained by the patient and accompanying visitor(s) for the recording of patient appointment to facilitate this note. I attest to having reviewed and edited the generated note for accuracy, though some syntax or spelling errors may persist. Please contact the author of this note for any clarification.

## 2025-01-06 ENCOUNTER — LAB VISIT (OUTPATIENT)
Dept: LAB | Facility: HOSPITAL | Age: 65
End: 2025-01-06
Payer: MEDICARE

## 2025-01-06 DIAGNOSIS — Z86.19 HISTORY OF HELICOBACTER PYLORI INFECTION: ICD-10-CM

## 2025-01-06 PROCEDURE — 87338 HPYLORI STOOL AG IA: CPT | Mod: HCNC | Performed by: NURSE PRACTITIONER

## 2025-01-06 RX ORDER — AMOXICILLIN AND CLAVULANATE POTASSIUM 875; 125 MG/1; MG/1
1 TABLET, FILM COATED ORAL EVERY 12 HOURS
Qty: 20 TABLET | Refills: 0 | Status: SHIPPED | OUTPATIENT
Start: 2025-01-06 | End: 2025-01-16

## 2025-01-07 PROBLEM — N18.31 CHRONIC KIDNEY DISEASE, STAGE 3A: Status: ACTIVE | Noted: 2025-01-07

## 2025-01-09 ENCOUNTER — TELEPHONE (OUTPATIENT)
Dept: INTERNAL MEDICINE | Facility: CLINIC | Age: 65
End: 2025-01-09
Payer: MEDICARE

## 2025-01-09 DIAGNOSIS — A04.8 H. PYLORI INFECTION: Primary | ICD-10-CM

## 2025-01-09 LAB — H PYLORI AG STL QL IA: DETECTED

## 2025-01-09 RX ORDER — AMOXICILLIN 875 MG/1
875 TABLET, FILM COATED ORAL 3 TIMES DAILY
Qty: 12 TABLET | Refills: 0 | Status: SHIPPED | OUTPATIENT
Start: 2025-01-09 | End: 2025-01-13

## 2025-01-09 RX ORDER — OMEPRAZOLE 40 MG/1
40 CAPSULE, DELAYED RELEASE ORAL
Qty: 28 CAPSULE | Refills: 0 | Status: SHIPPED | OUTPATIENT
Start: 2025-01-09 | End: 2025-01-23

## 2025-01-09 RX ORDER — LEVOFLOXACIN 500 MG/1
500 TABLET, FILM COATED ORAL DAILY
Qty: 14 TABLET | Refills: 0 | Status: SHIPPED | OUTPATIENT
Start: 2025-01-09 | End: 2025-01-23

## 2025-01-09 NOTE — TELEPHONE ENCOUNTER
Levofloxacin triple PPI (high dose or high potency¶) Twice daily 14    Levofloxacin (500 mg) Once daily     Amoxicillin (750 mg) Three times daily      Patient tested positive for H. Pylori we are going to continue the Augmentin for the 10 days then he will swap to just Amoxicillin three times daily x 4 days. We will add Levaquin and a protein pump inhibitor to be taken x 14 days.

## 2025-01-09 NOTE — TELEPHONE ENCOUNTER
Pt informed of dx H. Pylori. Reviewed medication instructions to pt and received verbal feedback.

## 2025-01-19 DIAGNOSIS — M10.9 GOUT, UNSPECIFIED CAUSE, UNSPECIFIED CHRONICITY, UNSPECIFIED SITE: ICD-10-CM

## 2025-01-19 DIAGNOSIS — I10 ESSENTIAL HYPERTENSION: ICD-10-CM

## 2025-01-21 NOTE — TELEPHONE ENCOUNTER
Refill Routing Note   Medication(s) are not appropriate for processing by [facility] for the following reason(s):        Non-participating provider    [facility] action(s):  Route             Appointments  past 12m or future 3m with PCP    Date Provider   Last Visit   12/3/2024 Cristhian Colin FNP-C   Next Visit   6/3/2025 Cristhian Colin FNP-C   ED visits in past 90 days: 0        Note composed:10:29 AM 01/21/2025

## 2025-01-23 RX ORDER — BENAZEPRIL HYDROCHLORIDE 40 MG/1
40 TABLET ORAL
Qty: 90 TABLET | Refills: 3 | Status: SHIPPED | OUTPATIENT
Start: 2025-01-23

## 2025-01-23 RX ORDER — DILTIAZEM HYDROCHLORIDE 60 MG/1
60 CAPSULE, EXTENDED RELEASE ORAL 2 TIMES DAILY
Qty: 180 CAPSULE | Refills: 3 | Status: SHIPPED | OUTPATIENT
Start: 2025-01-23

## 2025-01-23 RX ORDER — ALLOPURINOL 300 MG/1
300 TABLET ORAL 2 TIMES DAILY
Qty: 180 TABLET | Refills: 3 | Status: SHIPPED | OUTPATIENT
Start: 2025-01-23

## 2025-02-05 ENCOUNTER — TELEPHONE (OUTPATIENT)
Dept: INTERNAL MEDICINE | Facility: CLINIC | Age: 65
End: 2025-02-05

## 2025-02-05 DIAGNOSIS — A04.8 H. PYLORI INFECTION: Primary | ICD-10-CM

## 2025-02-05 NOTE — TELEPHONE ENCOUNTER
----- Message from Ana María sent at 2/5/2025 12:12 PM CST -----  Contact: Pt  [phone]  Would like to receive medical advice.    Would they like a call back or a response via [facility]:  call back     Additional information:  Pt finished antibiotics and is asking when will he need to come in to get retested for H pylori.  He would like to find out if this is cleared up.

## 2025-02-05 NOTE — TELEPHONE ENCOUNTER
Pt did H. Pylori testing on 1/6/25 and has completed antibiotics.   Would like to know when he can get retested?     Per NP Glen pt can retest in 4 weeks with stool culture.  Pt notified  Need order.

## 2025-02-22 DIAGNOSIS — Z00.00 ENCOUNTER FOR MEDICARE ANNUAL WELLNESS EXAM: ICD-10-CM

## 2025-03-06 ENCOUNTER — CLINICAL SUPPORT (OUTPATIENT)
Dept: ENDOSCOPY | Facility: HOSPITAL | Age: 65
End: 2025-03-06
Payer: MEDICARE

## 2025-03-06 ENCOUNTER — TELEPHONE (OUTPATIENT)
Dept: ENDOSCOPY | Facility: HOSPITAL | Age: 65
End: 2025-03-06

## 2025-03-06 VITALS — WEIGHT: 225 LBS | BODY MASS INDEX: 28.88 KG/M2 | HEIGHT: 74 IN

## 2025-03-06 DIAGNOSIS — Z86.19 HISTORY OF HELICOBACTER PYLORI INFECTION: ICD-10-CM

## 2025-03-06 NOTE — TELEPHONE ENCOUNTER
Referral for procedure from PAT appointment      Spoke to pt to schedule procedure(s) Upper Endoscopy (EGD)       Physician to perform procedure(s) Dr. CELI Lowe  Date of Procedure (s) 03/10/25  Arrival Time 12:00 PM  Time of Procedure(s) 1:00 PM   Location of Procedure(s) [address]  Type of Rx Prep sent to patient: N/A  Instructions provided to patient via [facility]    Patient was informed on the following information and verbalized understanding. Screening questionnaire reviewed with patient and complete. If procedure requires anesthesia, a responsible adult needs to be present to accompany the patient home, patient cannot drive after receiving anesthesia. Appointment details are tentative, especially check-in time. Patient will receive a prep-op call 7 days prior to confirm check-in time for procedure. If applicable the patient should contact their pharmacy to verify Rx for procedure prep is ready for pick-up. Patient was advised to call the scheduling department at [phone] if pharmacy states no Rx is available. Patient was advised to call the endoscopy scheduling department if any questions or concerns arise.      [facility] Scheduling Department

## 2025-03-07 ENCOUNTER — NURSE TRIAGE (OUTPATIENT)
Dept: ADMINISTRATIVE | Facility: CLINIC | Age: 65
End: 2025-03-07
Payer: MEDICARE

## 2025-03-07 NOTE — TELEPHONE ENCOUNTER
Pt called and stated he is scheduled for an EGD on 3/10/25 and did not receive a call with his arrival time or PREP instructions. PREP instructions reviewed with pt and all questions answered. Pt verbalized understanding.  Reason for Disposition   Question about upcoming scheduled surgery, procedure or test, no triage required, and triager able to answer question    Protocols used: Information Only Call - No Triage-A-

## 2025-03-10 ENCOUNTER — HOSPITAL ENCOUNTER (OUTPATIENT)
Facility: HOSPITAL | Age: 65
Discharge: HOME | End: 2025-03-10
Attending: INTERNAL MEDICINE | Admitting: INTERNAL MEDICINE
Payer: MEDICARE

## 2025-03-10 ENCOUNTER — ANESTHESIA EVENT (OUTPATIENT)
Dept: ENDOSCOPY | Facility: HOSPITAL | Age: 65
End: 2025-03-10
Payer: MEDICARE

## 2025-03-10 ENCOUNTER — TELEPHONE (OUTPATIENT)
Dept: ENDOSCOPY | Facility: HOSPITAL | Age: 65
End: 2025-03-10
Payer: MEDICARE

## 2025-03-10 ENCOUNTER — ANESTHESIA (OUTPATIENT)
Dept: ENDOSCOPY | Facility: HOSPITAL | Age: 65
End: 2025-03-10
Payer: MEDICARE

## 2025-03-10 VITALS
WEIGHT: 225.75 LBS | HEIGHT: 74 IN | RESPIRATION RATE: 18 BRPM | HEART RATE: 58 BPM | OXYGEN SATURATION: 100 % | TEMPERATURE: 98 F | BODY MASS INDEX: 28.97 KG/M2 | DIASTOLIC BLOOD PRESSURE: 84 MMHG | SYSTOLIC BLOOD PRESSURE: 130 MMHG

## 2025-03-10 DIAGNOSIS — A04.8 H. PYLORI INFECTION: ICD-10-CM

## 2025-03-10 PROCEDURE — 37000008 HC ANESTHESIA 1ST 15 MINUTES: Performed by: INTERNAL MEDICINE

## 2025-03-10 PROCEDURE — 43239 EGD BIOPSY SINGLE/MULTIPLE: CPT | Mod: ,,, | Performed by: INTERNAL MEDICINE

## 2025-03-10 PROCEDURE — 43239 EGD BIOPSY SINGLE/MULTIPLE: CPT | Performed by: INTERNAL MEDICINE

## 2025-03-10 PROCEDURE — 25000003 PHARM REV CODE 250: Performed by: STUDENT IN AN ORGANIZED HEALTH CARE EDUCATION/TRAINING PROGRAM

## 2025-03-10 PROCEDURE — 37000009 HC ANESTHESIA EA ADD 15 MINS: Performed by: INTERNAL MEDICINE

## 2025-03-10 PROCEDURE — 88305 TISSUE EXAM BY PATHOLOGIST: CPT | Mod: 26,,, | Performed by: STUDENT IN AN ORGANIZED HEALTH CARE EDUCATION/TRAINING PROGRAM

## 2025-03-10 PROCEDURE — 27201012 HC FORCEPS, HOT/COLD, DISP: Performed by: INTERNAL MEDICINE

## 2025-03-10 PROCEDURE — E9220 PRA ENDO ANESTHESIA: HCPCS | Mod: ,,, | Performed by: NURSE ANESTHETIST, CERTIFIED REGISTERED

## 2025-03-10 PROCEDURE — 63600175 PHARM REV CODE 636 W HCPCS: Performed by: NURSE ANESTHETIST, CERTIFIED REGISTERED

## 2025-03-10 PROCEDURE — 88305 TISSUE EXAM BY PATHOLOGIST: CPT | Performed by: STUDENT IN AN ORGANIZED HEALTH CARE EDUCATION/TRAINING PROGRAM

## 2025-03-10 RX ORDER — SODIUM CHLORIDE 9 MG/ML
INJECTION, SOLUTION INTRAVENOUS CONTINUOUS
Status: DISCONTINUED | OUTPATIENT
Start: 2025-03-10 | End: 2025-03-10 | Stop reason: HOSPADM

## 2025-03-10 RX ORDER — PROPOFOL 10 MG/ML
VIAL (ML) INTRAVENOUS
Status: DISCONTINUED | OUTPATIENT
Start: 2025-03-10 | End: 2025-03-10

## 2025-03-10 RX ORDER — LIDOCAINE HYDROCHLORIDE 20 MG/ML
INJECTION, SOLUTION EPIDURAL; INFILTRATION; INTRACAUDAL; PERINEURAL
Status: DISCONTINUED | OUTPATIENT
Start: 2025-03-10 | End: 2025-03-10

## 2025-03-10 RX ADMIN — PROPOFOL 200 MCG/KG/MIN: 10 INJECTION, EMULSION INTRAVENOUS at 01:03

## 2025-03-10 RX ADMIN — SODIUM CHLORIDE: 0.9 INJECTION, SOLUTION INTRAVENOUS at 12:03

## 2025-03-10 RX ADMIN — PROPOFOL 100 MG: 10 INJECTION, EMULSION INTRAVENOUS at 01:03

## 2025-03-10 RX ADMIN — LIDOCAINE HYDROCHLORIDE 80 MG: 20 INJECTION, SOLUTION EPIDURAL; INFILTRATION; INTRACAUDAL; PERINEURAL at 01:03

## 2025-03-10 NOTE — PLAN OF CARE
PIV removed.  Catheter intact.  Coban dressing applied, patient instructed can remove when home.

## 2025-03-10 NOTE — TELEPHONE ENCOUNTER
----- Message from Jessica sent at 3/10/2025  9:04 AM CDT -----  Regarding: FW: Advice  Contact: [phone]    ----- Message -----  From: Ernestina Klein MA  Sent: 3/7/2025  10:34 AM CDT  To: [facility] Endoscopy Schedulers  Subject: FW: Advice                                       Please call and assist in answering pt's questions/concerns about his EGD this upcoming Monday, thank you so much!Ernestina Klein MA  ----- Message -----  From: Waleska Crump  Sent: 3/7/2025  10:32 AM CST  To: [facility] Staff  Subject: Advice                                           Patient is calling to get information about procedure Monday. Please contact pt

## 2025-03-10 NOTE — TELEPHONE ENCOUNTER
Called and spoke to patient. Patient states he called and found out his arrival time is for 12. Apologized to patient and informed him all of his instructions are in the messages in the portal.

## 2025-03-10 NOTE — TRANSFER OF CARE
"Anesthesia Transfer of Care Note    Patient: Ghulam Ariza    Procedure(s) Performed: Procedure(s) (LRB):  EGD (ESOPHAGOGASTRODUODENOSCOPY) (N/A)    Patient location: GI    Anesthesia Type: general    Transport from OR: Transported from OR on room air with adequate spontaneous ventilation    Post pain: adequate analgesia    Post assessment: no apparent anesthetic complications    Post vital signs: stable    Level of consciousness: awake and alert    Nausea/Vomiting: no nausea/vomiting    Complications: none    Transfer of care protocol was followed      Last vitals: Visit Vitals  /55 (BP Location: Left arm, Patient Position: Lying)   Pulse 65   Temp 36.6 °C (97.9 °F) (Temporal)   Resp 18   Ht 6' 2" (1.88 m)   Wt 102.4 kg (225 lb 12 oz)   SpO2 99%   BMI 28.98 kg/m²     "

## 2025-03-10 NOTE — PLAN OF CARE
Recovery complete.  Patient in Recovery Waiting area with wife while waiting for physician post procedure follow up.  NAD.  No c/o discomfort.

## 2025-03-10 NOTE — ANESTHESIA POSTPROCEDURE EVALUATION
Anesthesia Post Evaluation    Patient: Ghulam Ariza    Procedure(s) Performed: Procedure(s) (LRB):  EGD (ESOPHAGOGASTRODUODENOSCOPY) (N/A)    Final Anesthesia Type: general      Patient location during evaluation: GI PACU  Patient participation: Yes- Able to Participate  Level of consciousness: awake and alert  Post-procedure vital signs: reviewed and stable  Pain management: adequate  Airway patency: patent    PONV status at discharge: No PONV  Anesthetic complications: no      Cardiovascular status: hemodynamically stable  Respiratory status: unassisted and spontaneous ventilation  Hydration status: euvolemic  Follow-up not needed.              Vitals Value Taken Time   /83 03/10/25 13:32   Temp 36.6 °C (97.9 °F) 03/10/25 13:16   Pulse 57 03/10/25 13:32   Resp 16 03/10/25 13:32   SpO2 97 % 03/10/25 13:32         No case tracking events are documented in the log.      Pain/Stuart Score: Stuart Score: 10 (3/10/2025  1:32 PM)

## 2025-03-10 NOTE — H&P
Short Stay Endoscopy History and Physical    PCP - Cristhian Colin FNP-C     Procedure - EGD  ASA - per anesthesia  Mallampati - per anesthesia  History of Anesthesia problems - no  Family history Anesthesia problems -  no   Plan of anesthesia - per anesthesia    HPI:  This is a 64 y.o. male here for evaluation of: abdominal pain.    Denies N/V, dysphagia, odynophagia, gross GI bleeding.    HP stool Ag+ 1/6/25. Treated with levofloxacin, amoxicillin + PPI x14 days.    Denies current AC.    ROS:  Constitutional: No fevers, chills, No weight loss  CV: No chest pain  Pulm: No cough, No shortness of breath  Ophtho: No vision changes  GI: see HPI  Derm: No rash    Medical History:  has a past medical history of Cataract, Degenerative disc disease, Genital herpes, Glaucoma, colonic polyps, Hypertension (early 40s), and Pinched nerve in neck.    Surgical History:  has a past surgical history that includes back sx; Colonoscopy (N/A, 9/6/2018); Spine surgery; Bunionectomy (Right); Transforaminal epidural injection of steroid (Right, 6/10/2021); Transforaminal epidural injection of steroid (Bilateral, 7/8/2021); and Colonoscopy (N/A, 12/16/2024).    Family History: family history includes Cancer in his brother and paternal uncle; Cancer (age of onset: 65) in his father; Glaucoma in his mother; Hypertension in his mother; Stroke (age of onset: 68) in his mother.    Social History:  reports that he has never smoked. He has never used smokeless tobacco. He reports current alcohol use. He reports that he does not use drugs.    Review of patient's allergies indicates:  No Known Allergies    Medications:   Prescriptions Prior to Admission[1]    Physical Exam:    Vital Signs:   Vitals:    03/10/25 1146   BP: (!) 140/79   Pulse: 65   Resp: 18   Temp: 97.9 °F (36.6 °C)   Body mass index is 28.98 kg/m².    General Appearance: Well appearing in no acute distress  Eyes:    No scleral icterus  Lungs: Symmetric chest  excursions  Heart:  Regular rate  Abdomen: Soft, non tender, non distended  Skin: No rash    Labs:  Lab Results   Component Value Date    WBC 3.72 (L) 12/03/2024    HGB 13.2 (L) 12/03/2024    HCT 40.4 12/03/2024     12/03/2024    CHOL 185 12/03/2024    TRIG 111 12/03/2024    HDL 54 12/03/2024    ALT 21 12/03/2024    AST 18 12/03/2024     12/03/2024    K 4.6 12/03/2024     12/03/2024    CREATININE 1.4 12/03/2024    BUN 11 12/03/2024    CO2 27 12/03/2024    TSH 0.529 12/03/2024    PSA 0.84 12/03/2024    INR 1.0 11/07/2022    HGBA1C 5.8 (H) 12/03/2024       We discussed the risks of EGD, including bleeding, infections, and perforation requiring surgery.    The patient was consented for the procedure. Plan to proceed with EGD. All questions were answered.    Clau Lowe MD         [1]   Medications Prior to Admission   Medication Sig Dispense Refill Last Dose/Taking    benazepriL (LOTENSIN) 40 MG tablet TAKE 1 TABLET EVERY DAY 90 tablet 3 3/10/2025    diltiaZEM (CARDIZEM SR) 60 MG Cp12 TAKE 1 CAPSULE TWICE DAILY 180 capsule 3 3/10/2025    allopurinoL (ZYLOPRIM) 300 MG tablet TAKE 1 TABLET TWICE DAILY 180 tablet 3 Unknown    omeprazole (PRILOSEC) 40 MG capsule Take 1 capsule (40 mg total) by mouth 2 (two) times daily before meals. for 14 days 28 capsule 0     sildenafiL (VIAGRA) 100 MG tablet Take 1 tablet (100 mg total) by mouth daily as needed for Erectile Dysfunction. 30 tablet 2

## 2025-03-10 NOTE — ANESTHESIA PREPROCEDURE EVALUATION
03/10/2025  Ghulam Ariza is a 64 y.o., male.  Past Medical History:   Diagnosis Date    Cataract     Degenerative disc disease     cervical radiculopathy    Genital herpes     Glaucoma     Hx of colonic polyps     Hypertension early 40s    Pinched nerve in neck       Past Surgical History:   Procedure Laterality Date    back sx      L4/L5 Herniated disk    BUNIONECTOMY Right     COLONOSCOPY N/A 9/6/2018    Procedure: COLONOSCOPY;  Surgeon: Mg Lagunas MD;  Location: [facility] ([address] [place] [address]);  Service: Endoscopy;  Laterality: N/A;    COLONOSCOPY N/A 12/16/2024    Procedure: COLONOSCOPY;  Surgeon: Rene Ang MD;  Location: [facility];  Service: Endoscopy;  Laterality: N/A;    SPINE SURGERY      L4/5 laminectomy    TRANSFORAMINAL EPIDURAL INJECTION OF STEROID Right 6/10/2021    Procedure: INJECTION, STEROID, EPIDURAL, TRANSFORAMINAL APPROACH, L3-L4 AND L4-L5 URGENT CASE;  Surgeon: Enrique Sanchez MD;  Location: [facility];  Service: Pain Management;  Laterality: Right;    TRANSFORAMINAL EPIDURAL INJECTION OF STEROID Bilateral 7/8/2021    Procedure: INJECTION, STEROID, EPIDURAL, TRANSFORAMINAL APPROACH Bilateral L4/5 TFESI MEDICALLY URGENT CASE;  Surgeon: Enrique Sanchez MD;  Location: [facility];  Service: Pain Management;  Laterality: Bilateral;  consent needed        Pre-op Assessment    I have reviewed the Patient Summary Reports.     I have reviewed the Nursing Notes. I have reviewed the NPO Status.   I have reviewed the Medications.     Review of Systems  Anesthesia Hx:  No problems with previous Anesthesia             Denies Family Hx of Anesthesia complications.    Denies Personal Hx of Anesthesia complications.                    Hematology/Oncology:  Hematology Normal   Oncology Normal                                   EENT/Dental:  EENT/Dental Normal           Cardiovascular:  Exercise  tolerance: good   Hypertension              ECG has been reviewed.                      Hypertension         Pulmonary:  Pulmonary Normal                       Renal/:  Chronic Renal Disease, CKD                Hepatic/GI:  Hepatic/GI Normal                    Musculoskeletal:  Arthritis               Neurological:    Neuromuscular Disease,                                   Endocrine:  Endocrine Normal            Dermatological:  Skin Normal    Psych:  Psychiatric Normal                    Physical Exam  General: Well nourished    Airway:  Mallampati: II   Mouth Opening: Normal  TM Distance: Normal  Tongue: Normal  Neck ROM: Normal ROM    Dental:  Intact        Anesthesia Plan  Type of Anesthesia, risks & benefits discussed:    Anesthesia Type: Gen Natural Airway  Intra-op Monitoring Plan: Standard ASA Monitors  Post Op Pain Control Plan: multimodal analgesia and IV/PO Opioids PRN  Induction:  IV  Informed Consent: Informed consent signed with the Patient and all parties understand the risks and agree with anesthesia plan.  All questions answered. Patient consented to blood products? No  ASA Score: 2  Day of Surgery Review of History & Physical: H&P Update referred to the surgeon/provider.I have interviewed and examined the patient. I have reviewed the patient's H&P dated: There are no significant changes.     Ready For Surgery From Anesthesia Perspective.     .

## 2025-03-10 NOTE — [employer] PATIENT INSTRUCTIONS
Discharge Summary/Instructions after an Endoscopic Procedure  Patient Name: Ghulam Ariza  Patient MRN: [id number]  Patient YOB: 1960  Monday, March 10, 2025  Clau Lowe MD  Dear patient,  As a result of recent federal legislation (The Federal Cures Act), you may   receive lab or pathology results from your procedure in your [facility]   account before your physician is able to contact you. Your physician or   their representative will relay the results to you with their   recommendations at their soonest availability.  Thank you,  RESTRICTIONS:  During your procedure today, you received medications for sedation.  These   medications may affect your judgment, balance and coordination.  Therefore,   for 24 hours, you have the following restrictions:   - DO NOT drive a car, operate machinery, make legal/financial decisions,   sign important papers or drink alcohol.    ACTIVITY:  Today: no heavy lifting, straining or running due to procedural   sedation/anesthesia.  The following day: return to full activity including work.  DIET:  Eat and drink normally unless instructed otherwise.     TREATMENT FOR COMMON SIDE EFFECTS:  - Mild abdominal pain, nausea, belching, bloating or excessive gas:  rest,   eat lightly and use a heating pad.  - Sore Throat: treat with throat lozenges and/or gargle with warm salt   water.  - Because air was used during the procedure, expelling large amounts of air   from your rectum or belching is normal.  - If a bowel prep was taken, you may not have a bowel movement for 1-3 days.    This is normal.  SYMPTOMS TO WATCH FOR AND REPORT TO YOUR PHYSICIAN:  1. Abdominal pain or bloating, other than gas cramps.  2. Chest pain.  3. Back pain.  4. Signs of infection such as: chills or fever occurring within 24 hours   after the procedure.  5. Rectal bleeding, which would show as bright red, maroon, or black stools.   (A tablespoon of blood from the rectum is not serious, especially if    hemorrhoids are present.)  6. Vomiting.  7. Weakness or dizziness.  GO DIRECTLY TO THE NEAREST EMERGENCY ROOM IF YOU HAVE ANY OF THE FOLLOWING:      Difficulty breathing              Chills and/or fever over 101 F   Persistent vomiting and/or vomiting blood   Severe abdominal pain   Severe chest pain   Black, tarry stools   Bleeding- more than one tablespoon   Any other symptom or condition that you feel may need urgent attention  Your doctor recommends these additional instructions:  If any biopsies were taken, your doctors clinic will contact you in 1 to 2   weeks with any results.  - Await pathology results.   - Resume previous diet.   - Continue present medications.   - Discharge patient to home (with escort).   - The patient will be observed post-procedure, until all discharge criteria   are met.   - Patient has a contact number available for emergencies.  The signs and   symptoms of potential delayed complications were discussed with the   patient.  Return to normal activities tomorrow.  Written discharge   instructions were provided to the patient.  For questions, problems or results please call your physician - Clau Lowe MD at Work:  [phone].  [facility], EMERGENCY ROOM PHONE NUMBER: [phone]  IF A COMPLICATION OR EMERGENCY SITUATION ARISES AND YOU ARE UNABLE TO REACH   YOUR PHYSICIAN - GO DIRECTLY TO THE EMERGENCY ROOM.  MD Clau Ha MD  3/10/2025 1:15:41 PM  This report has been verified and signed electronically.  Dear patient,  As a result of recent federal legislation (The Federal Cures Act), you may   receive lab or pathology results from your procedure in your [facility]   account before your physician is able to contact you. Your physician or   their representative will relay the results to you with their   recommendations at their soonest availability.  Thank you,  PROVATION

## 2025-03-12 LAB
FINAL PATHOLOGIC DIAGNOSIS: NORMAL
GROSS: NORMAL
Lab: NORMAL
MICROSCOPIC EXAM: NORMAL

## 2025-03-13 DIAGNOSIS — K29.70 HELICOBACTER PYLORI GASTRITIS: Primary | ICD-10-CM

## 2025-03-13 DIAGNOSIS — B96.81 HELICOBACTER PYLORI GASTRITIS: Primary | ICD-10-CM

## 2025-03-14 ENCOUNTER — TELEPHONE (OUTPATIENT)
Dept: HEPATOLOGY | Facility: CLINIC | Age: 65
End: 2025-03-14
Payer: MEDICARE

## 2025-03-14 ENCOUNTER — RESULTS FOLLOW-UP (OUTPATIENT)
Dept: HEPATOLOGY | Facility: CLINIC | Age: 65
End: 2025-03-14

## 2025-03-14 RX ORDER — TETRACYCLINE HYDROCHLORIDE 500 MG/1
500 CAPSULE ORAL 4 TIMES DAILY
Qty: 56 CAPSULE | Refills: 0 | Status: SHIPPED | OUTPATIENT
Start: 2025-03-14 | End: 2025-03-28

## 2025-03-14 RX ORDER — METRONIDAZOLE 500 MG/1
500 TABLET ORAL 4 TIMES DAILY
Qty: 56 TABLET | Refills: 0 | Status: SHIPPED | OUTPATIENT
Start: 2025-03-14 | End: 2025-03-28

## 2025-03-14 RX ORDER — OMEPRAZOLE 40 MG/1
40 CAPSULE, DELAYED RELEASE ORAL 2 TIMES DAILY
Qty: 28 CAPSULE | Refills: 0 | Status: SHIPPED | OUTPATIENT
Start: 2025-03-14 | End: 2025-03-28

## 2025-03-14 NOTE — PROGRESS NOTES
Spoke to the patient over the phone to inform him about results from gastric biopsy taken during EGD on 3/10/25.     Final Pathologic Diagnosis Stomach, random, biopsy:  - Helicobacter pylori positive chronic active gastritis  - Negative for intestinal metaplasia  - Negative for dysplasia or carcinoma     Plan:   - Tetracycline 500mg QID x 14 days  - Metronidazole 500mg QID x 14 days   - Bismuth subsalicylate 524mg QID x 14 days  - Omeprazole 40mg BID x 14 days  - H pylori stool Ag in 6 weeks    Patient understood the plan. All questions were answered.    Clau Lowe MD  Staff Physician  Hepatology and Liver Transplant  [facility]  [facility]

## 2025-03-14 NOTE — TELEPHONE ENCOUNTER
"Patient was called and notified one of his prescriptions is not covered by insurance and will be about $600 out of pocket, so a message has been sent to provider notifying her of this and to see what we will do instead. Patient was notified that once I hear from her I will give him a call and update. Patient voiced agreement and understanding.    Ernestina Klein MA    ----- Message from Arthur sent at 3/14/2025 10:50 AM CDT -----  Consult/AdvisoryName Of Caller: SelfContact Preference?: [phone] What is the nature of the call?: Returning call to office. [facility]Thank you for all that you do for our patients"  "

## 2025-03-17 ENCOUNTER — PATIENT MESSAGE (OUTPATIENT)
Dept: TRANSPLANT | Facility: CLINIC | Age: 65
End: 2025-03-17
Payer: MEDICARE

## 2025-03-17 DIAGNOSIS — A04.8 H. PYLORI INFECTION: Primary | ICD-10-CM

## 2025-03-17 RX ORDER — TETRACYCLINE HYDROCHLORIDE 500 MG/1
500 CAPSULE ORAL 4 TIMES DAILY
Qty: 56 CAPSULE | Refills: 0 | Status: SHIPPED | OUTPATIENT
Start: 2025-03-17 | End: 2025-03-31

## 2025-03-25 DIAGNOSIS — K29.70 HELICOBACTER PYLORI GASTRITIS: ICD-10-CM

## 2025-03-25 DIAGNOSIS — B96.81 HELICOBACTER PYLORI GASTRITIS: ICD-10-CM

## 2025-03-25 RX ORDER — METRONIDAZOLE 500 MG/1
500 TABLET ORAL 4 TIMES DAILY
Qty: 56 TABLET | Refills: 0 | Status: SHIPPED | OUTPATIENT
Start: 2025-03-25 | End: 2025-04-08

## 2025-03-25 RX ORDER — OMEPRAZOLE 40 MG/1
40 CAPSULE, DELAYED RELEASE ORAL 2 TIMES DAILY
Qty: 28 CAPSULE | Refills: 0 | Status: SHIPPED | OUTPATIENT
Start: 2025-03-25 | End: 2025-04-08

## 2025-04-11 DIAGNOSIS — N52.9 ERECTILE DYSFUNCTION, UNSPECIFIED ERECTILE DYSFUNCTION TYPE: ICD-10-CM

## 2025-04-11 RX ORDER — SILDENAFIL 100 MG/1
100 TABLET, FILM COATED ORAL DAILY PRN
Qty: 30 TABLET | Refills: 2 | Status: SHIPPED | OUTPATIENT
Start: 2025-04-11

## 2025-04-11 NOTE — TELEPHONE ENCOUNTER
----- Message from Delores sent at 4/11/2025 11:16 AM CDT -----  Requesting an RX refill or new RX.Is this a refill or new RX: RX name and strength (copy/paste from chart):  sildenafiL (VIAGRA) 100 MG tabletIs this a 30 day or 90 day RX: Pharmacy name and phone # (copy/paste from chart):  [place] [id number] [facility] [address]: [phone] Fax: [phone] doctors have asked that we provide their patients with the following 2 reminders -- prescription refills can take up to 72 hours, and a friendly reminder that in the future you can use your [facility] account to request refills:

## 2025-05-21 ENCOUNTER — TELEPHONE (OUTPATIENT)
Dept: HEPATOLOGY | Facility: CLINIC | Age: 65
End: 2025-05-21
Payer: MEDICARE

## 2025-05-21 NOTE — TELEPHONE ENCOUNTER
----- Message from Med Assistant Do sent at 5/21/2025  2:45 PM CDT -----  Regarding: FW: pt advise  Contact: Pt    ----- Message -----  From: Clau Lowe MD  Sent: 5/20/2025   5:18 PM CDT  To: Ernestina Klein MA  Subject: RE: pt advise                                    I think he is calling to schedule H pylori stool antigen. There is an order from March 2025. Please help in schedule the stool collection. Thank you.  ----- Message -----  From: Ernestina Klein MA  Sent: 5/20/2025   8:46 AM CDT  To: Clau Lowe MD  Subject: FW: pt advise                                      ----- Message -----  From: Charlene Clark  Sent: 5/20/2025   8:38 AM CDT  To: [facility] Staff  Subject: pt advise                                        Patient would like to speak w/ You regarding labsPlease call, Phone  [phone] you

## 2025-05-21 NOTE — TELEPHONE ENCOUNTER
Call returned to the patient at [phone].  Patient informed to ask for the equipment from the Lab in [place] for the stool collection.    Patient stated he lives in [place] and will not be near [place].  Patient stated he has collected stool specimen before so he is aware how to collect.  Pt will return the sample to an [facility] in [place].    Please reach back out to us if you have any problems, questions or concerns.  Voiced understanding.

## 2025-05-22 ENCOUNTER — LAB VISIT (OUTPATIENT)
Dept: LAB | Facility: HOSPITAL | Age: 65
End: 2025-05-22
Attending: INTERNAL MEDICINE
Payer: MEDICARE

## 2025-05-22 DIAGNOSIS — B96.81 HELICOBACTER PYLORI GASTRITIS: ICD-10-CM

## 2025-05-22 DIAGNOSIS — K29.70 HELICOBACTER PYLORI GASTRITIS: ICD-10-CM

## 2025-05-22 PROCEDURE — 87338 HPYLORI STOOL AG IA: CPT

## 2025-05-23 ENCOUNTER — RESULTS FOLLOW-UP (OUTPATIENT)
Dept: TRANSPLANT | Facility: HOSPITAL | Age: 65
End: 2025-05-23

## 2025-05-23 LAB
H. PYLORI SURFACE ANTIGEN, INTERPRETATION (OHS): NEGATIVE
HELICOBACTER PYLORI SURFACE ANTIGEN (OHS): 0.82

## (undated) DEVICE — SYS CLSR DERMABOND PRINEO 22CM

## (undated) DEVICE — SUTURE STRATAFIX PGA PCL 3-0

## (undated) DEVICE — DRAPE STERI-DRAPE 1000 17X11IN

## (undated) DEVICE — KIT SURGIFLO HEMOSTATIC MATRIX

## (undated) DEVICE — DRAPE C-ARMOR EQUIPMENT COVER

## (undated) DEVICE — SPONGE LAP 4X18 PREWASHED

## (undated) DEVICE — NDL SPINAL 18GX3.5 SPINOCAN

## (undated) DEVICE — DRESSING MEPILEX BORDER 4 X 4

## (undated) DEVICE — CORD BIPOLAR 12 FOOT

## (undated) DEVICE — DRESSING AQUACEL FOAM 3 X 3

## (undated) DEVICE — NDL HYPO 27G X 1 1/2

## (undated) DEVICE — SUT VICRYL PLUS 2-0 CT1 18

## (undated) DEVICE — BLADE MILL BONE MEDIUM

## (undated) DEVICE — TUBE FRAZIER 5MM 2FT SOFT TIP

## (undated) DEVICE — SPONGE GAUZE 16PLY 4X4

## (undated) DEVICE — DRESSING TRANS 4X4 TEGADERM

## (undated) DEVICE — BANDAID STRIP PLASTIC 3/4X3

## (undated) DEVICE — DRESSING AQUACEL SACRAL 9 X 9

## (undated) DEVICE — NDL 20GX1-1/2IN IB

## (undated) DEVICE — BLADE 4IN EDGE INSULATED

## (undated) DEVICE — COVER BACK TABLE 72X21

## (undated) DEVICE — SUT 2-0 SILK 30IN BLK BRAID

## (undated) DEVICE — DRESSING LEUKOPLAST FLEX 1X3IN

## (undated) DEVICE — CLOSURE SKIN 1X5 STERI-STRIP

## (undated) DEVICE — DRESSING AQUACEL FOAM 5 X 5

## (undated) DEVICE — SEE MEDLINE ITEM 156905

## (undated) DEVICE — GAUZE SPONGE 4X4 12PLY

## (undated) DEVICE — STIMULATOR NRVE MINI VARI-STIM

## (undated) DEVICE — DRESSING ABSRBNT ISLAND 3.6X8

## (undated) DEVICE — Device

## (undated) DEVICE — ELECTRODE REM PLYHSV RETURN 9

## (undated) DEVICE — BUR BONE CUT MICRO TPS 3X3.8MM

## (undated) DEVICE — SEE MEDLINE ITEM 146347

## (undated) DEVICE — SEE MEDLINE ITEM 146417

## (undated) DEVICE — DRAPE C ARM 42 X 120 10/BX

## (undated) DEVICE — KIT SPINAL PATIENT CARE JACK

## (undated) DEVICE — SEE MEDLINE ITEM 157150

## (undated) DEVICE — APPLICATOR CHLORAPREP ORN 26ML

## (undated) DEVICE — SUT VICRYL+ 1 CT1 18IN

## (undated) DEVICE — TRAY FOLEY 16FR INFECTION CONT

## (undated) DEVICE — DRAPE ABDOMINAL TIBURON 14X11

## (undated) DEVICE — MARKER SKIN STND TIP BLUE BARR